# Patient Record
Sex: FEMALE | Race: WHITE | NOT HISPANIC OR LATINO | Employment: UNEMPLOYED | URBAN - METROPOLITAN AREA
[De-identification: names, ages, dates, MRNs, and addresses within clinical notes are randomized per-mention and may not be internally consistent; named-entity substitution may affect disease eponyms.]

---

## 2023-03-09 DIAGNOSIS — I26.99 PE (PULMONARY THROMBOEMBOLISM) (MULTI): ICD-10-CM

## 2023-03-09 DIAGNOSIS — I10 PRIMARY HYPERTENSION: Primary | ICD-10-CM

## 2023-03-09 RX ORDER — AMLODIPINE BESYLATE 5 MG/1
1 TABLET ORAL DAILY
COMMUNITY
Start: 2020-05-14 | End: 2023-03-09 | Stop reason: SDUPTHER

## 2023-03-10 RX ORDER — AMLODIPINE BESYLATE 5 MG/1
5 TABLET ORAL DAILY
Qty: 90 TABLET | Refills: 0 | Status: SHIPPED | OUTPATIENT
Start: 2023-03-10 | End: 2023-03-30 | Stop reason: SDUPTHER

## 2023-03-13 NOTE — TELEPHONE ENCOUNTER
Patient calling for Amlodipine and Eliquis refill. Patient has physical exam appt for the end of this month.

## 2023-03-25 PROBLEM — Z60.9 DECREASED SOCIAL INTERACTION: Status: ACTIVE | Noted: 2023-03-25

## 2023-03-25 PROBLEM — R79.89 LOW VITAMIN D LEVEL: Status: ACTIVE | Noted: 2023-03-25

## 2023-03-25 PROBLEM — R14.0 ABDOMINAL BLOATING: Status: ACTIVE | Noted: 2023-03-25

## 2023-03-25 PROBLEM — M79.18 MUSCULOSKELETAL PAIN: Status: ACTIVE | Noted: 2023-03-25

## 2023-03-25 PROBLEM — D12.6 TUBULAR ADENOMA OF COLON: Status: ACTIVE | Noted: 2023-03-25

## 2023-03-25 PROBLEM — R73.03 PRE-DIABETES: Status: ACTIVE | Noted: 2023-03-25

## 2023-03-25 PROBLEM — R06.02 SOB (SHORTNESS OF BREATH) ON EXERTION: Status: ACTIVE | Noted: 2023-03-25

## 2023-03-25 PROBLEM — I63.9: Status: ACTIVE | Noted: 2023-03-25

## 2023-03-25 PROBLEM — L60.3 DYSTROPHIC NAIL: Status: ACTIVE | Noted: 2023-03-25

## 2023-03-25 PROBLEM — J30.9 ALLERGIC RHINITIS: Status: ACTIVE | Noted: 2023-03-25

## 2023-03-25 PROBLEM — R05.3 CHRONIC COUGH: Status: ACTIVE | Noted: 2023-03-25

## 2023-03-25 PROBLEM — M54.2 CERVICALGIA: Status: ACTIVE | Noted: 2023-03-25

## 2023-03-25 PROBLEM — N60.91 ATYPICAL DUCTAL HYPERPLASIA OF RIGHT BREAST: Status: ACTIVE | Noted: 2023-03-25

## 2023-03-25 PROBLEM — K62.89 ANAL PAIN: Status: ACTIVE | Noted: 2023-03-25

## 2023-03-25 PROBLEM — M20.42 ACQUIRED HAMMERTOES OF BOTH FEET: Status: ACTIVE | Noted: 2023-03-25

## 2023-03-25 PROBLEM — B35.1 ONYCHOMYCOSIS OF TOENAIL: Status: ACTIVE | Noted: 2023-03-25

## 2023-03-25 PROBLEM — R10.9 CHRONIC RIGHT FLANK PAIN: Status: ACTIVE | Noted: 2023-03-25

## 2023-03-25 PROBLEM — B35.3 TINEA PEDIS OF BOTH FEET: Status: ACTIVE | Noted: 2023-03-25

## 2023-03-25 PROBLEM — M79.10 MYALGIA: Status: ACTIVE | Noted: 2023-03-25

## 2023-03-25 PROBLEM — Z86.0100 HISTORY OF COLON POLYPS: Status: ACTIVE | Noted: 2023-03-25

## 2023-03-25 PROBLEM — K58.9 IRRITABLE BOWEL SYNDROME: Status: ACTIVE | Noted: 2023-03-25

## 2023-03-25 PROBLEM — N39.0 URINARY TRACT INFECTION: Status: ACTIVE | Noted: 2023-03-25

## 2023-03-25 PROBLEM — F32.A ANXIETY AND DEPRESSION: Status: ACTIVE | Noted: 2023-03-25

## 2023-03-25 PROBLEM — M20.41 ACQUIRED HAMMERTOES OF BOTH FEET: Status: ACTIVE | Noted: 2023-03-25

## 2023-03-25 PROBLEM — M19.071 LOCALIZED, PRIMARY OSTEOARTHRITIS OF THE ANKLE AND FOOT, RIGHT: Status: ACTIVE | Noted: 2023-03-25

## 2023-03-25 PROBLEM — M54.6 THORACIC BACK PAIN: Status: ACTIVE | Noted: 2023-03-25

## 2023-03-25 PROBLEM — M77.30 HEEL SPUR: Status: ACTIVE | Noted: 2023-03-25

## 2023-03-25 PROBLEM — R93.89 ABNORMAL CT SCAN: Status: ACTIVE | Noted: 2023-03-25

## 2023-03-25 PROBLEM — K13.0 LESION OF LIP: Status: ACTIVE | Noted: 2023-03-25

## 2023-03-25 PROBLEM — R51.9: Status: ACTIVE | Noted: 2023-03-25

## 2023-03-25 PROBLEM — F41.9 ANXIETY AND DEPRESSION: Status: ACTIVE | Noted: 2023-03-25

## 2023-03-25 PROBLEM — K21.9 CHRONIC GERD: Status: ACTIVE | Noted: 2023-03-25

## 2023-03-25 PROBLEM — N63.20 LEFT BREAST MASS: Status: ACTIVE | Noted: 2023-03-25

## 2023-03-25 PROBLEM — B02.9 SHINGLES: Status: ACTIVE | Noted: 2023-03-25

## 2023-03-25 PROBLEM — G89.29 CHRONIC RIGHT FLANK PAIN: Status: ACTIVE | Noted: 2023-03-25

## 2023-03-25 PROBLEM — R53.83 FATIGUE: Status: ACTIVE | Noted: 2023-03-25

## 2023-03-25 PROBLEM — R10.9 ABDOMINAL PAIN: Status: ACTIVE | Noted: 2023-03-25

## 2023-03-25 PROBLEM — M19.072 LOCALIZED, PRIMARY OSTEOARTHRITIS OF THE ANKLE AND FOOT, LEFT: Status: ACTIVE | Noted: 2023-03-25

## 2023-03-25 PROBLEM — I10 UNCONTROLLED HYPERTENSION: Status: ACTIVE | Noted: 2023-03-25

## 2023-03-25 PROBLEM — Q21.12 PFO (PATENT FORAMEN OVALE) (HHS-HCC): Status: ACTIVE | Noted: 2023-03-25

## 2023-03-25 PROBLEM — R01.1 FLOW MURMUR: Status: ACTIVE | Noted: 2023-03-25

## 2023-03-25 PROBLEM — I63.9 STROKE (MULTI): Status: ACTIVE | Noted: 2023-03-25

## 2023-03-25 PROBLEM — Z86.010 HISTORY OF COLON POLYPS: Status: ACTIVE | Noted: 2023-03-25

## 2023-03-25 PROBLEM — E55.9 VITAMIN D DEFICIENCY: Status: ACTIVE | Noted: 2023-03-25

## 2023-03-25 PROBLEM — R31.29 MICROSCOPIC HEMATURIA: Status: ACTIVE | Noted: 2023-03-25

## 2023-03-25 PROBLEM — R00.2 PALPITATIONS: Status: ACTIVE | Noted: 2023-03-25

## 2023-03-25 PROBLEM — H92.01 OTALGIA OF RIGHT EAR: Status: ACTIVE | Noted: 2023-03-25

## 2023-03-25 PROBLEM — R07.89 ATYPICAL CHEST PAIN: Status: ACTIVE | Noted: 2023-03-25

## 2023-03-25 PROBLEM — R42 DIZZINESS: Status: ACTIVE | Noted: 2023-03-25

## 2023-03-25 PROBLEM — H93.A3 SUBJECTIVE PULSATILE TINNITUS OF BOTH EARS: Status: ACTIVE | Noted: 2023-03-25

## 2023-03-25 PROBLEM — J32.9 CHRONIC SINUSITIS: Status: ACTIVE | Noted: 2023-03-25

## 2023-03-25 PROBLEM — R51.9 PRESSURE IN HEAD: Status: ACTIVE | Noted: 2023-03-25

## 2023-03-25 PROBLEM — E78.00 ELEVATED LDL CHOLESTEROL LEVEL: Status: ACTIVE | Noted: 2023-03-25

## 2023-03-25 PROBLEM — R92.8 ABNORMAL MAMMOGRAM: Status: ACTIVE | Noted: 2023-03-25

## 2023-03-25 PROBLEM — I26.99 PULMONARY EMBOLISM (MULTI): Status: ACTIVE | Noted: 2023-03-25

## 2023-03-25 RX ORDER — EZETIMIBE AND SIMVASTATIN 10; 10 MG/1; MG/1
1 TABLET ORAL EVERY EVENING
COMMUNITY
Start: 2022-03-16 | End: 2023-03-30 | Stop reason: ALTCHOICE

## 2023-03-25 RX ORDER — ASPIRIN 81 MG/1
1 TABLET ORAL DAILY
COMMUNITY
Start: 2021-10-27 | End: 2023-03-30 | Stop reason: ALTCHOICE

## 2023-03-25 RX ORDER — MULTIVITAMIN
1 TABLET ORAL DAILY
COMMUNITY
Start: 2022-10-12 | End: 2023-11-13 | Stop reason: ALTCHOICE

## 2023-03-25 RX ORDER — VIT C/E/ZN/COPPR/LUTEIN/ZEAXAN 250MG-90MG
1 CAPSULE ORAL DAILY
COMMUNITY
Start: 2022-10-12 | End: 2023-10-26 | Stop reason: SDUPTHER

## 2023-03-30 ENCOUNTER — OFFICE VISIT (OUTPATIENT)
Dept: PRIMARY CARE | Facility: CLINIC | Age: 63
End: 2023-03-30
Payer: COMMERCIAL

## 2023-03-30 VITALS
HEART RATE: 71 BPM | TEMPERATURE: 97.9 F | SYSTOLIC BLOOD PRESSURE: 147 MMHG | BODY MASS INDEX: 30.37 KG/M2 | DIASTOLIC BLOOD PRESSURE: 91 MMHG | WEIGHT: 171.4 LBS | HEIGHT: 63 IN

## 2023-03-30 DIAGNOSIS — Z00.00 ENCOUNTER FOR GENERAL ADULT MEDICAL EXAMINATION WITHOUT ABNORMAL FINDINGS: ICD-10-CM

## 2023-03-30 DIAGNOSIS — G89.29 CHRONIC PAIN OF BOTH KNEES: ICD-10-CM

## 2023-03-30 DIAGNOSIS — M25.561 CHRONIC PAIN OF BOTH KNEES: ICD-10-CM

## 2023-03-30 DIAGNOSIS — I63.9 CEREBRAL INFARCTION, UNSPECIFIED (MULTI): ICD-10-CM

## 2023-03-30 DIAGNOSIS — N63.20 MASS OF LEFT BREAST, UNSPECIFIED QUADRANT: Primary | ICD-10-CM

## 2023-03-30 DIAGNOSIS — I10 PRIMARY HYPERTENSION: ICD-10-CM

## 2023-03-30 DIAGNOSIS — Z11.59 NEED FOR HEPATITIS C SCREENING TEST: ICD-10-CM

## 2023-03-30 DIAGNOSIS — I26.99 PE (PULMONARY THROMBOEMBOLISM) (MULTI): ICD-10-CM

## 2023-03-30 DIAGNOSIS — Z11.4 ENCOUNTER FOR SCREENING FOR HIV: ICD-10-CM

## 2023-03-30 DIAGNOSIS — Z12.11 COLON CANCER SCREENING: ICD-10-CM

## 2023-03-30 DIAGNOSIS — Z78.0 ASYMPTOMATIC MENOPAUSE: ICD-10-CM

## 2023-03-30 DIAGNOSIS — M25.562 CHRONIC PAIN OF BOTH KNEES: ICD-10-CM

## 2023-03-30 PROBLEM — R42 DIZZINESS: Status: RESOLVED | Noted: 2023-03-25 | Resolved: 2023-03-30

## 2023-03-30 PROBLEM — H92.01 OTALGIA OF RIGHT EAR: Status: RESOLVED | Noted: 2023-03-25 | Resolved: 2023-03-30

## 2023-03-30 PROBLEM — R07.89 ATYPICAL CHEST PAIN: Status: RESOLVED | Noted: 2023-03-25 | Resolved: 2023-03-30

## 2023-03-30 PROBLEM — K13.0 LESION OF LIP: Status: RESOLVED | Noted: 2023-03-25 | Resolved: 2023-03-30

## 2023-03-30 PROBLEM — R53.83 FATIGUE: Status: RESOLVED | Noted: 2023-03-25 | Resolved: 2023-03-30

## 2023-03-30 PROCEDURE — 99396 PREV VISIT EST AGE 40-64: CPT | Performed by: FAMILY MEDICINE

## 2023-03-30 PROCEDURE — 3077F SYST BP >= 140 MM HG: CPT | Performed by: FAMILY MEDICINE

## 2023-03-30 PROCEDURE — 3080F DIAST BP >= 90 MM HG: CPT | Performed by: FAMILY MEDICINE

## 2023-03-30 PROCEDURE — 1036F TOBACCO NON-USER: CPT | Performed by: FAMILY MEDICINE

## 2023-03-30 RX ORDER — EZETIMIBE AND SIMVASTATIN 10; 10 MG/1; MG/1
1 TABLET ORAL EVERY EVENING
Qty: 90 TABLET | Refills: 1 | Status: SHIPPED | OUTPATIENT
Start: 2023-03-30 | End: 2023-07-24 | Stop reason: SDUPTHER

## 2023-03-30 RX ORDER — AMLODIPINE BESYLATE 5 MG/1
5 TABLET ORAL DAILY
Qty: 90 TABLET | Refills: 1 | Status: SHIPPED | OUTPATIENT
Start: 2023-03-30 | End: 2023-10-26 | Stop reason: SDUPTHER

## 2023-03-30 RX ORDER — ASPIRIN 81 MG/1
81 TABLET ORAL DAILY
Qty: 90 TABLET | Refills: 1 | Status: SHIPPED | OUTPATIENT
Start: 2023-03-30 | End: 2023-07-24 | Stop reason: SDUPTHER

## 2023-03-30 ASSESSMENT — ENCOUNTER SYMPTOMS
DEPRESSION: 0
OCCASIONAL FEELINGS OF UNSTEADINESS: 0
LOSS OF SENSATION IN FEET: 0

## 2023-03-30 ASSESSMENT — PAIN SCALES - GENERAL: PAINLEVEL: 6

## 2023-03-30 NOTE — PROGRESS NOTES
Subjective   Patient ID: Cassandra Lang is a 62 y.o. female who presents for Annual Exam.  HPI  The patient is here for her CPE.  No major concerns otherwise.    Mammogram 1/2023  Colonoscopy 2021: 1 tubular adenoma- repeat in 5y.  Dexa scan ordered today.  Pap smear 5/2019- wnl- no HPV.  Blood and FIT ordered.     She also has bilateral knee pain and gained about 10 lbs over the last year.    A review of system was completed.  All systems were reviewed and were normal, except for the ones that are listed in the HPI.    Objective   Physical Exam    Assessment/Plan   Problem List Items Addressed This Visit          Other    Asymptomatic menopause    Relevant Orders    XR DEXA bone density    Colon cancer screening    Relevant Orders    Fecal Occult Blood Immunoassy    Encounter for screening for HIV    Relevant Orders    HIV 1/2 Antigen/Antibody Screen with Reflex to Confirmation    Need for hepatitis C screening test    Relevant Orders    Hepatitis C Antibody    RESOLVED: Left breast mass - Primary     Other Visit Diagnoses       Primary hypertension        Relevant Medications    amLODIPine (Norvasc) 5 mg tablet    Other Relevant Orders    CBC    Comprehensive Metabolic Panel    Hemoglobin A1C    Lipid Panel    TSH with reflex to Free T4 if abnormal    PE (pulmonary thromboembolism) (CMS/HCC)        Relevant Medications    apixaban (Eliquis) 5 mg tablet    Encounter for general adult medical examination without abnormal findings        Relevant Medications    aspirin 81 mg EC tablet    Cerebral infarction, unspecified (CMS/HCC)        Relevant Medications    ezetimibe-simvastatin (Vytorin) 10-10 mg tablet        1- CPE:  Mammogram 1/2023  Colonoscopy 2021: 1 tubular adenoma- repeat in 5y.  Dexa scan ordered today.  Pap smear 5/2019- wnl- no HPV.  Blood and FIT ordered.      2-  Bilateral knee pain from recent weight gain  Weight loss recommended.    3- Uncontrolled HTN.  - Patient declined an increased of the  Amlodipine 5 mg every day current dose.  Weight loss and life style changes discussed.

## 2023-03-31 ENCOUNTER — LAB (OUTPATIENT)
Dept: LAB | Facility: LAB | Age: 63
End: 2023-03-31
Payer: COMMERCIAL

## 2023-03-31 DIAGNOSIS — Z11.4 ENCOUNTER FOR SCREENING FOR HIV: ICD-10-CM

## 2023-03-31 DIAGNOSIS — Z11.59 NEED FOR HEPATITIS C SCREENING TEST: ICD-10-CM

## 2023-03-31 DIAGNOSIS — I10 PRIMARY HYPERTENSION: ICD-10-CM

## 2023-03-31 LAB
ALANINE AMINOTRANSFERASE (SGPT) (U/L) IN SER/PLAS: 18 U/L (ref 7–45)
ALBUMIN (G/DL) IN SER/PLAS: 4.4 G/DL (ref 3.4–5)
ALKALINE PHOSPHATASE (U/L) IN SER/PLAS: 70 U/L (ref 33–136)
ANION GAP IN SER/PLAS: 13 MMOL/L (ref 10–20)
ASPARTATE AMINOTRANSFERASE (SGOT) (U/L) IN SER/PLAS: 16 U/L (ref 9–39)
BILIRUBIN TOTAL (MG/DL) IN SER/PLAS: 0.7 MG/DL (ref 0–1.2)
CALCIUM (MG/DL) IN SER/PLAS: 9.3 MG/DL (ref 8.6–10.3)
CARBON DIOXIDE, TOTAL (MMOL/L) IN SER/PLAS: 26 MMOL/L (ref 21–32)
CHLORIDE (MMOL/L) IN SER/PLAS: 106 MMOL/L (ref 98–107)
CHOLESTEROL (MG/DL) IN SER/PLAS: 188 MG/DL (ref 0–199)
CHOLESTEROL IN HDL (MG/DL) IN SER/PLAS: 63.5 MG/DL
CHOLESTEROL/HDL RATIO: 3
CREATININE (MG/DL) IN SER/PLAS: 0.72 MG/DL (ref 0.5–1.05)
ERYTHROCYTE DISTRIBUTION WIDTH (RATIO) BY AUTOMATED COUNT: 13 % (ref 11.5–14.5)
ERYTHROCYTE MEAN CORPUSCULAR HEMOGLOBIN CONCENTRATION (G/DL) BY AUTOMATED: 32.2 G/DL (ref 32–36)
ERYTHROCYTE MEAN CORPUSCULAR VOLUME (FL) BY AUTOMATED COUNT: 83 FL (ref 80–100)
ERYTHROCYTES (10*6/UL) IN BLOOD BY AUTOMATED COUNT: 5.29 X10E12/L (ref 4–5.2)
ESTIMATED AVERAGE GLUCOSE FOR HBA1C: 128 MG/DL
GFR FEMALE: >90 ML/MIN/1.73M2
GLUCOSE (MG/DL) IN SER/PLAS: 121 MG/DL (ref 74–99)
HEMATOCRIT (%) IN BLOOD BY AUTOMATED COUNT: 43.8 % (ref 36–46)
HEMOGLOBIN (G/DL) IN BLOOD: 14.1 G/DL (ref 12–16)
HEMOGLOBIN A1C/HEMOGLOBIN TOTAL IN BLOOD: 6.1 %
HEPATITIS C VIRUS AB PRESENCE IN SERUM: NONREACTIVE
HIV 1/ 2 AG/AB SCREEN: NONREACTIVE
LDL: 99 MG/DL (ref 0–99)
LEUKOCYTES (10*3/UL) IN BLOOD BY AUTOMATED COUNT: 4.3 X10E9/L (ref 4.4–11.3)
NRBC (PER 100 WBCS) BY AUTOMATED COUNT: 0 /100 WBC (ref 0–0)
PLATELETS (10*3/UL) IN BLOOD AUTOMATED COUNT: 271 X10E9/L (ref 150–450)
POTASSIUM (MMOL/L) IN SER/PLAS: 4 MMOL/L (ref 3.5–5.3)
PROTEIN TOTAL: 6.6 G/DL (ref 6.4–8.2)
SODIUM (MMOL/L) IN SER/PLAS: 141 MMOL/L (ref 136–145)
THYROTROPIN (MIU/L) IN SER/PLAS BY DETECTION LIMIT <= 0.05 MIU/L: 1.41 MIU/L (ref 0.44–3.98)
TRIGLYCERIDE (MG/DL) IN SER/PLAS: 129 MG/DL (ref 0–149)
UREA NITROGEN (MG/DL) IN SER/PLAS: 14 MG/DL (ref 6–23)
VLDL: 26 MG/DL (ref 0–40)

## 2023-03-31 PROCEDURE — 87389 HIV-1 AG W/HIV-1&-2 AB AG IA: CPT

## 2023-03-31 PROCEDURE — 85027 COMPLETE CBC AUTOMATED: CPT

## 2023-03-31 PROCEDURE — 80061 LIPID PANEL: CPT

## 2023-03-31 PROCEDURE — 36415 COLL VENOUS BLD VENIPUNCTURE: CPT

## 2023-03-31 PROCEDURE — 80053 COMPREHEN METABOLIC PANEL: CPT

## 2023-03-31 PROCEDURE — 83036 HEMOGLOBIN GLYCOSYLATED A1C: CPT

## 2023-03-31 PROCEDURE — 86803 HEPATITIS C AB TEST: CPT

## 2023-03-31 PROCEDURE — 84443 ASSAY THYROID STIM HORMONE: CPT

## 2023-04-03 ENCOUNTER — LAB (OUTPATIENT)
Dept: LAB | Facility: LAB | Age: 63
End: 2023-04-03
Payer: COMMERCIAL

## 2023-04-03 DIAGNOSIS — Z12.11 COLON CANCER SCREENING: ICD-10-CM

## 2023-06-28 ENCOUNTER — OFFICE VISIT (OUTPATIENT)
Dept: PRIMARY CARE | Facility: CLINIC | Age: 63
End: 2023-06-28
Payer: COMMERCIAL

## 2023-06-28 VITALS
HEART RATE: 89 BPM | DIASTOLIC BLOOD PRESSURE: 80 MMHG | SYSTOLIC BLOOD PRESSURE: 137 MMHG | HEIGHT: 63 IN | BODY MASS INDEX: 30.51 KG/M2 | WEIGHT: 172.2 LBS

## 2023-06-28 DIAGNOSIS — L23.9 ALLERGIC DERMATITIS: Primary | ICD-10-CM

## 2023-06-28 PROCEDURE — 3075F SYST BP GE 130 - 139MM HG: CPT | Performed by: FAMILY MEDICINE

## 2023-06-28 PROCEDURE — 99214 OFFICE O/P EST MOD 30 MIN: CPT | Performed by: FAMILY MEDICINE

## 2023-06-28 PROCEDURE — 1036F TOBACCO NON-USER: CPT | Performed by: FAMILY MEDICINE

## 2023-06-28 PROCEDURE — 3079F DIAST BP 80-89 MM HG: CPT | Performed by: FAMILY MEDICINE

## 2023-06-28 RX ORDER — PREDNISONE 20 MG/1
20 TABLET ORAL DAILY
Qty: 5 TABLET | Refills: 0 | Status: SHIPPED | OUTPATIENT
Start: 2023-06-28 | End: 2023-07-03

## 2023-06-28 RX ORDER — LORATADINE 10 MG/1
10 TABLET ORAL DAILY
Qty: 30 TABLET | Refills: 2 | Status: SHIPPED | OUTPATIENT
Start: 2023-06-28 | End: 2023-07-30

## 2023-06-28 NOTE — ASSESSMENT & PLAN NOTE
-stop vitamin E today.  -Prednisone 20 mg every day for 5 days.  -Loratadine 10 mg every day started today.  -stool for ova and parasites.

## 2023-06-28 NOTE — PROGRESS NOTES
Subjective   Patient ID: Cassandra Lang is a 63 y.o. female who presents for Rash.  Rash        The patient is here for the management of a new generalized pruritus that started at least two weeks ago.  She thinks that it started shortly after starting a vitamin E supplement.     A review of system was completed.  All systems were reviewed and were normal, except for the ones that are listed in the HPI.    Objective   Physical Exam  Constitutional:       Appearance: Normal appearance.   HENT:      Head: Normocephalic and atraumatic.      Right Ear: Tympanic membrane, ear canal and external ear normal.      Left Ear: Tympanic membrane, ear canal and external ear normal.      Nose: Nose normal.      Mouth/Throat:      Mouth: Mucous membranes are moist.      Pharynx: Oropharynx is clear.   Eyes:      Extraocular Movements: Extraocular movements intact.      Conjunctiva/sclera: Conjunctivae normal.      Pupils: Pupils are equal, round, and reactive to light.   Cardiovascular:      Rate and Rhythm: Normal rate and regular rhythm.      Pulses: Normal pulses.   Pulmonary:      Effort: Pulmonary effort is normal.      Breath sounds: Normal breath sounds.   Abdominal:      General: Abdomen is flat. Bowel sounds are normal.      Palpations: Abdomen is soft.   Musculoskeletal:         General: Normal range of motion.      Cervical back: Normal range of motion and neck supple.   Skin:     General: Skin is warm.   Neurological:      General: No focal deficit present.      Mental Status: She is alert and oriented to person, place, and time. Mental status is at baseline.   Psychiatric:         Mood and Affect: Mood normal.         Behavior: Behavior normal.         Thought Content: Thought content normal.         Judgment: Judgment normal.         Assessment/Plan   Problem List Items Addressed This Visit          Skin    Allergic dermatitis - Primary     -stop vitamin E today.  -Prednisone 20 mg every day for 5  days.  -Loratadine 10 mg every day started today.  -stool for ova and parasites.          Relevant Medications    predniSONE (Deltasone) 20 mg tablet    loratadine (Claritin) 10 mg tablet    Other Relevant Orders    Referral to Allergy    Ova/Para + Giardia/Cryptosporidium Antigen

## 2023-07-18 ENCOUNTER — LAB (OUTPATIENT)
Dept: LAB | Facility: LAB | Age: 63
End: 2023-07-18
Payer: COMMERCIAL

## 2023-07-18 DIAGNOSIS — L23.9 ALLERGIC DERMATITIS: ICD-10-CM

## 2023-07-18 PROCEDURE — 87328 CRYPTOSPORIDIUM AG IA: CPT

## 2023-07-18 PROCEDURE — 87177 OVA AND PARASITES SMEARS: CPT

## 2023-07-18 PROCEDURE — 87329 GIARDIA AG IA: CPT

## 2023-07-22 DIAGNOSIS — L23.9 ALLERGIC DERMATITIS: ICD-10-CM

## 2023-07-24 ENCOUNTER — OFFICE VISIT (OUTPATIENT)
Dept: PRIMARY CARE | Facility: CLINIC | Age: 63
End: 2023-07-24
Payer: COMMERCIAL

## 2023-07-24 VITALS
SYSTOLIC BLOOD PRESSURE: 134 MMHG | DIASTOLIC BLOOD PRESSURE: 93 MMHG | HEART RATE: 94 BPM | WEIGHT: 172.8 LBS | TEMPERATURE: 98 F | BODY MASS INDEX: 30.61 KG/M2

## 2023-07-24 DIAGNOSIS — E78.5 HYPERLIPIDEMIA, UNSPECIFIED HYPERLIPIDEMIA TYPE: ICD-10-CM

## 2023-07-24 DIAGNOSIS — I63.9 CEREBRAL INFARCTION, UNSPECIFIED (MULTI): ICD-10-CM

## 2023-07-24 DIAGNOSIS — R73.03 PRE-DIABETES: ICD-10-CM

## 2023-07-24 DIAGNOSIS — Z00.00 ENCOUNTER FOR GENERAL ADULT MEDICAL EXAMINATION WITHOUT ABNORMAL FINDINGS: ICD-10-CM

## 2023-07-24 DIAGNOSIS — M54.6 ACUTE RIGHT-SIDED THORACIC BACK PAIN: Primary | ICD-10-CM

## 2023-07-24 DIAGNOSIS — I26.99 PE (PULMONARY THROMBOEMBOLISM) (MULTI): ICD-10-CM

## 2023-07-24 DIAGNOSIS — I10 PRIMARY HYPERTENSION: ICD-10-CM

## 2023-07-24 LAB
CRYPTOSPORIDIUM ANTIGEN-DATA CONVERSION: NEGATIVE
GIARDIA LAMBLIA AG-DATA CONVERSION: NEGATIVE
OVA + PARASITE EXAM: NEGATIVE

## 2023-07-24 PROCEDURE — 99214 OFFICE O/P EST MOD 30 MIN: CPT | Performed by: FAMILY MEDICINE

## 2023-07-24 PROCEDURE — 3080F DIAST BP >= 90 MM HG: CPT | Performed by: FAMILY MEDICINE

## 2023-07-24 PROCEDURE — 3075F SYST BP GE 130 - 139MM HG: CPT | Performed by: FAMILY MEDICINE

## 2023-07-24 PROCEDURE — 1036F TOBACCO NON-USER: CPT | Performed by: FAMILY MEDICINE

## 2023-07-24 RX ORDER — EZETIMIBE AND SIMVASTATIN 10; 10 MG/1; MG/1
1 TABLET ORAL EVERY EVENING
Qty: 90 TABLET | Refills: 1 | Status: SHIPPED | OUTPATIENT
Start: 2023-07-24 | End: 2023-10-26 | Stop reason: SDUPTHER

## 2023-07-24 RX ORDER — ASPIRIN 81 MG/1
81 TABLET ORAL DAILY
Qty: 90 TABLET | Refills: 1 | Status: SHIPPED | OUTPATIENT
Start: 2023-07-24 | End: 2023-10-26 | Stop reason: SDUPTHER

## 2023-07-24 RX ORDER — METHOCARBAMOL 500 MG/1
TABLET, FILM COATED ORAL
COMMUNITY
End: 2023-07-24 | Stop reason: SDUPTHER

## 2023-07-24 RX ORDER — AMLODIPINE BESYLATE 5 MG/1
5 TABLET ORAL DAILY
Qty: 90 TABLET | Refills: 1 | Status: ON HOLD | OUTPATIENT
Start: 2023-07-24 | End: 2023-10-02 | Stop reason: SDUPTHER

## 2023-07-24 RX ORDER — METHOCARBAMOL 500 MG/1
TABLET, FILM COATED ORAL
Qty: 20 TABLET | Refills: 0 | Status: SHIPPED | OUTPATIENT
Start: 2023-07-24 | End: 2023-11-13 | Stop reason: ALTCHOICE

## 2023-07-24 NOTE — PROGRESS NOTES
Subjective   Patient ID: Cassandra Lang is a 63 y.o. female who presents for Hospital Follow-up.  HPI  The patient is here for:  1-  her post hospitalization follow-up visit.  She went in for a right upper and para thoracic pain/ spasms.  She was treated with methocarbamol and lidocaine patch.  The pain improved but is occasionally still present.  2-  Crawling sensation under the skin.  Stool for Ova and parasites were normal.  3- A1C and lipid panel.     A review of system was completed.  All systems were reviewed and were normal, except for the ones that are listed in the HPI.    Objective   Physical Exam  Constitutional:       Appearance: Normal appearance.   HENT:      Head: Normocephalic and atraumatic.      Right Ear: Tympanic membrane, ear canal and external ear normal.      Left Ear: Tympanic membrane, ear canal and external ear normal.      Nose: Nose normal.      Mouth/Throat:      Mouth: Mucous membranes are moist.      Pharynx: Oropharynx is clear.   Eyes:      Extraocular Movements: Extraocular movements intact.      Conjunctiva/sclera: Conjunctivae normal.      Pupils: Pupils are equal, round, and reactive to light.   Cardiovascular:      Rate and Rhythm: Normal rate and regular rhythm.      Pulses: Normal pulses.   Pulmonary:      Effort: Pulmonary effort is normal.      Breath sounds: Normal breath sounds.   Abdominal:      General: Abdomen is flat. Bowel sounds are normal.      Palpations: Abdomen is soft.   Musculoskeletal:         General: Normal range of motion.      Cervical back: Normal range of motion and neck supple.      Comments: Right para scapula tenderness to palpation.  No paresthesia.    Skin:     General: Skin is warm.   Neurological:      General: No focal deficit present.      Mental Status: She is alert and oriented to person, place, and time. Mental status is at baseline.   Psychiatric:         Mood and Affect: Mood normal.         Behavior: Behavior normal.         Thought  Content: Thought content normal.         Judgment: Judgment normal.         Assessment/Plan   Problem List Items Addressed This Visit       Pre-diabetes     -Diet and life style changes discussed.  Continue statin.  -Blood test ordered.          Relevant Orders    Hemoglobin A1C    Thoracic back pain - Primary     -possible muscle strain.  Tylenol and Methocarbamol PRN started.  -Lidocaine patches PRN.          Relevant Medications    methocarbamol (Robaxin) 500 mg tablet    Hyperlipidemia     -diet and life style changes continue.  -statin started.          Relevant Orders    Lipid Panel     Other Visit Diagnoses       Cerebral infarction, unspecified (CMS/HCC)        Relevant Medications    ezetimibe-simvastatin (Vytorin) 10-10 mg tablet    PE (pulmonary thromboembolism) (CMS/HCC)        Relevant Medications    apixaban (Eliquis) 5 mg tablet    Primary hypertension        Relevant Medications    amLODIPine (Norvasc) 5 mg tablet    Other Relevant Orders    Comprehensive Metabolic Panel    Encounter for general adult medical examination without abnormal findings        Relevant Medications    aspirin 81 mg EC tablet

## 2023-07-30 RX ORDER — LORATADINE 10 MG/1
10 TABLET ORAL DAILY
Qty: 30 TABLET | Refills: 2 | Status: SHIPPED | OUTPATIENT
Start: 2023-07-30 | End: 2023-11-13 | Stop reason: ALTCHOICE

## 2023-09-06 ENCOUNTER — HOSPITAL ENCOUNTER (OUTPATIENT)
Dept: DATA CONVERSION | Facility: HOSPITAL | Age: 63
End: 2023-09-06
Attending: SURGERY | Admitting: SURGERY
Payer: COMMERCIAL

## 2023-09-06 DIAGNOSIS — N60.91 UNSPECIFIED BENIGN MAMMARY DYSPLASIA OF RIGHT BREAST: ICD-10-CM

## 2023-09-13 LAB
COMPLETE PATHOLOGY REPORT: NORMAL
CONVERTED ADDENDUM DIAGNOSIS 2: NORMAL
CONVERTED CLINICAL DIAGNOSIS-HISTORY: NORMAL
CONVERTED FINAL DIAGNOSIS: NORMAL
CONVERTED FINAL REPORT PDF LINK TO COPY AND PASTE: NORMAL
CONVERTED GROSS DESCRIPTION: NORMAL
CONVERTED SYNOPTIC DIAGNOSIS: NORMAL

## 2023-09-29 VITALS — WEIGHT: 171.96 LBS | HEIGHT: 64 IN | BODY MASS INDEX: 29.36 KG/M2

## 2023-09-30 ENCOUNTER — ANESTHESIA EVENT (OUTPATIENT)
Dept: OPERATING ROOM | Facility: HOSPITAL | Age: 63
End: 2023-09-30
Payer: COMMERCIAL

## 2023-09-30 NOTE — H&P
History & Physical Reviewed:   I have reviewed the History and Physical dated:  08-Aug-2023   History and Physical reviewed and relevant findings noted. Patient examined to review pertinent physical  findings.: No significant changes   Home Medications Reviewed: no changes noted   Allergies Reviewed: no changes noted       ERAS (Enhanced Recovery After Surgery):  ·  ERAS Patient: no     Consent:   COVID-19 Consent:  ·  COVID-19 Risk Consent Surgeon has reviewed key risks related to the risk of ebony COVID-19 and if they contract COVID-19 what the risks are.       Electronic Signatures:  Gricelda Soria)  (Signed 06-Sep-2023 06:58)   Authored: History & Physical Reviewed, ERAS, Consent,  Note Completion      Last Updated: 06-Sep-2023 06:58 by Gricelda Soria)

## 2023-10-01 ENCOUNTER — PREP FOR PROCEDURE (OUTPATIENT)
Dept: SURGICAL ONCOLOGY | Facility: CLINIC | Age: 63
End: 2023-10-01
Payer: COMMERCIAL

## 2023-10-01 DIAGNOSIS — Z17.1 MALIGNANT NEOPLASM OF UPPER-OUTER QUADRANT OF RIGHT BREAST IN FEMALE, ESTROGEN RECEPTOR NEGATIVE (MULTI): Primary | ICD-10-CM

## 2023-10-01 DIAGNOSIS — C50.411 MALIGNANT NEOPLASM OF UPPER-OUTER QUADRANT OF RIGHT BREAST IN FEMALE, ESTROGEN RECEPTOR NEGATIVE (MULTI): Primary | ICD-10-CM

## 2023-10-01 RX ORDER — ONDANSETRON HYDROCHLORIDE 2 MG/ML
4 INJECTION, SOLUTION INTRAVENOUS ONCE AS NEEDED
Status: CANCELLED | OUTPATIENT
Start: 2023-10-01

## 2023-10-01 RX ORDER — ACETAMINOPHEN 325 MG/1
975 TABLET ORAL ONCE
Status: CANCELLED | OUTPATIENT
Start: 2023-10-01 | End: 2023-10-01

## 2023-10-01 RX ORDER — SODIUM CHLORIDE, SODIUM LACTATE, POTASSIUM CHLORIDE, CALCIUM CHLORIDE 600; 310; 30; 20 MG/100ML; MG/100ML; MG/100ML; MG/100ML
100 INJECTION, SOLUTION INTRAVENOUS CONTINUOUS
Status: CANCELLED | OUTPATIENT
Start: 2023-10-01

## 2023-10-01 RX ORDER — LIDOCAINE HYDROCHLORIDE 10 MG/ML
0.1 INJECTION, SOLUTION EPIDURAL; INFILTRATION; INTRACAUDAL; PERINEURAL ONCE
Status: CANCELLED | OUTPATIENT
Start: 2023-10-01 | End: 2023-10-01

## 2023-10-01 RX ORDER — DROPERIDOL 2.5 MG/ML
0.62 INJECTION, SOLUTION INTRAMUSCULAR; INTRAVENOUS ONCE AS NEEDED
Status: CANCELLED | OUTPATIENT
Start: 2023-10-01

## 2023-10-01 RX ORDER — APREPITANT 40 MG/1
40 CAPSULE ORAL ONCE
Status: CANCELLED | OUTPATIENT
Start: 2023-10-01 | End: 2023-10-01

## 2023-10-01 RX ORDER — SODIUM CHLORIDE, SODIUM LACTATE, POTASSIUM CHLORIDE, CALCIUM CHLORIDE 600; 310; 30; 20 MG/100ML; MG/100ML; MG/100ML; MG/100ML
INJECTION, SOLUTION INTRAVENOUS CONTINUOUS
Status: CANCELLED | OUTPATIENT
Start: 2023-10-01

## 2023-10-01 RX ORDER — ALBUTEROL SULFATE 0.83 MG/ML
2.5 SOLUTION RESPIRATORY (INHALATION) ONCE AS NEEDED
Status: CANCELLED | OUTPATIENT
Start: 2023-10-01

## 2023-10-01 RX ORDER — MIDAZOLAM HYDROCHLORIDE 1 MG/ML
1 INJECTION INTRAMUSCULAR; INTRAVENOUS ONCE AS NEEDED
Status: CANCELLED | OUTPATIENT
Start: 2023-10-01

## 2023-10-01 RX ORDER — SODIUM CHLORIDE, SODIUM LACTATE, POTASSIUM CHLORIDE, CALCIUM CHLORIDE 600; 310; 30; 20 MG/100ML; MG/100ML; MG/100ML; MG/100ML
50 INJECTION, SOLUTION INTRAVENOUS CONTINUOUS
Status: CANCELLED | OUTPATIENT
Start: 2023-10-02

## 2023-10-01 RX ORDER — MEPERIDINE HYDROCHLORIDE 25 MG/ML
12.5 INJECTION INTRAMUSCULAR; INTRAVENOUS; SUBCUTANEOUS EVERY 10 MIN PRN
Status: CANCELLED | OUTPATIENT
Start: 2023-10-01

## 2023-10-01 RX ORDER — OXYCODONE HYDROCHLORIDE 5 MG/1
5 TABLET ORAL EVERY 4 HOURS PRN
Status: CANCELLED | OUTPATIENT
Start: 2023-10-01

## 2023-10-01 RX ORDER — LIDOCAINE HYDROCHLORIDE 10 MG/ML
INJECTION, SOLUTION EPIDURAL; INFILTRATION; INTRACAUDAL; PERINEURAL ONCE
Status: CANCELLED | OUTPATIENT
Start: 2023-10-01 | End: 2023-10-01

## 2023-10-01 RX ORDER — DIPHENHYDRAMINE HYDROCHLORIDE 50 MG/ML
12.5 INJECTION INTRAMUSCULAR; INTRAVENOUS ONCE AS NEEDED
Status: CANCELLED | OUTPATIENT
Start: 2023-10-01

## 2023-10-01 RX ORDER — HYDRALAZINE HYDROCHLORIDE 20 MG/ML
5 INJECTION INTRAMUSCULAR; INTRAVENOUS EVERY 30 MIN PRN
Status: CANCELLED | OUTPATIENT
Start: 2023-10-01

## 2023-10-01 RX ORDER — LABETALOL HYDROCHLORIDE 5 MG/ML
5 INJECTION, SOLUTION INTRAVENOUS ONCE AS NEEDED
Status: CANCELLED | OUTPATIENT
Start: 2023-10-01

## 2023-10-01 NOTE — OP NOTE
"      PROCEDURE DETAILS    Preoperative Diagnosis:  right ADH    Postoperative Diagnosis:  right ADH    Surgeon: Gricelda Soria  Resident/Fellow/Other Assistant: Maik Warner    Procedure:  1. Right Breast Magseed Excisional Biopsy  2. 80850 interpretation of specimen radiograph    Anesthesia: Mike Dickson  Estimated Blood Loss: 5 mL  Findings: specimen radiograph confirmed removal of distortion, biopsy clip, and Magseed  Specimens(s) Collected: yes,  right Magseed excisional biopsy at 10:00 5cm FN, short stitch superior, long stitch lateral    Complications: none  Drains and/or Catheters: none  Implants: none  Patient Returned To/Condition: to PACU in excellent condition        Operative Report:   After informed consent was obtained and  the appropriate breast was marked the patient was transferred to the operating room.  She was positioned on the operating room table in a supine position with her pressure points padded appropriately. A surgical time out was performed with the OR team.   General anesthesia was induced and she received perioperative antibiotics in the form of Ancef.    I reviewed the preoperative imaging and confirmed that she had 1 MagSeed in her right breast at the 10 o'clock position localizing the biopsy clip and distortion.  Her right breast and axilla were prepped and draped in a sterile fashion.  Using the probe,  I was able to confirm the location of the MagSeed.  I made a radial incision. Dissection was taken down through her soft tissues using electrocautery.  A skin flap was raised superiorly and I excised a 3x2x2 cm specimen of breast tissue that contained  the MagSeed.  I confirmed the seed was within the excision specimen using the probe. The specimen was inked on all 6 sides and sent to Pathology fresh labeled \"right Magseed excisional biopsy at 10:00 5cm FN, short stitch superior, long stitch lateral.\"    I reviewed the specimen radiograph myself.  I confirmed the excision specimen " contained the MagSeed and the biopsy clip.  The cavity was irrigated.  Hemostasis was achieved with electrocautery and clips. There was one very large vessel posterior to the excision that was secured with clips. One small clip was left marking the base of the cavity.  The cavity was closed in  layers using multiple interrupted 3-0 Vicryls. The skin was closed using interrupted 3-0 Vicryl deep dermals and a running subcuticular 4-0 Monocryl.      10 mL of 0.5 percent Marcaine plain were injected into the surrounding breast tissue for postoperative analgesia. The incision was dressed with skin glue, fluffs, and a surgical bra.  The patient awoke from general anesthesia without incident.  The instrument  and sponge counts were correct at the end of the case.                            Attestation:   Note Completion:  Attending Attestation I performed the procedure without a resident         Electronic Signatures:  Gricelda Soria)  (Signed 06-Sep-2023 08:29)   Authored: Post-Operative Note, Chart Review, Note Completion      Last Updated: 06-Sep-2023 08:29 by Gricelda Soria)

## 2023-10-02 ENCOUNTER — HOSPITAL ENCOUNTER (OUTPATIENT)
Facility: HOSPITAL | Age: 63
Setting detail: OUTPATIENT SURGERY
Discharge: HOME | End: 2023-10-02
Attending: SURGERY | Admitting: SURGERY
Payer: COMMERCIAL

## 2023-10-02 ENCOUNTER — ANESTHESIA (OUTPATIENT)
Dept: OPERATING ROOM | Facility: HOSPITAL | Age: 63
End: 2023-10-02

## 2023-10-02 ENCOUNTER — APPOINTMENT (OUTPATIENT)
Dept: PRIMARY CARE | Facility: CLINIC | Age: 63
End: 2023-10-02
Payer: COMMERCIAL

## 2023-10-02 ENCOUNTER — HOSPITAL ENCOUNTER (OUTPATIENT)
Dept: RADIOLOGY | Facility: HOSPITAL | Age: 63
Setting detail: OUTPATIENT SURGERY
Discharge: HOME | End: 2023-10-02
Payer: COMMERCIAL

## 2023-10-02 ENCOUNTER — ANESTHESIA EVENT (OUTPATIENT)
Dept: OPERATING ROOM | Facility: HOSPITAL | Age: 63
End: 2023-10-02

## 2023-10-02 ENCOUNTER — ANESTHESIA (OUTPATIENT)
Dept: OPERATING ROOM | Facility: HOSPITAL | Age: 63
End: 2023-10-02
Payer: COMMERCIAL

## 2023-10-02 VITALS
BODY MASS INDEX: 27.81 KG/M2 | DIASTOLIC BLOOD PRESSURE: 77 MMHG | SYSTOLIC BLOOD PRESSURE: 145 MMHG | RESPIRATION RATE: 16 BRPM | WEIGHT: 162.92 LBS | HEART RATE: 64 BPM | TEMPERATURE: 97 F | OXYGEN SATURATION: 95 % | HEIGHT: 64 IN

## 2023-10-02 DIAGNOSIS — C50.911 MALIGNANT NEOPLASM OF UNSPECIFIED SITE OF RIGHT FEMALE BREAST (MULTI): ICD-10-CM

## 2023-10-02 DIAGNOSIS — C50.411 MALIGNANT NEOPLASM OF UPPER-OUTER QUADRANT OF RIGHT FEMALE BREAST (MULTI): ICD-10-CM

## 2023-10-02 PROCEDURE — 2500000004 HC RX 250 GENERAL PHARMACY W/ HCPCS (ALT 636 FOR OP/ED): Performed by: NURSE ANESTHETIST, CERTIFIED REGISTERED

## 2023-10-02 PROCEDURE — 38792 RA TRACER ID OF SENTINL NODE: CPT | Performed by: SURGERY

## 2023-10-02 PROCEDURE — 3600000008 HC OR TIME - EACH INCREMENTAL 1 MINUTE - PROCEDURE LEVEL THREE: Performed by: SURGERY

## 2023-10-02 PROCEDURE — 2500000001 HC RX 250 WO HCPCS SELF ADMINISTERED DRUGS (ALT 637 FOR MEDICARE OP): Performed by: SURGERY

## 2023-10-02 PROCEDURE — A38525 PR BX/REMV,LYMPH NODE,DEEP AXILL: Performed by: NURSE ANESTHETIST, CERTIFIED REGISTERED

## 2023-10-02 PROCEDURE — 88307 TISSUE EXAM BY PATHOLOGIST: CPT | Mod: TC,SUR,AHULAB,CMCLAB | Performed by: SURGERY

## 2023-10-02 PROCEDURE — 2580000001 HC RX 258 IV SOLUTIONS: Performed by: ANESTHESIOLOGY

## 2023-10-02 PROCEDURE — 2720000007 HC OR 272 NO HCPCS: Performed by: SURGERY

## 2023-10-02 PROCEDURE — 7100000001 HC RECOVERY ROOM TIME - INITIAL BASE CHARGE: Performed by: SURGERY

## 2023-10-02 PROCEDURE — 19301 PARTIAL MASTECTOMY: CPT | Performed by: SURGERY

## 2023-10-02 PROCEDURE — A9520 TC99 TILMANOCEPT DIAG 0.5MCI: HCPCS | Performed by: SURGERY

## 2023-10-02 PROCEDURE — 3600000003 HC OR TIME - INITIAL BASE CHARGE - PROCEDURE LEVEL THREE: Performed by: SURGERY

## 2023-10-02 PROCEDURE — 7100000009 HC PHASE TWO TIME - INITIAL BASE CHARGE: Performed by: SURGERY

## 2023-10-02 PROCEDURE — 2500000004 HC RX 250 GENERAL PHARMACY W/ HCPCS (ALT 636 FOR OP/ED): Performed by: ANESTHESIOLOGY

## 2023-10-02 PROCEDURE — 38525 BIOPSY/REMOVAL LYMPH NODES: CPT | Performed by: SURGERY

## 2023-10-02 PROCEDURE — 7100000002 HC RECOVERY ROOM TIME - EACH INCREMENTAL 1 MINUTE: Performed by: SURGERY

## 2023-10-02 PROCEDURE — 3700000002 HC GENERAL ANESTHESIA TIME - EACH INCREMENTAL 1 MINUTE: Performed by: SURGERY

## 2023-10-02 PROCEDURE — 2580000001 HC RX 258 IV SOLUTIONS: Performed by: NURSE ANESTHETIST, CERTIFIED REGISTERED

## 2023-10-02 PROCEDURE — 3430000001 HC RX 343 DIAGNOSTIC RADIOPHARMACEUTICALS: Performed by: SURGERY

## 2023-10-02 PROCEDURE — 3700000001 HC GENERAL ANESTHESIA TIME - INITIAL BASE CHARGE: Performed by: SURGERY

## 2023-10-02 PROCEDURE — 88307 TISSUE EXAM BY PATHOLOGIST: CPT | Performed by: STUDENT IN AN ORGANIZED HEALTH CARE EDUCATION/TRAINING PROGRAM

## 2023-10-02 PROCEDURE — 2500000005 HC RX 250 GENERAL PHARMACY W/O HCPCS: Performed by: NURSE ANESTHETIST, CERTIFIED REGISTERED

## 2023-10-02 PROCEDURE — 2580000001 HC RX 258 IV SOLUTIONS: Performed by: SURGERY

## 2023-10-02 PROCEDURE — 2500000001 HC RX 250 WO HCPCS SELF ADMINISTERED DRUGS (ALT 637 FOR MEDICARE OP): Performed by: ANESTHESIOLOGY

## 2023-10-02 PROCEDURE — 2500000004 HC RX 250 GENERAL PHARMACY W/ HCPCS (ALT 636 FOR OP/ED): Performed by: SURGERY

## 2023-10-02 PROCEDURE — A38525 PR BX/REMV,LYMPH NODE,DEEP AXILL: Performed by: ANESTHESIOLOGY

## 2023-10-02 PROCEDURE — 88307 TISSUE EXAM BY PATHOLOGIST: CPT | Mod: TC,AHULAB,CMCLAB | Performed by: SURGERY

## 2023-10-02 PROCEDURE — 7100000010 HC PHASE TWO TIME - EACH INCREMENTAL 1 MINUTE: Performed by: SURGERY

## 2023-10-02 PROCEDURE — 38900 IO MAP OF SENT LYMPH NODE: CPT | Performed by: SURGERY

## 2023-10-02 DEVICE — HORIZON TI SMALL RED 6 CLIPS/CART
Type: IMPLANTABLE DEVICE | Site: BREAST | Status: NON-FUNCTIONAL
Brand: WECK

## 2023-10-02 RX ORDER — SODIUM CHLORIDE, SODIUM LACTATE, POTASSIUM CHLORIDE, CALCIUM CHLORIDE 600; 310; 30; 20 MG/100ML; MG/100ML; MG/100ML; MG/100ML
100 INJECTION, SOLUTION INTRAVENOUS CONTINUOUS
Status: DISCONTINUED | OUTPATIENT
Start: 2023-10-02 | End: 2023-10-02 | Stop reason: HOSPADM

## 2023-10-02 RX ORDER — SODIUM CHLORIDE, SODIUM LACTATE, POTASSIUM CHLORIDE, CALCIUM CHLORIDE 600; 310; 30; 20 MG/100ML; MG/100ML; MG/100ML; MG/100ML
50 INJECTION, SOLUTION INTRAVENOUS CONTINUOUS
Status: DISCONTINUED | OUTPATIENT
Start: 2023-10-02 | End: 2023-10-02 | Stop reason: HOSPADM

## 2023-10-02 RX ORDER — HYDROMORPHONE HYDROCHLORIDE 1 MG/ML
1 INJECTION, SOLUTION INTRAMUSCULAR; INTRAVENOUS; SUBCUTANEOUS EVERY 5 MIN PRN
Status: DISCONTINUED | OUTPATIENT
Start: 2023-10-02 | End: 2023-10-02 | Stop reason: HOSPADM

## 2023-10-02 RX ORDER — ACETAMINOPHEN 325 MG/1
975 TABLET ORAL ONCE
Status: COMPLETED | OUTPATIENT
Start: 2023-10-02 | End: 2023-10-02

## 2023-10-02 RX ORDER — ALBUTEROL SULFATE 0.83 MG/ML
2.5 SOLUTION RESPIRATORY (INHALATION) ONCE AS NEEDED
Status: DISCONTINUED | OUTPATIENT
Start: 2023-10-02 | End: 2023-10-02 | Stop reason: HOSPADM

## 2023-10-02 RX ORDER — DEXAMETHASONE SODIUM PHOSPHATE 4 MG/ML
INJECTION, SOLUTION INTRA-ARTICULAR; INTRALESIONAL; INTRAMUSCULAR; INTRAVENOUS; SOFT TISSUE AS NEEDED
Status: DISCONTINUED | OUTPATIENT
Start: 2023-10-02 | End: 2023-10-02

## 2023-10-02 RX ORDER — HYDRALAZINE HYDROCHLORIDE 20 MG/ML
5 INJECTION INTRAMUSCULAR; INTRAVENOUS EVERY 30 MIN PRN
Status: DISCONTINUED | OUTPATIENT
Start: 2023-10-02 | End: 2023-10-02 | Stop reason: HOSPADM

## 2023-10-02 RX ORDER — OXYCODONE HYDROCHLORIDE 5 MG/1
5 TABLET ORAL EVERY 4 HOURS PRN
Status: DISCONTINUED | OUTPATIENT
Start: 2023-10-02 | End: 2023-10-02 | Stop reason: HOSPADM

## 2023-10-02 RX ORDER — PROPOFOL 10 MG/ML
INJECTION, EMULSION INTRAVENOUS AS NEEDED
Status: DISCONTINUED | OUTPATIENT
Start: 2023-10-02 | End: 2023-10-02

## 2023-10-02 RX ORDER — LIDOCAINE HYDROCHLORIDE 20 MG/ML
INJECTION, SOLUTION INFILTRATION; PERINEURAL AS NEEDED
Status: DISCONTINUED | OUTPATIENT
Start: 2023-10-02 | End: 2023-10-02

## 2023-10-02 RX ORDER — CEFAZOLIN 1 G/1
INJECTION, POWDER, FOR SOLUTION INTRAVENOUS AS NEEDED
Status: DISCONTINUED | OUTPATIENT
Start: 2023-10-02 | End: 2023-10-02

## 2023-10-02 RX ORDER — FENTANYL CITRATE 50 UG/ML
INJECTION, SOLUTION INTRAMUSCULAR; INTRAVENOUS AS NEEDED
Status: DISCONTINUED | OUTPATIENT
Start: 2023-10-02 | End: 2023-10-02

## 2023-10-02 RX ORDER — LABETALOL HYDROCHLORIDE 5 MG/ML
5 INJECTION, SOLUTION INTRAVENOUS ONCE AS NEEDED
Status: DISCONTINUED | OUTPATIENT
Start: 2023-10-02 | End: 2023-10-02 | Stop reason: HOSPADM

## 2023-10-02 RX ORDER — ONDANSETRON HYDROCHLORIDE 2 MG/ML
INJECTION, SOLUTION INTRAVENOUS AS NEEDED
Status: DISCONTINUED | OUTPATIENT
Start: 2023-10-02 | End: 2023-10-02

## 2023-10-02 RX ORDER — DIPHENHYDRAMINE HYDROCHLORIDE 50 MG/ML
12.5 INJECTION INTRAMUSCULAR; INTRAVENOUS ONCE AS NEEDED
Status: DISCONTINUED | OUTPATIENT
Start: 2023-10-02 | End: 2023-10-02 | Stop reason: HOSPADM

## 2023-10-02 RX ORDER — BUPIVACAINE HYDROCHLORIDE 5 MG/ML
INJECTION, SOLUTION EPIDURAL; INTRACAUDAL AS NEEDED
Status: DISCONTINUED | OUTPATIENT
Start: 2023-10-02 | End: 2023-10-02 | Stop reason: HOSPADM

## 2023-10-02 RX ORDER — ONDANSETRON HYDROCHLORIDE 2 MG/ML
4 INJECTION, SOLUTION INTRAVENOUS ONCE AS NEEDED
Status: DISCONTINUED | OUTPATIENT
Start: 2023-10-02 | End: 2023-10-02 | Stop reason: HOSPADM

## 2023-10-02 RX ORDER — ISOSULFAN BLUE 50 MG/5ML
INJECTION, SOLUTION SUBCUTANEOUS AS NEEDED
Status: DISCONTINUED | OUTPATIENT
Start: 2023-10-02 | End: 2023-10-02 | Stop reason: HOSPADM

## 2023-10-02 RX ORDER — LIDOCAINE HYDROCHLORIDE 10 MG/ML
0.1 INJECTION, SOLUTION EPIDURAL; INFILTRATION; INTRACAUDAL; PERINEURAL ONCE
Status: DISCONTINUED | OUTPATIENT
Start: 2023-10-02 | End: 2023-10-02 | Stop reason: HOSPADM

## 2023-10-02 RX ADMIN — FENTANYL CITRATE 50 MCG: 50 INJECTION, SOLUTION INTRAMUSCULAR; INTRAVENOUS at 12:42

## 2023-10-02 RX ADMIN — HYDROMORPHONE HYDROCHLORIDE 0.5 MG: 1 INJECTION, SOLUTION INTRAMUSCULAR; INTRAVENOUS; SUBCUTANEOUS at 13:29

## 2023-10-02 RX ADMIN — PROPOFOL 200 MG: 10 INJECTION, EMULSION INTRAVENOUS at 11:53

## 2023-10-02 RX ADMIN — FENTANYL CITRATE 50 MCG: 50 INJECTION, SOLUTION INTRAMUSCULAR; INTRAVENOUS at 11:53

## 2023-10-02 RX ADMIN — OXYCODONE HYDROCHLORIDE 5 MG: 5 TABLET ORAL at 13:39

## 2023-10-02 RX ADMIN — SODIUM CHLORIDE, POTASSIUM CHLORIDE, SODIUM LACTATE AND CALCIUM CHLORIDE 50 ML/HR: 600; 310; 30; 20 INJECTION, SOLUTION INTRAVENOUS at 11:18

## 2023-10-02 RX ADMIN — SODIUM CHLORIDE, POTASSIUM CHLORIDE, SODIUM LACTATE AND CALCIUM CHLORIDE 100 ML/HR: 600; 310; 30; 20 INJECTION, SOLUTION INTRAVENOUS at 12:55

## 2023-10-02 RX ADMIN — DEXAMETHASONE SODIUM PHOSPHATE 8 MG: 4 INJECTION, SOLUTION INTRA-ARTICULAR; INTRALESIONAL; INTRAMUSCULAR; INTRAVENOUS; SOFT TISSUE at 11:53

## 2023-10-02 RX ADMIN — TILMANOCEPT 1 MILLICURIE: KIT at 12:25

## 2023-10-02 RX ADMIN — ONDANSETRON 4 MG: 2 INJECTION INTRAMUSCULAR; INTRAVENOUS at 11:53

## 2023-10-02 RX ADMIN — SODIUM CHLORIDE, SODIUM LACTATE, POTASSIUM CHLORIDE, AND CALCIUM CHLORIDE: 600; 310; 30; 20 INJECTION, SOLUTION INTRAVENOUS at 11:41

## 2023-10-02 RX ADMIN — LIDOCAINE HYDROCHLORIDE 50 MG: 20 INJECTION, SOLUTION INFILTRATION; PERINEURAL at 11:53

## 2023-10-02 RX ADMIN — ACETAMINOPHEN 975 MG: 325 TABLET, FILM COATED ORAL at 11:17

## 2023-10-02 RX ADMIN — CEFAZOLIN SODIUM 2 G: 1 POWDER, FOR SOLUTION INTRAMUSCULAR; INTRAVENOUS at 11:50

## 2023-10-02 ASSESSMENT — PAIN - FUNCTIONAL ASSESSMENT
PAIN_FUNCTIONAL_ASSESSMENT: 0-10
PAIN_FUNCTIONAL_ASSESSMENT: VAS (VISUAL ANALOG SCALE)
PAIN_FUNCTIONAL_ASSESSMENT: 0-10

## 2023-10-02 ASSESSMENT — PAIN SCALES - GENERAL
PAINLEVEL_OUTOF10: 2
PAINLEVEL_OUTOF10: 3
PAINLEVEL_OUTOF10: 1
PAINLEVEL_OUTOF10: 3
PAINLEVEL_OUTOF10: 3
PAINLEVEL_OUTOF10: 5 - MODERATE PAIN

## 2023-10-02 ASSESSMENT — COLUMBIA-SUICIDE SEVERITY RATING SCALE - C-SSRS
6. HAVE YOU EVER DONE ANYTHING, STARTED TO DO ANYTHING, OR PREPARED TO DO ANYTHING TO END YOUR LIFE?: NO
1. IN THE PAST MONTH, HAVE YOU WISHED YOU WERE DEAD OR WISHED YOU COULD GO TO SLEEP AND NOT WAKE UP?: NO
2. HAVE YOU ACTUALLY HAD ANY THOUGHTS OF KILLING YOURSELF?: NO

## 2023-10-02 NOTE — OP NOTE
Right re-excision lumpectomy w/sentinel lymph node biopsy and intraoperative lymphatic mapping (R) Operative Note     Date: 10/2/2023  OR Location: [facility] A OR    Name: Cassandra Lang, : 1960, Age: 63 y.o., MRN: [id number], Sex: female    Diagnosis  Pre-op Diagnosis     * Malignant neoplasm of unspecified site of right female breast (CMS/HCC) [C50.911] Post-op Diagnosis     * Malignant neoplasm of unspecified site of right female breast (CMS/HCC) [C50.911]     Procedures    * Right re-excision lumpectomy w/sentinel lymph node biopsy and intraoperative lymphatic mapping    Surgeons      * Gricelda Soria - Primary    Resident/Fellow/Other Assistant:  Surgical Assistant: Maik Warner    Procedure Summary  Anesthesia: General  ASA: III  Anesthesia Staff: Anesthesiologist: Gagandeep Arellano MD  CRNA: COURTNEY Swift-YNES, MART  Estimated Blood Loss: 3mL  Intra-op Medications:   Medication Name Total Dose   lactated Ringer's infusion 85 mL   acetaminophen (Tylenol) tablet 975 mg 975 mg              Anesthesia Record               Intraprocedure I/O Totals       None             Staff:   Circulator: Jane Salcedo RN; Claire Lund RN  Scrub Person: Deja Alejandre            Findings: 1 axillary sentinel lymph node      Indications: Cassandra Lang is an 63 y.o. female who is having surgery for Malignant neoplasm of unspecified site of right female breast (CMS/HCC) [C50.911].     The patient was seen in the preoperative area. The risks, benefits, complications, treatment options, non-operative alternatives, expected recovery and outcomes were discussed with the patient. The possibilities of reaction to medication, pulmonary aspiration, injury to surrounding structures, bleeding, recurrent infection, the need for additional procedures, failure to diagnose a condition, and creating a complication requiring transfusion or operation were discussed with the patient. The patient concurred with the proposed plan,  "giving informed consent.  The site of surgery was properly noted/marked if necessary per policy. The patient has been actively warmed in preoperative area. Preoperative antibiotics have been ordered and given within 1 hours of incision. Venous thrombosis prophylaxis have been ordered including bilateral sequential compression devices    Procedure Details: After informed consent was obtained and the appropriate breast was marked the patient was transferred to the operating room.  She was positioned on the operating room table in a supine position with her pressure points padded appropriately. A surgical time out was performed with the OR team.  General anesthesia was induced and she received perioperative antibiotics in the form of Ancef.    I proceeded with intraoperative lymphatic mapping by injecting her right breast around the border of the areola transdermally from 9-12 o'clock with 1.0 millicurie of technetium-99 tilmanocept (Lymphoseek). I then injected her right breast subareolar with 2.5 mL of Lymphazurin blue dye.  Her right breast was gently massaged for 5 minutes.    I reviewed the preoperative imaging and confirmed on her surgical pathology report that she had positive anterior margin for DCIS after excisional biopsy in her right breast at the 10 o'clock position.  Her right breast and axilla were prepped and draped in a sterile fashion. I re-opened her old lumpectomy incision. Dissection was taken down through her soft tissues using electrocautery. The vicryl sutures reapproximating her deep layers were opened. The seroma fluid was aspirated. The anterior aspect of the cavity was grasped with a forceps and the margin was exicsed for a depth of 1cm. The specimen was inked on the new anterior surface with green paint and sent to Pathology fresh labeled \"right re-excision of anterior margin at 10:00 5cm FN, clip marks true margin.\" Three small clips were left marking the boundaries of the anterior and " "medial cavity. The cavity was closed in layers using multiple interrupted 3-0 Vicryls. The skin was closed using interrupted 3-0 Vicryl deep dermals and a running subcuticular 4-0 Monocryl.      I then turned my attention to the intraoperative lymphatic mapping and sentinel node biopsy.  Using the Neoprobe, I was able to confirm there was uptake of technetium in the right axilla. An axillary incision was created with a scalpel and dissection was taken down through her soft tissues using electrocautery.  The clavipectoral fascia was identified and divided and the axilla was entered.  I identified a hot spot up against the chest wall in level I.  This tissue was grasped, elevated and excised.  This was associated with a lymph node that had uptake of blue dye and technetium.  Ex Vivo count of 127, this was sent as \"right axillary sentinel lymph node #1, hottest.\"  I did not identify any other lymph nodes that had uptake of technetium or blue dye. All specimens were sent for permanent.    The cavity was irrigated. Hemostasis was achieved with cautery and clips. The clavipectoral fascia was re-approximated using interrupted 3-0 Vicryl. The skin was closed with interrupted deep dermal 3-0 Vicryl followed by a running subcuticular 4-0 Monocryl.    10 mL of 0.5 percent Marcaine plain were injected into the surrounding breast tissue for postoperative analgesia and 10 mL into the axilla.  The incisions were dressed with skin glue, fluffs, and a surgical bra.  The patient awoke from general anesthesia without incident.  The instrument and sponge counts were correct at the end of the case.     Complications:  None; patient tolerated the procedure well.    Disposition: PACU - hemodynamically stable.  Condition: stable     Jewell Node Biopsy for Breast Cancer  Operation performed with curative intent Yes   Tracer(s) used to identify sentinel nodes in the upfront surgery (non-neoadjuvant) setting Yes, Tc99 and lymphazurin "   Tracer(s) used to identify sentinel nodes in the neoadjuvant setting N/A   All nodes (colored or non-colored) present at the end of a dye-filled lymphatic channel were removed Yes   All significantly radioactive nodes were removed Yes   All palpably suspicious nodes were removed N/A   Biopsy-proven positive nodes marked with clips prior to chemotherapy were identified and removed N/A             Attending Attestation: I was present and scrubbed for the entire procedure.    Gricelda Soria  Phone Number: [phone]

## 2023-10-02 NOTE — ANESTHESIA POSTPROCEDURE EVALUATION
Patient: Cassandra Lang    Procedure Summary       Date: 10/02/23 Room / Location: [facility] 03 / [facility]    Anesthesia Start: 1141 Anesthesia Stop: 1302    Procedure: Right re-excision lumpectomy w/sentinel lymph node biopsy and intraoperative lymphatic mapping (Right) Diagnosis:       Malignant neoplasm of unspecified site of right female breast (CMS/HCC)      (Malignant neoplasm of unspecified site of right female breast (CMS/HCC) [C50.911])    Surgeons: Gricelda Soria MD Responsible Provider: Gagandeep Arellano MD    Anesthesia Type: general ASA Status: 3            Anesthesia Type: general    Vitals Value Taken Time   /77 10/02/23 1400   Temp 36.1 °C (97 °F) 10/02/23 1345   Pulse 64 10/02/23 1400   Resp 16 10/02/23 1400   SpO2 95 % 10/02/23 1400       Anesthesia Post Evaluation    Patient location during evaluation: PACU  Patient participation: complete - patient participated  Level of consciousness: awake and alert  Pain management: satisfactory to patient  Airway patency: patent  Cardiovascular status: acceptable and blood pressure returned to baseline  Respiratory status: acceptable  Hydration status: acceptable        No notable events documented.

## 2023-10-02 NOTE — ANESTHESIA PREPROCEDURE EVALUATION
Patient: Cassandra Lang    Procedure Information       Date/Time: 10/02/23 1100    Procedure: Right re-excision lumpectomy w/sentinel lymph node biopsy and intraoperative lymphatic mapping (Right) - Nuclear Medicine Injection    Location: [facility] A OR 03 / Virtual [facility] A OR    Surgeons: Gricelda Soria MD            Relevant Problems   Cardiovascular   (+) Flow murmur   (+) Hyperlipidemia   (+) PFO (patent foramen ovale)   (+) Pulmonary embolism (CMS/HCC)   (+) Thoracic back pain   (+) Uncontrolled hypertension      GI   (+) Chronic GERD   (+) Irritable bowel syndrome      /Renal   (+) Urinary tract infection      Neuro/Psych   (+) Anxiety and depression   (+) Stroke (CMS/HCC)      Pulmonary   (+) Pulmonary embolism (CMS/HCC)   (+) SOB (shortness of breath) on exertion      Musculoskeletal   (+) Localized, primary osteoarthritis of the ankle and foot, left   (+) Localized, primary osteoarthritis of the ankle and foot, right      Infectious Disease   (+) Onychomycosis of toenail   (+) Shingles   (+) Tinea pedis of both feet   (+) Urinary tract infection       Clinical information reviewed:   Tobacco  Allergies  Meds   Med Hx  Surg Hx   Fam Hx  Soc Hx        NPO/Void Status  Date of Last Liquid: 10/02/23  Time of Last Liquid: 0700  Date of Last Solid: 10/01/23  Time of Last Solid: 2100         Anesthesia Evaluation  Past Medical History:   Diagnosis Date    Abnormal findings on diagnostic imaging of other specified body structures     Abnormal CT of the chest    Personal history of other medical treatment     History of echocardiogram    Personal history of other medical treatment     History of transesophageal echocardiography (MAYKEL)    Personal history of other medical treatment     History of cardiac monitoring    Personal history of transient ischemic attack (TIA), and cerebral infarction without residual deficits     History of cerebrovascular accident    Tinea unguium 10/12/2021    Onychomycosis of toenail     "  Past Surgical History:   Procedure Laterality Date    APPENDECTOMY  2014    Appendectomy     SECTION, CLASSIC  2014     Section    MR HEAD ANGIO WO IV CONTRAST  2020    MR HEAD ANGIO WO IV CONTRAST 2020 [facility] CLINICAL LEGACY    MR NECK ANGIO WO IV CONTRAST  2020    MR NECK ANGIO WO IV CONTRAST 2020 [facility] CLINICAL LEGACY    OTHER SURGICAL HISTORY  2020    Esophagogastroduodenoscopy    OTHER SURGICAL HISTORY  2020    Colonoscopy    OTHER SURGICAL HISTORY  2021    Patent foramen ovale repair     Social History     Tobacco Use    Smoking status: Never     Passive exposure: Never    Smokeless tobacco: Never   Vaping Use    Vaping Use: Never used   Substance Use Topics    Alcohol use: Never    Drug use: Never      Current Outpatient Medications   Medication Instructions    amLODIPine (NORVASC) 5 mg, oral, Daily    apixaban (ELIQUIS) 5 mg, oral, 2 times daily    aspirin 81 mg, oral, Daily    cholecalciferol (Vitamin D-3) 25 MCG (1000 UT) capsule 1 capsule, oral, Daily    ezetimibe-simvastatin (Vytorin) 10-10 mg tablet 1 tablet, oral, Every evening    loratadine (CLARITIN) 10 mg, oral, Daily    methocarbamol (Robaxin) 500 mg tablet TAKE 2 TABLETS BY MOUTH 3 TIMES DAILY AS NEEDED FOR PAIN    multivitamin tablet 1 tablet, oral, Daily      Allergies   Allergen Reactions    Sulfa (Sulfonamide Antibiotics) Rash    Sulfamethoxazole-Trimethoprim Rash       Physical Exam    Airway  Mallampati: II  TM distance: >3 FB     Cardiovascular    Dental    Pulmonary    Abdominal           Visit Vitals  BP (!) 141/92   Pulse 80   Temp 36.4 °C (97.5 °F)   Resp 15   Ht 1.626 m (5' 4\")   Wt 73.9 kg (162 lb 14.7 oz)   LMP  (LMP Unknown)   SpO2 97%   BMI 27.97 kg/m²   OB Status Postmenopausal   Smoking Status Never   BSA 1.83 m²      Anesthesia Plan    ASA 3     general   (lma)  intravenous induction   Postoperative administration of opioids is intended.  Anesthetic plan and risks " discussed with patient.    Plan discussed with CRNA.

## 2023-10-02 NOTE — DISCHARGE INSTRUCTIONS
There are no restrictions after surgery about what you may eat or drink. Please note anesthesia and pain medication can make you feel sick (nauseated).  Please use tylenol 1,000 mg q6h and ibuprofen 400 mg q8h and ice for pain after surgery. If your pain is still >5 after using these OTC medications, please us the Rx medication prescribed for you.  Pain medication and anesthesia can cause constipation. It is important to be physically active (walk) and drink lots of water after surgery. You may need to start an OTC stool softener or laxative if you have not had a bowel movement after 2 days.  You may shower 24h after surgery, but should avoid soaking your incisions in a bathtub/hot tub/swimming pool for 4 weeks.  You have glue over your incisions which functions as a water-tight bandage. This will slowly peel off in approximately 2 weeks.  Please follow the instructions in your Breast Cancer book about exercises to perform and activities to avoid after your surgery.  You may drive when you are not in pain and when you are not taking narcotic pain medication, as these both delay reaction time.  If you have drains, strip and record output for each drain separately as you were instructed in clinic and by OT in PACU.  I will call you with your pathology report when it is available, usually 1.5 weeks after surgery.  You will have a post op visit to see me in clinic in about 2 weeks. If this has not already been scheduled for you, please call Kristin to schedule [phone].  If you have questions or concerns when you are home, please call the office: [place] [phone]

## 2023-10-02 NOTE — ANESTHESIA PROCEDURE NOTES
Airway  Date/Time: 10/2/2023 11:54 AM  Urgency: elective    Airway not difficult    Staffing  Performed: CRNA   Authorized by: Gagandeep Arellano MD    Performed by: COURTNEY Swift-YNES, DNP    Indications and Patient Condition  Indications for airway management: anesthesia and airway protection  Spontaneous ventilation: present  Sedation level: deep  Preoxygenated: yes  Patient position: sniffing  MILS maintained throughout      Final Airway Details  Final airway type: supraglottic airway      Successful airway: Supreme  Size 4     Number of attempts at approach: 1

## 2023-10-06 ENCOUNTER — TELEPHONE (OUTPATIENT)
Dept: SURGICAL ONCOLOGY | Facility: CLINIC | Age: 63
End: 2023-10-06
Payer: COMMERCIAL

## 2023-10-06 NOTE — TELEPHONE ENCOUNTER
Telephone patient has routine follow-up from right reexcision lumpectomy with sentinel lymph node biopsy on 10/23/2023.  Denies complaints of fever or chills.  She endorses minor discomfort however has not needed any narcotic pain medication and is using ice for relief.  She has been up to the shower without difficulty.  Incision is clean, dry and intact with Dermabond in place.  She denies redness, swelling or warmth to the area.  Appetite normal.  She is voiding without difficulty.  + BM.  She is performing exercises as illustrated in the surgical block without difficulty.  She offers no complaints or concerns at this time.  Her postoperative appointment is scheduled with Dr. Soria on 10/17/2023 she is aware to contact the office for any concerns or questions prior to this visit.

## 2023-10-09 DIAGNOSIS — C50.411 MALIGNANT NEOPLASM OF UPPER-OUTER QUADRANT OF RIGHT BREAST IN FEMALE, ESTROGEN RECEPTOR NEGATIVE (MULTI): ICD-10-CM

## 2023-10-09 DIAGNOSIS — Z17.1 MALIGNANT NEOPLASM OF UPPER-OUTER QUADRANT OF RIGHT BREAST IN FEMALE, ESTROGEN RECEPTOR NEGATIVE (MULTI): ICD-10-CM

## 2023-10-12 ENCOUNTER — APPOINTMENT (OUTPATIENT)
Dept: HEMATOLOGY/ONCOLOGY | Facility: HOSPITAL | Age: 63
End: 2023-10-12
Payer: COMMERCIAL

## 2023-10-17 ENCOUNTER — OFFICE VISIT (OUTPATIENT)
Dept: SURGICAL ONCOLOGY | Facility: CLINIC | Age: 63
End: 2023-10-17
Payer: COMMERCIAL

## 2023-10-17 DIAGNOSIS — C50.411 MALIGNANT NEOPLASM OF UPPER-OUTER QUADRANT OF RIGHT BREAST IN FEMALE, ESTROGEN RECEPTOR NEGATIVE (MULTI): Primary | ICD-10-CM

## 2023-10-17 DIAGNOSIS — Z17.1 MALIGNANT NEOPLASM OF UPPER-OUTER QUADRANT OF RIGHT BREAST IN FEMALE, ESTROGEN RECEPTOR NEGATIVE (MULTI): Primary | ICD-10-CM

## 2023-10-17 PROCEDURE — 1036F TOBACCO NON-USER: CPT | Performed by: SURGERY

## 2023-10-17 PROCEDURE — 99024 POSTOP FOLLOW-UP VISIT: CPT | Performed by: SURGERY

## 2023-10-17 NOTE — PROGRESS NOTES
Subjective   Cassandra Lang is a 63 y.o. Postmenopausal female      Cancer Staging   Malignant neoplasm of upper-outer quadrant of right breast in female, estrogen receptor negative (CMS/HCC)  Staging form: Breast, AJCC 8th Edition  - Pathologic stage from 10/17/2023: Stage IIA (pT2, pN0(sn), cM0, G3, ER-, HI-, HER2+) - Signed by Gricelda Soria MD on 10/17/2023  Histopathologic type: Infiltrating ductular carcinoma  Stage prefix: Initial diagnosis  Method of lymph node assessment: [place] lymph node biopsy  Histologic grading system: 3 grade system      Diagnosis date: 9/6/23 right grade 3 IDC ER/HI- HER2+, pT2cN0, stage IA    Cassandra Lang is a pleasant 62 yo female returning to the [facility] for a post op visit after her second surgery. She has been in surveillance for a known right ADH that was diagnosed in January 2022 on screening mammogram. Even though excision was recommended at that time, she declined, and this area has remained stable on subsequent imaging studies. She had abnormal screening mammogram in January 2022 and proceeded to bilateral diagnostic mammogram and targeted ultrasound demonstrating an ill-defined mass with associated distortion in the right breast. The area underwent ultrasound guided core biopsy revealing a diagnosis of severely atypical ductal proliferation with apocrine features. Scanning of right axilla demonstrated 2 morphologically normal lymph nodes. In the left breast at 3:00 position 3cm from nipple, a 1cm probable benign soft mass is identified and short-term 6 month sonographic follow up is recommended. She was seen in 3/2022 and the decision was made to delay surgery secondary to leaving the country for 2 months; she was prescribed anastrozole at that time, but she did not fill the prescription. She denies palpable mass, skin changes or nipple discharge. [place] she has had additional imaging surveillance in July 2022 and January 2023  to follow a probably benign left breast finding and documenting stability at the right ADH site. She decided to undergo Magseed localized excisional biopsy for ADH on 23 to avoid continued surveillance. Final pathology demontrated a 2.1cm grade 3 IDC with a positive anterior margin for DCIS. She returned to the OR on 10/2/23 for axillary staging with SLN and re-excision of the anterior margin. Final pathology from that procedure is still pending at the time of this visit. She is doing well since surgery and has no breast specific complaints today.    She is accompanied by her daughter, who helped to translate during this conversation, as Polish is Ms. Lang's primary language, but she was able to participate and communicate well on her own.    BREAST IMAGIN2022 Screening mammogram demonstrated scattered fibroglandular tissue, BI-RADS Category 0, spiculated right breast mass and left breast mass. 2022 Bilateral diagnostic mammogram and targeted ultrasound, BI-RADS Category 4, Right breast at 10:00,5 cm from nipple, ill-defined mass with associated architectural distortion measuring 1.7 x 0.7 x 3.2cm warranting ultrasound guided biopsy. Scanning of right axilla demonstrates 2 morphologically normal lymph nodes. Left breast at 3:00, 3cm from nipple, probable benign soft mass measuring 1 x 1.1 x 0.5cm, recommend 6 month ultrasound follow up  2022 bilateral ultrasound and mammogram BI-RADS 3 scattered fibrglandular density, in the right breast, the biopsied  architectural distortion in the superior lateral right breast at anterior to mid depth is unchanged. No suspicious masses or calcifications are identified. In the left breast there is a small oval circumscribed hypoechoic mass is again seen in the 3 o'clock position, 3 cm FN. It measures 9 x 5 x 9 mm. It is avascular and soft on elastography.  2023 bilateral mammgraom and ultrasound BI-RADS 3 there is stable architectural distortion with  "associated tissue marker in the anterior upper outer right breast. Sonographic scanning targeted to the 3 o'clock position left breast 3 cm from the nipple demonstrates an oval circumscribed parallel partially anechoic and partially hypoechoic mass measuring  0.8 x 0.5 x 0.6 cm, with the greatest overall radial dimension of 1.0 cm. This is stable compared with 2022. There is no internal Doppler blood flow and the mass remains soft on elastography.    BREAST PROCEDURE: 3/1/2022 Right breast ultrasound guided core biopsy at 10:00 5cm from nipple.  23 right magseed excisional biopsy  10/2/23 right re-excision lumpectomy, SLN    REPRODUCTIVE HEALTH: menarche at 16, postmenopausal from natural causes t age 54,  with first birth at 31,  x 1 month, denies exposure to birth control pills or post menopausal estrogen.    MEDICAL HISTORY: Hypertension, hypercholesterolemia, CVA in 2020, her daughter states she has a constant \"pounding sound\" in her head since, Pulmonary embolism 2020, patent foramen ovale s/p percutaneous closure 2021, anticoagulated on Eliquis and ASA.    FAMILY CANCER HISTORY: Mother diagnosed with kidney cancer.     MEDICAL ONCOLOGY: Dr. Rosenbaum    RADIATION ONCOLOGY: Dr. Domínguez    GENETICS: does not meet NCCN criteria    Findings at that time were the following:   Tumor size: 2.1 cm   Tumor thgthrthathdtheth:th th4th Estrogen Receptor: -   Progesterone Receptor: -   Her-2 ronnell: +   Lymph node status: pending        Objective     Cancer-related family history includes Cancer in her mother.    Physical Exam     RIGHT breast: healing periareolar incision, resolving ecchymosis, good symmetry, no underlying volume deficit    RIGHT axilla: healing SLN incision without underlying seroma      Assessment/Plan   Stage II breast cancer.  Surgery is complete.  Return to see Dr. Rosenbaum in medical oncology to discuss adjuvant chemotherapy and Dr. Domínguez in radiation oncology to discuss adjuvant radiation " after completion of chemotherapy.  Return to clinic 6 months after completion of local therapy, I estimate approximately 1 year. Bilateral diagnostic mammogram will be ordered and obtained before the visit.   Gricelda Soria MD

## 2023-10-17 NOTE — PATIENT INSTRUCTIONS
Reminder of your appointment with Dr. Rosenbaum in Medical Oncology on 10/25/2023 at 10:15am.  Plan on seeing Dr. Soria in approximately 1 year with bilateral diagnostic mammogram.

## 2023-10-18 LAB
LAB AP ASR DISCLAIMER: NORMAL
LABORATORY COMMENT REPORT: NORMAL
PATH REPORT.FINAL DX SPEC: NORMAL
PATH REPORT.GROSS SPEC: NORMAL
PATH REPORT.RELEVANT HX SPEC: NORMAL
PATH REPORT.TOTAL CANCER: NORMAL

## 2023-10-18 PROCEDURE — 88342 IMHCHEM/IMCYTCHM 1ST ANTB: CPT | Performed by: STUDENT IN AN ORGANIZED HEALTH CARE EDUCATION/TRAINING PROGRAM

## 2023-10-18 PROCEDURE — 88341 IMHCHEM/IMCYTCHM EA ADD ANTB: CPT | Performed by: STUDENT IN AN ORGANIZED HEALTH CARE EDUCATION/TRAINING PROGRAM

## 2023-10-18 PROCEDURE — 88341 IMHCHEM/IMCYTCHM EA ADD ANTB: CPT | Mod: TC,SUR,AHULAB,CMCLAB | Performed by: SURGERY

## 2023-10-19 ENCOUNTER — TUMOR BOARD CONFERENCE (OUTPATIENT)
Dept: HEMATOLOGY/ONCOLOGY | Facility: HOSPITAL | Age: 63
End: 2023-10-19
Payer: COMMERCIAL

## 2023-10-20 ENCOUNTER — APPOINTMENT (OUTPATIENT)
Dept: PRIMARY CARE | Facility: CLINIC | Age: 63
End: 2023-10-20
Payer: COMMERCIAL

## 2023-10-23 NOTE — TUMOR BOARD NOTE
Tumor Board Documentation - Breast    Cassandra Lang was presented by *** at our Tumor Board on 10/23/2023, which included representatives from Medical oncology, Radiation oncology, Surgical oncology, Pathology, Radiology.    Cassandra currently presents as New patient initial presentation (Treatment planning) with history of the following treatments:  .    History of biopsy right breast for atypia Mar 2022.  Patient declined excision at that time.  S/p right breast mag seed excisional biopsy Sept 2023. Right re-excision lumpectomy w/sentinel lymph node biopsy and intraoperative lymphatic mapping.  Final margins negative.     Additionally, we reviewed previous medical and familial history, history of present illness, and recent lab results along with all available histopathologic and imaging studies. The tumor board considered available treatment options and made the following recommendations:    Radiation. TCH. Consider adding pertuzumab.  Genetics referral not recommended.        The following procedures/referrals were also placed: No orders of the defined types were placed in this encounter.      Clinical Trial Status:       National site-specific guidelines were discussed with respect to the case.    Path stage:  pT2 pN1mi(sn) cM0 stage IIB.  ER/IA-, HER2+.

## 2023-10-25 ENCOUNTER — OFFICE VISIT (OUTPATIENT)
Dept: HEMATOLOGY/ONCOLOGY | Facility: CLINIC | Age: 63
End: 2023-10-25
Payer: COMMERCIAL

## 2023-10-25 ENCOUNTER — ONCOLOGY MEDICATION OUTREACH (OUTPATIENT)
Dept: HEMATOLOGY/ONCOLOGY | Facility: CLINIC | Age: 63
End: 2023-10-25
Payer: COMMERCIAL

## 2023-10-25 VITALS
DIASTOLIC BLOOD PRESSURE: 89 MMHG | BODY MASS INDEX: 28.34 KG/M2 | HEART RATE: 88 BPM | TEMPERATURE: 98.6 F | WEIGHT: 165.12 LBS | SYSTOLIC BLOOD PRESSURE: 125 MMHG

## 2023-10-25 DIAGNOSIS — I63.019 CEREBROVASCULAR ACCIDENT (CVA) DUE TO THROMBOSIS OF VERTEBRAL ARTERY, UNSPECIFIED BLOOD VESSEL LATERALITY (MULTI): ICD-10-CM

## 2023-10-25 DIAGNOSIS — Z17.1 MALIGNANT NEOPLASM OF UPPER-OUTER QUADRANT OF RIGHT BREAST IN FEMALE, ESTROGEN RECEPTOR NEGATIVE (MULTI): Primary | ICD-10-CM

## 2023-10-25 DIAGNOSIS — C50.411 MALIGNANT NEOPLASM OF UPPER-OUTER QUADRANT OF RIGHT BREAST IN FEMALE, ESTROGEN RECEPTOR NEGATIVE (MULTI): ICD-10-CM

## 2023-10-25 DIAGNOSIS — C50.411 MALIGNANT NEOPLASM OF UPPER-OUTER QUADRANT OF RIGHT BREAST IN FEMALE, ESTROGEN RECEPTOR NEGATIVE (MULTI): Primary | ICD-10-CM

## 2023-10-25 DIAGNOSIS — Z17.1 MALIGNANT NEOPLASM OF UPPER-OUTER QUADRANT OF RIGHT BREAST IN FEMALE, ESTROGEN RECEPTOR NEGATIVE (MULTI): ICD-10-CM

## 2023-10-25 DIAGNOSIS — Z41.9 ELECTIVE SURGICAL PROCEDURE: ICD-10-CM

## 2023-10-25 DIAGNOSIS — Z92.21 STATUS POST ADMINISTRATION OF CARDIOTOXIC CHEMOTHERAPY: ICD-10-CM

## 2023-10-25 DIAGNOSIS — I82.4Y9 DEEP VEIN THROMBOSIS (DVT) OF PROXIMAL LOWER EXTREMITY, UNSPECIFIED CHRONICITY, UNSPECIFIED LATERALITY (MULTI): ICD-10-CM

## 2023-10-25 PROCEDURE — 3074F SYST BP LT 130 MM HG: CPT | Performed by: INTERNAL MEDICINE

## 2023-10-25 PROCEDURE — 99214 OFFICE O/P EST MOD 30 MIN: CPT | Performed by: INTERNAL MEDICINE

## 2023-10-25 PROCEDURE — 3079F DIAST BP 80-89 MM HG: CPT | Performed by: INTERNAL MEDICINE

## 2023-10-25 PROCEDURE — 1036F TOBACCO NON-USER: CPT | Performed by: INTERNAL MEDICINE

## 2023-10-25 RX ORDER — DEXAMETHASONE SODIUM PHOSPHATE 100 MG/10ML
10 INJECTION INTRAMUSCULAR; INTRAVENOUS ONCE
Status: CANCELLED | OUTPATIENT
Start: 2023-11-15

## 2023-10-25 RX ORDER — EPINEPHRINE 0.3 MG/.3ML
0.3 INJECTION SUBCUTANEOUS EVERY 5 MIN PRN
Status: CANCELLED | OUTPATIENT
Start: 2023-11-15

## 2023-10-25 RX ORDER — DIPHENHYDRAMINE HYDROCHLORIDE 50 MG/ML
50 INJECTION INTRAMUSCULAR; INTRAVENOUS AS NEEDED
Status: CANCELLED | OUTPATIENT
Start: 2023-11-22

## 2023-10-25 RX ORDER — PROCHLORPERAZINE MALEATE 5 MG
10 TABLET ORAL EVERY 6 HOURS PRN
Status: CANCELLED | OUTPATIENT
Start: 2023-11-15

## 2023-10-25 RX ORDER — FAMOTIDINE 10 MG/ML
20 INJECTION INTRAVENOUS ONCE AS NEEDED
Status: CANCELLED | OUTPATIENT
Start: 2023-11-15

## 2023-10-25 RX ORDER — HEPARIN 100 UNIT/ML
500 SYRINGE INTRAVENOUS AS NEEDED
Status: CANCELLED | OUTPATIENT
Start: 2023-10-25

## 2023-10-25 RX ORDER — DEXAMETHASONE SODIUM PHOSPHATE 100 MG/10ML
10 INJECTION INTRAMUSCULAR; INTRAVENOUS ONCE
Status: CANCELLED | OUTPATIENT
Start: 2023-11-22

## 2023-10-25 RX ORDER — DIPHENHYDRAMINE HCL 50 MG
50 CAPSULE ORAL ONCE
Status: CANCELLED | OUTPATIENT
Start: 2023-11-15

## 2023-10-25 RX ORDER — FAMOTIDINE 10 MG/ML
20 INJECTION INTRAVENOUS ONCE
Status: CANCELLED | OUTPATIENT
Start: 2023-11-22

## 2023-10-25 RX ORDER — FAMOTIDINE 10 MG/ML
20 INJECTION INTRAVENOUS ONCE AS NEEDED
Status: CANCELLED | OUTPATIENT
Start: 2023-11-22

## 2023-10-25 RX ORDER — ALBUTEROL SULFATE 0.83 MG/ML
3 SOLUTION RESPIRATORY (INHALATION) AS NEEDED
Status: CANCELLED | OUTPATIENT
Start: 2023-11-15

## 2023-10-25 RX ORDER — PROCHLORPERAZINE MALEATE 5 MG
10 TABLET ORAL EVERY 6 HOURS PRN
Status: CANCELLED | OUTPATIENT
Start: 2023-11-22

## 2023-10-25 RX ORDER — HEPARIN SODIUM,PORCINE/PF 10 UNIT/ML
50 SYRINGE (ML) INTRAVENOUS AS NEEDED
Status: CANCELLED | OUTPATIENT
Start: 2023-10-25

## 2023-10-25 RX ORDER — EPINEPHRINE 0.3 MG/.3ML
0.3 INJECTION SUBCUTANEOUS EVERY 5 MIN PRN
Status: CANCELLED | OUTPATIENT
Start: 2023-11-22

## 2023-10-25 RX ORDER — PROCHLORPERAZINE EDISYLATE 5 MG/ML
10 INJECTION INTRAMUSCULAR; INTRAVENOUS EVERY 6 HOURS PRN
Status: CANCELLED | OUTPATIENT
Start: 2023-11-22

## 2023-10-25 RX ORDER — ALBUTEROL SULFATE 0.83 MG/ML
3 SOLUTION RESPIRATORY (INHALATION) AS NEEDED
Status: CANCELLED | OUTPATIENT
Start: 2023-11-22

## 2023-10-25 RX ORDER — DIPHENHYDRAMINE HYDROCHLORIDE 50 MG/ML
50 INJECTION INTRAMUSCULAR; INTRAVENOUS AS NEEDED
Status: CANCELLED | OUTPATIENT
Start: 2023-11-15

## 2023-10-25 RX ORDER — PROCHLORPERAZINE EDISYLATE 5 MG/ML
10 INJECTION INTRAMUSCULAR; INTRAVENOUS EVERY 6 HOURS PRN
Status: CANCELLED | OUTPATIENT
Start: 2023-11-15

## 2023-10-25 RX ORDER — DIPHENHYDRAMINE HCL 50 MG
50 CAPSULE ORAL ONCE
Status: CANCELLED | OUTPATIENT
Start: 2023-11-22

## 2023-10-25 RX ORDER — FAMOTIDINE 10 MG/ML
20 INJECTION INTRAVENOUS ONCE
Status: CANCELLED | OUTPATIENT
Start: 2023-11-15

## 2023-10-25 ASSESSMENT — PAIN SCALES - GENERAL: PAINLEVEL: 0-NO PAIN

## 2023-10-25 NOTE — PATIENT INSTRUCTIONS
Port   Echocardiogram  Start chemotherapy in about 3 wks  Follow up with ana lilia about 3 wks after starting chemotherapy  Will give you information on the cold cap

## 2023-10-25 NOTE — PROGRESS NOTES
Reviewed drug, dose, frequency, administration, treatment cycle, duration of therapy, and missed doses. Counseled on potential side effects including but not limited to chemotherapy side effects: neutropenia, infection risk, anemia, fatigue, weakness, low energy, thrombocytopenia, bleeding/bruising, n/v, diarrhea, hair loss, fatigue, muscle or joint pain, and skin rash. Discussed techniques to mitigate severity of side effects such as blood count checks, prophylactic anti-infective medicines, temperature checks, blood product transfusions, electrolyte monitoring, antiemetic use, loperamide use, staying hydrated if having diarrhea, and oral care (soft tooth brush, nonalcohol containing mouthwash).  Provided medication/regimen handout. Patient had questions about taxol and herceptin. All questions answered and contact information was given to patient.

## 2023-10-25 NOTE — PROGRESS NOTES
Breast Medical Oncology Clinic  Location: [facility]      BREAST CANCER DIAGNOSIS  Malignant neoplasm of upper-outer quadrant of right breast in female, estrogen receptor negative (CMS/HCC), Pathologic: Stage IIB (pT2, pN1mi(sn), cM0, G3, ER-, [place]-, HER2+)       ONCOLOGIC HISTORY  S/p recent lumpectomy and SLN.     CURRENT THERAPY      HISTORY OF PRESENT ILLNESS    Cassandra Lang is a 63 y.o. woman. Here for post op visit. S/p SLN which revealed  micromet. Previously we had discussed trastuzumab/pacltaxel. Today we have a Polish  using "[employer] system. No new issues.            Review of Systems - Oncology      Past Medical History:  has a past medical history of Abnormal findings on diagnostic imaging of other specified body structures, Personal history of other medical treatment, Personal history of other medical treatment, Personal history of other medical treatment, Personal history of transient ischemic attack (TIA), and cerebral infarction without residual deficits, and Tinea unguium (10/12/2021).  Surgical History:   has a past surgical history that includes  section, classic (2014); Appendectomy (2014); Other surgical history (2020); Other surgical history (2020); Other surgical history (2021); MR angio head wo IV contrast (2020); MR angio neck wo IV contrast (2020); and Breast lumpectomy.  Social History:   reports that she has never smoked. She has never been exposed to tobacco smoke. She has never used smokeless tobacco. She reports that she does not drink alcohol and does not use drugs.  Family History:    Family History   Problem Relation Name Age of Onset    Diabetes Mother      Hypertension Mother      Cancer Mother      Coronary artery disease Father      Heart attack Maternal Grandfather      Stroke Other       Family Oncology History:  Cancer-related family history includes Cancer in her mother.      OBJECTIVE    VS / Pain:  /89 (BP  Location: Left arm, Patient Position: Sitting, BP Cuff Size: Adult)   Pulse 88   Temp 37 °C (98.6 °F) (Skin)   Wt 74.9 kg (165 lb 2 oz)   LMP  (LMP Unknown)   BMI 28.34 kg/m²   BSA: 1.84 meters squared   Pain Scale: 0    Performance Status:  The ECOG performance scale today is ECO- Restricted in physically strenuous activity.  Carries out light duty.    Physical Exam        Diagnostic Results   === 22 ===    CT HEAD WO IV CONTRAST    - Impression -  No acute intracranial hemorrhage or mass-effect. Remote infarct in  the left basal ganglia/corona radiata.   No results found for this or any previous visit from the past 365 days.     No images are attached to the encounter.    LABORATORY/PATHOLOGY DATA    Admission on 10/02/2023, Discharged on 10/02/2023   Component Date Value Ref Range Status    Case Report 10/02/2023    Final                    Value:Surgical Pathology                                Case: [id number]                                  Authorizing Provider:  Gricelda Soria MD          Collected:           10/02/2023 1151              Ordering Location:     [facility] OR Received:            10/03/2023 1017              Pathologist:           Anastacia Bailey MD                                                            Specimens:   A) - BREAST MARGIN RIGHT, RIGHT RE-EXCISION OF ANTERIOR MARGIN MARKED GREEN AT 10:00                5CM FN,  OOB 1211                                                                                   B) - SENTINEL LYMPH NODE BREAST RIGHT, RIGHT AXILLARY SENTINEL LYMPH NODE #1, HOTTEST      FINAL DIAGNOSIS 10/02/2023    Final                    Value:This result contains rich text formatting which cannot be displayed here.      10/02/2023    Final                    Value:This result contains rich text formatting which cannot be displayed here.    Clinical History 10/02/2023    Final                    Value:This result contains rich text formatting  which cannot be displayed here.    Gross Description 10/02/2023    Final                    Value:This result contains rich text formatting which cannot be displayed here.    Disclaimer 10/02/2023    Final                    Value:This result contains rich text formatting which cannot be displayed here.          IMPRESSION/PLAN    1. Right sided [id number] HER2+ breast cancer, unexpectedly found after excisional biopsy for sclerosing adenosis. I have spent 60 minutes of direct  consultation  with the patient today reviewing the cancer care plan, educating and answering questions regarding ongoing follow up, greater than 50% in counseling and coordination of care. Recommendations are as follows: 1) recommend adjuvant chemo-HER2  therapy. She is in agreement. Will order PORT and echo. Not interested in cold cap. Will start chemo in about 3-4 wks. Patient has been consented        2. Hypercoagulable state. extensive medical history of CVA, breast cancer,  HTN, Hyperlipidemia, hypercholesterolemia. She remains on eliquis.      We discussed the clinical significance of diagnosis, goals of care and treatment plan in detail.     I personally spent over half of a total 55 minutes face to face with the patient in counseling and discussion and/or coordination of care as described above.         -------------------------------------------------------------------------------------------------------------------------------  Brendon Rosenbaum MD  Director of Breast Cancer Medical Oncology Research Program   [facility]  Professor of Medicine  [address], [address]  [place] [address]  Phone: [phone]  [email or web address]

## 2023-10-26 ENCOUNTER — OFFICE VISIT (OUTPATIENT)
Dept: PRIMARY CARE | Facility: CLINIC | Age: 63
End: 2023-10-26
Payer: COMMERCIAL

## 2023-10-26 VITALS
HEART RATE: 92 BPM | DIASTOLIC BLOOD PRESSURE: 84 MMHG | BODY MASS INDEX: 29.18 KG/M2 | TEMPERATURE: 97.2 F | WEIGHT: 170 LBS | SYSTOLIC BLOOD PRESSURE: 133 MMHG

## 2023-10-26 DIAGNOSIS — E78.00 PURE HYPERCHOLESTEROLEMIA: Primary | ICD-10-CM

## 2023-10-26 DIAGNOSIS — E55.9 VITAMIN D DEFICIENCY: ICD-10-CM

## 2023-10-26 DIAGNOSIS — I10 PRIMARY HYPERTENSION: ICD-10-CM

## 2023-10-26 DIAGNOSIS — Z00.00 ENCOUNTER FOR GENERAL ADULT MEDICAL EXAMINATION WITHOUT ABNORMAL FINDINGS: ICD-10-CM

## 2023-10-26 DIAGNOSIS — I26.99 PE (PULMONARY THROMBOEMBOLISM) (MULTI): ICD-10-CM

## 2023-10-26 DIAGNOSIS — I63.9 CEREBRAL INFARCTION, UNSPECIFIED (MULTI): ICD-10-CM

## 2023-10-26 PROCEDURE — 3079F DIAST BP 80-89 MM HG: CPT | Performed by: FAMILY MEDICINE

## 2023-10-26 PROCEDURE — 90471 IMMUNIZATION ADMIN: CPT | Performed by: FAMILY MEDICINE

## 2023-10-26 PROCEDURE — 90686 IIV4 VACC NO PRSV 0.5 ML IM: CPT | Performed by: FAMILY MEDICINE

## 2023-10-26 PROCEDURE — 3075F SYST BP GE 130 - 139MM HG: CPT | Performed by: FAMILY MEDICINE

## 2023-10-26 PROCEDURE — 99214 OFFICE O/P EST MOD 30 MIN: CPT | Performed by: FAMILY MEDICINE

## 2023-10-26 PROCEDURE — 1036F TOBACCO NON-USER: CPT | Performed by: FAMILY MEDICINE

## 2023-10-26 RX ORDER — AMLODIPINE BESYLATE 5 MG/1
5 TABLET ORAL DAILY
Qty: 90 TABLET | Refills: 1 | Status: SHIPPED | OUTPATIENT
Start: 2023-10-26 | End: 2024-02-16 | Stop reason: HOSPADM

## 2023-10-26 RX ORDER — EZETIMIBE AND SIMVASTATIN 10; 10 MG/1; MG/1
1 TABLET ORAL EVERY EVENING
Qty: 90 TABLET | Refills: 1 | Status: SHIPPED | OUTPATIENT
Start: 2023-10-26 | End: 2024-04-05 | Stop reason: SDUPTHER

## 2023-10-26 RX ORDER — VIT C/E/ZN/COPPR/LUTEIN/ZEAXAN 250MG-90MG
25 CAPSULE ORAL DAILY
Qty: 90 CAPSULE | Refills: 1 | Status: SHIPPED | OUTPATIENT
Start: 2023-10-26 | End: 2024-04-05 | Stop reason: SDUPTHER

## 2023-10-26 RX ORDER — ASPIRIN 81 MG/1
81 TABLET ORAL DAILY
Qty: 90 TABLET | Refills: 1 | Status: SHIPPED | OUTPATIENT
Start: 2023-10-26 | End: 2024-02-16 | Stop reason: HOSPADM

## 2023-10-26 NOTE — PROGRESS NOTES
"Subjective   Patient ID: Cassandra Lang is a 63 y.o. female who presents for 6 month follow up.  HPI    The patient is a 62 yo, , female, with a history of hypertension, hypercholesterolemia, low vitamin D, CVA 5/20, elevated A1c, right breast cancer \"23,  who is here for blood test order and medication refills.     A review of system was completed.  All systems were reviewed and were normal, except for the ones that are listed in the HPI.    Objective   Physical Exam  Constitutional:       Appearance: Normal appearance.   HENT:      Head: Normocephalic and atraumatic.      Right Ear: Tympanic membrane, ear canal and external ear normal.      Left Ear: Tympanic membrane, ear canal and external ear normal.      Nose: Nose normal.      Mouth/Throat:      Mouth: Mucous membranes are moist.      Pharynx: Oropharynx is clear.   Eyes:      Extraocular Movements: Extraocular movements intact.      Conjunctiva/sclera: Conjunctivae normal.      Pupils: Pupils are equal, round, and reactive to light.   Cardiovascular:      Rate and Rhythm: Normal rate and regular rhythm.      Pulses: Normal pulses.   Pulmonary:      Effort: Pulmonary effort is normal.      Breath sounds: Normal breath sounds.   Abdominal:      General: Abdomen is flat. Bowel sounds are normal.      Palpations: Abdomen is soft.   Musculoskeletal:         General: Normal range of motion.      Cervical back: Normal range of motion and neck supple.   Skin:     General: Skin is warm.   Neurological:      General: No focal deficit present.      Mental Status: She is alert and oriented to person, place, and time. Mental status is at baseline.   Psychiatric:         Mood and Affect: Mood normal.         Behavior: Behavior normal.         Thought Content: Thought content normal.         Judgment: Judgment normal.         Assessment/Plan   Problem List Items Addressed This Visit       Vitamin D deficiency    Relevant Medications    cholecalciferol (Vitamin " D-3) 25 MCG (1000 UT) capsule    Other Relevant Orders    Vitamin D 25-Hydroxy,Total (for eval of Vitamin D levels)    Hyperlipidemia - Primary    Primary hypertension    Relevant Medications    amLODIPine (Norvasc) 5 mg tablet    PE (pulmonary thromboembolism) (CMS/HCC)    Relevant Medications    apixaban (Eliquis) 5 mg tablet    Encounter for general adult medical examination without abnormal findings    Relevant Medications    aspirin 81 mg EC tablet    Cerebral infarction, unspecified (CMS/HCC)    Relevant Medications    ezetimibe-simvastatin (Vytorin) 10-10 mg tablet   -blood test ordered.  -Medications all refilled.      Patient to return to office in 6 months.

## 2023-10-30 ENCOUNTER — LAB (OUTPATIENT)
Dept: LAB | Facility: LAB | Age: 63
End: 2023-10-30
Payer: COMMERCIAL

## 2023-10-30 DIAGNOSIS — Z17.1 MALIGNANT NEOPLASM OF UPPER-OUTER QUADRANT OF RIGHT BREAST IN FEMALE, ESTROGEN RECEPTOR NEGATIVE (MULTI): ICD-10-CM

## 2023-10-30 DIAGNOSIS — Z92.21 STATUS POST ADMINISTRATION OF CARDIOTOXIC CHEMOTHERAPY: ICD-10-CM

## 2023-10-30 DIAGNOSIS — Z41.9 ELECTIVE SURGICAL PROCEDURE: ICD-10-CM

## 2023-10-30 DIAGNOSIS — C50.411 MALIGNANT NEOPLASM OF UPPER-OUTER QUADRANT OF RIGHT BREAST IN FEMALE, ESTROGEN RECEPTOR NEGATIVE (MULTI): ICD-10-CM

## 2023-10-30 LAB
ALBUMIN SERPL BCP-MCNC: 4.7 G/DL (ref 3.4–5)
ALP SERPL-CCNC: 69 U/L (ref 33–136)
ALT SERPL W P-5'-P-CCNC: 20 U/L (ref 7–45)
ANION GAP SERPL CALC-SCNC: 17 MMOL/L (ref 10–20)
APTT PPP: 38 SECONDS (ref 27–38)
AST SERPL W P-5'-P-CCNC: 16 U/L (ref 9–39)
BASOPHILS # BLD AUTO: 0.06 X10*3/UL (ref 0–0.1)
BASOPHILS NFR BLD AUTO: 1.4 %
BILIRUB SERPL-MCNC: 0.5 MG/DL (ref 0–1.2)
BUN SERPL-MCNC: 11 MG/DL (ref 6–23)
CALCIUM SERPL-MCNC: 9.8 MG/DL (ref 8.6–10.6)
CHLORIDE SERPL-SCNC: 106 MMOL/L (ref 98–107)
CO2 SERPL-SCNC: 25 MMOL/L (ref 21–32)
CREAT SERPL-MCNC: 0.63 MG/DL (ref 0.5–1.05)
EOSINOPHIL # BLD AUTO: 0.19 X10*3/UL (ref 0–0.7)
EOSINOPHIL NFR BLD AUTO: 4.5 %
ERYTHROCYTE [DISTWIDTH] IN BLOOD BY AUTOMATED COUNT: 13 % (ref 11.5–14.5)
GFR SERPL CREATININE-BSD FRML MDRD: >90 ML/MIN/1.73M*2
GLUCOSE SERPL-MCNC: 103 MG/DL (ref 74–99)
HCT VFR BLD AUTO: 45 % (ref 36–46)
HGB BLD-MCNC: 14.5 G/DL (ref 12–16)
IMM GRANULOCYTES # BLD AUTO: 0.01 X10*3/UL (ref 0–0.7)
IMM GRANULOCYTES NFR BLD AUTO: 0.2 % (ref 0–0.9)
INR PPP: 1.1 (ref 0.9–1.1)
LYMPHOCYTES # BLD AUTO: 1.41 X10*3/UL (ref 1.2–4.8)
LYMPHOCYTES NFR BLD AUTO: 33.7 %
MCH RBC QN AUTO: 27.1 PG (ref 26–34)
MCHC RBC AUTO-ENTMCNC: 32.2 G/DL (ref 32–36)
MCV RBC AUTO: 84 FL (ref 80–100)
MONOCYTES # BLD AUTO: 0.42 X10*3/UL (ref 0.1–1)
MONOCYTES NFR BLD AUTO: 10 %
NEUTROPHILS # BLD AUTO: 2.1 X10*3/UL (ref 1.2–7.7)
NEUTROPHILS NFR BLD AUTO: 50.2 %
NRBC BLD-RTO: 0 /100 WBCS (ref 0–0)
PLATELET # BLD AUTO: 259 X10*3/UL (ref 150–450)
PMV BLD AUTO: 9.8 FL (ref 7.5–11.5)
POTASSIUM SERPL-SCNC: 3.8 MMOL/L (ref 3.5–5.3)
PROT SERPL-MCNC: 6.9 G/DL (ref 6.4–8.2)
PROTHROMBIN TIME: 12.8 SECONDS (ref 9.8–12.8)
RBC # BLD AUTO: 5.35 X10*6/UL (ref 4–5.2)
SODIUM SERPL-SCNC: 144 MMOL/L (ref 136–145)
WBC # BLD AUTO: 4.2 X10*3/UL (ref 4.4–11.3)

## 2023-10-30 PROCEDURE — 36415 COLL VENOUS BLD VENIPUNCTURE: CPT

## 2023-10-30 PROCEDURE — 80053 COMPREHEN METABOLIC PANEL: CPT

## 2023-10-30 PROCEDURE — 85730 THROMBOPLASTIN TIME PARTIAL: CPT

## 2023-10-30 PROCEDURE — 85610 PROTHROMBIN TIME: CPT

## 2023-10-30 PROCEDURE — 85025 COMPLETE CBC W/AUTO DIFF WBC: CPT

## 2023-11-08 ENCOUNTER — HOSPITAL ENCOUNTER (OUTPATIENT)
Dept: CARDIOLOGY | Facility: HOSPITAL | Age: 63
Discharge: HOME | End: 2023-11-08
Payer: COMMERCIAL

## 2023-11-08 DIAGNOSIS — C50.411 MALIGNANT NEOPLASM OF UPPER-OUTER QUADRANT OF RIGHT BREAST IN FEMALE, ESTROGEN RECEPTOR NEGATIVE (MULTI): Primary | ICD-10-CM

## 2023-11-08 DIAGNOSIS — Z17.1 MALIGNANT NEOPLASM OF UPPER-OUTER QUADRANT OF RIGHT BREAST IN FEMALE, ESTROGEN RECEPTOR NEGATIVE (MULTI): Primary | ICD-10-CM

## 2023-11-08 DIAGNOSIS — Z92.21 STATUS POST ADMINISTRATION OF CARDIOTOXIC CHEMOTHERAPY: ICD-10-CM

## 2023-11-08 DIAGNOSIS — Z51.81 ENCOUNTER FOR THERAPEUTIC DRUG LEVEL MONITORING: ICD-10-CM

## 2023-11-08 LAB
AORTIC VALVE PEAK VELOCITY: 0.99
AV PEAK GRADIENT: 3.9
EJECTION FRACTION APICAL 4 CHAMBER: 64.1
EJECTION FRACTION: 64
LEFT VENTRICLE INTERNAL DIMENSION DIASTOLE: 4.3 (ref 3.5–6)
MITRAL VALVE E/A RATIO: 0.52
MITRAL VALVE E/E' RATIO: 4.6
RIGHT VENTRICLE PEAK SYSTOLIC PRESSURE: 19.3
TRICUSPID ANNULAR PLANE SYSTOLIC EXCURSION: 1.7

## 2023-11-08 PROCEDURE — 93308 TTE F-UP OR LMTD: CPT | Performed by: STUDENT IN AN ORGANIZED HEALTH CARE EDUCATION/TRAINING PROGRAM

## 2023-11-08 PROCEDURE — 93308 TTE F-UP OR LMTD: CPT

## 2023-11-09 RX ORDER — DIPHENHYDRAMINE HYDROCHLORIDE 50 MG/ML
50 INJECTION INTRAMUSCULAR; INTRAVENOUS AS NEEDED
Status: CANCELLED | OUTPATIENT
Start: 2023-12-27

## 2023-11-09 RX ORDER — DEXAMETHASONE SODIUM PHOSPHATE 100 MG/10ML
10 INJECTION INTRAMUSCULAR; INTRAVENOUS ONCE
Status: CANCELLED | OUTPATIENT
Start: 2023-11-29

## 2023-11-09 RX ORDER — ALBUTEROL SULFATE 0.83 MG/ML
3 SOLUTION RESPIRATORY (INHALATION) AS NEEDED
Status: CANCELLED | OUTPATIENT
Start: 2023-12-27

## 2023-11-09 RX ORDER — PROCHLORPERAZINE EDISYLATE 5 MG/ML
10 INJECTION INTRAMUSCULAR; INTRAVENOUS EVERY 6 HOURS PRN
Status: CANCELLED | OUTPATIENT
Start: 2023-11-29

## 2023-11-09 RX ORDER — FAMOTIDINE 10 MG/ML
20 INJECTION INTRAVENOUS ONCE AS NEEDED
Status: CANCELLED | OUTPATIENT
Start: 2023-11-29

## 2023-11-09 RX ORDER — EPINEPHRINE 0.3 MG/.3ML
0.3 INJECTION SUBCUTANEOUS EVERY 5 MIN PRN
Status: CANCELLED | OUTPATIENT
Start: 2023-12-27

## 2023-11-09 RX ORDER — PROCHLORPERAZINE MALEATE 10 MG
10 TABLET ORAL EVERY 6 HOURS PRN
Status: CANCELLED | OUTPATIENT
Start: 2023-11-29

## 2023-11-09 RX ORDER — PROCHLORPERAZINE EDISYLATE 5 MG/ML
10 INJECTION INTRAMUSCULAR; INTRAVENOUS EVERY 6 HOURS PRN
Status: CANCELLED | OUTPATIENT
Start: 2023-12-27

## 2023-11-09 RX ORDER — DIPHENHYDRAMINE HCL 50 MG
50 CAPSULE ORAL ONCE
Status: CANCELLED | OUTPATIENT
Start: 2023-12-27

## 2023-11-09 RX ORDER — FAMOTIDINE 10 MG/ML
20 INJECTION INTRAVENOUS ONCE
Status: CANCELLED | OUTPATIENT
Start: 2023-11-29

## 2023-11-09 RX ORDER — FAMOTIDINE 10 MG/ML
20 INJECTION INTRAVENOUS ONCE AS NEEDED
Status: CANCELLED | OUTPATIENT
Start: 2023-12-27

## 2023-11-09 RX ORDER — EPINEPHRINE 0.3 MG/.3ML
0.3 INJECTION SUBCUTANEOUS EVERY 5 MIN PRN
Status: CANCELLED | OUTPATIENT
Start: 2023-11-29

## 2023-11-09 RX ORDER — FAMOTIDINE 10 MG/ML
20 INJECTION INTRAVENOUS ONCE
Status: CANCELLED | OUTPATIENT
Start: 2023-12-27

## 2023-11-09 RX ORDER — DEXAMETHASONE SODIUM PHOSPHATE 100 MG/10ML
10 INJECTION INTRAMUSCULAR; INTRAVENOUS ONCE
Status: CANCELLED | OUTPATIENT
Start: 2023-12-27

## 2023-11-09 RX ORDER — PROCHLORPERAZINE MALEATE 10 MG
10 TABLET ORAL EVERY 6 HOURS PRN
Status: CANCELLED | OUTPATIENT
Start: 2023-12-27

## 2023-11-09 RX ORDER — DIPHENHYDRAMINE HYDROCHLORIDE 50 MG/ML
50 INJECTION INTRAMUSCULAR; INTRAVENOUS AS NEEDED
Status: CANCELLED | OUTPATIENT
Start: 2023-11-29

## 2023-11-09 RX ORDER — DIPHENHYDRAMINE HCL 50 MG
50 CAPSULE ORAL ONCE
Status: CANCELLED | OUTPATIENT
Start: 2023-11-29

## 2023-11-09 RX ORDER — ALBUTEROL SULFATE 0.83 MG/ML
3 SOLUTION RESPIRATORY (INHALATION) AS NEEDED
Status: CANCELLED | OUTPATIENT
Start: 2023-11-29

## 2023-11-13 ENCOUNTER — HOSPITAL ENCOUNTER (OUTPATIENT)
Dept: CARDIOLOGY | Facility: HOSPITAL | Age: 63
Setting detail: OUTPATIENT SURGERY
Discharge: HOME | End: 2023-11-13
Attending: RADIOLOGY | Admitting: RADIOLOGY
Payer: COMMERCIAL

## 2023-11-13 VITALS
WEIGHT: 169.97 LBS | HEART RATE: 96 BPM | BODY MASS INDEX: 29.02 KG/M2 | OXYGEN SATURATION: 97 % | HEIGHT: 64 IN | TEMPERATURE: 97.7 F | DIASTOLIC BLOOD PRESSURE: 82 MMHG | SYSTOLIC BLOOD PRESSURE: 135 MMHG | RESPIRATION RATE: 16 BRPM

## 2023-11-13 DIAGNOSIS — C50.411 MALIGNANT NEOPLASM OF UPPER-OUTER QUADRANT OF RIGHT BREAST IN FEMALE, ESTROGEN RECEPTOR NEGATIVE (MULTI): ICD-10-CM

## 2023-11-13 DIAGNOSIS — Z17.1 MALIGNANT NEOPLASM OF UPPER-OUTER QUADRANT OF RIGHT BREAST IN FEMALE, ESTROGEN RECEPTOR NEGATIVE (MULTI): ICD-10-CM

## 2023-11-13 DIAGNOSIS — Z92.21 STATUS POST ADMINISTRATION OF CARDIOTOXIC CHEMOTHERAPY: ICD-10-CM

## 2023-11-13 PROCEDURE — 2500000004 HC RX 250 GENERAL PHARMACY W/ HCPCS (ALT 636 FOR OP/ED): Performed by: RADIOLOGY

## 2023-11-13 PROCEDURE — 2500000005 HC RX 250 GENERAL PHARMACY W/O HCPCS: Performed by: RADIOLOGY

## 2023-11-13 PROCEDURE — 76937 US GUIDE VASCULAR ACCESS: CPT | Performed by: RADIOLOGY

## 2023-11-13 PROCEDURE — 96372 THER/PROPH/DIAG INJ SC/IM: CPT | Performed by: RADIOLOGY

## 2023-11-13 PROCEDURE — C1788 PORT, INDWELLING, IMP: HCPCS

## 2023-11-13 PROCEDURE — 99152 MOD SED SAME PHYS/QHP 5/>YRS: CPT | Performed by: RADIOLOGY

## 2023-11-13 PROCEDURE — 36561 INSERT TUNNELED CV CATH: CPT | Performed by: RADIOLOGY

## 2023-11-13 PROCEDURE — 99153 MOD SED SAME PHYS/QHP EA: CPT | Performed by: RADIOLOGY

## 2023-11-13 PROCEDURE — 76937 US GUIDE VASCULAR ACCESS: CPT

## 2023-11-13 PROCEDURE — 77001 FLUOROGUIDE FOR VEIN DEVICE: CPT

## 2023-11-13 PROCEDURE — 99153 MOD SED SAME PHYS/QHP EA: CPT

## 2023-11-13 PROCEDURE — 77001 FLUOROGUIDE FOR VEIN DEVICE: CPT | Performed by: RADIOLOGY

## 2023-11-13 PROCEDURE — 99152 MOD SED SAME PHYS/QHP 5/>YRS: CPT

## 2023-11-13 PROCEDURE — 36561 INSERT TUNNELED CV CATH: CPT

## 2023-11-13 PROCEDURE — 2780000003 HC OR 278 NO HCPCS

## 2023-11-13 RX ORDER — LIDOCAINE HYDROCHLORIDE AND EPINEPHRINE 10; 10 MG/ML; UG/ML
INJECTION, SOLUTION INFILTRATION; PERINEURAL AS NEEDED
Status: DISCONTINUED | OUTPATIENT
Start: 2023-11-13 | End: 2023-11-14 | Stop reason: HOSPADM

## 2023-11-13 RX ORDER — FENTANYL CITRATE 50 UG/ML
INJECTION, SOLUTION INTRAMUSCULAR; INTRAVENOUS AS NEEDED
Status: DISCONTINUED | OUTPATIENT
Start: 2023-11-13 | End: 2023-11-14 | Stop reason: HOSPADM

## 2023-11-13 RX ORDER — MIDAZOLAM HYDROCHLORIDE 1 MG/ML
INJECTION, SOLUTION INTRAMUSCULAR; INTRAVENOUS AS NEEDED
Status: DISCONTINUED | OUTPATIENT
Start: 2023-11-13 | End: 2023-11-14 | Stop reason: HOSPADM

## 2023-11-13 RX ORDER — SODIUM CHLORIDE 9 MG/ML
100 INJECTION, SOLUTION INTRAVENOUS CONTINUOUS
Status: DISCONTINUED | OUTPATIENT
Start: 2023-11-13 | End: 2023-11-13

## 2023-11-13 RX ADMIN — MIDAZOLAM HYDROCHLORIDE 0.5 MG: 1 INJECTION, SOLUTION INTRAMUSCULAR; INTRAVENOUS at 09:17

## 2023-11-13 RX ADMIN — LIDOCAINE HYDROCHLORIDE,EPINEPHRINE BITARTRATE 10 ML: 10; .01 INJECTION, SOLUTION INFILTRATION; PERINEURAL at 09:12

## 2023-11-13 RX ADMIN — FENTANYL CITRATE 50 MCG: 50 INJECTION, SOLUTION INTRAMUSCULAR; INTRAVENOUS at 09:12

## 2023-11-13 RX ADMIN — FENTANYL CITRATE 25 MCG: 50 INJECTION, SOLUTION INTRAMUSCULAR; INTRAVENOUS at 09:17

## 2023-11-13 RX ADMIN — FENTANYL CITRATE 25 MCG: 50 INJECTION, SOLUTION INTRAMUSCULAR; INTRAVENOUS at 09:22

## 2023-11-13 RX ADMIN — MIDAZOLAM HYDROCHLORIDE 0.5 MG: 1 INJECTION, SOLUTION INTRAMUSCULAR; INTRAVENOUS at 09:22

## 2023-11-13 RX ADMIN — Medication 2 L/MIN: at 08:33

## 2023-11-13 RX ADMIN — MIDAZOLAM HYDROCHLORIDE 1 MG: 1 INJECTION, SOLUTION INTRAMUSCULAR; INTRAVENOUS at 09:12

## 2023-11-13 NOTE — Clinical Note
Patient Clipped and Prepped: right chest. Prepped with ChloraPrep, a minimum of 3 minute dry time, longer if needed, no pooling noted, patient draped in sterile fashion.

## 2023-11-13 NOTE — H&P
"History Of Present Illness  Cassandra Lang is a 63 y.o. female presenting with H/O Breast cancer  For Mediport placement with Dr Tan. .     BP (!) 135/97   Pulse 97   Temp 36.5 °C (97.7 °F)   Resp 18   Ht 1.626 m (5' 4.02\")   Wt 77.1 kg (169 lb 15.6 oz)   LMP  (LMP Unknown)   SpO2 96%   BMI 29.16 kg/m²     Past Medical History  Past Medical History:   Diagnosis Date    Abnormal findings on diagnostic imaging of other specified body structures     Abnormal CT of the chest    Personal history of other medical treatment     History of echocardiogram    Personal history of other medical treatment     History of transesophageal echocardiography (MAYKEL)    Personal history of other medical treatment     History of cardiac monitoring    Personal history of transient ischemic attack (TIA), and cerebral infarction without residual deficits     History of cerebrovascular accident    Tinea unguium 10/12/2021    Onychomycosis of toenail       Surgical History  Past Surgical History:   Procedure Laterality Date    APPENDECTOMY  2014    Appendectomy    BREAST LUMPECTOMY       SECTION, CLASSIC  2014     Section    MR HEAD ANGIO WO IV CONTRAST  2020    MR HEAD ANGIO WO IV CONTRAST 2020 [facility] CLINICAL LEGACY    MR NECK ANGIO WO IV CONTRAST  2020    MR NECK ANGIO WO IV CONTRAST 2020 [facility] CLINICAL LEGACY    OTHER SURGICAL HISTORY  2020    Esophagogastroduodenoscopy    OTHER SURGICAL HISTORY  2020    Colonoscopy    OTHER SURGICAL HISTORY  2021    Patent foramen ovale repair        Social History  She reports that she has never smoked. She has never been exposed to tobacco smoke. She has never used smokeless tobacco. She reports that she does not drink alcohol and does not use drugs.    Family History  Family History   Problem Relation Name Age of Onset    Diabetes Mother      Hypertension Mother      Cancer Mother      Coronary artery disease Father      " "Heart attack Maternal Grandfather      Stroke Other          Allergies  Sulfa (sulfonamide antibiotics) and Sulfamethoxazole-trimethoprim    Review of Systems   Hematological:         Breast CA   All other systems reviewed and are negative.       Physical Exam  Vitals reviewed.   HENT:      Head: Normocephalic.      Mouth/Throat:      Mouth: Mucous membranes are moist.      Comments: Mallampati III  Eyes:      Pupils: Pupils are equal, round, and reactive to light.   Cardiovascular:      Rate and Rhythm: Normal rate.   Pulmonary:      Effort: Pulmonary effort is normal.      Breath sounds: Normal breath sounds.   Abdominal:      Palpations: Abdomen is soft.   Musculoskeletal:         General: Normal range of motion.      Cervical back: Normal range of motion and neck supple.   Skin:     General: Skin is warm and dry.   Neurological:      General: No focal deficit present.      Mental Status: She is alert.   Psychiatric:         Mood and Affect: Mood normal.          Last Recorded Vitals  Blood pressure (!) 135/97, pulse 97, temperature 36.5 °C (97.7 °F), resp. rate 18, height 1.626 m (5' 4.02\"), weight 77.1 kg (169 lb 15.6 oz), SpO2 96 %.    Relevant Results  Current Outpatient Medications on File Prior to Encounter   Medication Sig Dispense Refill    amLODIPine (Norvasc) 5 mg tablet Take 1 tablet (5 mg) by mouth once daily. 90 tablet 1    apixaban (Eliquis) 5 mg tablet Take 1 tablet (5 mg) by mouth 2 times a day. 180 tablet 1    aspirin 81 mg EC tablet Take 1 tablet (81 mg) by mouth once daily. 90 tablet 1    cholecalciferol (Vitamin D-3) 25 MCG (1000 UT) capsule Take 1 capsule (25 mcg) by mouth once daily. 90 capsule 1    ezetimibe-simvastatin (Vytorin) 10-10 mg tablet Take 1 tablet by mouth once daily in the evening. 90 tablet 1    [DISCONTINUED] loratadine (Claritin) 10 mg tablet TAKE 1 TABLET BY MOUTH EVERY DAY 30 tablet 2    [DISCONTINUED] methocarbamol (Robaxin) 500 mg tablet TAKE 2 TABLETS BY MOUTH 3 TIMES " DAILY AS NEEDED FOR PAIN 20 tablet 0    [DISCONTINUED] multivitamin tablet Take 1 tablet by mouth once daily.       No current facility-administered medications on file prior to encounter.         No results found for this or any previous visit (from the past 24 hour(s)).       Assessment/Plan   Active Problems:  There are no active Hospital Problems.         I spent 15 minutes in the professional and overall care of this patient.      Sahara Muniz PA-C

## 2023-11-13 NOTE — POST-PROCEDURE NOTE
Interventional Radiology Brief Postprocedure Note    Attending: Dominick    Assistant: None    Diagnosis: breast cancer    Description of procedure: RIJ mediport, ready for use     Anesthesia:  Local  2 mg Versed, 50 mcg Fentanyl    Complications: None    Estimated Blood Loss: minimal        No specimens collected      See detailed result report with images in PACS.    The patient tolerated the procedure well without incident or complication and is in stable condition.

## 2023-11-14 PROBLEM — Z51.11 ENCOUNTER FOR ANTINEOPLASTIC CHEMOTHERAPY: Status: ACTIVE | Noted: 2023-11-14

## 2023-11-14 PROBLEM — Z79.899 ENCOUNTER FOR MONITORING CARDIOTOXIC DRUG THERAPY: Status: ACTIVE | Noted: 2023-11-14

## 2023-11-14 PROBLEM — Z51.81 ENCOUNTER FOR MONITORING CARDIOTOXIC DRUG THERAPY: Status: ACTIVE | Noted: 2023-11-14

## 2023-11-15 ENCOUNTER — OFFICE VISIT (OUTPATIENT)
Dept: HEMATOLOGY/ONCOLOGY | Facility: CLINIC | Age: 63
End: 2023-11-15
Payer: COMMERCIAL

## 2023-11-15 ENCOUNTER — INFUSION (OUTPATIENT)
Dept: HEMATOLOGY/ONCOLOGY | Facility: CLINIC | Age: 63
End: 2023-11-15
Payer: COMMERCIAL

## 2023-11-15 VITALS
TEMPERATURE: 98.4 F | SYSTOLIC BLOOD PRESSURE: 136 MMHG | HEART RATE: 96 BPM | BODY MASS INDEX: 27.45 KG/M2 | WEIGHT: 160 LBS | DIASTOLIC BLOOD PRESSURE: 96 MMHG

## 2023-11-15 VITALS
TEMPERATURE: 98.2 F | BODY MASS INDEX: 28.44 KG/M2 | HEART RATE: 92 BPM | RESPIRATION RATE: 18 BRPM | OXYGEN SATURATION: 97 % | WEIGHT: 160.5 LBS | SYSTOLIC BLOOD PRESSURE: 136 MMHG | DIASTOLIC BLOOD PRESSURE: 77 MMHG | HEIGHT: 63 IN

## 2023-11-15 DIAGNOSIS — Z51.11 ENCOUNTER FOR ANTINEOPLASTIC CHEMOTHERAPY: ICD-10-CM

## 2023-11-15 DIAGNOSIS — Z17.1 MALIGNANT NEOPLASM OF UPPER-OUTER QUADRANT OF RIGHT BREAST IN FEMALE, ESTROGEN RECEPTOR NEGATIVE (MULTI): Primary | ICD-10-CM

## 2023-11-15 DIAGNOSIS — Z51.81 ENCOUNTER FOR MONITORING CARDIOTOXIC DRUG THERAPY: ICD-10-CM

## 2023-11-15 DIAGNOSIS — K21.9 CHRONIC GERD: ICD-10-CM

## 2023-11-15 DIAGNOSIS — Z79.899 ENCOUNTER FOR MONITORING CARDIOTOXIC DRUG THERAPY: ICD-10-CM

## 2023-11-15 DIAGNOSIS — C50.411 MALIGNANT NEOPLASM OF UPPER-OUTER QUADRANT OF RIGHT BREAST IN FEMALE, ESTROGEN RECEPTOR NEGATIVE (MULTI): Primary | ICD-10-CM

## 2023-11-15 DIAGNOSIS — C50.411 MALIGNANT NEOPLASM OF UPPER-OUTER QUADRANT OF RIGHT BREAST IN FEMALE, ESTROGEN RECEPTOR NEGATIVE (MULTI): ICD-10-CM

## 2023-11-15 DIAGNOSIS — Z17.1 MALIGNANT NEOPLASM OF UPPER-OUTER QUADRANT OF RIGHT BREAST IN FEMALE, ESTROGEN RECEPTOR NEGATIVE (MULTI): ICD-10-CM

## 2023-11-15 LAB
ALBUMIN SERPL BCP-MCNC: 4.7 G/DL (ref 3.4–5)
ALP SERPL-CCNC: 69 U/L (ref 33–136)
ALT SERPL W P-5'-P-CCNC: 18 U/L (ref 7–45)
ANION GAP SERPL CALC-SCNC: 12 MMOL/L (ref 10–20)
AST SERPL W P-5'-P-CCNC: 17 U/L (ref 9–39)
BASOPHILS # BLD AUTO: 0.05 X10*3/UL (ref 0–0.1)
BASOPHILS NFR BLD AUTO: 1 %
BILIRUB SERPL-MCNC: 0.6 MG/DL (ref 0–1.2)
BUN SERPL-MCNC: 10 MG/DL (ref 6–23)
CALCIUM SERPL-MCNC: 9.9 MG/DL (ref 8.6–10.6)
CHLORIDE SERPL-SCNC: 107 MMOL/L (ref 98–107)
CO2 SERPL-SCNC: 28 MMOL/L (ref 21–32)
CREAT SERPL-MCNC: 0.56 MG/DL (ref 0.5–1.05)
EOSINOPHIL # BLD AUTO: 0.17 X10*3/UL (ref 0–0.7)
EOSINOPHIL NFR BLD AUTO: 3.3 %
ERYTHROCYTE [DISTWIDTH] IN BLOOD BY AUTOMATED COUNT: 13.3 % (ref 11.5–14.5)
GFR SERPL CREATININE-BSD FRML MDRD: >90 ML/MIN/1.73M*2
GLUCOSE SERPL-MCNC: 103 MG/DL (ref 74–99)
HCT VFR BLD AUTO: 43.8 % (ref 36–46)
HGB BLD-MCNC: 14.4 G/DL (ref 12–16)
IMM GRANULOCYTES # BLD AUTO: 0 X10*3/UL (ref 0–0.7)
IMM GRANULOCYTES NFR BLD AUTO: 0 % (ref 0–0.9)
LYMPHOCYTES # BLD AUTO: 1.79 X10*3/UL (ref 1.2–4.8)
LYMPHOCYTES NFR BLD AUTO: 34.6 %
MCH RBC QN AUTO: 27.2 PG (ref 26–34)
MCHC RBC AUTO-ENTMCNC: 32.9 G/DL (ref 32–36)
MCV RBC AUTO: 83 FL (ref 80–100)
MONOCYTES # BLD AUTO: 0.41 X10*3/UL (ref 0.1–1)
MONOCYTES NFR BLD AUTO: 7.9 %
NEUTROPHILS # BLD AUTO: 2.75 X10*3/UL (ref 1.2–7.7)
NEUTROPHILS NFR BLD AUTO: 53.2 %
NRBC BLD-RTO: ABNORMAL /100{WBCS}
PLATELET # BLD AUTO: 244 X10*3/UL (ref 150–450)
POTASSIUM SERPL-SCNC: 3.7 MMOL/L (ref 3.5–5.3)
PROT SERPL-MCNC: 7 G/DL (ref 6.4–8.2)
RBC # BLD AUTO: 5.29 X10*6/UL (ref 4–5.2)
SODIUM SERPL-SCNC: 143 MMOL/L (ref 136–145)
WBC # BLD AUTO: 5.2 X10*3/UL (ref 4.4–11.3)

## 2023-11-15 PROCEDURE — 2500000004 HC RX 250 GENERAL PHARMACY W/ HCPCS (ALT 636 FOR OP/ED): Performed by: INTERNAL MEDICINE

## 2023-11-15 PROCEDURE — 36591 DRAW BLOOD OFF VENOUS DEVICE: CPT

## 2023-11-15 PROCEDURE — 96417 CHEMO IV INFUS EACH ADDL SEQ: CPT

## 2023-11-15 PROCEDURE — 36415 COLL VENOUS BLD VENIPUNCTURE: CPT

## 2023-11-15 PROCEDURE — 3080F DIAST BP >= 90 MM HG: CPT | Performed by: NURSE PRACTITIONER

## 2023-11-15 PROCEDURE — 1036F TOBACCO NON-USER: CPT | Performed by: NURSE PRACTITIONER

## 2023-11-15 PROCEDURE — 96375 TX/PRO/DX INJ NEW DRUG ADDON: CPT | Mod: INF

## 2023-11-15 PROCEDURE — 99215 OFFICE O/P EST HI 40 MIN: CPT | Performed by: NURSE PRACTITIONER

## 2023-11-15 PROCEDURE — 3075F SYST BP GE 130 - 139MM HG: CPT | Performed by: NURSE PRACTITIONER

## 2023-11-15 PROCEDURE — 96415 CHEMO IV INFUSION ADDL HR: CPT

## 2023-11-15 PROCEDURE — 96413 CHEMO IV INFUSION 1 HR: CPT

## 2023-11-15 PROCEDURE — 80053 COMPREHEN METABOLIC PANEL: CPT

## 2023-11-15 PROCEDURE — 2500000001 HC RX 250 WO HCPCS SELF ADMINISTERED DRUGS (ALT 637 FOR MEDICARE OP): Performed by: INTERNAL MEDICINE

## 2023-11-15 PROCEDURE — 96374 THER/PROPH/DIAG INJ IV PUSH: CPT | Mod: INF

## 2023-11-15 PROCEDURE — 85025 COMPLETE CBC W/AUTO DIFF WBC: CPT

## 2023-11-15 RX ORDER — PROCHLORPERAZINE MALEATE 10 MG
10 TABLET ORAL EVERY 6 HOURS PRN
Status: DISCONTINUED | OUTPATIENT
Start: 2023-11-15 | End: 2023-11-15 | Stop reason: HOSPADM

## 2023-11-15 RX ORDER — PROCHLORPERAZINE EDISYLATE 5 MG/ML
10 INJECTION INTRAMUSCULAR; INTRAVENOUS EVERY 6 HOURS PRN
Status: DISCONTINUED | OUTPATIENT
Start: 2023-11-15 | End: 2023-11-15 | Stop reason: HOSPADM

## 2023-11-15 RX ORDER — ALBUTEROL SULFATE 0.83 MG/ML
3 SOLUTION RESPIRATORY (INHALATION) AS NEEDED
Status: DISCONTINUED | OUTPATIENT
Start: 2023-11-15 | End: 2023-11-15 | Stop reason: HOSPADM

## 2023-11-15 RX ORDER — LIDOCAINE AND PRILOCAINE 25; 25 MG/G; MG/G
CREAM TOPICAL
Qty: 30 G | Refills: 1 | Status: SHIPPED | OUTPATIENT
Start: 2023-11-15 | End: 2024-02-15 | Stop reason: ALTCHOICE

## 2023-11-15 RX ORDER — DIPHENHYDRAMINE HCL 50 MG
50 CAPSULE ORAL ONCE
Status: COMPLETED | OUTPATIENT
Start: 2023-11-15 | End: 2023-11-15

## 2023-11-15 RX ORDER — FAMOTIDINE 10 MG/ML
20 INJECTION INTRAVENOUS ONCE AS NEEDED
Status: DISCONTINUED | OUTPATIENT
Start: 2023-11-15 | End: 2023-11-15 | Stop reason: HOSPADM

## 2023-11-15 RX ORDER — ONDANSETRON HYDROCHLORIDE 8 MG/1
8 TABLET, FILM COATED ORAL EVERY 6 HOURS PRN
Qty: 30 TABLET | Refills: 1 | Status: SHIPPED | OUTPATIENT
Start: 2023-11-15 | End: 2023-12-06

## 2023-11-15 RX ORDER — HEPARIN SODIUM,PORCINE/PF 10 UNIT/ML
50 SYRINGE (ML) INTRAVENOUS AS NEEDED
Status: CANCELLED | OUTPATIENT
Start: 2023-11-15

## 2023-11-15 RX ORDER — HEPARIN 100 UNIT/ML
500 SYRINGE INTRAVENOUS AS NEEDED
Status: CANCELLED | OUTPATIENT
Start: 2023-11-15

## 2023-11-15 RX ORDER — DEXAMETHASONE SODIUM PHOSPHATE 4 MG/ML
10 INJECTION, SOLUTION INTRA-ARTICULAR; INTRALESIONAL; INTRAMUSCULAR; INTRAVENOUS; SOFT TISSUE ONCE
Status: COMPLETED | OUTPATIENT
Start: 2023-11-15 | End: 2023-11-15

## 2023-11-15 RX ORDER — DIPHENHYDRAMINE HYDROCHLORIDE 50 MG/ML
50 INJECTION INTRAMUSCULAR; INTRAVENOUS AS NEEDED
Status: DISCONTINUED | OUTPATIENT
Start: 2023-11-15 | End: 2023-11-15 | Stop reason: HOSPADM

## 2023-11-15 RX ORDER — FAMOTIDINE 10 MG/ML
20 INJECTION INTRAVENOUS ONCE
Status: COMPLETED | OUTPATIENT
Start: 2023-11-15 | End: 2023-11-15

## 2023-11-15 RX ORDER — OMEPRAZOLE 40 MG/1
40 CAPSULE, DELAYED RELEASE ORAL
Qty: 30 CAPSULE | Refills: 2 | Status: SHIPPED | OUTPATIENT
Start: 2023-11-15 | End: 2024-02-15 | Stop reason: ALTCHOICE

## 2023-11-15 RX ORDER — EPINEPHRINE 0.3 MG/.3ML
0.3 INJECTION SUBCUTANEOUS EVERY 5 MIN PRN
Status: DISCONTINUED | OUTPATIENT
Start: 2023-11-15 | End: 2023-11-15 | Stop reason: HOSPADM

## 2023-11-15 RX ORDER — HEPARIN SODIUM,PORCINE/PF 10 UNIT/ML
50 SYRINGE (ML) INTRAVENOUS AS NEEDED
Status: DISCONTINUED | OUTPATIENT
Start: 2023-11-15 | End: 2023-11-15 | Stop reason: HOSPADM

## 2023-11-15 RX ORDER — HEPARIN 100 UNIT/ML
500 SYRINGE INTRAVENOUS AS NEEDED
Status: DISCONTINUED | OUTPATIENT
Start: 2023-11-15 | End: 2023-11-15 | Stop reason: HOSPADM

## 2023-11-15 RX ADMIN — DEXAMETHASONE SODIUM PHOSPHATE 10 MG: 4 INJECTION INTRA-ARTICULAR; INTRALESIONAL; INTRAMUSCULAR; INTRAVENOUS; SOFT TISSUE at 10:59

## 2023-11-15 RX ADMIN — PACLITAXEL 150 MG: 6 INJECTION, SOLUTION INTRAVENOUS at 14:13

## 2023-11-15 RX ADMIN — FAMOTIDINE 20 MG: 10 INJECTION INTRAVENOUS at 10:59

## 2023-11-15 RX ADMIN — TRASTUZUMAB-ANNS 300.3 MG: 150 INJECTION, POWDER, LYOPHILIZED, FOR SOLUTION INTRAVENOUS at 12:26

## 2023-11-15 RX ADMIN — DIPHENHYDRAMINE HYDROCHLORIDE 50 MG: 50 CAPSULE ORAL at 10:59

## 2023-11-15 RX ADMIN — HEPARIN 500 UNITS: 100 SYRINGE at 15:24

## 2023-11-15 ASSESSMENT — PAIN SCALES - GENERAL
PAINLEVEL: 0-NO PAIN
PAINLEVEL: 0-NO PAIN

## 2023-11-15 NOTE — PROGRESS NOTES
Breast Medical Oncology Clinic  Location: [facility]      BREAST CANCER DIAGNOSIS  Malignant neoplasm of upper-outer quadrant of right breast in female, estrogen receptor negative (CMS/HCC), Pathologic: Stage IIB (pT2, pN1mi(sn), cM0, G3, ER-, MD-, HER2+)   -Jan 2022 Diagnosed with ADH, decline excision  -March 2022 Declined Aromatase inhibitor   -Jan 2023 and July 2023 Continued breast imaging and surveillance of ADH  -9/6/2023 Magseed localized excisional biopsy for ADH to avoid continued surveillance. Final pathology demontrated a 2.1cm grade 3 IDC HER2+ with a positive anterior margin for DCIS.   -10/2/23 axillary staging with SLN and re-excision of the anterior margin, revealed 1/1 micromet    MEDICAL ONCOLOGY: Dr. Rosenbaum  Surgical oncology: Dr Gricelda Soria   RADIATION ONCOLOGY: Dr. Domínguez  GENETICS: does not meet NCCN criteria      CURRENT THERAPY  Planned initiation of adjuvant weekly Paclitaxel and herceptin  x12 followed by completion of maintenance herceptin    HISTORY OF PRESENT ILLNESS    Cassandra Lang is a 63 y.o. woman who present to initiate adjuvant therapy for HER2+ right sided breast cancer. She is accompanied today by her daughter Merry, due to language barriers we are utilizing CANDICE and her daughter to translate for this appointment. She denies any new cancer concerns, she does not have any new masses that she can tell. She reports baseline dry skin since before the cancer diagnosis.     She denies any chest pain or breathing issues. She reports intermittent heart burn. She drinks chamomile tea to help with this.      She denies any vision changes or headache issues, dizziness or loss of balance, no falls    She denies any new or unexplained bone aches or pains, she reports baseline spinal arthritis which is worse with cold weather    She reports a poor appetite baseline since before the diagnosis. She reports normal bowel movements and urination.     She reports issues with sleep, has baseline  "issues with fatigue           Review of Systems - Oncology  ROS 14 points performed, See HPI for exceptions      Past Medical History:  has a past medical history of Hypertension, hypercholesterolemia, CVA in 2020, her daughter states she has a constant \"pounding sound\" in her head since, Pulmonary embolism 2020, patent foramen ovale s/p percutaneous closure 2021, anticoagulated on Eliquis and ASA.Personal history of transient ischemic attack (TIA), and cerebral infarction without residual deficits, and Tinea unguium (10/12/2021).  Surgical History:   has a past surgical history that includes  section, classic (2014); Appendectomy (2014); Other surgical history (2020); Other surgical history (2020); Other surgical history (2021); MR angio head wo IV contrast (2020); MR angio neck wo IV contrast (2020); Breast lumpectomy; and IR CVC port (2023).  Social History:   reports that she has never smoked. She has never been exposed to tobacco smoke. She has never used smokeless tobacco. She reports that she does not drink alcohol and does not use drugs. Lives in a home with  Vin (Hx of a stroke), 3 adult children all live in the home - Merry is a researcher and is going to PA school, daughter Gladys works in anesthesia, son Phani is disabled from severe RA. 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 Pt and  [place]oved [place] in .   REPRODUCTIVE HEALTH: menarche at 16, postmenopausal from natural causes age 54,  with first birth at 31,  x 1 month, denies exposure to birth control pills or post menopausal estrogen.  FAMILY CANCER HISTORY: Mother diagnosed with kidney cancer.     Family History:    Family History   Problem Relation Name Age of Onset    Diabetes Mother      Hypertension Mother      Cancer Mother      Coronary artery disease Father      Heart attack Maternal Grandfather      Stroke Other       Family Oncology History:  " Cancer-related family history includes Cancer in her mother.      OBJECTIVE    VS / Pain:  BP (!) 136/96   Pulse 96   Temp 36.9 °C (98.4 °F)   Wt 72.6 kg (160 lb)   LMP  (LMP Unknown)   BMI 27.45 kg/m²   BSA: 1.81 meters squared   Pain Scale: 0    Performance Status:  The ECOG performance scale today is ECO- Restricted in physically strenuous activity.  Carries out light duty.    Physical Exam  Constitutional: Well developed, awake/alert/oriented x4, no distress, alert and cooperative  EYES: Sclera clear  ENMT: mucous membranes moist, no apparent injury, no lesions seen  Head/Neck: Neck supple, no apparent injury, thyroid without mass or tenderness, No JVD, trachea midline, no bruits  Respiratory / Thoracic: Patent airways, clear to all lobes, normal breath sounds with good chest expansion, thorax symmetric.  Cardiovascular: Regular, rate and rhythm, no murmurs, 2+ equal pulses of the extremities, normal auscultated S 1and S 2  GI: Nondistended, soft, non-tender, no rebound tenderness or guarding, no masses palpable, no organomegaly, +BS, no bruits  Musculoskeletal: ROM intact, no joint swelling, normal strength, no spinal tenderness  Extremities: normal extremities, no cyanosis edema, contusions or wounds, no clubbing  Neurological: alert and oriented x4, intact senses, motor, response and reflexes, normal strength  Breast: S/p right lumpectomy. No palpable masses or lesions in either breast  Lymphatic: No cervical, supraclavicular, infraclavicular or axillary lymphadenopathy  Psychological: Appropriate and talkative mood and behavior  Skin: Warm and dry, no lesions, no rashes, no jaundice        Diagnostic Results   === 22 ===    CT HEAD WO IV CONTRAST    - Impression -  No acute intracranial hemorrhage or mass-effect. Remote infarct in  the left basal ganglia/corona radiata.   No results found for this or any previous visit from the past 365 days.     No images are attached to the  encounter.    LABORATORY/PATHOLOGY DATA    Infusion on 11/15/2023   Component Date Value Ref Range Status    WBC 11/15/2023 5.2  4.4 - 11.3 x10*3/uL Final    nRBC 11/15/2023    Final    RBC 11/15/2023 5.29 (H)  4.00 - 5.20 x10*6/uL Final    Hemoglobin 11/15/2023 14.4  12.0 - 16.0 g/dL Final    Hematocrit 11/15/2023 43.8  36.0 - 46.0 % Final    MCV 11/15/2023 83  80 - 100 fL Final    MCH 11/15/2023 27.2  26.0 - 34.0 pg Final    MCHC 11/15/2023 32.9  32.0 - 36.0 g/dL Final    RDW 11/15/2023 13.3  11.5 - 14.5 % Final    Platelets 11/15/2023 244  150 - 450 x10*3/uL Final    Neutrophils % 11/15/2023 53.2  40.0 - 80.0 % Final    Immature Granulocytes %, Automated 11/15/2023 0.0  0.0 - 0.9 % Final    Lymphocytes % 11/15/2023 34.6  13.0 - 44.0 % Final    Monocytes % 11/15/2023 7.9  2.0 - 10.0 % Final    Eosinophils % 11/15/2023 3.3  0.0 - 6.0 % Final    Basophils % 11/15/2023 1.0  0.0 - 2.0 % Final    Neutrophils Absolute 11/15/2023 2.75  1.20 - 7.70 x10*3/uL Final    Immature Granulocytes Absolute, Au* 11/15/2023 0.00  0.00 - 0.70 x10*3/uL Final    Lymphocytes Absolute 11/15/2023 1.79  1.20 - 4.80 x10*3/uL Final    Monocytes Absolute 11/15/2023 0.41  0.10 - 1.00 x10*3/uL Final    Eosinophils Absolute 11/15/2023 0.17  0.00 - 0.70 x10*3/uL Final    Basophils Absolute 11/15/2023 0.05  0.00 - 0.10 x10*3/uL Final    Glucose 11/15/2023 103 (H)  74 - 99 mg/dL Final    Sodium 11/15/2023 143  136 - 145 mmol/L Final    Potassium 11/15/2023 3.7  3.5 - 5.3 mmol/L Final    Chloride 11/15/2023 107  98 - 107 mmol/L Final    Bicarbonate 11/15/2023 28  21 - 32 mmol/L Final    Anion Gap 11/15/2023 12  10 - 20 mmol/L Final    Urea Nitrogen 11/15/2023 10  6 - 23 mg/dL Final    Creatinine 11/15/2023 0.56  0.50 - 1.05 mg/dL Final    eGFR 11/15/2023 >90  >60 mL/min/1.73m*2 Final    Calcium 11/15/2023 9.9  8.6 - 10.6 mg/dL Final    Albumin 11/15/2023 4.7  3.4 - 5.0 g/dL Final    Alkaline Phosphatase 11/15/2023 69  33 - 136 U/L Final    Total  Protein 11/15/2023 7.0  6.4 - 8.2 g/dL Final    AST 11/15/2023 17  9 - 39 U/L Final    Bilirubin, Total 11/15/2023 0.6  0.0 - 1.2 mg/dL Final    ALT 11/15/2023 18  7 - 45 U/L Final   Hospital Outpatient Visit on 11/08/2023   Component Date Value Ref Range Status    AV pk gogo 11/08/2023 0.99   Final-Edited    LV biplane EF 11/08/2023 64   Final-Edited    MV avg E/e' ratio 11/08/2023 4.60   Final-Edited    MV E/A ratio 11/08/2023 0.52   Final-Edited    Tricuspid annular plane systolic e* 11/08/2023 1.7   Final-Edited    LVIDd 11/08/2023 4.30   Final-Edited    RVSP 11/08/2023 19.3   Final-Edited    AV pk grad 11/08/2023 3.9   Final-Edited    LV A4C EF 11/08/2023 64.1   Final-Edited   Lab on 10/30/2023   Component Date Value Ref Range Status    aPTT 10/30/2023 38  27 - 38 seconds Final    Protime 10/30/2023 12.8  9.8 - 12.8 seconds Final    INR 10/30/2023 1.1  0.9 - 1.1 Final    WBC 10/30/2023 4.2 (L)  4.4 - 11.3 x10*3/uL Final    nRBC 10/30/2023 0.0  0.0 - 0.0 /100 WBCs Final    RBC 10/30/2023 5.35 (H)  4.00 - 5.20 x10*6/uL Final    Hemoglobin 10/30/2023 14.5  12.0 - 16.0 g/dL Final    Hematocrit 10/30/2023 45.0  36.0 - 46.0 % Final    MCV 10/30/2023 84  80 - 100 fL Final    MCH 10/30/2023 27.1  26.0 - 34.0 pg Final    MCHC 10/30/2023 32.2  32.0 - 36.0 g/dL Final    RDW 10/30/2023 13.0  11.5 - 14.5 % Final    Platelets 10/30/2023 259  150 - 450 x10*3/uL Final    MPV 10/30/2023 9.8  7.5 - 11.5 fL Final    Neutrophils % 10/30/2023 50.2  40.0 - 80.0 % Final    Immature Granulocytes %, Automated 10/30/2023 0.2  0.0 - 0.9 % Final    Lymphocytes % 10/30/2023 33.7  13.0 - 44.0 % Final    Monocytes % 10/30/2023 10.0  2.0 - 10.0 % Final    Eosinophils % 10/30/2023 4.5  0.0 - 6.0 % Final    Basophils % 10/30/2023 1.4  0.0 - 2.0 % Final    Neutrophils Absolute 10/30/2023 2.10  1.20 - 7.70 x10*3/uL Final    Immature Granulocytes Absolute, Au* 10/30/2023 0.01  0.00 - 0.70 x10*3/uL Final    Lymphocytes Absolute 10/30/2023 1.41  1.20  - 4.80 x10*3/uL Final    Monocytes Absolute 10/30/2023 0.42  0.10 - 1.00 x10*3/uL Final    Eosinophils Absolute 10/30/2023 0.19  0.00 - 0.70 x10*3/uL Final    Basophils Absolute 10/30/2023 0.06  0.00 - 0.10 x10*3/uL Final    Glucose 10/30/2023 103 (H)  74 - 99 mg/dL Final    Sodium 10/30/2023 144  136 - 145 mmol/L Final    Potassium 10/30/2023 3.8  3.5 - 5.3 mmol/L Final    Chloride 10/30/2023 106  98 - 107 mmol/L Final    Bicarbonate 10/30/2023 25  21 - 32 mmol/L Final    Anion Gap 10/30/2023 17  10 - 20 mmol/L Final    Urea Nitrogen 10/30/2023 11  6 - 23 mg/dL Final    Creatinine 10/30/2023 0.63  0.50 - 1.05 mg/dL Final    eGFR 10/30/2023 >90  >60 mL/min/1.73m*2 Final    Calcium 10/30/2023 9.8  8.6 - 10.6 mg/dL Final    Albumin 10/30/2023 4.7  3.4 - 5.0 g/dL Final    Alkaline Phosphatase 10/30/2023 69  33 - 136 U/L Final    Total Protein 10/30/2023 6.9  6.4 - 8.2 g/dL Final    AST 10/30/2023 16  9 - 39 U/L Final    Bilirubin, Total 10/30/2023 0.5  0.0 - 1.2 mg/dL Final    ALT 10/30/2023 20  7 - 45 U/L Final          IMPRESSION/PLAN    1. Right sided [id number] HER2+ breast cancer, unexpectedly found after excisional biopsy for sclerosing adenosis.   Will start therapy today with adjuvant chemo-HER2 therapy- weekly paclitaxel and herceptin x12 followed by maintenance herceptin to complete 1 year. Prescriptions sent for anti nausea, emila cream and heart burn isses    RTC in 3-4 weeks with Dr Brendon Rosenbaum / Shawna Mccallum APRN, CNP       2. Hypercoagulable state. extensive medical history of CVA, breast cancer,  HTN, Hyperlipidemia, hypercholesterolemia. She remains on eliquis.      We discussed the clinical significance of diagnosis, goals of care and treatment plan in detail.     At least 35 minutes of direct consultation was spent with the patient today reviewing her cancer care plan, educating and answering questions regarding ongoing follow up, greater than 50% in counseling and coordination of  care'        -------------------------------------------------------------------------------------------------------------------------------  Shawna Mccallum MSN, APRN, FNP-C  [facility]  Division of Medical Oncology- Breast   Collaborating Physician Dr. Brendon Rosenbaum   [place] [address]  Phone: [phone]  Fax: [phone]  Available via Secure Chat    Confidential Peer Review Document-  Privilege  Privileged Pursuant to [place] Revised Code Section 2305.24, .25, .251 & .252

## 2023-11-15 NOTE — SIGNIFICANT EVENT
11/15/23 1031   Prechemo Checklist   Has the patient been in the hospital, ED, or urgent care since last date of service No   Chemo/Immuno Consent Signed Yes   Protocol/Indications Verified Yes   Confirmed to previous date/time of medication Yes   Compared to previous dose Yes   All medications are dated accurately Yes   Pregnancy Test Negative Not applicable   Parameters Met Yes   BSA/Weight-Height Verified Yes   Dose Calculations Verified Yes

## 2023-11-15 NOTE — PATIENT INSTRUCTIONS
You will start weekly Paclitaxel today with herceptin for a planned 12 weeks. Please call us for any intolerances or side effects to the regimen- Nausea, changes in bowels or neuropathy    Return to clinic to see Dr Brendon Rosenbaum or Shawna VALDEZ CNP on 12/13/23 prior to chemotherapy.    I have sent in Prescriptions for nausea, acid reflux, and numbing cream for your port.    Please stay as active as possible, drink 2 liter of water daily, and use oral rinse after each meal and as needed.    Please call [phone] with any questions or concerns prior to your next office visit.

## 2023-11-18 ENCOUNTER — HOSPITAL ENCOUNTER (EMERGENCY)
Facility: HOSPITAL | Age: 63
Discharge: HOME | End: 2023-11-18
Payer: COMMERCIAL

## 2023-11-18 ENCOUNTER — APPOINTMENT (OUTPATIENT)
Dept: RADIOLOGY | Facility: HOSPITAL | Age: 63
End: 2023-11-18
Payer: COMMERCIAL

## 2023-11-18 VITALS
DIASTOLIC BLOOD PRESSURE: 88 MMHG | SYSTOLIC BLOOD PRESSURE: 132 MMHG | OXYGEN SATURATION: 99 % | HEART RATE: 90 BPM | BODY MASS INDEX: 28 KG/M2 | WEIGHT: 158 LBS | HEIGHT: 63 IN | RESPIRATION RATE: 18 BRPM | TEMPERATURE: 98.6 F

## 2023-11-18 DIAGNOSIS — N30.90 CYSTITIS: ICD-10-CM

## 2023-11-18 DIAGNOSIS — C50.411 MALIGNANT NEOPLASM OF UPPER-OUTER QUADRANT OF RIGHT BREAST IN FEMALE, ESTROGEN RECEPTOR NEGATIVE (MULTI): ICD-10-CM

## 2023-11-18 DIAGNOSIS — R31.9 HEMATURIA, UNSPECIFIED TYPE: Primary | ICD-10-CM

## 2023-11-18 DIAGNOSIS — Z17.1 MALIGNANT NEOPLASM OF UPPER-OUTER QUADRANT OF RIGHT BREAST IN FEMALE, ESTROGEN RECEPTOR NEGATIVE (MULTI): ICD-10-CM

## 2023-11-18 LAB
ALBUMIN SERPL BCP-MCNC: 4.6 G/DL (ref 3.4–5)
ALP SERPL-CCNC: 67 U/L (ref 33–136)
ALT SERPL W P-5'-P-CCNC: 23 U/L (ref 7–45)
ANION GAP SERPL CALC-SCNC: 15 MMOL/L (ref 10–20)
APPEARANCE UR: ABNORMAL
AST SERPL W P-5'-P-CCNC: 16 U/L (ref 9–39)
BACTERIA #/AREA URNS AUTO: ABNORMAL /HPF
BASOPHILS # BLD AUTO: 0.05 X10*3/UL (ref 0–0.1)
BASOPHILS NFR BLD AUTO: 0.6 %
BILIRUB SERPL-MCNC: 0.6 MG/DL (ref 0–1.2)
BILIRUB UR STRIP.AUTO-MCNC: NEGATIVE MG/DL
BUN SERPL-MCNC: 19 MG/DL (ref 6–23)
CALCIUM SERPL-MCNC: 9.4 MG/DL (ref 8.6–10.3)
CHLORIDE SERPL-SCNC: 103 MMOL/L (ref 98–107)
CO2 SERPL-SCNC: 24 MMOL/L (ref 21–32)
COLOR UR: YELLOW
CREAT SERPL-MCNC: 0.68 MG/DL (ref 0.5–1.05)
EOSINOPHIL # BLD AUTO: 0.16 X10*3/UL (ref 0–0.7)
EOSINOPHIL NFR BLD AUTO: 2 %
ERYTHROCYTE [DISTWIDTH] IN BLOOD BY AUTOMATED COUNT: 12.9 % (ref 11.5–14.5)
GFR SERPL CREATININE-BSD FRML MDRD: >90 ML/MIN/1.73M*2
GLUCOSE SERPL-MCNC: 131 MG/DL (ref 74–99)
GLUCOSE UR STRIP.AUTO-MCNC: NEGATIVE MG/DL
HCT VFR BLD AUTO: 43 % (ref 36–46)
HGB BLD-MCNC: 14.4 G/DL (ref 12–16)
HOLD SPECIMEN: NORMAL
IMM GRANULOCYTES # BLD AUTO: 0.03 X10*3/UL (ref 0–0.7)
IMM GRANULOCYTES NFR BLD AUTO: 0.4 % (ref 0–0.9)
KETONES UR STRIP.AUTO-MCNC: NEGATIVE MG/DL
LACTATE SERPL-SCNC: 0.7 MMOL/L (ref 0.4–2)
LEUKOCYTE ESTERASE UR QL STRIP.AUTO: ABNORMAL
LYMPHOCYTES # BLD AUTO: 1.86 X10*3/UL (ref 1.2–4.8)
LYMPHOCYTES NFR BLD AUTO: 23.3 %
MCH RBC QN AUTO: 27.6 PG (ref 26–34)
MCHC RBC AUTO-ENTMCNC: 33.5 G/DL (ref 32–36)
MCV RBC AUTO: 83 FL (ref 80–100)
MONOCYTES # BLD AUTO: 0.28 X10*3/UL (ref 0.1–1)
MONOCYTES NFR BLD AUTO: 3.5 %
MUCOUS THREADS #/AREA URNS AUTO: ABNORMAL /LPF
NEUTROPHILS # BLD AUTO: 5.61 X10*3/UL (ref 1.2–7.7)
NEUTROPHILS NFR BLD AUTO: 70.2 %
NITRITE UR QL STRIP.AUTO: POSITIVE
NRBC BLD-RTO: 0 /100 WBCS (ref 0–0)
PH UR STRIP.AUTO: 5 [PH]
PLATELET # BLD AUTO: 211 X10*3/UL (ref 150–450)
POTASSIUM SERPL-SCNC: 3.6 MMOL/L (ref 3.5–5.3)
PROT SERPL-MCNC: 6.8 G/DL (ref 6.4–8.2)
PROT UR STRIP.AUTO-MCNC: ABNORMAL MG/DL
RBC # BLD AUTO: 5.21 X10*6/UL (ref 4–5.2)
RBC # UR STRIP.AUTO: ABNORMAL /UL
RBC #/AREA URNS AUTO: >20 /HPF
SODIUM SERPL-SCNC: 138 MMOL/L (ref 136–145)
SP GR UR STRIP.AUTO: 1.01
SQUAMOUS #/AREA URNS AUTO: ABNORMAL /HPF
UROBILINOGEN UR STRIP.AUTO-MCNC: <2 MG/DL
WBC # BLD AUTO: 8 X10*3/UL (ref 4.4–11.3)
WBC #/AREA URNS AUTO: >50 /HPF
WBC CLUMPS #/AREA URNS AUTO: ABNORMAL /HPF

## 2023-11-18 PROCEDURE — 96360 HYDRATION IV INFUSION INIT: CPT | Mod: 59

## 2023-11-18 PROCEDURE — 99284 EMERGENCY DEPT VISIT MOD MDM: CPT | Mod: 25 | Performed by: PHYSICIAN ASSISTANT

## 2023-11-18 PROCEDURE — 74177 CT ABD & PELVIS W/CONTRAST: CPT | Performed by: STUDENT IN AN ORGANIZED HEALTH CARE EDUCATION/TRAINING PROGRAM

## 2023-11-18 PROCEDURE — 84075 ASSAY ALKALINE PHOSPHATASE: CPT | Performed by: PHYSICIAN ASSISTANT

## 2023-11-18 PROCEDURE — 85025 COMPLETE CBC W/AUTO DIFF WBC: CPT | Performed by: PHYSICIAN ASSISTANT

## 2023-11-18 PROCEDURE — 99285 EMERGENCY DEPT VISIT HI MDM: CPT | Mod: 25

## 2023-11-18 PROCEDURE — 2550000001 HC RX 255 CONTRASTS: Performed by: PHYSICIAN ASSISTANT

## 2023-11-18 PROCEDURE — 83605 ASSAY OF LACTIC ACID: CPT | Performed by: PHYSICIAN ASSISTANT

## 2023-11-18 PROCEDURE — 87186 SC STD MICRODIL/AGAR DIL: CPT | Mod: PARLAB | Performed by: PHYSICIAN ASSISTANT

## 2023-11-18 PROCEDURE — 36415 COLL VENOUS BLD VENIPUNCTURE: CPT | Performed by: PHYSICIAN ASSISTANT

## 2023-11-18 PROCEDURE — 74177 CT ABD & PELVIS W/CONTRAST: CPT

## 2023-11-18 PROCEDURE — 87075 CULTR BACTERIA EXCEPT BLOOD: CPT | Mod: 59,PARLAB | Performed by: PHYSICIAN ASSISTANT

## 2023-11-18 PROCEDURE — 2500000001 HC RX 250 WO HCPCS SELF ADMINISTERED DRUGS (ALT 637 FOR MEDICARE OP): Performed by: PHYSICIAN ASSISTANT

## 2023-11-18 PROCEDURE — 81001 URINALYSIS AUTO W/SCOPE: CPT | Performed by: PHYSICIAN ASSISTANT

## 2023-11-18 PROCEDURE — 87075 CULTR BACTERIA EXCEPT BLOOD: CPT | Mod: PARLAB | Performed by: PHYSICIAN ASSISTANT

## 2023-11-18 PROCEDURE — 2500000004 HC RX 250 GENERAL PHARMACY W/ HCPCS (ALT 636 FOR OP/ED): Performed by: PHYSICIAN ASSISTANT

## 2023-11-18 RX ORDER — CEPHALEXIN 500 MG/1
500 CAPSULE ORAL 2 TIMES DAILY
Qty: 14 CAPSULE | Refills: 0 | Status: SHIPPED | OUTPATIENT
Start: 2023-11-18 | End: 2023-11-25

## 2023-11-18 RX ORDER — CEPHALEXIN 500 MG/1
500 CAPSULE ORAL ONCE
Status: COMPLETED | OUTPATIENT
Start: 2023-11-18 | End: 2023-11-18

## 2023-11-18 RX ADMIN — IOHEXOL 75 ML: 350 INJECTION, SOLUTION INTRAVENOUS at 20:40

## 2023-11-18 RX ADMIN — SODIUM CHLORIDE 1000 ML: 9 INJECTION, SOLUTION INTRAVENOUS at 20:03

## 2023-11-18 RX ADMIN — CEPHALEXIN 500 MG: 500 CAPSULE ORAL at 22:25

## 2023-11-18 ASSESSMENT — COLUMBIA-SUICIDE SEVERITY RATING SCALE - C-SSRS
2. HAVE YOU ACTUALLY HAD ANY THOUGHTS OF KILLING YOURSELF?: NO
1. IN THE PAST MONTH, HAVE YOU WISHED YOU WERE DEAD OR WISHED YOU COULD GO TO SLEEP AND NOT WAKE UP?: NO
6. HAVE YOU EVER DONE ANYTHING, STARTED TO DO ANYTHING, OR PREPARED TO DO ANYTHING TO END YOUR LIFE?: NO

## 2023-11-18 ASSESSMENT — PAIN - FUNCTIONAL ASSESSMENT: PAIN_FUNCTIONAL_ASSESSMENT: 0-10

## 2023-11-18 ASSESSMENT — PAIN SCALES - GENERAL
PAINLEVEL_OUTOF10: 0 - NO PAIN
PAINLEVEL_OUTOF10: 0 - NO PAIN

## 2023-11-18 NOTE — ED TRIAGE NOTES
Blood in urine, low abd pain. Frequent urination.   started yesterday .  Started chemo last week.

## 2023-11-19 NOTE — ED PROVIDER NOTES
Patient was handed off to me by the previous provider.  At the time of handoff CT imaging and urinalysis was pending.  On my examination the patient was found to be well-appearing.  There is no CVA tenderness concerning for pyelonephritis.  Abdomen soft without rigidity or guarding.  Work-up revealed a nitrite positive UTI with no CT evidence of obstructive uropathy.  I reviewed the patient's past medical records along with her most recent cultures.  Patient will be discharged home on Keflex.  She was given her first dose prior to discharge.  Return precautions were discussed at length.  Recommended close follow-up with her primary care physician in the next 2-3 days      Gagandeep Richard PA-C  11/18/23 9090

## 2023-11-19 NOTE — ED PROVIDER NOTES
Limitations to History: None  External Records Reviewed  Independent Historians: None  Social determinants affecting care: None    HPI  Cassandra Lang is a 63 y.o. female With significant past medical history of breast cancer recently starting chemotherapy, hypertension, arthritis, CVA on Eliquis, who presents emergency department for assessment of hematuria for the last 2 days.  She reports that her urine is pink in color.  She also reports some dysuria and urinary pressure.  She has had some chills.  She is unsure about fever.  She denies nausea or vomiting.  She does report some mild back pain but does not left or right-sided and is more midline.  She discussed this with physician on-call who recommended she go to the ER for further assessment of her symptoms.  She had her first round of chemotherapy 3 days ago.  She has no further complaints.  Her partner is at bedside.    PMH  Past Medical History:   Diagnosis Date    Abnormal findings on diagnostic imaging of other specified body structures     Abnormal CT of the chest    Personal history of other medical treatment     History of echocardiogram    Personal history of other medical treatment     History of transesophageal echocardiography (MAYKEL)    Personal history of other medical treatment     History of cardiac monitoring    Personal history of transient ischemic attack (TIA), and cerebral infarction without residual deficits     History of cerebrovascular accident    Tinea unguium 10/12/2021    Onychomycosis of toenail    reviewed by myself.    Meds  Current Outpatient Medications   Medication Instructions    amLODIPine (NORVASC) 5 mg, oral, Daily    apixaban (ELIQUIS) 5 mg, oral, 2 times daily    aspirin 81 mg, oral, Daily    cholecalciferol (VITAMIN D-3) 25 mcg, oral, Daily    ezetimibe-simvastatin (Vytorin) 10-10 mg tablet 1 tablet, oral, Every evening    lidocaine-prilocaine (Emla) 2.5-2.5 % cream Apply topically to affected area once a day, As Needed   "30-60 minutes prior to port access    omeprazole (PRILOSEC) 40 mg, oral, Daily before breakfast, Do not crush or chew.    ondansetron (ZOFRAN) 8 mg, oral, Every 6 hours PRN       Allergies  Allergies   Allergen Reactions    Sulfa (Sulfonamide Antibiotics) Rash    Sulfamethoxazole-Trimethoprim Rash    reviewed by myself.    SHx  Social History     Tobacco Use    Smoking status: Never     Passive exposure: Never    Smokeless tobacco: Never   Vaping Use    Vaping Use: Never used   Substance Use Topics    Alcohol use: Never    Drug use: Never    reviewed by myself.      ------------------------------------------------------------------------------------------------------------------------------------------    BP (!) 151/104   Pulse (!) 112   Temp 37 °C (98.6 °F)   Resp 20   Ht 1.6 m (5' 3\")   Wt 71.7 kg (158 lb)   LMP  (LMP Unknown)   SpO2 96%   BMI 27.99 kg/m²     Physical Exam  Vitals and nursing note reviewed.   Constitutional:       General: She is not in acute distress.     Appearance: Normal appearance. She is normal weight. She is not ill-appearing or toxic-appearing.   HENT:      Head: Normocephalic.      Nose: Nose normal.      Mouth/Throat:      Mouth: Mucous membranes are moist.   Eyes:      Extraocular Movements: Extraocular movements intact.      Conjunctiva/sclera: Conjunctivae normal.   Cardiovascular:      Rate and Rhythm: Regular rhythm. Tachycardia present.   Pulmonary:      Effort: Pulmonary effort is normal.      Breath sounds: Normal breath sounds.   Abdominal:      General: Abdomen is flat. Bowel sounds are normal. There is no distension.      Palpations: Abdomen is soft.      Tenderness: There is no abdominal tenderness. There is no right CVA tenderness or left CVA tenderness.   Musculoskeletal:         General: Normal range of motion.      Cervical back: Neck supple.   Skin:     General: Skin is warm and dry.   Neurological:      Mental Status: She is alert and oriented to person, place, " and time.   Psychiatric:         Attention and Perception: Attention normal.         Mood and Affect: Mood normal.          ------------------------------------------------------------------------------------------------------------------------------------------  Imaging  CT abdomen pelvis w IV contrast    (Results Pending)        Labs  Labs Reviewed   CBC WITH AUTO DIFFERENTIAL - Abnormal       Result Value    WBC 8.0      nRBC 0.0      RBC 5.21 (*)     Hemoglobin 14.4      Hematocrit 43.0      MCV 83      MCH 27.6      MCHC 33.5      RDW 12.9      Platelets 211      Neutrophils % 70.2      Immature Granulocytes %, Automated 0.4      Lymphocytes % 23.3      Monocytes % 3.5      Eosinophils % 2.0      Basophils % 0.6      Neutrophils Absolute 5.61      Immature Granulocytes Absolute, Automated 0.03      Lymphocytes Absolute 1.86      Monocytes Absolute 0.28      Eosinophils Absolute 0.16      Basophils Absolute 0.05     BLOOD CULTURE   BLOOD CULTURE   COMPREHENSIVE METABOLIC PANEL   URINALYSIS WITH REFLEX MICROSCOPIC AND CULTURE    Narrative:     The following orders were created for panel order Urinalysis with Reflex Microscopic and Culture.  Procedure                               Abnormality         Status                     ---------                               -----------         ------                     Urinalysis with Reflex M...[[id number]]                      In process                 Extra Urine Gray Tube[[id number]]                            In process                   Please view results for these tests on the individual orders.   LACTATE   URINALYSIS WITH REFLEX MICROSCOPIC AND CULTURE   EXTRA URINE GRAY TUBE   MICROSCOPIC ONLY, URINE        ED Course  Diagnoses as of 11/18/23 1954   Hematuria, unspecified type        Medical Decision Making: He did not appear ill or toxic.  Vital signs reviewed from triage.  She is hypertensive and tachycardic.  Otherwise hemodynamically stable.  I did assess  the patient at 1900 due to high patient acuity and volume in the emergency department.  Nursing staff asked to place patient on cardiac and pulse ox monitor and to repeat her vital signs.    Differential diagnoses considered: UTI, pyelonephritis, bladder mass, others    Medications given: IVF    Lab work was placed.  Case was discussed with my colleague, Geovany Richard at the end of my shift.  He will be following the work-up pending labs and imaging.  He will be making final disposition           Maicol Gardiner PA-C  11/18/23 1955

## 2023-11-21 ENCOUNTER — TELEPHONE (OUTPATIENT)
Dept: ADMISSION | Facility: HOSPITAL | Age: 63
End: 2023-11-21
Payer: COMMERCIAL

## 2023-11-21 LAB — BACTERIA UR CULT: ABNORMAL

## 2023-11-21 NOTE — TELEPHONE ENCOUNTER
The patient had left a VM regarding being on a new antibiotic and wanting to discuss prior to next treatment on 11/22    This RN placed a call to the patient to discuss:   Left a VM on her mobile line and her home line for her to call us back to discuss further, next treatment is scheduled tomorrow morning.

## 2023-11-22 ENCOUNTER — TELEPHONE (OUTPATIENT)
Dept: PHARMACY | Facility: HOSPITAL | Age: 63
End: 2023-11-22

## 2023-11-22 ENCOUNTER — INFUSION (OUTPATIENT)
Dept: HEMATOLOGY/ONCOLOGY | Facility: CLINIC | Age: 63
End: 2023-11-22
Payer: COMMERCIAL

## 2023-11-22 VITALS
OXYGEN SATURATION: 98 % | DIASTOLIC BLOOD PRESSURE: 90 MMHG | HEART RATE: 80 BPM | BODY MASS INDEX: 27.73 KG/M2 | WEIGHT: 156.53 LBS | SYSTOLIC BLOOD PRESSURE: 137 MMHG | TEMPERATURE: 98.1 F | RESPIRATION RATE: 17 BRPM

## 2023-11-22 DIAGNOSIS — C50.411 MALIGNANT NEOPLASM OF UPPER-OUTER QUADRANT OF RIGHT BREAST IN FEMALE, ESTROGEN RECEPTOR NEGATIVE (MULTI): ICD-10-CM

## 2023-11-22 DIAGNOSIS — Z17.1 MALIGNANT NEOPLASM OF UPPER-OUTER QUADRANT OF RIGHT BREAST IN FEMALE, ESTROGEN RECEPTOR NEGATIVE (MULTI): ICD-10-CM

## 2023-11-22 LAB
ALBUMIN SERPL BCP-MCNC: 4.3 G/DL (ref 3.4–5)
ALP SERPL-CCNC: 63 U/L (ref 33–136)
ALT SERPL W P-5'-P-CCNC: 23 U/L (ref 7–45)
ANION GAP SERPL CALC-SCNC: 15 MMOL/L (ref 10–20)
AST SERPL W P-5'-P-CCNC: 19 U/L (ref 9–39)
BASOPHILS # BLD AUTO: 0.03 X10*3/UL (ref 0–0.1)
BASOPHILS NFR BLD AUTO: 0.9 %
BILIRUB SERPL-MCNC: 0.5 MG/DL (ref 0–1.2)
BUN SERPL-MCNC: 10 MG/DL (ref 6–23)
CALCIUM SERPL-MCNC: 9.5 MG/DL (ref 8.6–10.6)
CHLORIDE SERPL-SCNC: 105 MMOL/L (ref 98–107)
CO2 SERPL-SCNC: 25 MMOL/L (ref 21–32)
CREAT SERPL-MCNC: 0.62 MG/DL (ref 0.5–1.05)
EOSINOPHIL # BLD AUTO: 0.18 X10*3/UL (ref 0–0.7)
EOSINOPHIL NFR BLD AUTO: 5.5 %
ERYTHROCYTE [DISTWIDTH] IN BLOOD BY AUTOMATED COUNT: 12.8 % (ref 11.5–14.5)
GFR SERPL CREATININE-BSD FRML MDRD: >90 ML/MIN/1.73M*2
GLUCOSE SERPL-MCNC: 100 MG/DL (ref 74–99)
HCT VFR BLD AUTO: 38.9 % (ref 36–46)
HGB BLD-MCNC: 13 G/DL (ref 12–16)
IMM GRANULOCYTES # BLD AUTO: 0.02 X10*3/UL (ref 0–0.7)
IMM GRANULOCYTES NFR BLD AUTO: 0.6 % (ref 0–0.9)
LYMPHOCYTES # BLD AUTO: 1.13 X10*3/UL (ref 1.2–4.8)
LYMPHOCYTES NFR BLD AUTO: 34.5 %
MCH RBC QN AUTO: 27.6 PG (ref 26–34)
MCHC RBC AUTO-ENTMCNC: 33.4 G/DL (ref 32–36)
MCV RBC AUTO: 83 FL (ref 80–100)
MONOCYTES # BLD AUTO: 0.2 X10*3/UL (ref 0.1–1)
MONOCYTES NFR BLD AUTO: 6.1 %
NEUTROPHILS # BLD AUTO: 1.72 X10*3/UL (ref 1.2–7.7)
NEUTROPHILS NFR BLD AUTO: 52.4 %
NRBC BLD-RTO: ABNORMAL /100{WBCS}
PLATELET # BLD AUTO: 228 X10*3/UL (ref 150–450)
POTASSIUM SERPL-SCNC: 3.8 MMOL/L (ref 3.5–5.3)
PROT SERPL-MCNC: 6.6 G/DL (ref 6.4–8.2)
RBC # BLD AUTO: 4.71 X10*6/UL (ref 4–5.2)
SODIUM SERPL-SCNC: 141 MMOL/L (ref 136–145)
WBC # BLD AUTO: 3.3 X10*3/UL (ref 4.4–11.3)

## 2023-11-22 PROCEDURE — 2500000001 HC RX 250 WO HCPCS SELF ADMINISTERED DRUGS (ALT 637 FOR MEDICARE OP): Performed by: INTERNAL MEDICINE

## 2023-11-22 PROCEDURE — 96523 IRRIG DRUG DELIVERY DEVICE: CPT

## 2023-11-22 PROCEDURE — 2500000004 HC RX 250 GENERAL PHARMACY W/ HCPCS (ALT 636 FOR OP/ED): Performed by: INTERNAL MEDICINE

## 2023-11-22 PROCEDURE — 80053 COMPREHEN METABOLIC PANEL: CPT

## 2023-11-22 PROCEDURE — 96375 TX/PRO/DX INJ NEW DRUG ADDON: CPT | Mod: INF

## 2023-11-22 PROCEDURE — 85025 COMPLETE CBC W/AUTO DIFF WBC: CPT

## 2023-11-22 PROCEDURE — 96413 CHEMO IV INFUSION 1 HR: CPT

## 2023-11-22 PROCEDURE — 96417 CHEMO IV INFUS EACH ADDL SEQ: CPT

## 2023-11-22 PROCEDURE — 2500000004 HC RX 250 GENERAL PHARMACY W/ HCPCS (ALT 636 FOR OP/ED): Mod: JZ | Performed by: INTERNAL MEDICINE

## 2023-11-22 RX ORDER — DIPHENHYDRAMINE HYDROCHLORIDE 50 MG/ML
50 INJECTION INTRAMUSCULAR; INTRAVENOUS AS NEEDED
Status: DISCONTINUED | OUTPATIENT
Start: 2023-11-22 | End: 2023-11-22 | Stop reason: HOSPADM

## 2023-11-22 RX ORDER — DIPHENHYDRAMINE HCL 50 MG
50 CAPSULE ORAL ONCE
Status: COMPLETED | OUTPATIENT
Start: 2023-11-22 | End: 2023-11-22

## 2023-11-22 RX ORDER — DEXAMETHASONE SODIUM PHOSPHATE 4 MG/ML
10 INJECTION, SOLUTION INTRA-ARTICULAR; INTRALESIONAL; INTRAMUSCULAR; INTRAVENOUS; SOFT TISSUE ONCE
Status: COMPLETED | OUTPATIENT
Start: 2023-11-22 | End: 2023-11-22

## 2023-11-22 RX ORDER — PROCHLORPERAZINE MALEATE 10 MG
10 TABLET ORAL EVERY 6 HOURS PRN
Status: DISCONTINUED | OUTPATIENT
Start: 2023-11-22 | End: 2023-11-22 | Stop reason: HOSPADM

## 2023-11-22 RX ORDER — EPINEPHRINE 0.3 MG/.3ML
0.3 INJECTION SUBCUTANEOUS EVERY 5 MIN PRN
Status: DISCONTINUED | OUTPATIENT
Start: 2023-11-22 | End: 2023-11-22 | Stop reason: HOSPADM

## 2023-11-22 RX ORDER — PROCHLORPERAZINE EDISYLATE 5 MG/ML
10 INJECTION INTRAMUSCULAR; INTRAVENOUS EVERY 6 HOURS PRN
Status: DISCONTINUED | OUTPATIENT
Start: 2023-11-22 | End: 2023-11-22 | Stop reason: HOSPADM

## 2023-11-22 RX ORDER — ALBUTEROL SULFATE 0.83 MG/ML
3 SOLUTION RESPIRATORY (INHALATION) AS NEEDED
Status: DISCONTINUED | OUTPATIENT
Start: 2023-11-22 | End: 2023-11-22 | Stop reason: HOSPADM

## 2023-11-22 RX ORDER — FAMOTIDINE 10 MG/ML
20 INJECTION INTRAVENOUS ONCE
Status: COMPLETED | OUTPATIENT
Start: 2023-11-22 | End: 2023-11-22

## 2023-11-22 RX ORDER — HEPARIN 100 UNIT/ML
500 SYRINGE INTRAVENOUS AS NEEDED
Status: CANCELLED | OUTPATIENT
Start: 2023-11-22

## 2023-11-22 RX ORDER — FAMOTIDINE 10 MG/ML
20 INJECTION INTRAVENOUS ONCE AS NEEDED
Status: DISCONTINUED | OUTPATIENT
Start: 2023-11-22 | End: 2023-11-22 | Stop reason: HOSPADM

## 2023-11-22 RX ORDER — HEPARIN 100 UNIT/ML
500 SYRINGE INTRAVENOUS AS NEEDED
Status: DISCONTINUED | OUTPATIENT
Start: 2023-11-22 | End: 2023-11-22 | Stop reason: HOSPADM

## 2023-11-22 RX ORDER — HEPARIN SODIUM,PORCINE/PF 10 UNIT/ML
50 SYRINGE (ML) INTRAVENOUS AS NEEDED
Status: CANCELLED | OUTPATIENT
Start: 2023-11-22

## 2023-11-22 RX ORDER — HEPARIN SODIUM,PORCINE/PF 10 UNIT/ML
50 SYRINGE (ML) INTRAVENOUS AS NEEDED
Status: DISCONTINUED | OUTPATIENT
Start: 2023-11-22 | End: 2023-11-22 | Stop reason: HOSPADM

## 2023-11-22 RX ADMIN — FAMOTIDINE 20 MG: 10 INJECTION INTRAVENOUS at 08:51

## 2023-11-22 RX ADMIN — HEPARIN 500 UNITS: 100 SYRINGE at 10:58

## 2023-11-22 RX ADMIN — TRASTUZUMAB-ANNS 150 MG: 150 INJECTION, POWDER, LYOPHILIZED, FOR SOLUTION INTRAVENOUS at 09:19

## 2023-11-22 RX ADMIN — PACLITAXEL 150 MG: 6 INJECTION, SOLUTION INTRAVENOUS at 09:56

## 2023-11-22 RX ADMIN — DEXAMETHASONE SODIUM PHOSPHATE 10 MG: 4 INJECTION INTRA-ARTICULAR; INTRALESIONAL; INTRAMUSCULAR; INTRAVENOUS; SOFT TISSUE at 08:46

## 2023-11-22 RX ADMIN — DIPHENHYDRAMINE HYDROCHLORIDE 50 MG: 50 CAPSULE ORAL at 08:45

## 2023-11-22 ASSESSMENT — PAIN SCALES - GENERAL: PAINLEVEL: 0-NO PAIN

## 2023-11-22 NOTE — PROGRESS NOTES
EDPD Note: Antibiotics Reviewed and Warranted    Contacted Mrs. Cassandra Lang regarding a positive Escherichia coli urine culture/result that was taken during their recent emergency room visit. I completed education with patient via the [facility] Language Line ([phone]). Patient spoke some english, but majority of conversation was discussed via the interpretor. Patient indicated that she felt symptoms had greatly improved after starting cephalexin, including reported symptoms in the ED of dysuria and urinary pressure. The patient is being treated appropriately with cephalexin 500 m capsule BID x 7 days based on the culture susceptibilities and reported patient symptom improvement. Patient denied education regarding antibiotic side effects, but stated that if symptoms worsened, she would follow up with her PCP for further care. Patient also reported that they were already aware of urine culture results. Encouraged patient to return to the ED, if unable to reach their PCP. Patient acknowledged understanding.     Susceptibility data from last 90 days.  Collected Specimen Info Organism Amoxicillin/Clavulanate Ampicillin Ampicillin/Sulbactam Cefazolin Cefazolin (uncomplicated UTIs only) Ciprofloxacin Gentamicin Nitrofurantoin Piperacillin/Tazobactam Trimethoprim/Sulfamethoxazole   23 Urine from Clean Catch/Voided Escherichia coli S R I S S S S S S S        No further follow up needed from EDPD Team.     Karo Posey, GeorgesD

## 2023-11-23 LAB
BACTERIA BLD CULT: NORMAL
BACTERIA BLD CULT: NORMAL

## 2023-11-29 RX ORDER — PROCHLORPERAZINE EDISYLATE 5 MG/ML
10 INJECTION INTRAMUSCULAR; INTRAVENOUS EVERY 6 HOURS PRN
Status: CANCELLED | OUTPATIENT
Start: 2023-12-13

## 2023-11-29 RX ORDER — DIPHENHYDRAMINE HCL 25 MG
50 CAPSULE ORAL ONCE
Status: CANCELLED | OUTPATIENT
Start: 2023-12-06

## 2023-11-29 RX ORDER — DIPHENHYDRAMINE HCL 25 MG
50 CAPSULE ORAL ONCE
Status: CANCELLED | OUTPATIENT
Start: 2023-12-13

## 2023-11-29 RX ORDER — ALBUTEROL SULFATE 0.83 MG/ML
3 SOLUTION RESPIRATORY (INHALATION) AS NEEDED
Status: CANCELLED | OUTPATIENT
Start: 2023-12-13

## 2023-11-29 RX ORDER — DIPHENHYDRAMINE HYDROCHLORIDE 50 MG/ML
50 INJECTION INTRAMUSCULAR; INTRAVENOUS AS NEEDED
Status: CANCELLED | OUTPATIENT
Start: 2023-12-06

## 2023-11-29 RX ORDER — EPINEPHRINE 0.3 MG/.3ML
0.3 INJECTION SUBCUTANEOUS EVERY 5 MIN PRN
Status: CANCELLED | OUTPATIENT
Start: 2023-12-13

## 2023-11-29 RX ORDER — DEXAMETHASONE SODIUM PHOSPHATE 10 MG/ML
10 INJECTION INTRAMUSCULAR; INTRAVENOUS ONCE
Status: CANCELLED | OUTPATIENT
Start: 2023-12-13

## 2023-11-29 RX ORDER — PROCHLORPERAZINE EDISYLATE 5 MG/ML
10 INJECTION INTRAMUSCULAR; INTRAVENOUS EVERY 6 HOURS PRN
Status: CANCELLED | OUTPATIENT
Start: 2023-12-06

## 2023-11-29 RX ORDER — PROCHLORPERAZINE MALEATE 10 MG
10 TABLET ORAL EVERY 6 HOURS PRN
Status: CANCELLED | OUTPATIENT
Start: 2023-12-13

## 2023-11-29 RX ORDER — FAMOTIDINE 10 MG/ML
20 INJECTION INTRAVENOUS ONCE
Status: CANCELLED | OUTPATIENT
Start: 2023-12-06

## 2023-11-29 RX ORDER — FAMOTIDINE 10 MG/ML
20 INJECTION INTRAVENOUS ONCE
Status: CANCELLED | OUTPATIENT
Start: 2023-12-13

## 2023-11-29 RX ORDER — ALBUTEROL SULFATE 0.83 MG/ML
3 SOLUTION RESPIRATORY (INHALATION) AS NEEDED
Status: CANCELLED | OUTPATIENT
Start: 2023-12-06

## 2023-11-29 RX ORDER — EPINEPHRINE 0.3 MG/.3ML
0.3 INJECTION SUBCUTANEOUS EVERY 5 MIN PRN
Status: CANCELLED | OUTPATIENT
Start: 2023-12-06

## 2023-11-29 RX ORDER — DEXAMETHASONE SODIUM PHOSPHATE 10 MG/ML
10 INJECTION INTRAMUSCULAR; INTRAVENOUS ONCE
Status: CANCELLED | OUTPATIENT
Start: 2023-12-06

## 2023-11-29 RX ORDER — DIPHENHYDRAMINE HYDROCHLORIDE 50 MG/ML
50 INJECTION INTRAMUSCULAR; INTRAVENOUS AS NEEDED
Status: CANCELLED | OUTPATIENT
Start: 2023-12-13

## 2023-11-29 RX ORDER — PROCHLORPERAZINE MALEATE 10 MG
10 TABLET ORAL EVERY 6 HOURS PRN
Status: CANCELLED | OUTPATIENT
Start: 2023-12-06

## 2023-11-29 RX ORDER — FAMOTIDINE 10 MG/ML
20 INJECTION INTRAVENOUS ONCE AS NEEDED
Status: CANCELLED | OUTPATIENT
Start: 2023-12-13

## 2023-11-29 RX ORDER — FAMOTIDINE 10 MG/ML
20 INJECTION INTRAVENOUS ONCE AS NEEDED
Status: CANCELLED | OUTPATIENT
Start: 2023-12-06

## 2023-11-30 ENCOUNTER — INFUSION (OUTPATIENT)
Dept: HEMATOLOGY/ONCOLOGY | Facility: CLINIC | Age: 63
End: 2023-11-30
Payer: COMMERCIAL

## 2023-11-30 VITALS
WEIGHT: 163.8 LBS | HEART RATE: 77 BPM | SYSTOLIC BLOOD PRESSURE: 141 MMHG | TEMPERATURE: 98.8 F | RESPIRATION RATE: 18 BRPM | BODY MASS INDEX: 29.02 KG/M2 | DIASTOLIC BLOOD PRESSURE: 90 MMHG

## 2023-11-30 DIAGNOSIS — Z17.1 MALIGNANT NEOPLASM OF UPPER-OUTER QUADRANT OF RIGHT BREAST IN FEMALE, ESTROGEN RECEPTOR NEGATIVE (MULTI): ICD-10-CM

## 2023-11-30 DIAGNOSIS — C50.411 MALIGNANT NEOPLASM OF UPPER-OUTER QUADRANT OF RIGHT BREAST IN FEMALE, ESTROGEN RECEPTOR NEGATIVE (MULTI): ICD-10-CM

## 2023-11-30 LAB
BASOPHILS # BLD AUTO: 0.03 X10*3/UL (ref 0–0.1)
BASOPHILS NFR BLD AUTO: 0.9 %
EOSINOPHIL # BLD AUTO: 0.15 X10*3/UL (ref 0–0.7)
EOSINOPHIL NFR BLD AUTO: 4.5 %
ERYTHROCYTE [DISTWIDTH] IN BLOOD BY AUTOMATED COUNT: 13.1 % (ref 11.5–14.5)
HCT VFR BLD AUTO: 35.5 % (ref 36–46)
HGB BLD-MCNC: 11.8 G/DL (ref 12–16)
IMM GRANULOCYTES # BLD AUTO: 0.01 X10*3/UL (ref 0–0.7)
IMM GRANULOCYTES NFR BLD AUTO: 0.3 % (ref 0–0.9)
LYMPHOCYTES # BLD AUTO: 1.1 X10*3/UL (ref 1.2–4.8)
LYMPHOCYTES NFR BLD AUTO: 32.8 %
MCH RBC QN AUTO: 27.6 PG (ref 26–34)
MCHC RBC AUTO-ENTMCNC: 33.2 G/DL (ref 32–36)
MCV RBC AUTO: 83 FL (ref 80–100)
MONOCYTES # BLD AUTO: 0.23 X10*3/UL (ref 0.1–1)
MONOCYTES NFR BLD AUTO: 6.9 %
NEUTROPHILS # BLD AUTO: 1.83 X10*3/UL (ref 1.2–7.7)
NEUTROPHILS NFR BLD AUTO: 54.6 %
NRBC BLD-RTO: ABNORMAL /100{WBCS}
PLATELET # BLD AUTO: 261 X10*3/UL (ref 150–450)
RBC # BLD AUTO: 4.27 X10*6/UL (ref 4–5.2)
WBC # BLD AUTO: 3.4 X10*3/UL (ref 4.4–11.3)

## 2023-11-30 PROCEDURE — 96413 CHEMO IV INFUSION 1 HR: CPT

## 2023-11-30 PROCEDURE — 2500000001 HC RX 250 WO HCPCS SELF ADMINISTERED DRUGS (ALT 637 FOR MEDICARE OP): Performed by: INTERNAL MEDICINE

## 2023-11-30 PROCEDURE — 96417 CHEMO IV INFUS EACH ADDL SEQ: CPT

## 2023-11-30 PROCEDURE — 80053 COMPREHEN METABOLIC PANEL: CPT

## 2023-11-30 PROCEDURE — 96374 THER/PROPH/DIAG INJ IV PUSH: CPT | Mod: INF

## 2023-11-30 PROCEDURE — 2500000004 HC RX 250 GENERAL PHARMACY W/ HCPCS (ALT 636 FOR OP/ED): Performed by: INTERNAL MEDICINE

## 2023-11-30 PROCEDURE — 36591 DRAW BLOOD OFF VENOUS DEVICE: CPT

## 2023-11-30 PROCEDURE — 2500000004 HC RX 250 GENERAL PHARMACY W/ HCPCS (ALT 636 FOR OP/ED): Mod: JZ | Performed by: INTERNAL MEDICINE

## 2023-11-30 PROCEDURE — 96375 TX/PRO/DX INJ NEW DRUG ADDON: CPT | Mod: INF

## 2023-11-30 PROCEDURE — 85025 COMPLETE CBC W/AUTO DIFF WBC: CPT

## 2023-11-30 RX ORDER — HEPARIN SODIUM,PORCINE/PF 10 UNIT/ML
50 SYRINGE (ML) INTRAVENOUS AS NEEDED
Status: CANCELLED | OUTPATIENT
Start: 2023-11-30

## 2023-11-30 RX ORDER — DIPHENHYDRAMINE HCL 50 MG
50 CAPSULE ORAL ONCE
Status: COMPLETED | OUTPATIENT
Start: 2023-11-30 | End: 2023-11-30

## 2023-11-30 RX ORDER — HEPARIN SODIUM,PORCINE/PF 10 UNIT/ML
50 SYRINGE (ML) INTRAVENOUS AS NEEDED
Status: DISCONTINUED | OUTPATIENT
Start: 2023-11-30 | End: 2023-11-30 | Stop reason: HOSPADM

## 2023-11-30 RX ORDER — PROCHLORPERAZINE MALEATE 10 MG
10 TABLET ORAL EVERY 6 HOURS PRN
Status: DISCONTINUED | OUTPATIENT
Start: 2023-11-30 | End: 2023-11-30 | Stop reason: HOSPADM

## 2023-11-30 RX ORDER — FAMOTIDINE 10 MG/ML
20 INJECTION INTRAVENOUS ONCE AS NEEDED
Status: DISCONTINUED | OUTPATIENT
Start: 2023-11-30 | End: 2023-11-30 | Stop reason: HOSPADM

## 2023-11-30 RX ORDER — ALBUTEROL SULFATE 0.83 MG/ML
3 SOLUTION RESPIRATORY (INHALATION) AS NEEDED
Status: DISCONTINUED | OUTPATIENT
Start: 2023-11-30 | End: 2023-11-30 | Stop reason: HOSPADM

## 2023-11-30 RX ORDER — EPINEPHRINE 0.3 MG/.3ML
0.3 INJECTION SUBCUTANEOUS EVERY 5 MIN PRN
Status: DISCONTINUED | OUTPATIENT
Start: 2023-11-30 | End: 2023-11-30 | Stop reason: HOSPADM

## 2023-11-30 RX ORDER — DIPHENHYDRAMINE HYDROCHLORIDE 50 MG/ML
50 INJECTION INTRAMUSCULAR; INTRAVENOUS AS NEEDED
Status: DISCONTINUED | OUTPATIENT
Start: 2023-11-30 | End: 2023-11-30 | Stop reason: HOSPADM

## 2023-11-30 RX ORDER — DEXAMETHASONE SODIUM PHOSPHATE 4 MG/ML
10 INJECTION, SOLUTION INTRA-ARTICULAR; INTRALESIONAL; INTRAMUSCULAR; INTRAVENOUS; SOFT TISSUE ONCE
Status: COMPLETED | OUTPATIENT
Start: 2023-11-30 | End: 2023-11-30

## 2023-11-30 RX ORDER — HEPARIN 100 UNIT/ML
500 SYRINGE INTRAVENOUS AS NEEDED
Status: DISCONTINUED | OUTPATIENT
Start: 2023-11-30 | End: 2023-11-30 | Stop reason: HOSPADM

## 2023-11-30 RX ORDER — FAMOTIDINE 10 MG/ML
20 INJECTION INTRAVENOUS ONCE
Status: COMPLETED | OUTPATIENT
Start: 2023-11-30 | End: 2023-11-30

## 2023-11-30 RX ORDER — PROCHLORPERAZINE EDISYLATE 5 MG/ML
10 INJECTION INTRAMUSCULAR; INTRAVENOUS EVERY 6 HOURS PRN
Status: DISCONTINUED | OUTPATIENT
Start: 2023-11-30 | End: 2023-11-30 | Stop reason: HOSPADM

## 2023-11-30 RX ORDER — HEPARIN 100 UNIT/ML
500 SYRINGE INTRAVENOUS AS NEEDED
Status: CANCELLED | OUTPATIENT
Start: 2023-11-30

## 2023-11-30 RX ADMIN — TRASTUZUMAB-ANNS 150 MG: 150 INJECTION, POWDER, LYOPHILIZED, FOR SOLUTION INTRAVENOUS at 12:04

## 2023-11-30 RX ADMIN — HEPARIN 500 UNITS: 100 SYRINGE at 13:42

## 2023-11-30 RX ADMIN — FAMOTIDINE 20 MG: 10 INJECTION INTRAVENOUS at 11:18

## 2023-11-30 RX ADMIN — DIPHENHYDRAMINE HYDROCHLORIDE 50 MG: 50 CAPSULE ORAL at 11:18

## 2023-11-30 RX ADMIN — DEXAMETHASONE SODIUM PHOSPHATE 10 MG: 4 INJECTION INTRA-ARTICULAR; INTRALESIONAL; INTRAMUSCULAR; INTRAVENOUS; SOFT TISSUE at 11:18

## 2023-11-30 RX ADMIN — PACLITAXEL 150 MG: 6 INJECTION, SOLUTION INTRAVENOUS at 12:44

## 2023-11-30 ASSESSMENT — PAIN SCALES - GENERAL: PAINLEVEL: 0-NO PAIN

## 2023-11-30 NOTE — SIGNIFICANT EVENT
11/30/23 1042   Prechemo Checklist   Has the patient been in the hospital, ED, or urgent care since last date of service No   Chemo/Immuno Consent Signed Yes   Protocol/Indications Verified Yes   Confirmed to previous date/time of medication Yes   Compared to previous dose Yes   All medications are dated accurately Yes   Pregnancy Test Negative Not applicable   Parameters Met Yes   BSA/Weight-Height Verified Yes   Dose Calculations Verified Yes

## 2023-12-04 LAB
ALBUMIN SERPL BCP-MCNC: 4.3 G/DL (ref 3.4–5)
ALP SERPL-CCNC: 59 U/L (ref 33–136)
ALT SERPL W P-5'-P-CCNC: 21 U/L (ref 7–45)
ANION GAP SERPL CALC-SCNC: 21 MMOL/L (ref 10–20)
AST SERPL W P-5'-P-CCNC: 22 U/L (ref 9–39)
BILIRUB SERPL-MCNC: 0.4 MG/DL (ref 0–1.2)
BUN SERPL-MCNC: 13 MG/DL (ref 6–23)
CALCIUM SERPL-MCNC: 9.4 MG/DL (ref 8.6–10.6)
CHLORIDE SERPL-SCNC: 106 MMOL/L (ref 98–107)
CO2 SERPL-SCNC: 20 MMOL/L (ref 21–32)
CREAT SERPL-MCNC: 0.72 MG/DL (ref 0.5–1.05)
GFR SERPL CREATININE-BSD FRML MDRD: >90 ML/MIN/1.73M*2
GLUCOSE SERPL-MCNC: 115 MG/DL (ref 74–99)
POTASSIUM SERPL-SCNC: 3.9 MMOL/L (ref 3.5–5.3)
PROT SERPL-MCNC: 6.3 G/DL (ref 6.4–8.2)
SODIUM SERPL-SCNC: 143 MMOL/L (ref 136–145)

## 2023-12-06 ENCOUNTER — INFUSION (OUTPATIENT)
Dept: HEMATOLOGY/ONCOLOGY | Facility: CLINIC | Age: 63
End: 2023-12-06
Payer: COMMERCIAL

## 2023-12-06 VITALS
WEIGHT: 164 LBS | BODY MASS INDEX: 29.05 KG/M2 | TEMPERATURE: 98.4 F | OXYGEN SATURATION: 96 % | HEART RATE: 89 BPM | RESPIRATION RATE: 18 BRPM | DIASTOLIC BLOOD PRESSURE: 84 MMHG | SYSTOLIC BLOOD PRESSURE: 123 MMHG

## 2023-12-06 DIAGNOSIS — Z17.1 MALIGNANT NEOPLASM OF UPPER-OUTER QUADRANT OF RIGHT BREAST IN FEMALE, ESTROGEN RECEPTOR NEGATIVE (MULTI): ICD-10-CM

## 2023-12-06 DIAGNOSIS — C50.411 MALIGNANT NEOPLASM OF UPPER-OUTER QUADRANT OF RIGHT BREAST IN FEMALE, ESTROGEN RECEPTOR NEGATIVE (MULTI): ICD-10-CM

## 2023-12-06 LAB
BASOPHILS # BLD AUTO: 0.05 X10*3/UL (ref 0–0.1)
BASOPHILS NFR BLD AUTO: 1.4 %
EOSINOPHIL # BLD AUTO: 0.13 X10*3/UL (ref 0–0.7)
EOSINOPHIL NFR BLD AUTO: 3.5 %
ERYTHROCYTE [DISTWIDTH] IN BLOOD BY AUTOMATED COUNT: 13.2 % (ref 11.5–14.5)
HCT VFR BLD AUTO: 37.6 % (ref 36–46)
HGB BLD-MCNC: 12.4 G/DL (ref 12–16)
IMM GRANULOCYTES # BLD AUTO: 0.02 X10*3/UL (ref 0–0.7)
IMM GRANULOCYTES NFR BLD AUTO: 0.5 % (ref 0–0.9)
LYMPHOCYTES # BLD AUTO: 1.15 X10*3/UL (ref 1.2–4.8)
LYMPHOCYTES NFR BLD AUTO: 31.3 %
MCH RBC QN AUTO: 27.5 PG (ref 26–34)
MCHC RBC AUTO-ENTMCNC: 33 G/DL (ref 32–36)
MCV RBC AUTO: 83 FL (ref 80–100)
MONOCYTES # BLD AUTO: 0.23 X10*3/UL (ref 0.1–1)
MONOCYTES NFR BLD AUTO: 6.3 %
NEUTROPHILS # BLD AUTO: 2.1 X10*3/UL (ref 1.2–7.7)
NEUTROPHILS NFR BLD AUTO: 57 %
NRBC BLD-RTO: ABNORMAL /100{WBCS}
PLATELET # BLD AUTO: 321 X10*3/UL (ref 150–450)
RBC # BLD AUTO: 4.51 X10*6/UL (ref 4–5.2)
WBC # BLD AUTO: 3.7 X10*3/UL (ref 4.4–11.3)

## 2023-12-06 PROCEDURE — 36591 DRAW BLOOD OFF VENOUS DEVICE: CPT

## 2023-12-06 PROCEDURE — 96413 CHEMO IV INFUSION 1 HR: CPT

## 2023-12-06 PROCEDURE — 2500000004 HC RX 250 GENERAL PHARMACY W/ HCPCS (ALT 636 FOR OP/ED): Performed by: INTERNAL MEDICINE

## 2023-12-06 PROCEDURE — 96417 CHEMO IV INFUS EACH ADDL SEQ: CPT

## 2023-12-06 PROCEDURE — 80053 COMPREHEN METABOLIC PANEL: CPT

## 2023-12-06 PROCEDURE — 85025 COMPLETE CBC W/AUTO DIFF WBC: CPT

## 2023-12-06 PROCEDURE — 96374 THER/PROPH/DIAG INJ IV PUSH: CPT | Mod: INF

## 2023-12-06 PROCEDURE — 96375 TX/PRO/DX INJ NEW DRUG ADDON: CPT | Mod: INF

## 2023-12-06 PROCEDURE — 2500000001 HC RX 250 WO HCPCS SELF ADMINISTERED DRUGS (ALT 637 FOR MEDICARE OP): Performed by: INTERNAL MEDICINE

## 2023-12-06 RX ORDER — PROCHLORPERAZINE EDISYLATE 5 MG/ML
10 INJECTION INTRAMUSCULAR; INTRAVENOUS EVERY 6 HOURS PRN
Status: DISCONTINUED | OUTPATIENT
Start: 2023-12-06 | End: 2023-12-06 | Stop reason: HOSPADM

## 2023-12-06 RX ORDER — DIPHENHYDRAMINE HYDROCHLORIDE 50 MG/ML
50 INJECTION INTRAMUSCULAR; INTRAVENOUS AS NEEDED
Status: DISCONTINUED | OUTPATIENT
Start: 2023-12-06 | End: 2023-12-06 | Stop reason: HOSPADM

## 2023-12-06 RX ORDER — HEPARIN SODIUM,PORCINE/PF 10 UNIT/ML
50 SYRINGE (ML) INTRAVENOUS AS NEEDED
Status: DISCONTINUED | OUTPATIENT
Start: 2023-12-06 | End: 2023-12-06 | Stop reason: HOSPADM

## 2023-12-06 RX ORDER — FAMOTIDINE 10 MG/ML
20 INJECTION INTRAVENOUS ONCE AS NEEDED
Status: DISCONTINUED | OUTPATIENT
Start: 2023-12-06 | End: 2023-12-06 | Stop reason: HOSPADM

## 2023-12-06 RX ORDER — HEPARIN SODIUM,PORCINE/PF 10 UNIT/ML
50 SYRINGE (ML) INTRAVENOUS AS NEEDED
Status: CANCELLED | OUTPATIENT
Start: 2023-12-06

## 2023-12-06 RX ORDER — PROCHLORPERAZINE MALEATE 10 MG
10 TABLET ORAL EVERY 6 HOURS PRN
Status: DISCONTINUED | OUTPATIENT
Start: 2023-12-06 | End: 2023-12-06 | Stop reason: HOSPADM

## 2023-12-06 RX ORDER — EPINEPHRINE 0.3 MG/.3ML
0.3 INJECTION SUBCUTANEOUS EVERY 5 MIN PRN
Status: DISCONTINUED | OUTPATIENT
Start: 2023-12-06 | End: 2023-12-06 | Stop reason: HOSPADM

## 2023-12-06 RX ORDER — HEPARIN 100 UNIT/ML
500 SYRINGE INTRAVENOUS AS NEEDED
Status: CANCELLED | OUTPATIENT
Start: 2023-12-06

## 2023-12-06 RX ORDER — DIPHENHYDRAMINE HCL 50 MG
50 CAPSULE ORAL ONCE
Status: COMPLETED | OUTPATIENT
Start: 2023-12-06 | End: 2023-12-06

## 2023-12-06 RX ORDER — HEPARIN 100 UNIT/ML
500 SYRINGE INTRAVENOUS AS NEEDED
Status: DISCONTINUED | OUTPATIENT
Start: 2023-12-06 | End: 2023-12-06 | Stop reason: HOSPADM

## 2023-12-06 RX ORDER — FAMOTIDINE 10 MG/ML
20 INJECTION INTRAVENOUS ONCE
Status: COMPLETED | OUTPATIENT
Start: 2023-12-06 | End: 2023-12-06

## 2023-12-06 RX ORDER — DEXAMETHASONE SODIUM PHOSPHATE 4 MG/ML
10 INJECTION, SOLUTION INTRA-ARTICULAR; INTRALESIONAL; INTRAMUSCULAR; INTRAVENOUS; SOFT TISSUE ONCE
Status: COMPLETED | OUTPATIENT
Start: 2023-12-06 | End: 2023-12-06

## 2023-12-06 RX ORDER — ALBUTEROL SULFATE 0.83 MG/ML
3 SOLUTION RESPIRATORY (INHALATION) AS NEEDED
Status: DISCONTINUED | OUTPATIENT
Start: 2023-12-06 | End: 2023-12-06 | Stop reason: HOSPADM

## 2023-12-06 RX ADMIN — PACLITAXEL 150 MG: 6 INJECTION, SOLUTION INTRAVENOUS at 13:19

## 2023-12-06 RX ADMIN — FAMOTIDINE 20 MG: 10 INJECTION INTRAVENOUS at 12:11

## 2023-12-06 RX ADMIN — Medication 500 UNITS: at 14:25

## 2023-12-06 RX ADMIN — DIPHENHYDRAMINE HYDROCHLORIDE 50 MG: 50 CAPSULE ORAL at 12:11

## 2023-12-06 RX ADMIN — TRASTUZUMAB-ANNS 150 MG: 150 INJECTION, POWDER, LYOPHILIZED, FOR SOLUTION INTRAVENOUS at 12:39

## 2023-12-06 RX ADMIN — DEXAMETHASONE SODIUM PHOSPHATE 10 MG: 4 INJECTION INTRA-ARTICULAR; INTRALESIONAL; INTRAMUSCULAR; INTRAVENOUS; SOFT TISSUE at 12:11

## 2023-12-06 ASSESSMENT — PAIN SCALES - GENERAL: PAINLEVEL: 0-NO PAIN

## 2023-12-06 NOTE — SIGNIFICANT EVENT
12/06/23 1154   Prechemo Checklist   Has the patient been in the hospital, ED, or urgent care since last date of service No   Protocol/Indications Verified Yes   Confirmed to previous date/time of medication Yes   Compared to previous dose Yes   All medications are dated accurately Yes   Pregnancy Test Negative Not applicable   Parameters Met Yes   BSA/Weight-Height Verified Yes   Dose Calculations Verified Yes

## 2023-12-07 LAB
ALBUMIN SERPL BCP-MCNC: 4.5 G/DL (ref 3.4–5)
ALP SERPL-CCNC: 61 U/L (ref 33–136)
ALT SERPL W P-5'-P-CCNC: 25 U/L (ref 7–45)
ANION GAP SERPL CALC-SCNC: 15 MMOL/L (ref 10–20)
AST SERPL W P-5'-P-CCNC: 19 U/L (ref 9–39)
BILIRUB SERPL-MCNC: 0.4 MG/DL (ref 0–1.2)
BUN SERPL-MCNC: 15 MG/DL (ref 6–23)
CALCIUM SERPL-MCNC: 9.4 MG/DL (ref 8.6–10.6)
CHLORIDE SERPL-SCNC: 104 MMOL/L (ref 98–107)
CO2 SERPL-SCNC: 26 MMOL/L (ref 21–32)
CREAT SERPL-MCNC: 0.63 MG/DL (ref 0.5–1.05)
GFR SERPL CREATININE-BSD FRML MDRD: >90 ML/MIN/1.73M*2
GLUCOSE SERPL-MCNC: 97 MG/DL (ref 74–99)
POTASSIUM SERPL-SCNC: 3.9 MMOL/L (ref 3.5–5.3)
PROT SERPL-MCNC: 6.6 G/DL (ref 6.4–8.2)
SODIUM SERPL-SCNC: 141 MMOL/L (ref 136–145)

## 2023-12-13 ENCOUNTER — OFFICE VISIT (OUTPATIENT)
Dept: HEMATOLOGY/ONCOLOGY | Facility: CLINIC | Age: 63
End: 2023-12-13
Payer: COMMERCIAL

## 2023-12-13 ENCOUNTER — INFUSION (OUTPATIENT)
Dept: HEMATOLOGY/ONCOLOGY | Facility: CLINIC | Age: 63
End: 2023-12-13
Payer: COMMERCIAL

## 2023-12-13 ENCOUNTER — SOCIAL WORK (OUTPATIENT)
Dept: CASE MANAGEMENT | Facility: HOSPITAL | Age: 63
End: 2023-12-13

## 2023-12-13 VITALS
HEART RATE: 86 BPM | TEMPERATURE: 98.2 F | WEIGHT: 164.8 LBS | DIASTOLIC BLOOD PRESSURE: 90 MMHG | SYSTOLIC BLOOD PRESSURE: 138 MMHG | BODY MASS INDEX: 29.19 KG/M2

## 2023-12-13 DIAGNOSIS — Z79.899 ENCOUNTER FOR MONITORING CARDIOTOXIC DRUG THERAPY: ICD-10-CM

## 2023-12-13 DIAGNOSIS — Z51.81 ENCOUNTER FOR MONITORING CARDIOTOXIC DRUG THERAPY: ICD-10-CM

## 2023-12-13 DIAGNOSIS — Z51.11 ENCOUNTER FOR ANTINEOPLASTIC CHEMOTHERAPY: ICD-10-CM

## 2023-12-13 DIAGNOSIS — Z17.1 MALIGNANT NEOPLASM OF UPPER-OUTER QUADRANT OF RIGHT BREAST IN FEMALE, ESTROGEN RECEPTOR NEGATIVE (MULTI): ICD-10-CM

## 2023-12-13 DIAGNOSIS — Z17.1 MALIGNANT NEOPLASM OF UPPER-OUTER QUADRANT OF RIGHT BREAST IN FEMALE, ESTROGEN RECEPTOR NEGATIVE (MULTI): Primary | ICD-10-CM

## 2023-12-13 DIAGNOSIS — C50.411 MALIGNANT NEOPLASM OF UPPER-OUTER QUADRANT OF RIGHT BREAST IN FEMALE, ESTROGEN RECEPTOR NEGATIVE (MULTI): ICD-10-CM

## 2023-12-13 DIAGNOSIS — C50.411 MALIGNANT NEOPLASM OF UPPER-OUTER QUADRANT OF RIGHT BREAST IN FEMALE, ESTROGEN RECEPTOR NEGATIVE (MULTI): Primary | ICD-10-CM

## 2023-12-13 LAB
ALBUMIN SERPL BCP-MCNC: 4.6 G/DL (ref 3.4–5)
ALP SERPL-CCNC: 56 U/L (ref 33–136)
ALT SERPL W P-5'-P-CCNC: 21 U/L (ref 7–45)
ANION GAP SERPL CALC-SCNC: 14 MMOL/L (ref 10–20)
AST SERPL W P-5'-P-CCNC: 17 U/L (ref 9–39)
BASOPHILS # BLD AUTO: 0.04 X10*3/UL (ref 0–0.1)
BASOPHILS NFR BLD AUTO: 1 %
BILIRUB SERPL-MCNC: 0.4 MG/DL (ref 0–1.2)
BUN SERPL-MCNC: 16 MG/DL (ref 6–23)
CALCIUM SERPL-MCNC: 9.6 MG/DL (ref 8.6–10.6)
CHLORIDE SERPL-SCNC: 104 MMOL/L (ref 98–107)
CO2 SERPL-SCNC: 27 MMOL/L (ref 21–32)
CREAT SERPL-MCNC: 0.69 MG/DL (ref 0.5–1.05)
EOSINOPHIL # BLD AUTO: 0.13 X10*3/UL (ref 0–0.7)
EOSINOPHIL NFR BLD AUTO: 3.2 %
ERYTHROCYTE [DISTWIDTH] IN BLOOD BY AUTOMATED COUNT: 13.6 % (ref 11.5–14.5)
GFR SERPL CREATININE-BSD FRML MDRD: >90 ML/MIN/1.73M*2
GLUCOSE SERPL-MCNC: 135 MG/DL (ref 74–99)
HCT VFR BLD AUTO: 37.8 % (ref 36–46)
HGB BLD-MCNC: 12.5 G/DL (ref 12–16)
IMM GRANULOCYTES # BLD AUTO: 0.04 X10*3/UL (ref 0–0.7)
IMM GRANULOCYTES NFR BLD AUTO: 1 % (ref 0–0.9)
LYMPHOCYTES # BLD AUTO: 1.26 X10*3/UL (ref 1.2–4.8)
LYMPHOCYTES NFR BLD AUTO: 31.3 %
MCH RBC QN AUTO: 27.6 PG (ref 26–34)
MCHC RBC AUTO-ENTMCNC: 33.1 G/DL (ref 32–36)
MCV RBC AUTO: 83 FL (ref 80–100)
MONOCYTES # BLD AUTO: 0.29 X10*3/UL (ref 0.1–1)
MONOCYTES NFR BLD AUTO: 7.2 %
NEUTROPHILS # BLD AUTO: 2.26 X10*3/UL (ref 1.2–7.7)
NEUTROPHILS NFR BLD AUTO: 56.3 %
NRBC BLD-RTO: ABNORMAL /100{WBCS}
PLATELET # BLD AUTO: 281 X10*3/UL (ref 150–450)
POTASSIUM SERPL-SCNC: 3.9 MMOL/L (ref 3.5–5.3)
PROT SERPL-MCNC: 6.6 G/DL (ref 6.4–8.2)
RBC # BLD AUTO: 4.53 X10*6/UL (ref 4–5.2)
SODIUM SERPL-SCNC: 141 MMOL/L (ref 136–145)
WBC # BLD AUTO: 4 X10*3/UL (ref 4.4–11.3)

## 2023-12-13 PROCEDURE — 96413 CHEMO IV INFUSION 1 HR: CPT

## 2023-12-13 PROCEDURE — 36591 DRAW BLOOD OFF VENOUS DEVICE: CPT

## 2023-12-13 PROCEDURE — 96417 CHEMO IV INFUS EACH ADDL SEQ: CPT

## 2023-12-13 PROCEDURE — 2500000001 HC RX 250 WO HCPCS SELF ADMINISTERED DRUGS (ALT 637 FOR MEDICARE OP): Performed by: INTERNAL MEDICINE

## 2023-12-13 PROCEDURE — 80053 COMPREHEN METABOLIC PANEL: CPT

## 2023-12-13 PROCEDURE — 85025 COMPLETE CBC W/AUTO DIFF WBC: CPT

## 2023-12-13 PROCEDURE — 96375 TX/PRO/DX INJ NEW DRUG ADDON: CPT | Mod: INF

## 2023-12-13 PROCEDURE — 99214 OFFICE O/P EST MOD 30 MIN: CPT | Performed by: INTERNAL MEDICINE

## 2023-12-13 PROCEDURE — 96374 THER/PROPH/DIAG INJ IV PUSH: CPT | Mod: INF

## 2023-12-13 PROCEDURE — 2500000004 HC RX 250 GENERAL PHARMACY W/ HCPCS (ALT 636 FOR OP/ED): Performed by: INTERNAL MEDICINE

## 2023-12-13 PROCEDURE — 3080F DIAST BP >= 90 MM HG: CPT | Performed by: INTERNAL MEDICINE

## 2023-12-13 PROCEDURE — 3075F SYST BP GE 130 - 139MM HG: CPT | Performed by: INTERNAL MEDICINE

## 2023-12-13 PROCEDURE — 2500000004 HC RX 250 GENERAL PHARMACY W/ HCPCS (ALT 636 FOR OP/ED): Mod: JZ | Performed by: INTERNAL MEDICINE

## 2023-12-13 PROCEDURE — 1036F TOBACCO NON-USER: CPT | Performed by: INTERNAL MEDICINE

## 2023-12-13 RX ORDER — PROCHLORPERAZINE EDISYLATE 5 MG/ML
10 INJECTION INTRAMUSCULAR; INTRAVENOUS EVERY 6 HOURS PRN
Status: CANCELLED | OUTPATIENT
Start: 2023-12-20

## 2023-12-13 RX ORDER — EPINEPHRINE 0.3 MG/.3ML
0.3 INJECTION SUBCUTANEOUS EVERY 5 MIN PRN
Status: DISCONTINUED | OUTPATIENT
Start: 2023-12-13 | End: 2023-12-13 | Stop reason: HOSPADM

## 2023-12-13 RX ORDER — PROCHLORPERAZINE EDISYLATE 5 MG/ML
10 INJECTION INTRAMUSCULAR; INTRAVENOUS EVERY 6 HOURS PRN
Status: DISCONTINUED | OUTPATIENT
Start: 2023-12-13 | End: 2023-12-13 | Stop reason: HOSPADM

## 2023-12-13 RX ORDER — DIPHENHYDRAMINE HYDROCHLORIDE 50 MG/ML
50 INJECTION INTRAMUSCULAR; INTRAVENOUS AS NEEDED
Status: DISCONTINUED | OUTPATIENT
Start: 2023-12-13 | End: 2023-12-13 | Stop reason: HOSPADM

## 2023-12-13 RX ORDER — PROCHLORPERAZINE MALEATE 10 MG
10 TABLET ORAL EVERY 6 HOURS PRN
Status: CANCELLED | OUTPATIENT
Start: 2023-12-20

## 2023-12-13 RX ORDER — DIPHENHYDRAMINE HCL 50 MG
50 CAPSULE ORAL ONCE
Status: COMPLETED | OUTPATIENT
Start: 2023-12-13 | End: 2023-12-13

## 2023-12-13 RX ORDER — EPINEPHRINE 0.3 MG/.3ML
0.3 INJECTION SUBCUTANEOUS EVERY 5 MIN PRN
Status: CANCELLED | OUTPATIENT
Start: 2023-12-20

## 2023-12-13 RX ORDER — HEPARIN 100 UNIT/ML
500 SYRINGE INTRAVENOUS AS NEEDED
Status: DISCONTINUED | OUTPATIENT
Start: 2023-12-13 | End: 2023-12-13 | Stop reason: HOSPADM

## 2023-12-13 RX ORDER — ALBUTEROL SULFATE 0.83 MG/ML
3 SOLUTION RESPIRATORY (INHALATION) AS NEEDED
Status: DISCONTINUED | OUTPATIENT
Start: 2023-12-13 | End: 2023-12-13 | Stop reason: HOSPADM

## 2023-12-13 RX ORDER — FAMOTIDINE 10 MG/ML
20 INJECTION INTRAVENOUS ONCE AS NEEDED
Status: DISCONTINUED | OUTPATIENT
Start: 2023-12-13 | End: 2023-12-13 | Stop reason: HOSPADM

## 2023-12-13 RX ORDER — HEPARIN SODIUM,PORCINE/PF 10 UNIT/ML
50 SYRINGE (ML) INTRAVENOUS AS NEEDED
Status: DISCONTINUED | OUTPATIENT
Start: 2023-12-13 | End: 2023-12-13 | Stop reason: HOSPADM

## 2023-12-13 RX ORDER — DEXAMETHASONE SODIUM PHOSPHATE 4 MG/ML
10 INJECTION, SOLUTION INTRA-ARTICULAR; INTRALESIONAL; INTRAMUSCULAR; INTRAVENOUS; SOFT TISSUE ONCE
Status: COMPLETED | OUTPATIENT
Start: 2023-12-13 | End: 2023-12-13

## 2023-12-13 RX ORDER — FAMOTIDINE 10 MG/ML
20 INJECTION INTRAVENOUS ONCE AS NEEDED
Status: CANCELLED | OUTPATIENT
Start: 2023-12-20

## 2023-12-13 RX ORDER — ALBUTEROL SULFATE 0.83 MG/ML
3 SOLUTION RESPIRATORY (INHALATION) AS NEEDED
Status: CANCELLED | OUTPATIENT
Start: 2023-12-20

## 2023-12-13 RX ORDER — DIPHENHYDRAMINE HYDROCHLORIDE 50 MG/ML
50 INJECTION INTRAMUSCULAR; INTRAVENOUS AS NEEDED
Status: CANCELLED | OUTPATIENT
Start: 2023-12-20

## 2023-12-13 RX ORDER — FAMOTIDINE 10 MG/ML
20 INJECTION INTRAVENOUS ONCE
Status: COMPLETED | OUTPATIENT
Start: 2023-12-13 | End: 2023-12-13

## 2023-12-13 RX ORDER — PROCHLORPERAZINE MALEATE 10 MG
10 TABLET ORAL EVERY 6 HOURS PRN
Status: DISCONTINUED | OUTPATIENT
Start: 2023-12-13 | End: 2023-12-13 | Stop reason: HOSPADM

## 2023-12-13 RX ORDER — HEPARIN 100 UNIT/ML
500 SYRINGE INTRAVENOUS AS NEEDED
Status: CANCELLED | OUTPATIENT
Start: 2023-12-13

## 2023-12-13 RX ORDER — DIPHENHYDRAMINE HCL 50 MG
50 CAPSULE ORAL ONCE
Status: CANCELLED | OUTPATIENT
Start: 2023-12-20

## 2023-12-13 RX ORDER — HEPARIN SODIUM,PORCINE/PF 10 UNIT/ML
50 SYRINGE (ML) INTRAVENOUS AS NEEDED
Status: CANCELLED | OUTPATIENT
Start: 2023-12-13

## 2023-12-13 RX ORDER — DEXAMETHASONE SODIUM PHOSPHATE 100 MG/10ML
10 INJECTION INTRAMUSCULAR; INTRAVENOUS ONCE
Status: CANCELLED | OUTPATIENT
Start: 2023-12-20

## 2023-12-13 RX ORDER — FAMOTIDINE 10 MG/ML
20 INJECTION INTRAVENOUS ONCE
Status: CANCELLED | OUTPATIENT
Start: 2023-12-20

## 2023-12-13 RX ADMIN — DEXAMETHASONE SODIUM PHOSPHATE 10 MG: 4 INJECTION INTRA-ARTICULAR; INTRALESIONAL; INTRAMUSCULAR; INTRAVENOUS; SOFT TISSUE at 11:50

## 2023-12-13 RX ADMIN — PACLITAXEL 150 MG: 6 INJECTION, SOLUTION INTRAVENOUS at 13:09

## 2023-12-13 RX ADMIN — DIPHENHYDRAMINE HYDROCHLORIDE 50 MG: 50 CAPSULE ORAL at 11:50

## 2023-12-13 RX ADMIN — TRASTUZUMAB-ANNS 150 MG: 150 INJECTION, POWDER, LYOPHILIZED, FOR SOLUTION INTRAVENOUS at 12:30

## 2023-12-13 RX ADMIN — Medication 500 UNITS: at 14:12

## 2023-12-13 RX ADMIN — FAMOTIDINE 20 MG: 10 INJECTION INTRAVENOUS at 11:50

## 2023-12-13 ASSESSMENT — ENCOUNTER SYMPTOMS
ARTHRALGIAS: 1
DEPRESSION: 0
HEMOPTYSIS: 0
CARDIOVASCULAR NEGATIVE: 1
WHEEZING: 0
ABDOMINAL PAIN: 0
CONFUSION: 0
EYE PROBLEMS: 0
LEG SWELLING: 0
BRUISES/BLEEDS EASILY: 0
SCLERAL ICTERUS: 0
FATIGUE: 0
CHEST TIGHTNESS: 0
BACK PAIN: 0
ABDOMINAL DISTENTION: 0
FREQUENCY: 0
EYES NEGATIVE: 1
SHORTNESS OF BREATH: 0
NAUSEA: 1
FEVER: 0
CHILLS: 0
HEMATURIA: 0
DYSURIA: 0
MYALGIAS: 0
ADENOPATHY: 0
DIARRHEA: 1
SLEEP DISTURBANCE: 0
CONSTIPATION: 0
RESPIRATORY NEGATIVE: 1
NERVOUS/ANXIOUS: 0
APPETITE CHANGE: 0
DIFFICULTY URINATING: 0
CONSTITUTIONAL NEGATIVE: 1
NUMBNESS: 0
BLOOD IN STOOL: 0
DIAPHORESIS: 0
EXTREMITY WEAKNESS: 0
SEIZURES: 0
HEADACHES: 0
HOT FLASHES: 0
COUGH: 0
DIZZINESS: 0
PALPITATIONS: 0

## 2023-12-13 ASSESSMENT — PAIN SCALES - GENERAL: PAINLEVEL: 0-NO PAIN

## 2023-12-13 NOTE — SIGNIFICANT EVENT
12/13/23 1135   Prechemo Checklist   Has the patient been in the hospital, ED, or urgent care since last date of service No   Chemo/Immuno Consent Signed Yes   Protocol/Indications Verified Yes   Confirmed to previous date/time of medication Yes   Compared to previous dose Yes   All medications are dated accurately Yes   Pregnancy Test Negative Not applicable   Parameters Met Yes   BSA/Weight-Height Verified Yes   Dose Calculations Verified Yes

## 2023-12-14 NOTE — PROGRESS NOTES
Breast [facility] Oncology Clinic  Location: [facility]      BREAST CANCER DIAGNOSIS  Malignant neoplasm of upper-outer quadrant of right breast in female, estrogen receptor negative (CMS/HCC), Pathologic: Stage IIB (pT2, pN1mi(sn), cM0, G3, ER-, MN-, HER2+)       ONCOLOGIC HISTORY  S/p recent lumpectomy and SLN.     CURRENT THERAPY  Weekly paclitaxel/trastuzumab week #5/12    HISTORY OF PRESENT ILLNESS    Cassandra Lang is a 63 y.o. woman. Here for post op visit. S/p SLN which revealed 1/1 micromet. Previously we had discussed trastuzumab/pacltaxel. Today we have a [place]  using [employer] system. Tolerating chemotherapy fairly well. No neuropathy. No diarrhea. Does have some skin dryness and itchiness and mild epistaxis which may be due to dry nasal mucosa. Previously she had similar issues pre-paclitaxel.             Review of Systems   Constitutional: Negative.  Negative for appetite change, chills, diaphoresis, fatigue and fever.   HENT:   Positive for nosebleeds. Negative for hearing loss and lump/mass.    Eyes: Negative.  Negative for eye problems and icterus.   Respiratory: Negative.  Negative for chest tightness, cough, hemoptysis, shortness of breath and wheezing.    Cardiovascular: Negative.  Negative for chest pain, leg swelling and palpitations.   Gastrointestinal:  Positive for diarrhea and nausea. Negative for abdominal distention, abdominal pain, blood in stool and constipation.   Endocrine: Negative for hot flashes.   Genitourinary:  Negative for bladder incontinence, difficulty urinating, dyspareunia, dysuria, frequency and hematuria.    Musculoskeletal:  Positive for arthralgias. Negative for back pain, gait problem and myalgias.   Neurological:  Negative for dizziness, extremity weakness, gait problem, headaches, numbness and seizures.   Hematological:  Negative for adenopathy. Does not bruise/bleed easily.   Psychiatric/Behavioral:  Negative for confusion, depression and sleep disturbance. The  patient is not nervous/anxious.    All other systems reviewed and are negative.        Past Medical History:  has a past medical history of Abnormal findings on diagnostic imaging of other specified body structures, Personal history of other medical treatment, Personal history of other medical treatment, Personal history of other medical treatment, Personal history of transient ischemic attack (TIA), and cerebral infarction without residual deficits, and Tinea unguium (10/12/2021).  Surgical History:   has a past surgical history that includes  section, classic (2014); Appendectomy (2014); Other surgical history (2020); Other surgical history (2020); Other surgical history (2021); MR angio head wo IV contrast (2020); MR angio neck wo IV contrast (2020); Breast lumpectomy; and IR CVC port (2023).  Social History:   reports that she has never smoked. She has never been exposed to tobacco smoke. She has never used smokeless tobacco. She reports that she does not drink alcohol and does not use drugs.  Family History:    Family History   Problem Relation Name Age of Onset    Diabetes Mother      Hypertension Mother      Cancer Mother      Coronary artery disease Father      Heart attack Maternal Grandfather      Stroke Other       Family Oncology History:  Cancer-related family history includes Cancer in her mother.      OBJECTIVE    VS / Pain:  /90 (BP Location: Left arm, Patient Position: Sitting, BP Cuff Size: Adult)   Pulse 86   Temp 36.8 °C (98.2 °F) (Skin)   Wt 74.8 kg (164 lb 12.8 oz)   LMP  (LMP Unknown)   BMI 29.19 kg/m²   BSA: 1.82 meters squared   Pain Scale: 0    Performance Status:  The ECOG performance scale today is ECO- Restricted in physically strenuous activity.  Carries out light duty.    Physical Exam  Constitutional:       General: She is not in acute distress.     Appearance: She is not ill-appearing, toxic-appearing or  diaphoretic.   HENT:      Head: Normocephalic and atraumatic.      Nose: Nose normal. No congestion or rhinorrhea.      Mouth/Throat:      Pharynx: No posterior oropharyngeal erythema.   Eyes:      Pupils: Pupils are equal, round, and reactive to light.   Cardiovascular:      Rate and Rhythm: Normal rate and regular rhythm.      Pulses: Normal pulses.      Heart sounds: Normal heart sounds. No murmur heard.     No gallop.   Pulmonary:      Effort: No respiratory distress.      Breath sounds: Normal breath sounds.   Abdominal:      General: There is no distension.      Palpations: There is no mass.      Tenderness: There is no abdominal tenderness.   Musculoskeletal:         General: No swelling.      Cervical back: No rigidity.      Right lower leg: No edema.      Left lower leg: No edema.   Lymphadenopathy:      Cervical: No cervical adenopathy.   Skin:     General: Skin is warm.      Coloration: Skin is not cyanotic.      Findings: No bruising, ecchymosis or erythema.   Neurological:      General: No focal deficit present.      Mental Status: She is oriented to person, place, and time.      Cranial Nerves: Cranial nerves 2-12 are intact.      Motor: Motor function is intact.   Psychiatric:         Attention and Perception: Attention and perception normal.         Mood and Affect: Mood and affect normal.         Behavior: Behavior normal.         Thought Content: Thought content normal.         Judgment: Judgment normal.             Diagnostic Results   === 08/25/22 ===    CT HEAD WO IV CONTRAST    - Impression -  No acute intracranial hemorrhage or mass-effect. Remote infarct in  the left basal ganglia/corona radiata.   No results found for this or any previous visit from the past 365 days.     No images are attached to the encounter.    LABORATORY/PATHOLOGY DATA    Infusion on 12/13/2023   Component Date Value Ref Range Status    WBC 12/13/2023 4.0 (L)  4.4 - 11.3 x10*3/uL Final    nRBC 12/13/2023    Final    RBC  12/13/2023 4.53  4.00 - 5.20 x10*6/uL Final    Hemoglobin 12/13/2023 12.5  12.0 - 16.0 g/dL Final    Hematocrit 12/13/2023 37.8  36.0 - 46.0 % Final    MCV 12/13/2023 83  80 - 100 fL Final    MCH 12/13/2023 27.6  26.0 - 34.0 pg Final    MCHC 12/13/2023 33.1  32.0 - 36.0 g/dL Final    RDW 12/13/2023 13.6  11.5 - 14.5 % Final    Platelets 12/13/2023 281  150 - 450 x10*3/uL Final    Neutrophils % 12/13/2023 56.3  40.0 - 80.0 % Final    Immature Granulocytes %, Automated 12/13/2023 1.0 (H)  0.0 - 0.9 % Final    Lymphocytes % 12/13/2023 31.3  13.0 - 44.0 % Final    Monocytes % 12/13/2023 7.2  2.0 - 10.0 % Final    Eosinophils % 12/13/2023 3.2  0.0 - 6.0 % Final    Basophils % 12/13/2023 1.0  0.0 - 2.0 % Final    Neutrophils Absolute 12/13/2023 2.26  1.20 - 7.70 x10*3/uL Final    Immature Granulocytes Absolute, Au* 12/13/2023 0.04  0.00 - 0.70 x10*3/uL Final    Lymphocytes Absolute 12/13/2023 1.26  1.20 - 4.80 x10*3/uL Final    Monocytes Absolute 12/13/2023 0.29  0.10 - 1.00 x10*3/uL Final    Eosinophils Absolute 12/13/2023 0.13  0.00 - 0.70 x10*3/uL Final    Basophils Absolute 12/13/2023 0.04  0.00 - 0.10 x10*3/uL Final    Glucose 12/13/2023 135 (H)  74 - 99 mg/dL Final    Sodium 12/13/2023 141  136 - 145 mmol/L Final    Potassium 12/13/2023 3.9  3.5 - 5.3 mmol/L Final    Chloride 12/13/2023 104  98 - 107 mmol/L Final    Bicarbonate 12/13/2023 27  21 - 32 mmol/L Final    Anion Gap 12/13/2023 14  10 - 20 mmol/L Final    Urea Nitrogen 12/13/2023 16  6 - 23 mg/dL Final    Creatinine 12/13/2023 0.69  0.50 - 1.05 mg/dL Final    eGFR 12/13/2023 >90  >60 mL/min/1.73m*2 Final    Calcium 12/13/2023 9.6  8.6 - 10.6 mg/dL Final    Albumin 12/13/2023 4.6  3.4 - 5.0 g/dL Final    Alkaline Phosphatase 12/13/2023 56  33 - 136 U/L Final    Total Protein 12/13/2023 6.6  6.4 - 8.2 g/dL Final    AST 12/13/2023 17  9 - 39 U/L Final    Bilirubin, Total 12/13/2023 0.4  0.0 - 1.2 mg/dL Final    ALT 12/13/2023 21  7 - 45 U/L Final   Infusion on  12/06/2023   Component Date Value Ref Range Status    WBC 12/06/2023 3.7 (L)  4.4 - 11.3 x10*3/uL Final    nRBC 12/06/2023    Final    RBC 12/06/2023 4.51  4.00 - 5.20 x10*6/uL Final    Hemoglobin 12/06/2023 12.4  12.0 - 16.0 g/dL Final    Hematocrit 12/06/2023 37.6  36.0 - 46.0 % Final    MCV 12/06/2023 83  80 - 100 fL Final    MCH 12/06/2023 27.5  26.0 - 34.0 pg Final    MCHC 12/06/2023 33.0  32.0 - 36.0 g/dL Final    RDW 12/06/2023 13.2  11.5 - 14.5 % Final    Platelets 12/06/2023 321  150 - 450 x10*3/uL Final    Neutrophils % 12/06/2023 57.0  40.0 - 80.0 % Final    Immature Granulocytes %, Automated 12/06/2023 0.5  0.0 - 0.9 % Final    Lymphocytes % 12/06/2023 31.3  13.0 - 44.0 % Final    Monocytes % 12/06/2023 6.3  2.0 - 10.0 % Final    Eosinophils % 12/06/2023 3.5  0.0 - 6.0 % Final    Basophils % 12/06/2023 1.4  0.0 - 2.0 % Final    Neutrophils Absolute 12/06/2023 2.10  1.20 - 7.70 x10*3/uL Final    Immature Granulocytes Absolute, Au* 12/06/2023 0.02  0.00 - 0.70 x10*3/uL Final    Lymphocytes Absolute 12/06/2023 1.15 (L)  1.20 - 4.80 x10*3/uL Final    Monocytes Absolute 12/06/2023 0.23  0.10 - 1.00 x10*3/uL Final    Eosinophils Absolute 12/06/2023 0.13  0.00 - 0.70 x10*3/uL Final    Basophils Absolute 12/06/2023 0.05  0.00 - 0.10 x10*3/uL Final    Glucose 12/06/2023 97  74 - 99 mg/dL Final    Sodium 12/06/2023 141  136 - 145 mmol/L Final    Potassium 12/06/2023 3.9  3.5 - 5.3 mmol/L Final    Chloride 12/06/2023 104  98 - 107 mmol/L Final    Bicarbonate 12/06/2023 26  21 - 32 mmol/L Final    Anion Gap 12/06/2023 15  10 - 20 mmol/L Final    Urea Nitrogen 12/06/2023 15  6 - 23 mg/dL Final    Creatinine 12/06/2023 0.63  0.50 - 1.05 mg/dL Final    eGFR 12/06/2023 >90  >60 mL/min/1.73m*2 Final    Calcium 12/06/2023 9.4  8.6 - 10.6 mg/dL Final    Albumin 12/06/2023 4.5  3.4 - 5.0 g/dL Final    Alkaline Phosphatase 12/06/2023 61  33 - 136 U/L Final    Total Protein 12/06/2023 6.6  6.4 - 8.2 g/dL Final    AST  12/06/2023 19  9 - 39 U/L Final    Bilirubin, Total 12/06/2023 0.4  0.0 - 1.2 mg/dL Final    ALT 12/06/2023 25  7 - 45 U/L Final   Infusion on 11/30/2023   Component Date Value Ref Range Status    WBC 11/30/2023 3.4 (L)  4.4 - 11.3 x10*3/uL Final    nRBC 11/30/2023    Final    RBC 11/30/2023 4.27  4.00 - 5.20 x10*6/uL Final    Hemoglobin 11/30/2023 11.8 (L)  12.0 - 16.0 g/dL Final    Hematocrit 11/30/2023 35.5 (L)  36.0 - 46.0 % Final    MCV 11/30/2023 83  80 - 100 fL Final    MCH 11/30/2023 27.6  26.0 - 34.0 pg Final    MCHC 11/30/2023 33.2  32.0 - 36.0 g/dL Final    RDW 11/30/2023 13.1  11.5 - 14.5 % Final    Platelets 11/30/2023 261  150 - 450 x10*3/uL Final    Neutrophils % 11/30/2023 54.6  40.0 - 80.0 % Final    Immature Granulocytes %, Automated 11/30/2023 0.3  0.0 - 0.9 % Final    Lymphocytes % 11/30/2023 32.8  13.0 - 44.0 % Final    Monocytes % 11/30/2023 6.9  2.0 - 10.0 % Final    Eosinophils % 11/30/2023 4.5  0.0 - 6.0 % Final    Basophils % 11/30/2023 0.9  0.0 - 2.0 % Final    Neutrophils Absolute 11/30/2023 1.83  1.20 - 7.70 x10*3/uL Final    Immature Granulocytes Absolute, Au* 11/30/2023 0.01  0.00 - 0.70 x10*3/uL Final    Lymphocytes Absolute 11/30/2023 1.10 (L)  1.20 - 4.80 x10*3/uL Final    Monocytes Absolute 11/30/2023 0.23  0.10 - 1.00 x10*3/uL Final    Eosinophils Absolute 11/30/2023 0.15  0.00 - 0.70 x10*3/uL Final    Basophils Absolute 11/30/2023 0.03  0.00 - 0.10 x10*3/uL Final    Glucose 11/30/2023 115 (H)  74 - 99 mg/dL Final    Sodium 11/30/2023 143  136 - 145 mmol/L Final    Potassium 11/30/2023 3.9  3.5 - 5.3 mmol/L Final    Chloride 11/30/2023 106  98 - 107 mmol/L Final    Bicarbonate 11/30/2023 20 (L)  21 - 32 mmol/L Final    Anion Gap 11/30/2023 21 (H)  10 - 20 mmol/L Final    Urea Nitrogen 11/30/2023 13  6 - 23 mg/dL Final    Creatinine 11/30/2023 0.72  0.50 - 1.05 mg/dL Final    eGFR 11/30/2023 >90  >60 mL/min/1.73m*2 Final    Calcium 11/30/2023 9.4  8.6 - 10.6 mg/dL Final    Albumin  11/30/2023 4.3  3.4 - 5.0 g/dL Final    Alkaline Phosphatase 11/30/2023 59  33 - 136 U/L Final    Total Protein 11/30/2023 6.3 (L)  6.4 - 8.2 g/dL Final    AST 11/30/2023 22  9 - 39 U/L Final    Bilirubin, Total 11/30/2023 0.4  0.0 - 1.2 mg/dL Final    ALT 11/30/2023 21  7 - 45 U/L Final   Infusion on 11/22/2023   Component Date Value Ref Range Status    WBC 11/22/2023 3.3 (L)  4.4 - 11.3 x10*3/uL Final    nRBC 11/22/2023    Final    RBC 11/22/2023 4.71  4.00 - 5.20 x10*6/uL Final    Hemoglobin 11/22/2023 13.0  12.0 - 16.0 g/dL Final    Hematocrit 11/22/2023 38.9  36.0 - 46.0 % Final    MCV 11/22/2023 83  80 - 100 fL Final    MCH 11/22/2023 27.6  26.0 - 34.0 pg Final    MCHC 11/22/2023 33.4  32.0 - 36.0 g/dL Final    RDW 11/22/2023 12.8  11.5 - 14.5 % Final    Platelets 11/22/2023 228  150 - 450 x10*3/uL Final    Neutrophils % 11/22/2023 52.4  40.0 - 80.0 % Final    Immature Granulocytes %, Automated 11/22/2023 0.6  0.0 - 0.9 % Final    Lymphocytes % 11/22/2023 34.5  13.0 - 44.0 % Final    Monocytes % 11/22/2023 6.1  2.0 - 10.0 % Final    Eosinophils % 11/22/2023 5.5  0.0 - 6.0 % Final    Basophils % 11/22/2023 0.9  0.0 - 2.0 % Final    Neutrophils Absolute 11/22/2023 1.72  1.20 - 7.70 x10*3/uL Final    Immature Granulocytes Absolute, Au* 11/22/2023 0.02  0.00 - 0.70 x10*3/uL Final    Lymphocytes Absolute 11/22/2023 1.13 (L)  1.20 - 4.80 x10*3/uL Final    Monocytes Absolute 11/22/2023 0.20  0.10 - 1.00 x10*3/uL Final    Eosinophils Absolute 11/22/2023 0.18  0.00 - 0.70 x10*3/uL Final    Basophils Absolute 11/22/2023 0.03  0.00 - 0.10 x10*3/uL Final    Glucose 11/22/2023 100 (H)  74 - 99 mg/dL Final    Sodium 11/22/2023 141  136 - 145 mmol/L Final    Potassium 11/22/2023 3.8  3.5 - 5.3 mmol/L Final    Chloride 11/22/2023 105  98 - 107 mmol/L Final    Bicarbonate 11/22/2023 25  21 - 32 mmol/L Final    Anion Gap 11/22/2023 15  10 - 20 mmol/L Final    Urea Nitrogen 11/22/2023 10  6 - 23 mg/dL Final    Creatinine  11/22/2023 0.62  0.50 - 1.05 mg/dL Final    eGFR 11/22/2023 >90  >60 mL/min/1.73m*2 Final    Calcium 11/22/2023 9.5  8.6 - 10.6 mg/dL Final    Albumin 11/22/2023 4.3  3.4 - 5.0 g/dL Final    Alkaline Phosphatase 11/22/2023 63  33 - 136 U/L Final    Total Protein 11/22/2023 6.6  6.4 - 8.2 g/dL Final    AST 11/22/2023 19  9 - 39 U/L Final    Bilirubin, Total 11/22/2023 0.5  0.0 - 1.2 mg/dL Final    ALT 11/22/2023 23  7 - 45 U/L Final   Admission on 11/18/2023, Discharged on 11/18/2023   Component Date Value Ref Range Status    WBC 11/18/2023 8.0  4.4 - 11.3 x10*3/uL Final    nRBC 11/18/2023 0.0  0.0 - 0.0 /100 WBCs Final    RBC 11/18/2023 5.21 (H)  4.00 - 5.20 x10*6/uL Final    Hemoglobin 11/18/2023 14.4  12.0 - 16.0 g/dL Final    Hematocrit 11/18/2023 43.0  36.0 - 46.0 % Final    MCV 11/18/2023 83  80 - 100 fL Final    MCH 11/18/2023 27.6  26.0 - 34.0 pg Final    MCHC 11/18/2023 33.5  32.0 - 36.0 g/dL Final    RDW 11/18/2023 12.9  11.5 - 14.5 % Final    Platelets 11/18/2023 211  150 - 450 x10*3/uL Final    Neutrophils % 11/18/2023 70.2  40.0 - 80.0 % Final    Immature Granulocytes %, Automated 11/18/2023 0.4  0.0 - 0.9 % Final    Lymphocytes % 11/18/2023 23.3  13.0 - 44.0 % Final    Monocytes % 11/18/2023 3.5  2.0 - 10.0 % Final    Eosinophils % 11/18/2023 2.0  0.0 - 6.0 % Final    Basophils % 11/18/2023 0.6  0.0 - 2.0 % Final    Neutrophils Absolute 11/18/2023 5.61  1.20 - 7.70 x10*3/uL Final    Immature Granulocytes Absolute, Au* 11/18/2023 0.03  0.00 - 0.70 x10*3/uL Final    Lymphocytes Absolute 11/18/2023 1.86  1.20 - 4.80 x10*3/uL Final    Monocytes Absolute 11/18/2023 0.28  0.10 - 1.00 x10*3/uL Final    Eosinophils Absolute 11/18/2023 0.16  0.00 - 0.70 x10*3/uL Final    Basophils Absolute 11/18/2023 0.05  0.00 - 0.10 x10*3/uL Final    Glucose 11/18/2023 131 (H)  74 - 99 mg/dL Final    Sodium 11/18/2023 138  136 - 145 mmol/L Final    Potassium 11/18/2023 3.6  3.5 - 5.3 mmol/L Final    Chloride 11/18/2023 103  98  - 107 mmol/L Final    Bicarbonate 11/18/2023 24  21 - 32 mmol/L Final    Anion Gap 11/18/2023 15  10 - 20 mmol/L Final    Urea Nitrogen 11/18/2023 19  6 - 23 mg/dL Final    Creatinine 11/18/2023 0.68  0.50 - 1.05 mg/dL Final    eGFR 11/18/2023 >90  >60 mL/min/1.73m*2 Final    Calcium 11/18/2023 9.4  8.6 - 10.3 mg/dL Final    Albumin 11/18/2023 4.6  3.4 - 5.0 g/dL Final    Alkaline Phosphatase 11/18/2023 67  33 - 136 U/L Final    Total Protein 11/18/2023 6.8  6.4 - 8.2 g/dL Final    AST 11/18/2023 16  9 - 39 U/L Final    Bilirubin, Total 11/18/2023 0.6  0.0 - 1.2 mg/dL Final    ALT 11/18/2023 23  7 - 45 U/L Final    Lactate 11/18/2023 0.7  0.4 - 2.0 mmol/L Final    Blood Culture 11/18/2023 No growth at 4 days -  FINAL REPORT   Final    Blood Culture 11/18/2023 No growth at 4 days -  FINAL REPORT   Final    Color, Urine 11/18/2023 Yellow  Straw, Yellow Final    Appearance, Urine 11/18/2023 Hazy (N)  Clear Final    Specific Gravity, Urine 11/18/2023 1.008  1.005 - 1.035 Final    pH, Urine 11/18/2023 5.0  5.0, 5.5, 6.0, 6.5, 7.0, 7.5, 8.0 Final    Protein, Urine 11/18/2023 30 (1+) (N)  NEGATIVE mg/dL Final    Glucose, Urine 11/18/2023 NEGATIVE  NEGATIVE mg/dL Final    Blood, Urine 11/18/2023 LARGE (3+) (A)  NEGATIVE Final    Ketones, Urine 11/18/2023 NEGATIVE  NEGATIVE mg/dL Final    Bilirubin, Urine 11/18/2023 NEGATIVE  NEGATIVE Final    Urobilinogen, Urine 11/18/2023 <2.0  <2.0 mg/dL Final    Nitrite, Urine 11/18/2023 POSITIVE (A)  NEGATIVE Final    Leukocyte Esterase, Urine 11/18/2023 MODERATE (2+) (A)  NEGATIVE Final    Extra Tube 11/18/2023 Hold for add-ons.   Final    WBC, Urine 11/18/2023 >50 (A)  1-5, NONE /HPF Final    WBC Clumps, Urine 11/18/2023 MANY  Reference range not established. /HPF Final    RBC, Urine 11/18/2023 >20 (A)  NONE, 1-2, 3-5 /HPF Final    Squamous Epithelial Cells, Urine 11/18/2023 1-9 (SPARSE)  Reference range not established. /HPF Final    Bacteria, Urine 11/18/2023 4+ (A)  NONE SEEN /HPF  Final    Mucus, Urine 11/18/2023 1+  Reference range not established. /LPF Final    Urine Culture 11/18/2023 >100,000 Escherichia coli (A)   Final   Infusion on 11/15/2023   Component Date Value Ref Range Status    WBC 11/15/2023 5.2  4.4 - 11.3 x10*3/uL Final    nRBC 11/15/2023    Final    RBC 11/15/2023 5.29 (H)  4.00 - 5.20 x10*6/uL Final    Hemoglobin 11/15/2023 14.4  12.0 - 16.0 g/dL Final    Hematocrit 11/15/2023 43.8  36.0 - 46.0 % Final    MCV 11/15/2023 83  80 - 100 fL Final    MCH 11/15/2023 27.2  26.0 - 34.0 pg Final    MCHC 11/15/2023 32.9  32.0 - 36.0 g/dL Final    RDW 11/15/2023 13.3  11.5 - 14.5 % Final    Platelets 11/15/2023 244  150 - 450 x10*3/uL Final    Neutrophils % 11/15/2023 53.2  40.0 - 80.0 % Final    Immature Granulocytes %, Automated 11/15/2023 0.0  0.0 - 0.9 % Final    Lymphocytes % 11/15/2023 34.6  13.0 - 44.0 % Final    Monocytes % 11/15/2023 7.9  2.0 - 10.0 % Final    Eosinophils % 11/15/2023 3.3  0.0 - 6.0 % Final    Basophils % 11/15/2023 1.0  0.0 - 2.0 % Final    Neutrophils Absolute 11/15/2023 2.75  1.20 - 7.70 x10*3/uL Final    Immature Granulocytes Absolute, Au* 11/15/2023 0.00  0.00 - 0.70 x10*3/uL Final    Lymphocytes Absolute 11/15/2023 1.79  1.20 - 4.80 x10*3/uL Final    Monocytes Absolute 11/15/2023 0.41  0.10 - 1.00 x10*3/uL Final    Eosinophils Absolute 11/15/2023 0.17  0.00 - 0.70 x10*3/uL Final    Basophils Absolute 11/15/2023 0.05  0.00 - 0.10 x10*3/uL Final    Glucose 11/15/2023 103 (H)  74 - 99 mg/dL Final    Sodium 11/15/2023 143  136 - 145 mmol/L Final    Potassium 11/15/2023 3.7  3.5 - 5.3 mmol/L Final    Chloride 11/15/2023 107  98 - 107 mmol/L Final    Bicarbonate 11/15/2023 28  21 - 32 mmol/L Final    Anion Gap 11/15/2023 12  10 - 20 mmol/L Final    Urea Nitrogen 11/15/2023 10  6 - 23 mg/dL Final    Creatinine 11/15/2023 0.56  0.50 - 1.05 mg/dL Final    eGFR 11/15/2023 >90  >60 mL/min/1.73m*2 Final    Calcium 11/15/2023 9.9  8.6 - 10.6 mg/dL Final    Albumin  11/15/2023 4.7  3.4 - 5.0 g/dL Final    Alkaline Phosphatase 11/15/2023 69  33 - 136 U/L Final    Total Protein 11/15/2023 7.0  6.4 - 8.2 g/dL Final    AST 11/15/2023 17  9 - 39 U/L Final    Bilirubin, Total 11/15/2023 0.6  0.0 - 1.2 mg/dL Final    ALT 11/15/2023 18  7 - 45 U/L Final   Hospital Outpatient Visit on 11/08/2023   Component Date Value Ref Range Status    AV pk gogo 11/08/2023 0.99   Final-Edited    LV biplane EF 11/08/2023 64   Final-Edited    MV avg E/e' ratio 11/08/2023 4.60   Final-Edited    MV E/A ratio 11/08/2023 0.52   Final-Edited    Tricuspid annular plane systolic e* 11/08/2023 1.7   Final-Edited    LVIDd 11/08/2023 4.30   Final-Edited    RVSP 11/08/2023 19.3   Final-Edited    AV pk grad 11/08/2023 3.9   Final-Edited    LV A4C EF 11/08/2023 64.1   Final-Edited          IMPRESSION/PLAN    1. Right sided [id number] HER2+ breast cancer, unexpectedly found after excisional biopsy for sclerosing adenosis. I have spent 45 minutes of direct  consultation  with the patient today reviewing the cancer care plan, educating and answering questions regarding ongoing follow up, greater than 50% in counseling and coordination of care. Continue adjuvant chemo-HER2  therapy, week #5/12. Return in 3-4 wks to follow-up with Shawna Mccallum        2. Hypercoagulable state. extensive medical history of CVA, breast cancer,  HTN, Hyperlipidemia, hypercholesterolemia. She remains on eliquis.      We discussed the clinical significance of diagnosis, goals of care and treatment plan in detail.     I personally spent over half of a total 55 minutes face to face with the patient in counseling and discussion and/or coordination of care as described above.         -------------------------------------------------------------------------------------------------------------------------------  Brendon Rosenbaum MD  Director of Breast Cancer Medical Oncology Research Program   [facility]  Professor of Medicine  [facility]  [id number] [place] [address] [address]  [place] [id number]  Phone: [phone]  [email or web address]

## 2023-12-14 NOTE — PROGRESS NOTES
RADHA met with patient and daughter in the infusion center of [facility]: [facility]. Today is patient's fifth of twelve planned chemotherapy treatments for breast cancer. SW introduced self and explained role. Ms. Lang is a 63 year old female, appears alert, oriented X3 and independent in self care. Patient with this  accent; able to understand and converse in English. Patient resides in a single family home with spouse and 2 adult daughters. Daughters, both in their 20's are full time students. Patient is a ; spouse works full time and carriers the medical benefits for both patient and spouse. Patient shared that her spouse is considering retiring sometime in 2024; she is concerned about medical insurance. SW explained how patient would qualify to enroll in a Market[employer] Plan when spouse retires. She can be screened for [place] Medicaid and then a Market place plan. SW reviewed community resources. SW provided a brochure for [facility]. Additional resources discussed and materials provided for resources for emotional and tangible supports. Support offered. Sw provided my contact information and will follow. Patient agreeable.

## 2023-12-20 ENCOUNTER — INFUSION (OUTPATIENT)
Dept: HEMATOLOGY/ONCOLOGY | Facility: CLINIC | Age: 63
End: 2023-12-20
Payer: COMMERCIAL

## 2023-12-20 VITALS
SYSTOLIC BLOOD PRESSURE: 149 MMHG | BODY MASS INDEX: 28.9 KG/M2 | WEIGHT: 163.14 LBS | HEART RATE: 84 BPM | TEMPERATURE: 95.2 F | RESPIRATION RATE: 18 BRPM | DIASTOLIC BLOOD PRESSURE: 96 MMHG

## 2023-12-20 DIAGNOSIS — C50.411 MALIGNANT NEOPLASM OF UPPER-OUTER QUADRANT OF RIGHT BREAST IN FEMALE, ESTROGEN RECEPTOR NEGATIVE (MULTI): ICD-10-CM

## 2023-12-20 DIAGNOSIS — Z17.1 MALIGNANT NEOPLASM OF UPPER-OUTER QUADRANT OF RIGHT BREAST IN FEMALE, ESTROGEN RECEPTOR NEGATIVE (MULTI): ICD-10-CM

## 2023-12-20 LAB
ALBUMIN SERPL BCP-MCNC: 4.3 G/DL (ref 3.4–5)
ALP SERPL-CCNC: 57 U/L (ref 33–136)
ALT SERPL W P-5'-P-CCNC: 23 U/L (ref 7–45)
ANION GAP SERPL CALC-SCNC: 11 MMOL/L (ref 10–20)
AST SERPL W P-5'-P-CCNC: 19 U/L (ref 9–39)
BASOPHILS # BLD AUTO: 0.06 X10*3/UL (ref 0–0.1)
BASOPHILS NFR BLD AUTO: 1.7 %
BILIRUB SERPL-MCNC: 0.3 MG/DL (ref 0–1.2)
BUN SERPL-MCNC: 13 MG/DL (ref 6–23)
CALCIUM SERPL-MCNC: 9.5 MG/DL (ref 8.6–10.6)
CHLORIDE SERPL-SCNC: 105 MMOL/L (ref 98–107)
CO2 SERPL-SCNC: 29 MMOL/L (ref 21–32)
CREAT SERPL-MCNC: 0.66 MG/DL (ref 0.5–1.05)
EOSINOPHIL # BLD AUTO: 0.15 X10*3/UL (ref 0–0.7)
EOSINOPHIL NFR BLD AUTO: 4.3 %
ERYTHROCYTE [DISTWIDTH] IN BLOOD BY AUTOMATED COUNT: 13.9 % (ref 11.5–14.5)
GFR SERPL CREATININE-BSD FRML MDRD: >90 ML/MIN/1.73M*2
GLUCOSE SERPL-MCNC: 118 MG/DL (ref 74–99)
HCT VFR BLD AUTO: 35.8 % (ref 36–46)
HGB BLD-MCNC: 11.9 G/DL (ref 12–16)
IMM GRANULOCYTES # BLD AUTO: 0.02 X10*3/UL (ref 0–0.7)
IMM GRANULOCYTES NFR BLD AUTO: 0.6 % (ref 0–0.9)
LYMPHOCYTES # BLD AUTO: 1.27 X10*3/UL (ref 1.2–4.8)
LYMPHOCYTES NFR BLD AUTO: 36.3 %
MCH RBC QN AUTO: 27.8 PG (ref 26–34)
MCHC RBC AUTO-ENTMCNC: 33.2 G/DL (ref 32–36)
MCV RBC AUTO: 84 FL (ref 80–100)
MONOCYTES # BLD AUTO: 0.27 X10*3/UL (ref 0.1–1)
MONOCYTES NFR BLD AUTO: 7.7 %
NEUTROPHILS # BLD AUTO: 1.73 X10*3/UL (ref 1.2–7.7)
NEUTROPHILS NFR BLD AUTO: 49.4 %
NRBC BLD-RTO: ABNORMAL /100{WBCS}
PLATELET # BLD AUTO: 272 X10*3/UL (ref 150–450)
POTASSIUM SERPL-SCNC: 3.7 MMOL/L (ref 3.5–5.3)
PROT SERPL-MCNC: 6.3 G/DL (ref 6.4–8.2)
RBC # BLD AUTO: 4.28 X10*6/UL (ref 4–5.2)
SODIUM SERPL-SCNC: 141 MMOL/L (ref 136–145)
WBC # BLD AUTO: 3.5 X10*3/UL (ref 4.4–11.3)

## 2023-12-20 PROCEDURE — 96413 CHEMO IV INFUSION 1 HR: CPT

## 2023-12-20 PROCEDURE — 85025 COMPLETE CBC W/AUTO DIFF WBC: CPT

## 2023-12-20 PROCEDURE — 36591 DRAW BLOOD OFF VENOUS DEVICE: CPT

## 2023-12-20 PROCEDURE — 2500000001 HC RX 250 WO HCPCS SELF ADMINISTERED DRUGS (ALT 637 FOR MEDICARE OP): Performed by: INTERNAL MEDICINE

## 2023-12-20 PROCEDURE — 96375 TX/PRO/DX INJ NEW DRUG ADDON: CPT | Mod: INF

## 2023-12-20 PROCEDURE — 96374 THER/PROPH/DIAG INJ IV PUSH: CPT | Mod: INF

## 2023-12-20 PROCEDURE — 2500000004 HC RX 250 GENERAL PHARMACY W/ HCPCS (ALT 636 FOR OP/ED): Performed by: INTERNAL MEDICINE

## 2023-12-20 PROCEDURE — 96417 CHEMO IV INFUS EACH ADDL SEQ: CPT

## 2023-12-20 PROCEDURE — 80053 COMPREHEN METABOLIC PANEL: CPT

## 2023-12-20 RX ORDER — HEPARIN SODIUM,PORCINE/PF 10 UNIT/ML
50 SYRINGE (ML) INTRAVENOUS AS NEEDED
Status: CANCELLED | OUTPATIENT
Start: 2023-12-20

## 2023-12-20 RX ORDER — ALBUTEROL SULFATE 0.83 MG/ML
3 SOLUTION RESPIRATORY (INHALATION) AS NEEDED
Status: DISCONTINUED | OUTPATIENT
Start: 2023-12-20 | End: 2023-12-20 | Stop reason: HOSPADM

## 2023-12-20 RX ORDER — DIPHENHYDRAMINE HYDROCHLORIDE 50 MG/ML
50 INJECTION INTRAMUSCULAR; INTRAVENOUS AS NEEDED
Status: DISCONTINUED | OUTPATIENT
Start: 2023-12-20 | End: 2023-12-20 | Stop reason: HOSPADM

## 2023-12-20 RX ORDER — FAMOTIDINE 10 MG/ML
20 INJECTION INTRAVENOUS ONCE
Status: COMPLETED | OUTPATIENT
Start: 2023-12-20 | End: 2023-12-20

## 2023-12-20 RX ORDER — EPINEPHRINE 0.3 MG/.3ML
0.3 INJECTION SUBCUTANEOUS EVERY 5 MIN PRN
Status: DISCONTINUED | OUTPATIENT
Start: 2023-12-20 | End: 2023-12-20 | Stop reason: HOSPADM

## 2023-12-20 RX ORDER — PROCHLORPERAZINE MALEATE 10 MG
10 TABLET ORAL EVERY 6 HOURS PRN
Status: DISCONTINUED | OUTPATIENT
Start: 2023-12-20 | End: 2023-12-20 | Stop reason: HOSPADM

## 2023-12-20 RX ORDER — HEPARIN SODIUM,PORCINE/PF 10 UNIT/ML
50 SYRINGE (ML) INTRAVENOUS AS NEEDED
Status: DISCONTINUED | OUTPATIENT
Start: 2023-12-20 | End: 2023-12-20 | Stop reason: HOSPADM

## 2023-12-20 RX ORDER — DEXAMETHASONE SODIUM PHOSPHATE 4 MG/ML
10 INJECTION, SOLUTION INTRA-ARTICULAR; INTRALESIONAL; INTRAMUSCULAR; INTRAVENOUS; SOFT TISSUE ONCE
Status: COMPLETED | OUTPATIENT
Start: 2023-12-20 | End: 2023-12-20

## 2023-12-20 RX ORDER — DIPHENHYDRAMINE HCL 50 MG
50 CAPSULE ORAL ONCE
Status: COMPLETED | OUTPATIENT
Start: 2023-12-20 | End: 2023-12-20

## 2023-12-20 RX ORDER — PROCHLORPERAZINE EDISYLATE 5 MG/ML
10 INJECTION INTRAMUSCULAR; INTRAVENOUS EVERY 6 HOURS PRN
Status: DISCONTINUED | OUTPATIENT
Start: 2023-12-20 | End: 2023-12-20 | Stop reason: HOSPADM

## 2023-12-20 RX ORDER — FAMOTIDINE 10 MG/ML
20 INJECTION INTRAVENOUS ONCE AS NEEDED
Status: DISCONTINUED | OUTPATIENT
Start: 2023-12-20 | End: 2023-12-20 | Stop reason: HOSPADM

## 2023-12-20 RX ORDER — HEPARIN 100 UNIT/ML
500 SYRINGE INTRAVENOUS AS NEEDED
Status: DISCONTINUED | OUTPATIENT
Start: 2023-12-20 | End: 2023-12-20 | Stop reason: HOSPADM

## 2023-12-20 RX ORDER — HEPARIN 100 UNIT/ML
500 SYRINGE INTRAVENOUS AS NEEDED
Status: CANCELLED | OUTPATIENT
Start: 2023-12-20

## 2023-12-20 RX ADMIN — Medication 500 UNITS: at 13:43

## 2023-12-20 RX ADMIN — DEXAMETHASONE SODIUM PHOSPHATE 10 MG: 4 INJECTION INTRA-ARTICULAR; INTRALESIONAL; INTRAMUSCULAR; INTRAVENOUS; SOFT TISSUE at 10:49

## 2023-12-20 RX ADMIN — TRASTUZUMAB-ANNS 150 MG: 150 INJECTION, POWDER, LYOPHILIZED, FOR SOLUTION INTRAVENOUS at 11:49

## 2023-12-20 RX ADMIN — FAMOTIDINE 20 MG: 10 INJECTION INTRAVENOUS at 10:49

## 2023-12-20 RX ADMIN — DIPHENHYDRAMINE HYDROCHLORIDE 50 MG: 50 CAPSULE ORAL at 10:49

## 2023-12-20 RX ADMIN — PACLITAXEL 150 MG: 6 INJECTION, SOLUTION INTRAVENOUS at 12:38

## 2023-12-20 ASSESSMENT — PAIN SCALES - GENERAL: PAINLEVEL: 0-NO PAIN

## 2023-12-20 NOTE — SIGNIFICANT EVENT
12/20/23 [facility]   Prechemo Checklist   Has the patient been in the hospital, ED, or urgent care since last date of service No   Chemo/Immuno Consent Signed Yes   Protocol/Indications Verified Yes   Confirmed to previous date/time of medication Yes   Compared to previous dose Yes   All medications are dated accurately Yes   Pregnancy Test Negative Not applicable   Parameters Met Yes   BSA/Weight-Height Verified Yes

## 2023-12-27 ENCOUNTER — INFUSION (OUTPATIENT)
Dept: HEMATOLOGY/ONCOLOGY | Facility: CLINIC | Age: 63
End: 2023-12-27
Payer: COMMERCIAL

## 2023-12-27 VITALS
SYSTOLIC BLOOD PRESSURE: 131 MMHG | HEART RATE: 96 BPM | BODY MASS INDEX: 28.76 KG/M2 | OXYGEN SATURATION: 96 % | TEMPERATURE: 98.6 F | WEIGHT: 162.37 LBS | RESPIRATION RATE: 18 BRPM | DIASTOLIC BLOOD PRESSURE: 86 MMHG

## 2023-12-27 DIAGNOSIS — Z17.1 MALIGNANT NEOPLASM OF UPPER-OUTER QUADRANT OF RIGHT BREAST IN FEMALE, ESTROGEN RECEPTOR NEGATIVE (MULTI): ICD-10-CM

## 2023-12-27 DIAGNOSIS — C50.411 MALIGNANT NEOPLASM OF UPPER-OUTER QUADRANT OF RIGHT BREAST IN FEMALE, ESTROGEN RECEPTOR NEGATIVE (MULTI): ICD-10-CM

## 2023-12-27 LAB
ALBUMIN SERPL BCP-MCNC: 4.1 G/DL (ref 3.4–5)
ALP SERPL-CCNC: 56 U/L (ref 33–136)
ALT SERPL W P-5'-P-CCNC: 24 U/L (ref 7–45)
ANION GAP SERPL CALC-SCNC: 12 MMOL/L (ref 10–20)
AST SERPL W P-5'-P-CCNC: 20 U/L (ref 9–39)
BASOPHILS # BLD AUTO: 0.05 X10*3/UL (ref 0–0.1)
BASOPHILS NFR BLD AUTO: 1.3 %
BILIRUB SERPL-MCNC: 0.3 MG/DL (ref 0–1.2)
BUN SERPL-MCNC: 9 MG/DL (ref 6–23)
CALCIUM SERPL-MCNC: 9.7 MG/DL (ref 8.6–10.6)
CHLORIDE SERPL-SCNC: 106 MMOL/L (ref 98–107)
CO2 SERPL-SCNC: 29 MMOL/L (ref 21–32)
CREAT SERPL-MCNC: 0.64 MG/DL (ref 0.5–1.05)
EOSINOPHIL # BLD AUTO: 0.17 X10*3/UL (ref 0–0.7)
EOSINOPHIL NFR BLD AUTO: 4.4 %
ERYTHROCYTE [DISTWIDTH] IN BLOOD BY AUTOMATED COUNT: 14.7 % (ref 11.5–14.5)
GFR SERPL CREATININE-BSD FRML MDRD: >90 ML/MIN/1.73M*2
GLUCOSE SERPL-MCNC: 85 MG/DL (ref 74–99)
HCT VFR BLD AUTO: 35.2 % (ref 36–46)
HGB BLD-MCNC: 11.7 G/DL (ref 12–16)
IMM GRANULOCYTES # BLD AUTO: 0.04 X10*3/UL (ref 0–0.7)
IMM GRANULOCYTES NFR BLD AUTO: 1 % (ref 0–0.9)
LYMPHOCYTES # BLD AUTO: 1.13 X10*3/UL (ref 1.2–4.8)
LYMPHOCYTES NFR BLD AUTO: 29 %
MCH RBC QN AUTO: 27.9 PG (ref 26–34)
MCHC RBC AUTO-ENTMCNC: 33.2 G/DL (ref 32–36)
MCV RBC AUTO: 84 FL (ref 80–100)
MONOCYTES # BLD AUTO: 0.32 X10*3/UL (ref 0.1–1)
MONOCYTES NFR BLD AUTO: 8.2 %
NEUTROPHILS # BLD AUTO: 2.18 X10*3/UL (ref 1.2–7.7)
NEUTROPHILS NFR BLD AUTO: 56.1 %
NRBC BLD-RTO: ABNORMAL /100{WBCS}
PLATELET # BLD AUTO: 299 X10*3/UL (ref 150–450)
POTASSIUM SERPL-SCNC: 3.6 MMOL/L (ref 3.5–5.3)
PROT SERPL-MCNC: 6.1 G/DL (ref 6.4–8.2)
RBC # BLD AUTO: 4.19 X10*6/UL (ref 4–5.2)
SODIUM SERPL-SCNC: 143 MMOL/L (ref 136–145)
WBC # BLD AUTO: 3.9 X10*3/UL (ref 4.4–11.3)

## 2023-12-27 PROCEDURE — 2500000004 HC RX 250 GENERAL PHARMACY W/ HCPCS (ALT 636 FOR OP/ED): Performed by: INTERNAL MEDICINE

## 2023-12-27 PROCEDURE — 96374 THER/PROPH/DIAG INJ IV PUSH: CPT | Mod: INF

## 2023-12-27 PROCEDURE — 80053 COMPREHEN METABOLIC PANEL: CPT

## 2023-12-27 PROCEDURE — 2500000001 HC RX 250 WO HCPCS SELF ADMINISTERED DRUGS (ALT 637 FOR MEDICARE OP): Performed by: INTERNAL MEDICINE

## 2023-12-27 PROCEDURE — 96417 CHEMO IV INFUS EACH ADDL SEQ: CPT

## 2023-12-27 PROCEDURE — 96413 CHEMO IV INFUSION 1 HR: CPT

## 2023-12-27 PROCEDURE — 96375 TX/PRO/DX INJ NEW DRUG ADDON: CPT | Mod: INF

## 2023-12-27 PROCEDURE — 85025 COMPLETE CBC W/AUTO DIFF WBC: CPT

## 2023-12-27 PROCEDURE — 36591 DRAW BLOOD OFF VENOUS DEVICE: CPT

## 2023-12-27 RX ORDER — HEPARIN SODIUM,PORCINE/PF 10 UNIT/ML
50 SYRINGE (ML) INTRAVENOUS AS NEEDED
Status: CANCELLED | OUTPATIENT
Start: 2023-12-27

## 2023-12-27 RX ORDER — DIPHENHYDRAMINE HCL 50 MG
50 CAPSULE ORAL ONCE
Status: COMPLETED | OUTPATIENT
Start: 2023-12-27 | End: 2023-12-27

## 2023-12-27 RX ORDER — HEPARIN SODIUM,PORCINE/PF 10 UNIT/ML
50 SYRINGE (ML) INTRAVENOUS AS NEEDED
Status: DISCONTINUED | OUTPATIENT
Start: 2023-12-27 | End: 2023-12-27 | Stop reason: HOSPADM

## 2023-12-27 RX ORDER — EPINEPHRINE 0.3 MG/.3ML
0.3 INJECTION SUBCUTANEOUS EVERY 5 MIN PRN
Status: DISCONTINUED | OUTPATIENT
Start: 2023-12-27 | End: 2023-12-27 | Stop reason: HOSPADM

## 2023-12-27 RX ORDER — HEPARIN 100 UNIT/ML
500 SYRINGE INTRAVENOUS AS NEEDED
Status: DISCONTINUED | OUTPATIENT
Start: 2023-12-27 | End: 2023-12-27 | Stop reason: HOSPADM

## 2023-12-27 RX ORDER — ALBUTEROL SULFATE 0.83 MG/ML
3 SOLUTION RESPIRATORY (INHALATION) AS NEEDED
Status: DISCONTINUED | OUTPATIENT
Start: 2023-12-27 | End: 2023-12-27 | Stop reason: HOSPADM

## 2023-12-27 RX ORDER — FAMOTIDINE 10 MG/ML
20 INJECTION INTRAVENOUS ONCE AS NEEDED
Status: DISCONTINUED | OUTPATIENT
Start: 2023-12-27 | End: 2023-12-27 | Stop reason: HOSPADM

## 2023-12-27 RX ORDER — DIPHENHYDRAMINE HYDROCHLORIDE 50 MG/ML
50 INJECTION INTRAMUSCULAR; INTRAVENOUS AS NEEDED
Status: DISCONTINUED | OUTPATIENT
Start: 2023-12-27 | End: 2023-12-27 | Stop reason: HOSPADM

## 2023-12-27 RX ORDER — DEXAMETHASONE SODIUM PHOSPHATE 4 MG/ML
10 INJECTION, SOLUTION INTRA-ARTICULAR; INTRALESIONAL; INTRAMUSCULAR; INTRAVENOUS; SOFT TISSUE ONCE
Status: COMPLETED | OUTPATIENT
Start: 2023-12-27 | End: 2023-12-27

## 2023-12-27 RX ORDER — PROCHLORPERAZINE EDISYLATE 5 MG/ML
10 INJECTION INTRAMUSCULAR; INTRAVENOUS EVERY 6 HOURS PRN
Status: DISCONTINUED | OUTPATIENT
Start: 2023-12-27 | End: 2023-12-27 | Stop reason: HOSPADM

## 2023-12-27 RX ORDER — PROCHLORPERAZINE MALEATE 10 MG
10 TABLET ORAL EVERY 6 HOURS PRN
Status: DISCONTINUED | OUTPATIENT
Start: 2023-12-27 | End: 2023-12-27 | Stop reason: HOSPADM

## 2023-12-27 RX ORDER — FAMOTIDINE 10 MG/ML
20 INJECTION INTRAVENOUS ONCE
Status: COMPLETED | OUTPATIENT
Start: 2023-12-27 | End: 2023-12-27

## 2023-12-27 RX ORDER — HEPARIN 100 UNIT/ML
500 SYRINGE INTRAVENOUS AS NEEDED
Status: CANCELLED | OUTPATIENT
Start: 2023-12-27

## 2023-12-27 RX ADMIN — DEXAMETHASONE SODIUM PHOSPHATE 10 MG: 4 INJECTION INTRA-ARTICULAR; INTRALESIONAL; INTRAMUSCULAR; INTRAVENOUS; SOFT TISSUE at 12:20

## 2023-12-27 RX ADMIN — FAMOTIDINE 20 MG: 10 INJECTION INTRAVENOUS at 12:20

## 2023-12-27 RX ADMIN — DIPHENHYDRAMINE HYDROCHLORIDE 50 MG: 50 CAPSULE ORAL at 12:19

## 2023-12-27 RX ADMIN — TRASTUZUMAB-ANNS 150 MG: 150 INJECTION, POWDER, LYOPHILIZED, FOR SOLUTION INTRAVENOUS at 12:55

## 2023-12-27 RX ADMIN — PACLITAXEL 150 MG: 6 INJECTION, SOLUTION INTRAVENOUS at 13:33

## 2023-12-27 RX ADMIN — HEPARIN 500 UNITS: 100 SYRINGE at 14:39

## 2023-12-27 ASSESSMENT — PAIN SCALES - GENERAL: PAINLEVEL: 0-NO PAIN

## 2023-12-27 NOTE — SIGNIFICANT EVENT
12/27/23 1211   Prechemo Checklist   Has the patient been in the hospital, ED, or urgent care since last date of service No   Chemo/Immuno Consent Signed Yes   Protocol/Indications Verified Yes   Confirmed to previous date/time of medication Yes   Compared to previous dose Yes   All medications are dated accurately Yes   Pregnancy Test Negative Not applicable   Parameters Met Yes   BSA/Weight-Height Verified Yes   Dose Calculations Verified Yes

## 2023-12-28 DIAGNOSIS — C50.411 MALIGNANT NEOPLASM OF UPPER-OUTER QUADRANT OF RIGHT BREAST IN FEMALE, ESTROGEN RECEPTOR NEGATIVE (MULTI): Primary | ICD-10-CM

## 2023-12-28 DIAGNOSIS — Z17.1 MALIGNANT NEOPLASM OF UPPER-OUTER QUADRANT OF RIGHT BREAST IN FEMALE, ESTROGEN RECEPTOR NEGATIVE (MULTI): Primary | ICD-10-CM

## 2023-12-28 RX ORDER — FAMOTIDINE 10 MG/ML
20 INJECTION INTRAVENOUS ONCE AS NEEDED
Status: CANCELLED | OUTPATIENT
Start: 2024-01-03

## 2023-12-28 RX ORDER — ALBUTEROL SULFATE 0.83 MG/ML
3 SOLUTION RESPIRATORY (INHALATION) AS NEEDED
Status: CANCELLED | OUTPATIENT
Start: 2024-01-10

## 2023-12-28 RX ORDER — DIPHENHYDRAMINE HCL 50 MG
50 CAPSULE ORAL ONCE
Status: CANCELLED | OUTPATIENT
Start: 2024-01-03

## 2023-12-28 RX ORDER — FAMOTIDINE 10 MG/ML
20 INJECTION INTRAVENOUS ONCE
Status: CANCELLED | OUTPATIENT
Start: 2024-01-10

## 2023-12-28 RX ORDER — DIPHENHYDRAMINE HYDROCHLORIDE 50 MG/ML
50 INJECTION INTRAMUSCULAR; INTRAVENOUS AS NEEDED
Status: CANCELLED | OUTPATIENT
Start: 2024-01-10

## 2023-12-28 RX ORDER — ALBUTEROL SULFATE 0.83 MG/ML
3 SOLUTION RESPIRATORY (INHALATION) AS NEEDED
Status: CANCELLED | OUTPATIENT
Start: 2024-01-03

## 2023-12-28 RX ORDER — DIPHENHYDRAMINE HYDROCHLORIDE 50 MG/ML
50 INJECTION INTRAMUSCULAR; INTRAVENOUS AS NEEDED
Status: CANCELLED | OUTPATIENT
Start: 2024-01-03

## 2023-12-28 RX ORDER — PROCHLORPERAZINE EDISYLATE 5 MG/ML
10 INJECTION INTRAMUSCULAR; INTRAVENOUS EVERY 6 HOURS PRN
Status: CANCELLED | OUTPATIENT
Start: 2024-01-10

## 2023-12-28 RX ORDER — DIPHENHYDRAMINE HCL 50 MG
50 CAPSULE ORAL ONCE
Status: CANCELLED | OUTPATIENT
Start: 2024-01-10

## 2023-12-28 RX ORDER — DEXAMETHASONE SODIUM PHOSPHATE 100 MG/10ML
10 INJECTION INTRAMUSCULAR; INTRAVENOUS ONCE
Status: CANCELLED | OUTPATIENT
Start: 2024-01-10

## 2023-12-28 RX ORDER — EPINEPHRINE 0.3 MG/.3ML
0.3 INJECTION SUBCUTANEOUS EVERY 5 MIN PRN
Status: CANCELLED | OUTPATIENT
Start: 2024-01-03

## 2023-12-28 RX ORDER — FAMOTIDINE 10 MG/ML
20 INJECTION INTRAVENOUS ONCE AS NEEDED
Status: CANCELLED | OUTPATIENT
Start: 2024-01-10

## 2023-12-28 RX ORDER — EPINEPHRINE 0.3 MG/.3ML
0.3 INJECTION SUBCUTANEOUS EVERY 5 MIN PRN
Status: CANCELLED | OUTPATIENT
Start: 2024-01-10

## 2023-12-28 RX ORDER — PROCHLORPERAZINE MALEATE 10 MG
10 TABLET ORAL EVERY 6 HOURS PRN
Status: CANCELLED | OUTPATIENT
Start: 2024-01-03

## 2023-12-28 RX ORDER — PROCHLORPERAZINE MALEATE 10 MG
10 TABLET ORAL EVERY 6 HOURS PRN
Status: CANCELLED | OUTPATIENT
Start: 2024-01-10

## 2023-12-28 RX ORDER — PROCHLORPERAZINE EDISYLATE 5 MG/ML
10 INJECTION INTRAMUSCULAR; INTRAVENOUS EVERY 6 HOURS PRN
Status: CANCELLED | OUTPATIENT
Start: 2024-01-03

## 2023-12-28 RX ORDER — DEXAMETHASONE SODIUM PHOSPHATE 100 MG/10ML
10 INJECTION INTRAMUSCULAR; INTRAVENOUS ONCE
Status: CANCELLED | OUTPATIENT
Start: 2024-01-03

## 2023-12-28 RX ORDER — FAMOTIDINE 10 MG/ML
20 INJECTION INTRAVENOUS ONCE
Status: CANCELLED | OUTPATIENT
Start: 2024-01-03

## 2024-01-03 ENCOUNTER — INFUSION (OUTPATIENT)
Dept: HEMATOLOGY/ONCOLOGY | Facility: CLINIC | Age: 64
End: 2024-01-03
Payer: COMMERCIAL

## 2024-01-03 VITALS
SYSTOLIC BLOOD PRESSURE: 141 MMHG | TEMPERATURE: 96.8 F | RESPIRATION RATE: 18 BRPM | DIASTOLIC BLOOD PRESSURE: 90 MMHG | OXYGEN SATURATION: 97 % | WEIGHT: 165 LBS | HEART RATE: 80 BPM | BODY MASS INDEX: 29.23 KG/M2

## 2024-01-03 DIAGNOSIS — C50.411 MALIGNANT NEOPLASM OF UPPER-OUTER QUADRANT OF RIGHT BREAST IN FEMALE, ESTROGEN RECEPTOR NEGATIVE (MULTI): ICD-10-CM

## 2024-01-03 DIAGNOSIS — Z17.1 MALIGNANT NEOPLASM OF UPPER-OUTER QUADRANT OF RIGHT BREAST IN FEMALE, ESTROGEN RECEPTOR NEGATIVE (MULTI): ICD-10-CM

## 2024-01-03 LAB
ALBUMIN SERPL BCP-MCNC: 4.3 G/DL (ref 3.4–5)
ALP SERPL-CCNC: 55 U/L (ref 33–136)
ALT SERPL W P-5'-P-CCNC: 29 U/L (ref 7–45)
ANION GAP SERPL CALC-SCNC: 14 MMOL/L (ref 10–20)
AST SERPL W P-5'-P-CCNC: 22 U/L (ref 9–39)
BASOPHILS # BLD AUTO: 0.07 X10*3/UL (ref 0–0.1)
BASOPHILS NFR BLD AUTO: 1.7 %
BILIRUB SERPL-MCNC: 0.3 MG/DL (ref 0–1.2)
BUN SERPL-MCNC: 10 MG/DL (ref 6–23)
CALCIUM SERPL-MCNC: 9.4 MG/DL (ref 8.6–10.6)
CHLORIDE SERPL-SCNC: 102 MMOL/L (ref 98–107)
CO2 SERPL-SCNC: 28 MMOL/L (ref 21–32)
CREAT SERPL-MCNC: 0.58 MG/DL (ref 0.5–1.05)
EOSINOPHIL # BLD AUTO: 0.21 X10*3/UL (ref 0–0.7)
EOSINOPHIL NFR BLD AUTO: 5.1 %
ERYTHROCYTE [DISTWIDTH] IN BLOOD BY AUTOMATED COUNT: 14.7 % (ref 11.5–14.5)
GFR SERPL CREATININE-BSD FRML MDRD: >90 ML/MIN/1.73M*2
GLUCOSE SERPL-MCNC: 145 MG/DL (ref 74–99)
HCT VFR BLD AUTO: 35.3 % (ref 36–46)
HGB BLD-MCNC: 11.7 G/DL (ref 12–16)
IMM GRANULOCYTES # BLD AUTO: 0.05 X10*3/UL (ref 0–0.7)
IMM GRANULOCYTES NFR BLD AUTO: 1.2 % (ref 0–0.9)
LYMPHOCYTES # BLD AUTO: 1.2 X10*3/UL (ref 1.2–4.8)
LYMPHOCYTES NFR BLD AUTO: 29.3 %
MCH RBC QN AUTO: 28.1 PG (ref 26–34)
MCHC RBC AUTO-ENTMCNC: 33.1 G/DL (ref 32–36)
MCV RBC AUTO: 85 FL (ref 80–100)
MONOCYTES # BLD AUTO: 0.34 X10*3/UL (ref 0.1–1)
MONOCYTES NFR BLD AUTO: 8.3 %
NEUTROPHILS # BLD AUTO: 2.23 X10*3/UL (ref 1.2–7.7)
NEUTROPHILS NFR BLD AUTO: 54.4 %
NRBC BLD-RTO: ABNORMAL /100{WBCS}
PLATELET # BLD AUTO: 310 X10*3/UL (ref 150–450)
POTASSIUM SERPL-SCNC: 3.7 MMOL/L (ref 3.5–5.3)
PROT SERPL-MCNC: 6.1 G/DL (ref 6.4–8.2)
RBC # BLD AUTO: 4.16 X10*6/UL (ref 4–5.2)
SODIUM SERPL-SCNC: 140 MMOL/L (ref 136–145)
WBC # BLD AUTO: 4.1 X10*3/UL (ref 4.4–11.3)

## 2024-01-03 PROCEDURE — 2500000004 HC RX 250 GENERAL PHARMACY W/ HCPCS (ALT 636 FOR OP/ED): Performed by: INTERNAL MEDICINE

## 2024-01-03 PROCEDURE — 85025 COMPLETE CBC W/AUTO DIFF WBC: CPT

## 2024-01-03 PROCEDURE — 36591 DRAW BLOOD OFF VENOUS DEVICE: CPT

## 2024-01-03 PROCEDURE — 2500000001 HC RX 250 WO HCPCS SELF ADMINISTERED DRUGS (ALT 637 FOR MEDICARE OP): Performed by: INTERNAL MEDICINE

## 2024-01-03 PROCEDURE — 96375 TX/PRO/DX INJ NEW DRUG ADDON: CPT | Mod: INF

## 2024-01-03 PROCEDURE — 96417 CHEMO IV INFUS EACH ADDL SEQ: CPT

## 2024-01-03 PROCEDURE — 96413 CHEMO IV INFUSION 1 HR: CPT

## 2024-01-03 PROCEDURE — 96374 THER/PROPH/DIAG INJ IV PUSH: CPT | Mod: INF

## 2024-01-03 PROCEDURE — 80053 COMPREHEN METABOLIC PANEL: CPT

## 2024-01-03 RX ORDER — DIPHENHYDRAMINE HCL 50 MG
50 CAPSULE ORAL ONCE
Status: COMPLETED | OUTPATIENT
Start: 2024-01-03 | End: 2024-01-03

## 2024-01-03 RX ORDER — EPINEPHRINE 0.3 MG/.3ML
0.3 INJECTION SUBCUTANEOUS EVERY 5 MIN PRN
Status: DISCONTINUED | OUTPATIENT
Start: 2024-01-03 | End: 2024-01-03 | Stop reason: HOSPADM

## 2024-01-03 RX ORDER — DIPHENHYDRAMINE HYDROCHLORIDE 50 MG/ML
50 INJECTION INTRAMUSCULAR; INTRAVENOUS AS NEEDED
Status: DISCONTINUED | OUTPATIENT
Start: 2024-01-03 | End: 2024-01-03 | Stop reason: HOSPADM

## 2024-01-03 RX ORDER — DEXAMETHASONE SODIUM PHOSPHATE 4 MG/ML
10 INJECTION, SOLUTION INTRA-ARTICULAR; INTRALESIONAL; INTRAMUSCULAR; INTRAVENOUS; SOFT TISSUE ONCE
Status: COMPLETED | OUTPATIENT
Start: 2024-01-03 | End: 2024-01-03

## 2024-01-03 RX ORDER — HEPARIN 100 UNIT/ML
500 SYRINGE INTRAVENOUS AS NEEDED
Status: CANCELLED | OUTPATIENT
Start: 2024-01-03

## 2024-01-03 RX ORDER — PROCHLORPERAZINE MALEATE 10 MG
10 TABLET ORAL EVERY 6 HOURS PRN
Status: DISCONTINUED | OUTPATIENT
Start: 2024-01-03 | End: 2024-01-03 | Stop reason: HOSPADM

## 2024-01-03 RX ORDER — HEPARIN SODIUM,PORCINE/PF 10 UNIT/ML
50 SYRINGE (ML) INTRAVENOUS AS NEEDED
Status: CANCELLED | OUTPATIENT
Start: 2024-01-03

## 2024-01-03 RX ORDER — HEPARIN SODIUM,PORCINE/PF 10 UNIT/ML
50 SYRINGE (ML) INTRAVENOUS AS NEEDED
Status: DISCONTINUED | OUTPATIENT
Start: 2024-01-03 | End: 2024-01-03 | Stop reason: HOSPADM

## 2024-01-03 RX ORDER — FAMOTIDINE 10 MG/ML
20 INJECTION INTRAVENOUS ONCE
Status: COMPLETED | OUTPATIENT
Start: 2024-01-03 | End: 2024-01-03

## 2024-01-03 RX ORDER — HEPARIN 100 UNIT/ML
500 SYRINGE INTRAVENOUS AS NEEDED
Status: DISCONTINUED | OUTPATIENT
Start: 2024-01-03 | End: 2024-01-03 | Stop reason: HOSPADM

## 2024-01-03 RX ORDER — FAMOTIDINE 10 MG/ML
20 INJECTION INTRAVENOUS ONCE AS NEEDED
Status: DISCONTINUED | OUTPATIENT
Start: 2024-01-03 | End: 2024-01-03 | Stop reason: HOSPADM

## 2024-01-03 RX ORDER — ALBUTEROL SULFATE 0.83 MG/ML
3 SOLUTION RESPIRATORY (INHALATION) AS NEEDED
Status: DISCONTINUED | OUTPATIENT
Start: 2024-01-03 | End: 2024-01-03 | Stop reason: HOSPADM

## 2024-01-03 RX ORDER — PROCHLORPERAZINE EDISYLATE 5 MG/ML
10 INJECTION INTRAMUSCULAR; INTRAVENOUS EVERY 6 HOURS PRN
Status: DISCONTINUED | OUTPATIENT
Start: 2024-01-03 | End: 2024-01-03 | Stop reason: HOSPADM

## 2024-01-03 RX ADMIN — DIPHENHYDRAMINE HYDROCHLORIDE 50 MG: 50 CAPSULE ORAL at 12:36

## 2024-01-03 RX ADMIN — DEXAMETHASONE SODIUM PHOSPHATE 10 MG: 4 INJECTION INTRA-ARTICULAR; INTRALESIONAL; INTRAMUSCULAR; INTRAVENOUS; SOFT TISSUE at 12:37

## 2024-01-03 RX ADMIN — HEPARIN 500 UNITS: 100 SYRINGE at 14:41

## 2024-01-03 RX ADMIN — TRASTUZUMAB-ANNS 150 MG: 150 INJECTION, POWDER, LYOPHILIZED, FOR SOLUTION INTRAVENOUS at 12:55

## 2024-01-03 RX ADMIN — FAMOTIDINE 20 MG: 10 INJECTION INTRAVENOUS at 12:37

## 2024-01-03 RX ADMIN — PACLITAXEL 150 MG: 6 INJECTION, SOLUTION INTRAVENOUS at 13:41

## 2024-01-03 ASSESSMENT — PAIN SCALES - GENERAL: PAINLEVEL: 0-NO PAIN

## 2024-01-03 NOTE — SIGNIFICANT EVENT
01/03/24 1148   Prechemo Checklist   Has the patient been in the hospital, ED, or urgent care since last date of service No   Chemo/Immuno Consent Signed Yes   Protocol/Indications Verified Yes   Confirmed to previous date/time of medication Yes   Compared to previous dose Yes   All medications are dated accurately Yes   Pregnancy Test Negative Not applicable   Parameters Met Yes   BSA/Weight-Height Verified Yes   Dose Calculations Verified Yes

## 2024-01-10 ENCOUNTER — INFUSION (OUTPATIENT)
Dept: HEMATOLOGY/ONCOLOGY | Facility: CLINIC | Age: 64
End: 2024-01-10
Payer: COMMERCIAL

## 2024-01-10 VITALS
WEIGHT: 164.7 LBS | TEMPERATURE: 97.9 F | HEART RATE: 98 BPM | DIASTOLIC BLOOD PRESSURE: 92 MMHG | SYSTOLIC BLOOD PRESSURE: 145 MMHG | OXYGEN SATURATION: 97 % | BODY MASS INDEX: 29.18 KG/M2

## 2024-01-10 DIAGNOSIS — C50.411 MALIGNANT NEOPLASM OF UPPER-OUTER QUADRANT OF RIGHT BREAST IN FEMALE, ESTROGEN RECEPTOR NEGATIVE (MULTI): ICD-10-CM

## 2024-01-10 DIAGNOSIS — Z17.1 MALIGNANT NEOPLASM OF UPPER-OUTER QUADRANT OF RIGHT BREAST IN FEMALE, ESTROGEN RECEPTOR NEGATIVE (MULTI): ICD-10-CM

## 2024-01-10 LAB
ALBUMIN SERPL BCP-MCNC: 4.4 G/DL (ref 3.4–5)
ALP SERPL-CCNC: 56 U/L (ref 33–136)
ALT SERPL W P-5'-P-CCNC: 28 U/L (ref 7–45)
ANION GAP SERPL CALC-SCNC: 13 MMOL/L (ref 10–20)
AST SERPL W P-5'-P-CCNC: 20 U/L (ref 9–39)
BASOPHILS # BLD AUTO: 0.04 X10*3/UL (ref 0–0.1)
BASOPHILS NFR BLD AUTO: 0.9 %
BILIRUB SERPL-MCNC: 0.4 MG/DL (ref 0–1.2)
BUN SERPL-MCNC: 18 MG/DL (ref 6–23)
CALCIUM SERPL-MCNC: 9.5 MG/DL (ref 8.6–10.6)
CHLORIDE SERPL-SCNC: 106 MMOL/L (ref 98–107)
CO2 SERPL-SCNC: 27 MMOL/L (ref 21–32)
CREAT SERPL-MCNC: 0.98 MG/DL (ref 0.5–1.05)
EGFRCR SERPLBLD CKD-EPI 2021: 66 ML/MIN/1.73M*2
EOSINOPHIL # BLD AUTO: 0.24 X10*3/UL (ref 0–0.7)
EOSINOPHIL NFR BLD AUTO: 5.4 %
ERYTHROCYTE [DISTWIDTH] IN BLOOD BY AUTOMATED COUNT: 14.8 % (ref 11.5–14.5)
GLUCOSE SERPL-MCNC: 103 MG/DL (ref 74–99)
HCT VFR BLD AUTO: 35.2 % (ref 36–46)
HGB BLD-MCNC: 11.6 G/DL (ref 12–16)
IMM GRANULOCYTES # BLD AUTO: 0.05 X10*3/UL (ref 0–0.7)
IMM GRANULOCYTES NFR BLD AUTO: 1.1 % (ref 0–0.9)
LYMPHOCYTES # BLD AUTO: 1.01 X10*3/UL (ref 1.2–4.8)
LYMPHOCYTES NFR BLD AUTO: 22.7 %
MCH RBC QN AUTO: 27.6 PG (ref 26–34)
MCHC RBC AUTO-ENTMCNC: 33 G/DL (ref 32–36)
MCV RBC AUTO: 84 FL (ref 80–100)
MONOCYTES # BLD AUTO: 0.44 X10*3/UL (ref 0.1–1)
MONOCYTES NFR BLD AUTO: 9.9 %
NEUTROPHILS # BLD AUTO: 2.66 X10*3/UL (ref 1.2–7.7)
NEUTROPHILS NFR BLD AUTO: 60 %
NRBC BLD-RTO: ABNORMAL /100{WBCS}
PLATELET # BLD AUTO: 283 X10*3/UL (ref 150–450)
POTASSIUM SERPL-SCNC: 3.7 MMOL/L (ref 3.5–5.3)
PROT SERPL-MCNC: 6.3 G/DL (ref 6.4–8.2)
RBC # BLD AUTO: 4.21 X10*6/UL (ref 4–5.2)
SODIUM SERPL-SCNC: 142 MMOL/L (ref 136–145)
WBC # BLD AUTO: 4.4 X10*3/UL (ref 4.4–11.3)

## 2024-01-10 PROCEDURE — 36591 DRAW BLOOD OFF VENOUS DEVICE: CPT

## 2024-01-10 PROCEDURE — 2500000001 HC RX 250 WO HCPCS SELF ADMINISTERED DRUGS (ALT 637 FOR MEDICARE OP): Performed by: INTERNAL MEDICINE

## 2024-01-10 PROCEDURE — 96375 TX/PRO/DX INJ NEW DRUG ADDON: CPT | Mod: INF

## 2024-01-10 PROCEDURE — 85025 COMPLETE CBC W/AUTO DIFF WBC: CPT

## 2024-01-10 PROCEDURE — 96374 THER/PROPH/DIAG INJ IV PUSH: CPT | Mod: INF

## 2024-01-10 PROCEDURE — 2500000004 HC RX 250 GENERAL PHARMACY W/ HCPCS (ALT 636 FOR OP/ED): Performed by: INTERNAL MEDICINE

## 2024-01-10 PROCEDURE — 96413 CHEMO IV INFUSION 1 HR: CPT

## 2024-01-10 PROCEDURE — 80053 COMPREHEN METABOLIC PANEL: CPT

## 2024-01-10 PROCEDURE — 96417 CHEMO IV INFUS EACH ADDL SEQ: CPT

## 2024-01-10 RX ORDER — HEPARIN SODIUM,PORCINE/PF 10 UNIT/ML
50 SYRINGE (ML) INTRAVENOUS AS NEEDED
Status: DISCONTINUED | OUTPATIENT
Start: 2024-01-10 | End: 2024-01-10 | Stop reason: HOSPADM

## 2024-01-10 RX ORDER — DIPHENHYDRAMINE HYDROCHLORIDE 50 MG/ML
50 INJECTION INTRAMUSCULAR; INTRAVENOUS AS NEEDED
Status: DISCONTINUED | OUTPATIENT
Start: 2024-01-10 | End: 2024-01-10 | Stop reason: HOSPADM

## 2024-01-10 RX ORDER — FAMOTIDINE 10 MG/ML
20 INJECTION INTRAVENOUS ONCE
Status: COMPLETED | OUTPATIENT
Start: 2024-01-10 | End: 2024-01-10

## 2024-01-10 RX ORDER — PROCHLORPERAZINE EDISYLATE 5 MG/ML
10 INJECTION INTRAMUSCULAR; INTRAVENOUS EVERY 6 HOURS PRN
Status: DISCONTINUED | OUTPATIENT
Start: 2024-01-10 | End: 2024-01-10 | Stop reason: HOSPADM

## 2024-01-10 RX ORDER — HEPARIN 100 UNIT/ML
500 SYRINGE INTRAVENOUS AS NEEDED
Status: DISCONTINUED | OUTPATIENT
Start: 2024-01-10 | End: 2024-01-10 | Stop reason: HOSPADM

## 2024-01-10 RX ORDER — HEPARIN SODIUM,PORCINE/PF 10 UNIT/ML
50 SYRINGE (ML) INTRAVENOUS AS NEEDED
Status: CANCELLED | OUTPATIENT
Start: 2024-01-10

## 2024-01-10 RX ORDER — PROCHLORPERAZINE MALEATE 10 MG
10 TABLET ORAL EVERY 6 HOURS PRN
Status: DISCONTINUED | OUTPATIENT
Start: 2024-01-10 | End: 2024-01-10 | Stop reason: HOSPADM

## 2024-01-10 RX ORDER — HEPARIN 100 UNIT/ML
500 SYRINGE INTRAVENOUS AS NEEDED
Status: CANCELLED | OUTPATIENT
Start: 2024-01-10

## 2024-01-10 RX ORDER — DEXAMETHASONE SODIUM PHOSPHATE 4 MG/ML
10 INJECTION, SOLUTION INTRA-ARTICULAR; INTRALESIONAL; INTRAMUSCULAR; INTRAVENOUS; SOFT TISSUE ONCE
Status: COMPLETED | OUTPATIENT
Start: 2024-01-10 | End: 2024-01-10

## 2024-01-10 RX ORDER — FAMOTIDINE 10 MG/ML
20 INJECTION INTRAVENOUS ONCE AS NEEDED
Status: DISCONTINUED | OUTPATIENT
Start: 2024-01-10 | End: 2024-01-10 | Stop reason: HOSPADM

## 2024-01-10 RX ORDER — EPINEPHRINE 0.3 MG/.3ML
0.3 INJECTION SUBCUTANEOUS EVERY 5 MIN PRN
Status: DISCONTINUED | OUTPATIENT
Start: 2024-01-10 | End: 2024-01-10 | Stop reason: HOSPADM

## 2024-01-10 RX ORDER — ALBUTEROL SULFATE 0.83 MG/ML
3 SOLUTION RESPIRATORY (INHALATION) AS NEEDED
Status: DISCONTINUED | OUTPATIENT
Start: 2024-01-10 | End: 2024-01-10 | Stop reason: HOSPADM

## 2024-01-10 RX ORDER — DIPHENHYDRAMINE HCL 50 MG
50 CAPSULE ORAL ONCE
Status: COMPLETED | OUTPATIENT
Start: 2024-01-10 | End: 2024-01-10

## 2024-01-10 RX ADMIN — FAMOTIDINE 20 MG: 10 INJECTION INTRAVENOUS at 12:03

## 2024-01-10 RX ADMIN — DEXAMETHASONE SODIUM PHOSPHATE 10 MG: 4 INJECTION INTRA-ARTICULAR; INTRALESIONAL; INTRAMUSCULAR; INTRAVENOUS; SOFT TISSUE at 12:03

## 2024-01-10 RX ADMIN — DIPHENHYDRAMINE HYDROCHLORIDE 50 MG: 50 CAPSULE ORAL at 12:04

## 2024-01-10 RX ADMIN — TRASTUZUMAB-ANNS 150 MG: 150 INJECTION, POWDER, LYOPHILIZED, FOR SOLUTION INTRAVENOUS at 12:42

## 2024-01-10 RX ADMIN — HEPARIN 500 UNITS: 100 SYRINGE at 14:32

## 2024-01-10 RX ADMIN — PACLITAXEL 150 MG: 6 INJECTION, SOLUTION INTRAVENOUS at 13:28

## 2024-01-10 ASSESSMENT — PAIN SCALES - GENERAL: PAINLEVEL: 0-NO PAIN

## 2024-01-10 NOTE — SIGNIFICANT EVENT
01/10/24 1148   Prechemo Checklist   Has the patient been in the hospital, ED, or urgent care since last date of service No   Chemo/Immuno Consent Signed Yes   Protocol/Indications Verified Yes   Confirmed to previous date/time of medication Yes   Compared to previous dose Yes   All medications are dated accurately Yes   Pregnancy Test Negative Not applicable   Parameters Met Yes   BSA/Weight-Height Verified Yes   Dose Calculations Verified Yes

## 2024-01-12 DIAGNOSIS — Z17.1 MALIGNANT NEOPLASM OF UPPER-OUTER QUADRANT OF RIGHT BREAST IN FEMALE, ESTROGEN RECEPTOR NEGATIVE (MULTI): Primary | ICD-10-CM

## 2024-01-12 DIAGNOSIS — C50.411 MALIGNANT NEOPLASM OF UPPER-OUTER QUADRANT OF RIGHT BREAST IN FEMALE, ESTROGEN RECEPTOR NEGATIVE (MULTI): Primary | ICD-10-CM

## 2024-01-12 RX ORDER — EPINEPHRINE 0.3 MG/.3ML
0.3 INJECTION SUBCUTANEOUS EVERY 5 MIN PRN
Status: CANCELLED | OUTPATIENT
Start: 2024-01-17

## 2024-01-12 RX ORDER — PROCHLORPERAZINE MALEATE 10 MG
10 TABLET ORAL EVERY 6 HOURS PRN
Status: CANCELLED | OUTPATIENT
Start: 2024-01-17

## 2024-01-12 RX ORDER — DIPHENHYDRAMINE HYDROCHLORIDE 50 MG/ML
50 INJECTION INTRAMUSCULAR; INTRAVENOUS AS NEEDED
Status: CANCELLED | OUTPATIENT
Start: 2024-01-17

## 2024-01-12 RX ORDER — ALBUTEROL SULFATE 0.83 MG/ML
3 SOLUTION RESPIRATORY (INHALATION) AS NEEDED
Status: CANCELLED | OUTPATIENT
Start: 2024-01-17

## 2024-01-12 RX ORDER — DIPHENHYDRAMINE HCL 50 MG
50 CAPSULE ORAL ONCE
Status: CANCELLED | OUTPATIENT
Start: 2024-01-17

## 2024-01-12 RX ORDER — PROCHLORPERAZINE EDISYLATE 5 MG/ML
10 INJECTION INTRAMUSCULAR; INTRAVENOUS EVERY 6 HOURS PRN
Status: CANCELLED | OUTPATIENT
Start: 2024-01-17

## 2024-01-12 RX ORDER — FAMOTIDINE 10 MG/ML
20 INJECTION INTRAVENOUS ONCE AS NEEDED
Status: CANCELLED | OUTPATIENT
Start: 2024-01-17

## 2024-01-12 RX ORDER — FAMOTIDINE 10 MG/ML
20 INJECTION INTRAVENOUS ONCE
Status: CANCELLED | OUTPATIENT
Start: 2024-01-17

## 2024-01-12 RX ORDER — DEXAMETHASONE SODIUM PHOSPHATE 100 MG/10ML
10 INJECTION INTRAMUSCULAR; INTRAVENOUS ONCE
Status: CANCELLED | OUTPATIENT
Start: 2024-01-17

## 2024-01-17 ENCOUNTER — OFFICE VISIT (OUTPATIENT)
Dept: HEMATOLOGY/ONCOLOGY | Facility: CLINIC | Age: 64
End: 2024-01-17
Payer: COMMERCIAL

## 2024-01-17 ENCOUNTER — ANCILLARY PROCEDURE (OUTPATIENT)
Dept: RADIOLOGY | Facility: CLINIC | Age: 64
End: 2024-01-17
Payer: COMMERCIAL

## 2024-01-17 ENCOUNTER — INFUSION (OUTPATIENT)
Dept: HEMATOLOGY/ONCOLOGY | Facility: CLINIC | Age: 64
End: 2024-01-17
Payer: COMMERCIAL

## 2024-01-17 VITALS
DIASTOLIC BLOOD PRESSURE: 83 MMHG | WEIGHT: 166.45 LBS | HEART RATE: 80 BPM | BODY MASS INDEX: 29.48 KG/M2 | SYSTOLIC BLOOD PRESSURE: 125 MMHG

## 2024-01-17 DIAGNOSIS — Z17.1 MALIGNANT NEOPLASM OF UPPER-OUTER QUADRANT OF RIGHT BREAST IN FEMALE, ESTROGEN RECEPTOR NEGATIVE (MULTI): Primary | ICD-10-CM

## 2024-01-17 DIAGNOSIS — R06.02 SHORTNESS OF BREATH: ICD-10-CM

## 2024-01-17 DIAGNOSIS — Z17.1 MALIGNANT NEOPLASM OF UPPER-OUTER QUADRANT OF RIGHT BREAST IN FEMALE, ESTROGEN RECEPTOR NEGATIVE (MULTI): ICD-10-CM

## 2024-01-17 DIAGNOSIS — Z51.81 ENCOUNTER FOR MONITORING CARDIOTOXIC DRUG THERAPY: ICD-10-CM

## 2024-01-17 DIAGNOSIS — Z79.899 ENCOUNTER FOR MONITORING CARDIOTOXIC DRUG THERAPY: ICD-10-CM

## 2024-01-17 DIAGNOSIS — C50.411 MALIGNANT NEOPLASM OF UPPER-OUTER QUADRANT OF RIGHT BREAST IN FEMALE, ESTROGEN RECEPTOR NEGATIVE (MULTI): Primary | ICD-10-CM

## 2024-01-17 DIAGNOSIS — C50.411 MALIGNANT NEOPLASM OF UPPER-OUTER QUADRANT OF RIGHT BREAST IN FEMALE, ESTROGEN RECEPTOR NEGATIVE (MULTI): ICD-10-CM

## 2024-01-17 DIAGNOSIS — R30.0 DYSURIA: ICD-10-CM

## 2024-01-17 DIAGNOSIS — R39.15 URINARY URGENCY: ICD-10-CM

## 2024-01-17 DIAGNOSIS — Z51.11 ENCOUNTER FOR ANTINEOPLASTIC CHEMOTHERAPY: ICD-10-CM

## 2024-01-17 LAB
ALBUMIN SERPL BCP-MCNC: 4.2 G/DL (ref 3.4–5)
ALP SERPL-CCNC: 53 U/L (ref 33–136)
ALT SERPL W P-5'-P-CCNC: 23 U/L (ref 7–45)
ANION GAP SERPL CALC-SCNC: 16 MMOL/L (ref 10–20)
APPEARANCE UR: CLEAR
AST SERPL W P-5'-P-CCNC: 19 U/L (ref 9–39)
BASOPHILS # BLD AUTO: 0.06 X10*3/UL (ref 0–0.1)
BASOPHILS NFR BLD AUTO: 1.4 %
BILIRUB SERPL-MCNC: 0.3 MG/DL (ref 0–1.2)
BILIRUB UR STRIP.AUTO-MCNC: NEGATIVE MG/DL
BUN SERPL-MCNC: 13 MG/DL (ref 6–23)
CALCIUM SERPL-MCNC: 9.4 MG/DL (ref 8.6–10.6)
CHLORIDE SERPL-SCNC: 103 MMOL/L (ref 98–107)
CO2 SERPL-SCNC: 26 MMOL/L (ref 21–32)
COLOR UR: YELLOW
CREAT SERPL-MCNC: 0.59 MG/DL (ref 0.5–1.05)
EGFRCR SERPLBLD CKD-EPI 2021: >90 ML/MIN/1.73M*2
EOSINOPHIL # BLD AUTO: 0.24 X10*3/UL (ref 0–0.7)
EOSINOPHIL NFR BLD AUTO: 5.7 %
ERYTHROCYTE [DISTWIDTH] IN BLOOD BY AUTOMATED COUNT: 14.8 % (ref 11.5–14.5)
GLUCOSE SERPL-MCNC: 145 MG/DL (ref 74–99)
GLUCOSE UR STRIP.AUTO-MCNC: NEGATIVE MG/DL
HCT VFR BLD AUTO: 34.5 % (ref 36–46)
HGB BLD-MCNC: 11.6 G/DL (ref 12–16)
IMM GRANULOCYTES # BLD AUTO: 0.05 X10*3/UL (ref 0–0.7)
IMM GRANULOCYTES NFR BLD AUTO: 1.2 % (ref 0–0.9)
KETONES UR STRIP.AUTO-MCNC: NEGATIVE MG/DL
LEUKOCYTE ESTERASE UR QL STRIP.AUTO: NEGATIVE
LYMPHOCYTES # BLD AUTO: 1.14 X10*3/UL (ref 1.2–4.8)
LYMPHOCYTES NFR BLD AUTO: 27.1 %
MCH RBC QN AUTO: 28.2 PG (ref 26–34)
MCHC RBC AUTO-ENTMCNC: 33.6 G/DL (ref 32–36)
MCV RBC AUTO: 84 FL (ref 80–100)
MONOCYTES # BLD AUTO: 0.33 X10*3/UL (ref 0.1–1)
MONOCYTES NFR BLD AUTO: 7.9 %
NEUTROPHILS # BLD AUTO: 2.38 X10*3/UL (ref 1.2–7.7)
NEUTROPHILS NFR BLD AUTO: 56.7 %
NITRITE UR QL STRIP.AUTO: NEGATIVE
NRBC BLD-RTO: ABNORMAL /100{WBCS}
PH UR STRIP.AUTO: 6 [PH]
PLATELET # BLD AUTO: 285 X10*3/UL (ref 150–450)
POTASSIUM SERPL-SCNC: 3.6 MMOL/L (ref 3.5–5.3)
PROT SERPL-MCNC: 6 G/DL (ref 6.4–8.2)
PROT UR STRIP.AUTO-MCNC: NEGATIVE MG/DL
RBC # BLD AUTO: 4.12 X10*6/UL (ref 4–5.2)
RBC # UR STRIP.AUTO: NEGATIVE /UL
SODIUM SERPL-SCNC: 141 MMOL/L (ref 136–145)
SP GR UR STRIP.AUTO: 1.01
UROBILINOGEN UR STRIP.AUTO-MCNC: 0.2 MG/DL
WBC # BLD AUTO: 4.2 X10*3/UL (ref 4.4–11.3)

## 2024-01-17 PROCEDURE — 96374 THER/PROPH/DIAG INJ IV PUSH: CPT | Mod: INF

## 2024-01-17 PROCEDURE — 96413 CHEMO IV INFUSION 1 HR: CPT

## 2024-01-17 PROCEDURE — 81003 URINALYSIS AUTO W/O SCOPE: CPT

## 2024-01-17 PROCEDURE — 2500000004 HC RX 250 GENERAL PHARMACY W/ HCPCS (ALT 636 FOR OP/ED): Performed by: INTERNAL MEDICINE

## 2024-01-17 PROCEDURE — 80053 COMPREHEN METABOLIC PANEL: CPT

## 2024-01-17 PROCEDURE — 85025 COMPLETE CBC W/AUTO DIFF WBC: CPT

## 2024-01-17 PROCEDURE — 96375 TX/PRO/DX INJ NEW DRUG ADDON: CPT | Mod: INF

## 2024-01-17 PROCEDURE — 3074F SYST BP LT 130 MM HG: CPT | Performed by: NURSE PRACTITIONER

## 2024-01-17 PROCEDURE — 71046 X-RAY EXAM CHEST 2 VIEWS: CPT

## 2024-01-17 PROCEDURE — 96417 CHEMO IV INFUS EACH ADDL SEQ: CPT

## 2024-01-17 PROCEDURE — 2500000001 HC RX 250 WO HCPCS SELF ADMINISTERED DRUGS (ALT 637 FOR MEDICARE OP): Performed by: INTERNAL MEDICINE

## 2024-01-17 PROCEDURE — 36591 DRAW BLOOD OFF VENOUS DEVICE: CPT

## 2024-01-17 PROCEDURE — 71046 X-RAY EXAM CHEST 2 VIEWS: CPT | Performed by: RADIOLOGY

## 2024-01-17 PROCEDURE — 99215 OFFICE O/P EST HI 40 MIN: CPT | Performed by: NURSE PRACTITIONER

## 2024-01-17 PROCEDURE — 1036F TOBACCO NON-USER: CPT | Performed by: NURSE PRACTITIONER

## 2024-01-17 PROCEDURE — 3079F DIAST BP 80-89 MM HG: CPT | Performed by: NURSE PRACTITIONER

## 2024-01-17 RX ORDER — PROCHLORPERAZINE EDISYLATE 5 MG/ML
10 INJECTION INTRAMUSCULAR; INTRAVENOUS EVERY 6 HOURS PRN
Status: DISCONTINUED | OUTPATIENT
Start: 2024-01-17 | End: 2024-01-17 | Stop reason: HOSPADM

## 2024-01-17 RX ORDER — EPINEPHRINE 0.3 MG/.3ML
0.3 INJECTION SUBCUTANEOUS EVERY 5 MIN PRN
Status: DISCONTINUED | OUTPATIENT
Start: 2024-01-17 | End: 2024-01-17 | Stop reason: HOSPADM

## 2024-01-17 RX ORDER — ALBUTEROL SULFATE 0.83 MG/ML
3 SOLUTION RESPIRATORY (INHALATION) AS NEEDED
Status: DISCONTINUED | OUTPATIENT
Start: 2024-01-17 | End: 2024-01-17 | Stop reason: HOSPADM

## 2024-01-17 RX ORDER — HEPARIN 100 UNIT/ML
500 SYRINGE INTRAVENOUS AS NEEDED
Status: DISCONTINUED | OUTPATIENT
Start: 2024-01-17 | End: 2024-01-17 | Stop reason: HOSPADM

## 2024-01-17 RX ORDER — DIPHENHYDRAMINE HCL 50 MG
50 CAPSULE ORAL ONCE
Status: COMPLETED | OUTPATIENT
Start: 2024-01-17 | End: 2024-01-17

## 2024-01-17 RX ORDER — DIPHENHYDRAMINE HYDROCHLORIDE 50 MG/ML
50 INJECTION INTRAMUSCULAR; INTRAVENOUS AS NEEDED
Status: DISCONTINUED | OUTPATIENT
Start: 2024-01-17 | End: 2024-01-17 | Stop reason: HOSPADM

## 2024-01-17 RX ORDER — HEPARIN SODIUM,PORCINE/PF 10 UNIT/ML
50 SYRINGE (ML) INTRAVENOUS AS NEEDED
Status: CANCELLED | OUTPATIENT
Start: 2024-01-17

## 2024-01-17 RX ORDER — FAMOTIDINE 10 MG/ML
20 INJECTION INTRAVENOUS ONCE
Status: COMPLETED | OUTPATIENT
Start: 2024-01-17 | End: 2024-01-17

## 2024-01-17 RX ORDER — HEPARIN 100 UNIT/ML
500 SYRINGE INTRAVENOUS AS NEEDED
Status: CANCELLED | OUTPATIENT
Start: 2024-01-17

## 2024-01-17 RX ORDER — FAMOTIDINE 10 MG/ML
20 INJECTION INTRAVENOUS ONCE AS NEEDED
Status: DISCONTINUED | OUTPATIENT
Start: 2024-01-17 | End: 2024-01-17 | Stop reason: HOSPADM

## 2024-01-17 RX ORDER — HEPARIN SODIUM,PORCINE/PF 10 UNIT/ML
50 SYRINGE (ML) INTRAVENOUS AS NEEDED
Status: DISCONTINUED | OUTPATIENT
Start: 2024-01-17 | End: 2024-01-17 | Stop reason: HOSPADM

## 2024-01-17 RX ORDER — PROCHLORPERAZINE MALEATE 10 MG
10 TABLET ORAL EVERY 6 HOURS PRN
Status: DISCONTINUED | OUTPATIENT
Start: 2024-01-17 | End: 2024-01-17 | Stop reason: HOSPADM

## 2024-01-17 RX ORDER — DEXAMETHASONE SODIUM PHOSPHATE 4 MG/ML
10 INJECTION, SOLUTION INTRA-ARTICULAR; INTRALESIONAL; INTRAMUSCULAR; INTRAVENOUS; SOFT TISSUE ONCE
Status: COMPLETED | OUTPATIENT
Start: 2024-01-17 | End: 2024-01-17

## 2024-01-17 RX ADMIN — PACLITAXEL 111 MG: 6 INJECTION, SOLUTION INTRAVENOUS at 13:28

## 2024-01-17 RX ADMIN — TRASTUZUMAB-ANNS 150 MG: 150 INJECTION, POWDER, LYOPHILIZED, FOR SOLUTION INTRAVENOUS at 12:54

## 2024-01-17 RX ADMIN — DIPHENHYDRAMINE HYDROCHLORIDE 50 MG: 50 CAPSULE ORAL at 12:06

## 2024-01-17 RX ADMIN — FAMOTIDINE 20 MG: 10 INJECTION INTRAVENOUS at 12:06

## 2024-01-17 RX ADMIN — HEPARIN 500 UNITS: 100 SYRINGE at 14:36

## 2024-01-17 RX ADMIN — DEXAMETHASONE SODIUM PHOSPHATE 10 MG: 4 INJECTION INTRA-ARTICULAR; INTRALESIONAL; INTRAMUSCULAR; INTRAVENOUS; SOFT TISSUE at 12:06

## 2024-01-17 ASSESSMENT — PAIN SCALES - GENERAL: PAINLEVEL: 1

## 2024-01-17 NOTE — SIGNIFICANT EVENT
01/17/24 1147   Prechemo Checklist   Has the patient been in the hospital, ED, or urgent care since last date of service No   Chemo/Immuno Consent Signed Yes   Protocol/Indications Verified Yes   Confirmed to previous date/time of medication Yes   Compared to previous dose No   All medications are dated accurately Yes   Pregnancy Test Negative Not applicable   Parameters Met Yes   BSA/Weight-Height Verified Yes   Dose Calculations Verified Yes

## 2024-01-17 NOTE — PROGRESS NOTES
Breast [facility] Oncology Clinic  Location: [facility]      BREAST CANCER DIAGNOSIS  Malignant neoplasm of upper-outer quadrant of right breast in female, estrogen receptor negative (CMS/HCC), Pathologic: Stage IIB (pT2, pN1mi(sn), cM0, G3, ER-, WI-, HER2+)       ONCOLOGIC HISTORY  S/p recent lumpectomy and SLN.     CURRENT THERAPY  Weekly paclitaxel/trastuzumab week #10/12    HISTORY OF PRESENT ILLNESS    Cassandra Lang is a 63 y.o. woman. Here for post op visit. S/p SLN which revealed 1/1 micromet. Previously we had discussed trastuzumab/pacltaxel. She recently completed abx for a UTI. She has also been struggling with a Sinus pressure. She is concerned she has still has a UTI, denies any blood anymore in her urine but still has some intermittent burning with urination sometimes. She denies any fever or chills.     She continues to tolerate chemo very well. She reports 2-3 treatments ago she began with neuropathy in her heals bilaterally that is now essentially constant. She reports her toes will intermittently feel numb. She denies any vision changes or headache issues, dizziness or loss of balance. She denies some increase in light sensitivity.     She continue to have skin dryness, continued mild epistaxis.     She denies any chest pain or breathing issues. She has noticed increase in feeling short of breath and fatigued. She is worried about an infection in her lungs.     She denies any new or unexplained bone aches or pains, she reports baseline spinal arthritis which is worse with cold weather    She denies any skin lesions or masses, oral sores lesions or infections    She reports a normal appetite. Intermittently will have a loose stool.     She reports some issues with insomnia.  She reports stable fatigue        Review of Systems   ROS 14 points performed, See HPI for exceptions      Past Medical History:  has a past medical history of Abnormal findings on diagnostic imaging of other specified body  structures, Personal history of other medical treatment, Personal history of other medical treatment, Personal history of other medical treatment, Personal history of transient ischemic attack (TIA), and cerebral infarction without residual deficits, and Tinea unguium (10/12/2021).  Surgical History:   has a past surgical history that includes  section, classic (2014); Appendectomy (2014); Other surgical history (2020); Other surgical history (2020); Other surgical history (2021); MR angio head wo IV contrast (2020); MR angio neck wo IV contrast (2020); Breast lumpectomy; and IR CVC port (2023).  Social History:   reports that she has never smoked. She has never been exposed to tobacco smoke. She has never used smokeless tobacco. She reports that she does not drink alcohol and does not use drugs.Lives in a home with  Vin (Hx of a stroke), 3 adult children all live in the home - Merry is a researcher and is going to PA school, daughter Gladys works in anesthesia, son Phani is disabled from severe RA. 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 Pt and  [place]oved [place] in    Family History:    Family History   Problem Relation Name Age of Onset    Diabetes Mother      Hypertension Mother      Cancer Mother      Coronary artery disease Father      Heart attack Maternal Grandfather      Stroke Other       Family Oncology History:  Cancer-related family history includes Cancer in her mother.      OBJECTIVE    VS / Pain:  /83 (BP Location: Left arm, Patient Position: Sitting, BP Cuff Size: Adult)   Pulse 80   Wt 75.5 kg (166 lb 7.2 oz)   LMP  (LMP Unknown)   BMI 29.48 kg/m²   BSA: 1.83 meters squared   Pain Scale: 0    Performance Status:  The ECOG performance scale today is ECO- Restricted in physically strenuous activity.  Carries out light duty.      Physical Exam  Constitutional: Well developed, awake/alert/oriented x4, no distress,  alert and cooperative  EYES: Sclera clear  ENMT: mucous membranes moist, no apparent injury, no lesions seen  Head/Neck: Neck supple, no apparent injury, thyroid without mass or tenderness, No JVD, trachea midline, no bruits  Respiratory / Thoracic: Patent airways, clear to all lobes, normal breath sounds with good chest expansion, thorax symmetric.  Cardiovascular: Regular, rate and rhythm, no murmurs, 2+ equal pulses of the extremities, normal auscultated S 1and S 2  GI: Nondistended, soft, non-tender, no rebound tenderness or guarding, no masses palpable, no organomegaly, +BS, no bruits  Musculoskeletal: ROM intact, no joint swelling, normal strength, no spinal tenderness  Extremities: normal extremities, no cyanosis edema, contusions or wounds, no clubbing  Neurological: alert and oriented x4, intact senses, motor, response and reflexes, normal strength  Breast: S/p right lumpectomy. No palpable masses or lesions in either breast   Lymphatic: No cervical, supraclavicular, infraclavicular or axillary lymphadenopathy  Psychological: Appropriate and talkative mood and behavior  Skin: Warm and dry, no lesions, no rashes, no jaundice    Diagnostic Results   === 08/25/22 ===    CT HEAD WO IV CONTRAST    - Impression -  No acute intracranial hemorrhage or mass-effect. Remote infarct in  the left basal ganglia/corona radiata.   No results found for this or any previous visit from the past 365 days.     No images are attached to the encounter.  TRANSTHORACIC ECHOCARDIOGRAM REPORT        Patient Name:      ASHLY HICKS    Reading           [id number] Lawrence Arnett MD                                         Physician:  Study Date:        11/8/2023           [facility]          [id number]                                         Provider:         EARLINE  MRN/PID:           [id number]            Fellow:  Accession#:        [id number]        Nurse:  Date of Birth/Age: 1960 / 63      Sonographer:      Graciela Brock  RDCS                     years  Gender:            F                   Additional Staff:  Height:            162.00 cm           Admit Date:       11/8/2023  Weight:            77.00 kg            Admission Status: Outpatient  BSA:               1.82 m2             Encounter#:       [id number]  Blood Pressure: 133 /64 mmHg     Study Type:    TRANSTHORACIC ECHO (TTE) LIMITED  Diagnosis/ICD: Encounter for monitoring cardiotoxic drug therapy-Z51.81  Indication:    Malignant neoplasm of upper-outer quadrant of right breast in                 female, estrogen receptor negative     Patient History:  Pertinent History: Hyperlipidemia, HTN, PE and Cancer. PFO.     Study Detail: The following Echo studies were performed: 2D, Doppler and color                flow. Technically challenging study due to body habitus.        PHYSICIAN INTERPRETATION:  Left Ventricle: The left ventricular systolic function is normal, with an estimated ejection fraction of 60-65%. Estimated left ventricular mass is normal. There are no regional wall motion abnormalities. The left ventricular cavity size was not assessed. The left ventricular septal wall thickness is normal. There is normal left ventricular posterior wall thickness. Spectral Doppler shows an impaired relaxation pattern of left ventricular diastolic filling.  Left Atrium: The left atrium was not assessed.  Right Ventricle: The right ventricle was not assessed. Right ventricular systolic function not assessed.  Right Atrium: The right atrium was not assessed.  Aortic Valve: The aortic valve appears structurally normal. The aortic valve appears tricuspid. There is no evidence of aortic valve regurgitation. The peak instantaneous gradient of the aortic valve is 3.9 mmHg.  Mitral Valve: The mitral valve is mildly thickened. There is mild mitral annular calcification. There is no evidence of mitral valve regurgitation.  Tricuspid Valve: The tricuspid valve is structurally normal. There is  trace tricuspid regurgitation.  Pulmonic Valve: The pulmonic valve was not assessed. Pulmonic valve regurgitation was not assessed.  Pericardium: Pericardial effusion was not assessed.  Aorta: The aortic root was not assessed.  Pulmonary Artery: The tricuspid regurgitant velocity is 2.02 m/s, and with an estimated right atrial pressure of 3 mmHg, the estimated pulmonary artery pressure is normal with the RVSP at 19.3 mmHg.  Systemic Veins: The inferior vena cava size appears small. There is IVC inspiratory collapse greater than 50%.  In comparison to the previous echocardiogram(s): Compared with study from 11/18/2021, no significant change.        CONCLUSIONS:   1. Left ventricular systolic function is normal with a 60-65% estimated ejection fraction.   2. Spectral Doppler shows an impaired relaxation pattern of left ventricular diastolic filling.     QUANTITATIVE DATA SUMMARY:  2D MEASUREMENTS:                           Normal Ranges:  IVSd:          1.00 cm   (0.6-1.1cm)  LVPWd:         1.00 cm   (0.6-1.1cm)  LVIDd:         4.30 cm   (3.9-5.9cm)  LVIDs:         2.90 cm  LV Mass Index: 78.3 g/m2  LV % FS        32.6 %     LV SYSTOLIC FUNCTION BY 2D PLANIMETRY (MOD):                      Normal Ranges:  EF-A4C View: 64.1 % (>=55%)  EF-A2C View: 64.3 %  EF-Biplane:  63.9 %     LV DIASTOLIC FUNCTION:                                Normal Ranges:  MV Peak E:        0.38 m/s    (0.7-1.2 m/s)  MV Peak A:        0.74 m/s    (0.42-0.7 m/s)  E/A Ratio:        0.52        (1.0-2.2)  MV lateral e'     0.08 m/s  MV medial e'      0.04 m/s  MV A Dur:         137.00 msec  E/e' Ratio:       4.60        (<8.0)  PulmV Sys Mahesh:    28.20 cm/s  PulmV Coles Mahesh:   16.60 cm/s  PulmV S/D Mahesh:    1.70  PulmV A Revs Mahesh: 19.70 cm/s  PulmV A Revs Dur: 119.00 msec     MITRAL VALVE:                  Normal Ranges:  MV DT: 121 msec (150-240msec)     AORTIC VALVE:                         Normal Ranges:  AoV Vmax:     0.99 m/s (<=1.7m/s)  AoV  Peak PG:  3.9 mmHg (<20mmHg)  LVOT Max Mahesh: 0.80 m/s (<=1.1m/s)        RIGHT VENTRICLE:  TAPSE: 17.1 mm     TRICUSPID VALVE/RVSP:                              Normal Ranges:  Peak TR Velocity: 2.02 m/s  Est. RA Pressure: 3 mmHg  RV Syst Pressure: 19.3 mmHg (< 30mmHg)  IVC Diam:         1.03 cm     Pulmonary Veins:  PulmV A Revs Dur: 119.00 msec  PulmV A Revs Mahesh: 19.70 cm/s  PulmV Coles Mahesh:   16.60 cm/s  PulmV S/D Mahesh:    1.70  PulmV Sys Mahesh:    28.20 cm/s        [id number] Lawrence Arnett MD  Electronically signed on 11/8/2023 at 9:17:22 PM     LABORATORY/PATHOLOGY DATA    Infusion on 01/10/2024   Component Date Value Ref Range Status    WBC 01/10/2024 4.4  4.4 - 11.3 x10*3/uL Final    nRBC 01/10/2024    Final    RBC 01/10/2024 4.21  4.00 - 5.20 x10*6/uL Final    Hemoglobin 01/10/2024 11.6 (L)  12.0 - 16.0 g/dL Final    Hematocrit 01/10/2024 35.2 (L)  36.0 - 46.0 % Final    MCV 01/10/2024 84  80 - 100 fL Final    MCH 01/10/2024 27.6  26.0 - 34.0 pg Final    MCHC 01/10/2024 33.0  32.0 - 36.0 g/dL Final    RDW 01/10/2024 14.8 (H)  11.5 - 14.5 % Final    Platelets 01/10/2024 283  150 - 450 x10*3/uL Final    Neutrophils % 01/10/2024 60.0  40.0 - 80.0 % Final    Immature Granulocytes %, Automated 01/10/2024 1.1 (H)  0.0 - 0.9 % Final    Lymphocytes % 01/10/2024 22.7  13.0 - 44.0 % Final    Monocytes % 01/10/2024 9.9  2.0 - 10.0 % Final    Eosinophils % 01/10/2024 5.4  0.0 - 6.0 % Final    Basophils % 01/10/2024 0.9  0.0 - 2.0 % Final    Neutrophils Absolute 01/10/2024 2.66  1.20 - 7.70 x10*3/uL Final    Immature Granulocytes Absolute, Au* 01/10/2024 0.05  0.00 - 0.70 x10*3/uL Final    Lymphocytes Absolute 01/10/2024 1.01 (L)  1.20 - 4.80 x10*3/uL Final    Monocytes Absolute 01/10/2024 0.44  0.10 - 1.00 x10*3/uL Final    Eosinophils Absolute 01/10/2024 0.24  0.00 - 0.70 x10*3/uL Final    Basophils Absolute 01/10/2024 0.04  0.00 - 0.10 x10*3/uL Final    Glucose 01/10/2024 103 (H)  74 - 99 mg/dL Final    Sodium 01/10/2024 142   136 - 145 mmol/L Final    Potassium 01/10/2024 3.7  3.5 - 5.3 mmol/L Final    Chloride 01/10/2024 106  98 - 107 mmol/L Final    Bicarbonate 01/10/2024 27  21 - 32 mmol/L Final    Anion Gap 01/10/2024 13  10 - 20 mmol/L Final    Urea Nitrogen 01/10/2024 18  6 - 23 mg/dL Final    Creatinine 01/10/2024 0.98  0.50 - 1.05 mg/dL Final    eGFR 01/10/2024 66  >60 mL/min/1.73m*2 Final    Calcium 01/10/2024 9.5  8.6 - 10.6 mg/dL Final    Albumin 01/10/2024 4.4  3.4 - 5.0 g/dL Final    Alkaline Phosphatase 01/10/2024 56  33 - 136 U/L Final    Total Protein 01/10/2024 6.3 (L)  6.4 - 8.2 g/dL Final    AST 01/10/2024 20  9 - 39 U/L Final    Bilirubin, Total 01/10/2024 0.4  0.0 - 1.2 mg/dL Final    ALT 01/10/2024 28  7 - 45 U/L Final   Infusion on 01/03/2024   Component Date Value Ref Range Status    WBC 01/03/2024 4.1 (L)  4.4 - 11.3 x10*3/uL Final    nRBC 01/03/2024    Final    RBC 01/03/2024 4.16  4.00 - 5.20 x10*6/uL Final    Hemoglobin 01/03/2024 11.7 (L)  12.0 - 16.0 g/dL Final    Hematocrit 01/03/2024 35.3 (L)  36.0 - 46.0 % Final    MCV 01/03/2024 85  80 - 100 fL Final    MCH 01/03/2024 28.1  26.0 - 34.0 pg Final    MCHC 01/03/2024 33.1  32.0 - 36.0 g/dL Final    RDW 01/03/2024 14.7 (H)  11.5 - 14.5 % Final    Platelets 01/03/2024 310  150 - 450 x10*3/uL Final    Neutrophils % 01/03/2024 54.4  40.0 - 80.0 % Final    Immature Granulocytes %, Automated 01/03/2024 1.2 (H)  0.0 - 0.9 % Final    Lymphocytes % 01/03/2024 29.3  13.0 - 44.0 % Final    Monocytes % 01/03/2024 8.3  2.0 - 10.0 % Final    Eosinophils % 01/03/2024 5.1  0.0 - 6.0 % Final    Basophils % 01/03/2024 1.7  0.0 - 2.0 % Final    Neutrophils Absolute 01/03/2024 2.23  1.20 - 7.70 x10*3/uL Final    Immature Granulocytes Absolute, Au* 01/03/2024 0.05  0.00 - 0.70 x10*3/uL Final    Lymphocytes Absolute 01/03/2024 1.20  1.20 - 4.80 x10*3/uL Final    Monocytes Absolute 01/03/2024 0.34  0.10 - 1.00 x10*3/uL Final    Eosinophils Absolute 01/03/2024 0.21  0.00 - 0.70  x10*3/uL Final    Basophils Absolute 01/03/2024 0.07  0.00 - 0.10 x10*3/uL Final    Glucose 01/03/2024 145 (H)  74 - 99 mg/dL Final    Sodium 01/03/2024 140  136 - 145 mmol/L Final    Potassium 01/03/2024 3.7  3.5 - 5.3 mmol/L Final    Chloride 01/03/2024 102  98 - 107 mmol/L Final    Bicarbonate 01/03/2024 28  21 - 32 mmol/L Final    Anion Gap 01/03/2024 14  10 - 20 mmol/L Final    Urea Nitrogen 01/03/2024 10  6 - 23 mg/dL Final    Creatinine 01/03/2024 0.58  0.50 - 1.05 mg/dL Final    eGFR 01/03/2024 >90  >60 mL/min/1.73m*2 Final    Calcium 01/03/2024 9.4  8.6 - 10.6 mg/dL Final    Albumin 01/03/2024 4.3  3.4 - 5.0 g/dL Final    Alkaline Phosphatase 01/03/2024 55  33 - 136 U/L Final    Total Protein 01/03/2024 6.1 (L)  6.4 - 8.2 g/dL Final    AST 01/03/2024 22  9 - 39 U/L Final    Bilirubin, Total 01/03/2024 0.3  0.0 - 1.2 mg/dL Final    ALT 01/03/2024 29  7 - 45 U/L Final   Infusion on 12/27/2023   Component Date Value Ref Range Status    WBC 12/27/2023 3.9 (L)  4.4 - 11.3 x10*3/uL Final    nRBC 12/27/2023    Final    RBC 12/27/2023 4.19  4.00 - 5.20 x10*6/uL Final    Hemoglobin 12/27/2023 11.7 (L)  12.0 - 16.0 g/dL Final    Hematocrit 12/27/2023 35.2 (L)  36.0 - 46.0 % Final    MCV 12/27/2023 84  80 - 100 fL Final    MCH 12/27/2023 27.9  26.0 - 34.0 pg Final    MCHC 12/27/2023 33.2  32.0 - 36.0 g/dL Final    RDW 12/27/2023 14.7 (H)  11.5 - 14.5 % Final    Platelets 12/27/2023 299  150 - 450 x10*3/uL Final    Neutrophils % 12/27/2023 56.1  40.0 - 80.0 % Final    Immature Granulocytes %, Automated 12/27/2023 1.0 (H)  0.0 - 0.9 % Final    Lymphocytes % 12/27/2023 29.0  13.0 - 44.0 % Final    Monocytes % 12/27/2023 8.2  2.0 - 10.0 % Final    Eosinophils % 12/27/2023 4.4  0.0 - 6.0 % Final    Basophils % 12/27/2023 1.3  0.0 - 2.0 % Final    Neutrophils Absolute 12/27/2023 2.18  1.20 - 7.70 x10*3/uL Final    Immature Granulocytes Absolute, Au* 12/27/2023 0.04  0.00 - 0.70 x10*3/uL Final    Lymphocytes Absolute  12/27/2023 1.13 (L)  1.20 - 4.80 x10*3/uL Final    Monocytes Absolute 12/27/2023 0.32  0.10 - 1.00 x10*3/uL Final    Eosinophils Absolute 12/27/2023 0.17  0.00 - 0.70 x10*3/uL Final    Basophils Absolute 12/27/2023 0.05  0.00 - 0.10 x10*3/uL Final    Glucose 12/27/2023 85  74 - 99 mg/dL Final    Sodium 12/27/2023 143  136 - 145 mmol/L Final    Potassium 12/27/2023 3.6  3.5 - 5.3 mmol/L Final    Chloride 12/27/2023 106  98 - 107 mmol/L Final    Bicarbonate 12/27/2023 29  21 - 32 mmol/L Final    Anion Gap 12/27/2023 12  10 - 20 mmol/L Final    Urea Nitrogen 12/27/2023 9  6 - 23 mg/dL Final    Creatinine 12/27/2023 0.64  0.50 - 1.05 mg/dL Final    eGFR 12/27/2023 >90  >60 mL/min/1.73m*2 Final    Calcium 12/27/2023 9.7  8.6 - 10.6 mg/dL Final    Albumin 12/27/2023 4.1  3.4 - 5.0 g/dL Final    Alkaline Phosphatase 12/27/2023 56  33 - 136 U/L Final    Total Protein 12/27/2023 6.1 (L)  6.4 - 8.2 g/dL Final    AST 12/27/2023 20  9 - 39 U/L Final    Bilirubin, Total 12/27/2023 0.3  0.0 - 1.2 mg/dL Final    ALT 12/27/2023 24  7 - 45 U/L Final   Infusion on 12/20/2023   Component Date Value Ref Range Status    WBC 12/20/2023 3.5 (L)  4.4 - 11.3 x10*3/uL Final    nRBC 12/20/2023    Final    RBC 12/20/2023 4.28  4.00 - 5.20 x10*6/uL Final    Hemoglobin 12/20/2023 11.9 (L)  12.0 - 16.0 g/dL Final    Hematocrit 12/20/2023 35.8 (L)  36.0 - 46.0 % Final    MCV 12/20/2023 84  80 - 100 fL Final    MCH 12/20/2023 27.8  26.0 - 34.0 pg Final    MCHC 12/20/2023 33.2  32.0 - 36.0 g/dL Final    RDW 12/20/2023 13.9  11.5 - 14.5 % Final    Platelets 12/20/2023 272  150 - 450 x10*3/uL Final    Neutrophils % 12/20/2023 49.4  40.0 - 80.0 % Final    Immature Granulocytes %, Automated 12/20/2023 0.6  0.0 - 0.9 % Final    Lymphocytes % 12/20/2023 36.3  13.0 - 44.0 % Final    Monocytes % 12/20/2023 7.7  2.0 - 10.0 % Final    Eosinophils % 12/20/2023 4.3  0.0 - 6.0 % Final    Basophils % 12/20/2023 1.7  0.0 - 2.0 % Final    Neutrophils Absolute  12/20/2023 1.73  1.20 - 7.70 x10*3/uL Final    Immature Granulocytes Absolute, Au* 12/20/2023 0.02  0.00 - 0.70 x10*3/uL Final    Lymphocytes Absolute 12/20/2023 1.27  1.20 - 4.80 x10*3/uL Final    Monocytes Absolute 12/20/2023 0.27  0.10 - 1.00 x10*3/uL Final    Eosinophils Absolute 12/20/2023 0.15  0.00 - 0.70 x10*3/uL Final    Basophils Absolute 12/20/2023 0.06  0.00 - 0.10 x10*3/uL Final    Glucose 12/20/2023 118 (H)  74 - 99 mg/dL Final    Sodium 12/20/2023 141  136 - 145 mmol/L Final    Potassium 12/20/2023 3.7  3.5 - 5.3 mmol/L Final    Chloride 12/20/2023 105  98 - 107 mmol/L Final    Bicarbonate 12/20/2023 29  21 - 32 mmol/L Final    Anion Gap 12/20/2023 11  10 - 20 mmol/L Final    Urea Nitrogen 12/20/2023 13  6 - 23 mg/dL Final    Creatinine 12/20/2023 0.66  0.50 - 1.05 mg/dL Final    eGFR 12/20/2023 >90  >60 mL/min/1.73m*2 Final    Calcium 12/20/2023 9.5  8.6 - 10.6 mg/dL Final    Albumin 12/20/2023 4.3  3.4 - 5.0 g/dL Final    Alkaline Phosphatase 12/20/2023 57  33 - 136 U/L Final    Total Protein 12/20/2023 6.3 (L)  6.4 - 8.2 g/dL Final    AST 12/20/2023 19  9 - 39 U/L Final    Bilirubin, Total 12/20/2023 0.3  0.0 - 1.2 mg/dL Final    ALT 12/20/2023 23  7 - 45 U/L Final   Infusion on 12/13/2023   Component Date Value Ref Range Status    WBC 12/13/2023 4.0 (L)  4.4 - 11.3 x10*3/uL Final    nRBC 12/13/2023    Final    RBC 12/13/2023 4.53  4.00 - 5.20 x10*6/uL Final    Hemoglobin 12/13/2023 12.5  12.0 - 16.0 g/dL Final    Hematocrit 12/13/2023 37.8  36.0 - 46.0 % Final    MCV 12/13/2023 83  80 - 100 fL Final    MCH 12/13/2023 27.6  26.0 - 34.0 pg Final    MCHC 12/13/2023 33.1  32.0 - 36.0 g/dL Final    RDW 12/13/2023 13.6  11.5 - 14.5 % Final    Platelets 12/13/2023 281  150 - 450 x10*3/uL Final    Neutrophils % 12/13/2023 56.3  40.0 - 80.0 % Final    Immature Granulocytes %, Automated 12/13/2023 1.0 (H)  0.0 - 0.9 % Final    Lymphocytes % 12/13/2023 31.3  13.0 - 44.0 % Final    Monocytes % 12/13/2023 7.2   2.0 - 10.0 % Final    Eosinophils % 12/13/2023 3.2  0.0 - 6.0 % Final    Basophils % 12/13/2023 1.0  0.0 - 2.0 % Final    Neutrophils Absolute 12/13/2023 2.26  1.20 - 7.70 x10*3/uL Final    Immature Granulocytes Absolute, Au* 12/13/2023 0.04  0.00 - 0.70 x10*3/uL Final    Lymphocytes Absolute 12/13/2023 1.26  1.20 - 4.80 x10*3/uL Final    Monocytes Absolute 12/13/2023 0.29  0.10 - 1.00 x10*3/uL Final    Eosinophils Absolute 12/13/2023 0.13  0.00 - 0.70 x10*3/uL Final    Basophils Absolute 12/13/2023 0.04  0.00 - 0.10 x10*3/uL Final    Glucose 12/13/2023 135 (H)  74 - 99 mg/dL Final    Sodium 12/13/2023 141  136 - 145 mmol/L Final    Potassium 12/13/2023 3.9  3.5 - 5.3 mmol/L Final    Chloride 12/13/2023 104  98 - 107 mmol/L Final    Bicarbonate 12/13/2023 27  21 - 32 mmol/L Final    Anion Gap 12/13/2023 14  10 - 20 mmol/L Final    Urea Nitrogen 12/13/2023 16  6 - 23 mg/dL Final    Creatinine 12/13/2023 0.69  0.50 - 1.05 mg/dL Final    eGFR 12/13/2023 >90  >60 mL/min/1.73m*2 Final    Calcium 12/13/2023 9.6  8.6 - 10.6 mg/dL Final    Albumin 12/13/2023 4.6  3.4 - 5.0 g/dL Final    Alkaline Phosphatase 12/13/2023 56  33 - 136 U/L Final    Total Protein 12/13/2023 6.6  6.4 - 8.2 g/dL Final    AST 12/13/2023 17  9 - 39 U/L Final    Bilirubin, Total 12/13/2023 0.4  0.0 - 1.2 mg/dL Final    ALT 12/13/2023 21  7 - 45 U/L Final          IMPRESSION/PLAN    1. Right sided [id number] HER2+ breast cancer, unexpectedly found after excisional biopsy for sclerosing adenosis.   Continue adjuvant chemo-HER2  therapy, week #10/12, however dose reduction to 60mg/m2 for 2 weeks  Grade 2 neuropathy that is constant in heals of feet. I have instructed her to call our team with any progression or worsening of neuropathy.    We have reviewed course of therapy- 3 weekly doses remain, then will transition to Q3 week maintenance Herceptin. Orders placed today for repeat ECHO due by 2/8/2024.    Newly reported exertional fatigue, likely from  chemo deconditioning. Orders placed for CXR.    Concerns for UTI, orders placed for UA with reflex to cx.     Return 4-5 wks to follow-up with Dr Brendon Rosenbaum.         2. Hypercoagulable state. extensive medical history of CVA, breast cancer,  HTN, Hyperlipidemia, hypercholesterolemia. She remains on eliquis.      We discussed the clinical significance of diagnosis, goals of care and treatment plan in detail.     At least 30 minutes of direct consultation was spent with the patient today reviewing her cancer care plan, educating and answering questions regarding ongoing follow up, greater than 50% in counseling and coordination of care           Shawna Mccallum MSN, APRN, FNP-C  [facility]  Division of Medical Oncology- Breast   Collaborating Physician Dr. Brendon Rosenbaum   [facility] Priya Li, Claire Cali [place] [address]  Phone: [phone]  Fax: [phone]  Available via Secure Chat    Confidential Peer Review Document-  Privilege  Privileged Pursuant to [place] Revised Code Section 2305.24, .25, .251 & .252

## 2024-01-17 NOTE — PATIENT INSTRUCTIONS
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     Return to clinic with Dr Brendon Rosenbaum on 2/21/24    Heart Ultrasound MUST be completed prior to 2/8/24.    Please increase oral fluid intake for skin dryness and mild nose bleeds. Please  AYR- nasal saline gel and use in your nose 3-5 times per day gently with Q-tip application.    I have placed orders today for a check of your urine + an X-ray of your lungs. I will call results.    Breast imaging per Dr Soria 2/1/24    Please call [phone] with any questions or concerns prior to your next office visit.

## 2024-01-18 DIAGNOSIS — Z17.1 MALIGNANT NEOPLASM OF UPPER-OUTER QUADRANT OF RIGHT BREAST IN FEMALE, ESTROGEN RECEPTOR NEGATIVE (MULTI): Primary | ICD-10-CM

## 2024-01-18 DIAGNOSIS — C50.411 MALIGNANT NEOPLASM OF UPPER-OUTER QUADRANT OF RIGHT BREAST IN FEMALE, ESTROGEN RECEPTOR NEGATIVE (MULTI): Primary | ICD-10-CM

## 2024-01-18 RX ORDER — DEXAMETHASONE SODIUM PHOSPHATE 100 MG/10ML
10 INJECTION INTRAMUSCULAR; INTRAVENOUS ONCE
Status: CANCELLED | OUTPATIENT
Start: 2024-01-24

## 2024-01-18 RX ORDER — DIPHENHYDRAMINE HYDROCHLORIDE 50 MG/ML
50 INJECTION INTRAMUSCULAR; INTRAVENOUS AS NEEDED
Status: CANCELLED | OUTPATIENT
Start: 2024-01-24

## 2024-01-18 RX ORDER — FAMOTIDINE 10 MG/ML
20 INJECTION INTRAVENOUS ONCE
Status: CANCELLED | OUTPATIENT
Start: 2024-01-24

## 2024-01-18 RX ORDER — EPINEPHRINE 0.3 MG/.3ML
0.3 INJECTION SUBCUTANEOUS EVERY 5 MIN PRN
Status: CANCELLED | OUTPATIENT
Start: 2024-01-24

## 2024-01-18 RX ORDER — DIPHENHYDRAMINE HCL 50 MG
50 CAPSULE ORAL ONCE
Status: CANCELLED | OUTPATIENT
Start: 2024-01-24

## 2024-01-18 RX ORDER — PROCHLORPERAZINE EDISYLATE 5 MG/ML
10 INJECTION INTRAMUSCULAR; INTRAVENOUS EVERY 6 HOURS PRN
Status: CANCELLED | OUTPATIENT
Start: 2024-01-24

## 2024-01-18 RX ORDER — PROCHLORPERAZINE MALEATE 10 MG
10 TABLET ORAL EVERY 6 HOURS PRN
Status: CANCELLED | OUTPATIENT
Start: 2024-01-24

## 2024-01-18 RX ORDER — ALBUTEROL SULFATE 0.83 MG/ML
3 SOLUTION RESPIRATORY (INHALATION) AS NEEDED
Status: CANCELLED | OUTPATIENT
Start: 2024-01-24

## 2024-01-18 RX ORDER — FAMOTIDINE 10 MG/ML
20 INJECTION INTRAVENOUS ONCE AS NEEDED
Status: CANCELLED | OUTPATIENT
Start: 2024-01-24

## 2024-01-19 ENCOUNTER — TELEPHONE (OUTPATIENT)
Dept: HEMATOLOGY/ONCOLOGY | Facility: HOSPITAL | Age: 64
End: 2024-01-19
Payer: COMMERCIAL

## 2024-01-19 NOTE — TELEPHONE ENCOUNTER
Call to pt to review normal CXR she verbalizes understanding this is liley from Chemo deconditioning.

## 2024-01-24 ENCOUNTER — INFUSION (OUTPATIENT)
Dept: HEMATOLOGY/ONCOLOGY | Facility: CLINIC | Age: 64
End: 2024-01-24
Payer: COMMERCIAL

## 2024-01-24 VITALS
WEIGHT: 163.25 LBS | DIASTOLIC BLOOD PRESSURE: 84 MMHG | OXYGEN SATURATION: 96 % | TEMPERATURE: 98.1 F | SYSTOLIC BLOOD PRESSURE: 131 MMHG | RESPIRATION RATE: 18 BRPM | BODY MASS INDEX: 28.92 KG/M2 | HEART RATE: 84 BPM

## 2024-01-24 DIAGNOSIS — C50.411 MALIGNANT NEOPLASM OF UPPER-OUTER QUADRANT OF RIGHT BREAST IN FEMALE, ESTROGEN RECEPTOR NEGATIVE (MULTI): Primary | ICD-10-CM

## 2024-01-24 DIAGNOSIS — Z17.1 MALIGNANT NEOPLASM OF UPPER-OUTER QUADRANT OF RIGHT BREAST IN FEMALE, ESTROGEN RECEPTOR NEGATIVE (MULTI): Primary | ICD-10-CM

## 2024-01-24 DIAGNOSIS — Z17.1 MALIGNANT NEOPLASM OF UPPER-OUTER QUADRANT OF RIGHT BREAST IN FEMALE, ESTROGEN RECEPTOR NEGATIVE (MULTI): ICD-10-CM

## 2024-01-24 DIAGNOSIS — C50.411 MALIGNANT NEOPLASM OF UPPER-OUTER QUADRANT OF RIGHT BREAST IN FEMALE, ESTROGEN RECEPTOR NEGATIVE (MULTI): ICD-10-CM

## 2024-01-24 LAB
ALBUMIN SERPL BCP-MCNC: 4.3 G/DL (ref 3.4–5)
ALP SERPL-CCNC: 50 U/L (ref 33–136)
ALT SERPL W P-5'-P-CCNC: 27 U/L (ref 7–45)
ANION GAP SERPL CALC-SCNC: 12 MMOL/L (ref 10–20)
AST SERPL W P-5'-P-CCNC: 19 U/L (ref 9–39)
BASOPHILS # BLD AUTO: 0.08 X10*3/UL (ref 0–0.1)
BASOPHILS NFR BLD AUTO: 2.2 %
BILIRUB SERPL-MCNC: 0.4 MG/DL (ref 0–1.2)
BUN SERPL-MCNC: 14 MG/DL (ref 6–23)
CALCIUM SERPL-MCNC: 9.6 MG/DL (ref 8.6–10.6)
CHLORIDE SERPL-SCNC: 104 MMOL/L (ref 98–107)
CO2 SERPL-SCNC: 29 MMOL/L (ref 21–32)
CREAT SERPL-MCNC: 0.65 MG/DL (ref 0.5–1.05)
EGFRCR SERPLBLD CKD-EPI 2021: >90 ML/MIN/1.73M*2
EOSINOPHIL # BLD AUTO: 0.23 X10*3/UL (ref 0–0.7)
EOSINOPHIL NFR BLD AUTO: 6.3 %
ERYTHROCYTE [DISTWIDTH] IN BLOOD BY AUTOMATED COUNT: 15.3 % (ref 11.5–14.5)
GLUCOSE SERPL-MCNC: 120 MG/DL (ref 74–99)
HCT VFR BLD AUTO: 34.9 % (ref 36–46)
HGB BLD-MCNC: 11.5 G/DL (ref 12–16)
IMM GRANULOCYTES # BLD AUTO: 0.03 X10*3/UL (ref 0–0.7)
IMM GRANULOCYTES NFR BLD AUTO: 0.8 % (ref 0–0.9)
LYMPHOCYTES # BLD AUTO: 0.97 X10*3/UL (ref 1.2–4.8)
LYMPHOCYTES NFR BLD AUTO: 26.4 %
MCH RBC QN AUTO: 27.9 PG (ref 26–34)
MCHC RBC AUTO-ENTMCNC: 33 G/DL (ref 32–36)
MCV RBC AUTO: 85 FL (ref 80–100)
MONOCYTES # BLD AUTO: 0.33 X10*3/UL (ref 0.1–1)
MONOCYTES NFR BLD AUTO: 9 %
NEUTROPHILS # BLD AUTO: 2.04 X10*3/UL (ref 1.2–7.7)
NEUTROPHILS NFR BLD AUTO: 55.3 %
NRBC BLD-RTO: ABNORMAL /100{WBCS}
PLATELET # BLD AUTO: 275 X10*3/UL (ref 150–450)
POTASSIUM SERPL-SCNC: 3.7 MMOL/L (ref 3.5–5.3)
PROT SERPL-MCNC: 6.2 G/DL (ref 6.4–8.2)
RBC # BLD AUTO: 4.12 X10*6/UL (ref 4–5.2)
SODIUM SERPL-SCNC: 141 MMOL/L (ref 136–145)
WBC # BLD AUTO: 3.7 X10*3/UL (ref 4.4–11.3)

## 2024-01-24 PROCEDURE — 96413 CHEMO IV INFUSION 1 HR: CPT

## 2024-01-24 PROCEDURE — 96417 CHEMO IV INFUS EACH ADDL SEQ: CPT

## 2024-01-24 PROCEDURE — 85025 COMPLETE CBC W/AUTO DIFF WBC: CPT

## 2024-01-24 PROCEDURE — 96375 TX/PRO/DX INJ NEW DRUG ADDON: CPT | Mod: INF

## 2024-01-24 PROCEDURE — 80053 COMPREHEN METABOLIC PANEL: CPT

## 2024-01-24 PROCEDURE — 2500000001 HC RX 250 WO HCPCS SELF ADMINISTERED DRUGS (ALT 637 FOR MEDICARE OP): Performed by: INTERNAL MEDICINE

## 2024-01-24 PROCEDURE — 2500000004 HC RX 250 GENERAL PHARMACY W/ HCPCS (ALT 636 FOR OP/ED): Performed by: INTERNAL MEDICINE

## 2024-01-24 RX ORDER — FAMOTIDINE 10 MG/ML
20 INJECTION INTRAVENOUS ONCE
Status: CANCELLED | OUTPATIENT
Start: 2024-01-31

## 2024-01-24 RX ORDER — PROCHLORPERAZINE EDISYLATE 5 MG/ML
10 INJECTION INTRAMUSCULAR; INTRAVENOUS EVERY 6 HOURS PRN
Status: DISCONTINUED | OUTPATIENT
Start: 2024-01-24 | End: 2024-01-24 | Stop reason: HOSPADM

## 2024-01-24 RX ORDER — PROCHLORPERAZINE MALEATE 10 MG
10 TABLET ORAL EVERY 6 HOURS PRN
Status: DISCONTINUED | OUTPATIENT
Start: 2024-01-24 | End: 2024-01-24 | Stop reason: HOSPADM

## 2024-01-24 RX ORDER — DEXAMETHASONE SODIUM PHOSPHATE 4 MG/ML
10 INJECTION, SOLUTION INTRA-ARTICULAR; INTRALESIONAL; INTRAMUSCULAR; INTRAVENOUS; SOFT TISSUE ONCE
Status: COMPLETED | OUTPATIENT
Start: 2024-01-24 | End: 2024-01-24

## 2024-01-24 RX ORDER — ALBUTEROL SULFATE 0.83 MG/ML
3 SOLUTION RESPIRATORY (INHALATION) AS NEEDED
Status: CANCELLED | OUTPATIENT
Start: 2024-01-31

## 2024-01-24 RX ORDER — PROCHLORPERAZINE EDISYLATE 5 MG/ML
10 INJECTION INTRAMUSCULAR; INTRAVENOUS EVERY 6 HOURS PRN
Status: CANCELLED | OUTPATIENT
Start: 2024-01-31

## 2024-01-24 RX ORDER — DIPHENHYDRAMINE HYDROCHLORIDE 50 MG/ML
50 INJECTION INTRAMUSCULAR; INTRAVENOUS AS NEEDED
Status: DISCONTINUED | OUTPATIENT
Start: 2024-01-24 | End: 2024-01-24 | Stop reason: HOSPADM

## 2024-01-24 RX ORDER — DIPHENHYDRAMINE HYDROCHLORIDE 50 MG/ML
50 INJECTION INTRAMUSCULAR; INTRAVENOUS AS NEEDED
Status: CANCELLED | OUTPATIENT
Start: 2024-01-31

## 2024-01-24 RX ORDER — HEPARIN SODIUM,PORCINE/PF 10 UNIT/ML
50 SYRINGE (ML) INTRAVENOUS AS NEEDED
Status: DISCONTINUED | OUTPATIENT
Start: 2024-01-24 | End: 2024-01-24 | Stop reason: HOSPADM

## 2024-01-24 RX ORDER — HEPARIN SODIUM,PORCINE/PF 10 UNIT/ML
50 SYRINGE (ML) INTRAVENOUS AS NEEDED
Status: CANCELLED | OUTPATIENT
Start: 2024-01-24

## 2024-01-24 RX ORDER — EPINEPHRINE 0.3 MG/.3ML
0.3 INJECTION SUBCUTANEOUS EVERY 5 MIN PRN
Status: CANCELLED | OUTPATIENT
Start: 2024-01-31

## 2024-01-24 RX ORDER — DIPHENHYDRAMINE HCL 50 MG
50 CAPSULE ORAL ONCE
Status: CANCELLED | OUTPATIENT
Start: 2024-01-31

## 2024-01-24 RX ORDER — ALBUTEROL SULFATE 0.83 MG/ML
3 SOLUTION RESPIRATORY (INHALATION) AS NEEDED
Status: DISCONTINUED | OUTPATIENT
Start: 2024-01-24 | End: 2024-01-24 | Stop reason: HOSPADM

## 2024-01-24 RX ORDER — HEPARIN 100 UNIT/ML
500 SYRINGE INTRAVENOUS AS NEEDED
Status: DISCONTINUED | OUTPATIENT
Start: 2024-01-24 | End: 2024-01-24 | Stop reason: HOSPADM

## 2024-01-24 RX ORDER — FAMOTIDINE 10 MG/ML
20 INJECTION INTRAVENOUS ONCE AS NEEDED
Status: CANCELLED | OUTPATIENT
Start: 2024-01-31

## 2024-01-24 RX ORDER — FAMOTIDINE 10 MG/ML
20 INJECTION INTRAVENOUS ONCE
Status: COMPLETED | OUTPATIENT
Start: 2024-01-24 | End: 2024-01-24

## 2024-01-24 RX ORDER — DIPHENHYDRAMINE HCL 50 MG
50 CAPSULE ORAL ONCE
Status: COMPLETED | OUTPATIENT
Start: 2024-01-24 | End: 2024-01-24

## 2024-01-24 RX ORDER — FAMOTIDINE 10 MG/ML
20 INJECTION INTRAVENOUS ONCE AS NEEDED
Status: DISCONTINUED | OUTPATIENT
Start: 2024-01-24 | End: 2024-01-24 | Stop reason: HOSPADM

## 2024-01-24 RX ORDER — PROCHLORPERAZINE MALEATE 10 MG
10 TABLET ORAL EVERY 6 HOURS PRN
Status: CANCELLED | OUTPATIENT
Start: 2024-01-31

## 2024-01-24 RX ORDER — DEXAMETHASONE SODIUM PHOSPHATE 100 MG/10ML
10 INJECTION INTRAMUSCULAR; INTRAVENOUS ONCE
Status: CANCELLED | OUTPATIENT
Start: 2024-01-31

## 2024-01-24 RX ORDER — EPINEPHRINE 0.3 MG/.3ML
0.3 INJECTION SUBCUTANEOUS EVERY 5 MIN PRN
Status: DISCONTINUED | OUTPATIENT
Start: 2024-01-24 | End: 2024-01-24 | Stop reason: HOSPADM

## 2024-01-24 RX ORDER — HEPARIN 100 UNIT/ML
500 SYRINGE INTRAVENOUS AS NEEDED
Status: CANCELLED | OUTPATIENT
Start: 2024-01-24

## 2024-01-24 RX ADMIN — HEPARIN 500 UNITS: 100 SYRINGE at 10:48

## 2024-01-24 RX ADMIN — TRASTUZUMAB-ANNS 150 MG: 150 INJECTION, POWDER, LYOPHILIZED, FOR SOLUTION INTRAVENOUS at 09:01

## 2024-01-24 RX ADMIN — DEXAMETHASONE SODIUM PHOSPHATE 10 MG: 4 INJECTION INTRA-ARTICULAR; INTRALESIONAL; INTRAMUSCULAR; INTRAVENOUS; SOFT TISSUE at 08:42

## 2024-01-24 RX ADMIN — DIPHENHYDRAMINE HYDROCHLORIDE 50 MG: 50 CAPSULE ORAL at 08:41

## 2024-01-24 RX ADMIN — FAMOTIDINE 20 MG: 10 INJECTION INTRAVENOUS at 08:41

## 2024-01-24 RX ADMIN — PACLITAXEL 111 MG: 6 INJECTION, SOLUTION INTRAVENOUS at 09:40

## 2024-01-24 ASSESSMENT — PAIN SCALES - GENERAL: PAINLEVEL: 0-NO PAIN

## 2024-01-24 NOTE — SIGNIFICANT EVENT
01/24/24 0805   Prechemo Checklist   Has the patient been in the hospital, ED, or urgent care since last date of service No   Chemo/Immuno Consent Signed Yes   Protocol/Indications Verified Yes   Confirmed to previous date/time of medication Yes   Compared to previous dose Yes   All medications are dated accurately Yes   Pregnancy Test Negative Not applicable   Parameters Met Yes   BSA/Weight-Height Verified Yes   Dose Calculations Verified Yes

## 2024-01-31 ENCOUNTER — INFUSION (OUTPATIENT)
Dept: HEMATOLOGY/ONCOLOGY | Facility: CLINIC | Age: 64
End: 2024-01-31
Payer: COMMERCIAL

## 2024-01-31 VITALS
RESPIRATION RATE: 18 BRPM | BODY MASS INDEX: 29.37 KG/M2 | OXYGEN SATURATION: 96 % | SYSTOLIC BLOOD PRESSURE: 135 MMHG | DIASTOLIC BLOOD PRESSURE: 95 MMHG | WEIGHT: 165.8 LBS | TEMPERATURE: 98.2 F | HEART RATE: 89 BPM

## 2024-01-31 DIAGNOSIS — Z17.1 MALIGNANT NEOPLASM OF UPPER-OUTER QUADRANT OF RIGHT BREAST IN FEMALE, ESTROGEN RECEPTOR NEGATIVE (MULTI): ICD-10-CM

## 2024-01-31 DIAGNOSIS — C50.411 MALIGNANT NEOPLASM OF UPPER-OUTER QUADRANT OF RIGHT BREAST IN FEMALE, ESTROGEN RECEPTOR NEGATIVE (MULTI): ICD-10-CM

## 2024-01-31 LAB
ALBUMIN SERPL BCP-MCNC: 4.2 G/DL (ref 3.4–5)
ALP SERPL-CCNC: 48 U/L (ref 33–136)
ALT SERPL W P-5'-P-CCNC: 24 U/L (ref 7–45)
ANION GAP SERPL CALC-SCNC: 12 MMOL/L (ref 10–20)
AST SERPL W P-5'-P-CCNC: 19 U/L (ref 9–39)
BASOPHILS # BLD AUTO: 0.07 X10*3/UL (ref 0–0.1)
BASOPHILS NFR BLD AUTO: 1.6 %
BILIRUB SERPL-MCNC: 0.4 MG/DL (ref 0–1.2)
BUN SERPL-MCNC: 11 MG/DL (ref 6–23)
CALCIUM SERPL-MCNC: 9.5 MG/DL (ref 8.6–10.6)
CHLORIDE SERPL-SCNC: 106 MMOL/L (ref 98–107)
CO2 SERPL-SCNC: 27 MMOL/L (ref 21–32)
CREAT SERPL-MCNC: 0.68 MG/DL (ref 0.5–1.05)
EGFRCR SERPLBLD CKD-EPI 2021: >90 ML/MIN/1.73M*2
EOSINOPHIL # BLD AUTO: 0.2 X10*3/UL (ref 0–0.7)
EOSINOPHIL NFR BLD AUTO: 4.6 %
ERYTHROCYTE [DISTWIDTH] IN BLOOD BY AUTOMATED COUNT: 15.3 % (ref 11.5–14.5)
GLUCOSE SERPL-MCNC: 106 MG/DL (ref 74–99)
HCT VFR BLD AUTO: 34.5 % (ref 36–46)
HGB BLD-MCNC: 11.4 G/DL (ref 12–16)
IMM GRANULOCYTES # BLD AUTO: 0.06 X10*3/UL (ref 0–0.7)
IMM GRANULOCYTES NFR BLD AUTO: 1.4 % (ref 0–0.9)
LYMPHOCYTES # BLD AUTO: 1.05 X10*3/UL (ref 1.2–4.8)
LYMPHOCYTES NFR BLD AUTO: 24.4 %
MCH RBC QN AUTO: 28.4 PG (ref 26–34)
MCHC RBC AUTO-ENTMCNC: 33 G/DL (ref 32–36)
MCV RBC AUTO: 86 FL (ref 80–100)
MONOCYTES # BLD AUTO: 0.36 X10*3/UL (ref 0.1–1)
MONOCYTES NFR BLD AUTO: 8.4 %
NEUTROPHILS # BLD AUTO: 2.57 X10*3/UL (ref 1.2–7.7)
NEUTROPHILS NFR BLD AUTO: 59.6 %
NRBC BLD-RTO: ABNORMAL /100{WBCS}
PLATELET # BLD AUTO: 278 X10*3/UL (ref 150–450)
POTASSIUM SERPL-SCNC: 3.9 MMOL/L (ref 3.5–5.3)
PROT SERPL-MCNC: 5.9 G/DL (ref 6.4–8.2)
RBC # BLD AUTO: 4.02 X10*6/UL (ref 4–5.2)
SODIUM SERPL-SCNC: 141 MMOL/L (ref 136–145)
WBC # BLD AUTO: 4.3 X10*3/UL (ref 4.4–11.3)

## 2024-01-31 PROCEDURE — 2500000004 HC RX 250 GENERAL PHARMACY W/ HCPCS (ALT 636 FOR OP/ED): Performed by: INTERNAL MEDICINE

## 2024-01-31 PROCEDURE — 80053 COMPREHEN METABOLIC PANEL: CPT

## 2024-01-31 PROCEDURE — 96417 CHEMO IV INFUS EACH ADDL SEQ: CPT

## 2024-01-31 PROCEDURE — 96375 TX/PRO/DX INJ NEW DRUG ADDON: CPT | Mod: INF

## 2024-01-31 PROCEDURE — 85025 COMPLETE CBC W/AUTO DIFF WBC: CPT

## 2024-01-31 PROCEDURE — 2500000001 HC RX 250 WO HCPCS SELF ADMINISTERED DRUGS (ALT 637 FOR MEDICARE OP): Performed by: INTERNAL MEDICINE

## 2024-01-31 PROCEDURE — 96413 CHEMO IV INFUSION 1 HR: CPT

## 2024-01-31 RX ORDER — DIPHENHYDRAMINE HYDROCHLORIDE 50 MG/ML
50 INJECTION INTRAMUSCULAR; INTRAVENOUS AS NEEDED
Status: DISCONTINUED | OUTPATIENT
Start: 2024-01-31 | End: 2024-01-31 | Stop reason: HOSPADM

## 2024-01-31 RX ORDER — ALBUTEROL SULFATE 0.83 MG/ML
3 SOLUTION RESPIRATORY (INHALATION) AS NEEDED
Status: DISCONTINUED | OUTPATIENT
Start: 2024-01-31 | End: 2024-01-31 | Stop reason: HOSPADM

## 2024-01-31 RX ORDER — EPINEPHRINE 0.3 MG/.3ML
0.3 INJECTION SUBCUTANEOUS EVERY 5 MIN PRN
Status: DISCONTINUED | OUTPATIENT
Start: 2024-01-31 | End: 2024-01-31 | Stop reason: HOSPADM

## 2024-01-31 RX ORDER — PROCHLORPERAZINE EDISYLATE 5 MG/ML
10 INJECTION INTRAMUSCULAR; INTRAVENOUS EVERY 6 HOURS PRN
Status: DISCONTINUED | OUTPATIENT
Start: 2024-01-31 | End: 2024-01-31 | Stop reason: HOSPADM

## 2024-01-31 RX ORDER — HEPARIN SODIUM,PORCINE/PF 10 UNIT/ML
50 SYRINGE (ML) INTRAVENOUS AS NEEDED
Status: DISCONTINUED | OUTPATIENT
Start: 2024-01-31 | End: 2024-01-31 | Stop reason: HOSPADM

## 2024-01-31 RX ORDER — PROCHLORPERAZINE MALEATE 10 MG
10 TABLET ORAL EVERY 6 HOURS PRN
Status: DISCONTINUED | OUTPATIENT
Start: 2024-01-31 | End: 2024-01-31 | Stop reason: HOSPADM

## 2024-01-31 RX ORDER — HEPARIN 100 UNIT/ML
500 SYRINGE INTRAVENOUS AS NEEDED
Status: DISCONTINUED | OUTPATIENT
Start: 2024-01-31 | End: 2024-01-31 | Stop reason: HOSPADM

## 2024-01-31 RX ORDER — DIPHENHYDRAMINE HCL 50 MG
50 CAPSULE ORAL ONCE
Status: COMPLETED | OUTPATIENT
Start: 2024-01-31 | End: 2024-01-31

## 2024-01-31 RX ORDER — FAMOTIDINE 10 MG/ML
20 INJECTION INTRAVENOUS ONCE
Status: COMPLETED | OUTPATIENT
Start: 2024-01-31 | End: 2024-01-31

## 2024-01-31 RX ORDER — HEPARIN 100 UNIT/ML
500 SYRINGE INTRAVENOUS AS NEEDED
Status: CANCELLED | OUTPATIENT
Start: 2024-01-31

## 2024-01-31 RX ORDER — DEXAMETHASONE SODIUM PHOSPHATE 4 MG/ML
10 INJECTION, SOLUTION INTRA-ARTICULAR; INTRALESIONAL; INTRAMUSCULAR; INTRAVENOUS; SOFT TISSUE ONCE
Status: COMPLETED | OUTPATIENT
Start: 2024-01-31 | End: 2024-01-31

## 2024-01-31 RX ORDER — FAMOTIDINE 10 MG/ML
20 INJECTION INTRAVENOUS ONCE AS NEEDED
Status: DISCONTINUED | OUTPATIENT
Start: 2024-01-31 | End: 2024-01-31 | Stop reason: HOSPADM

## 2024-01-31 RX ORDER — HEPARIN SODIUM,PORCINE/PF 10 UNIT/ML
50 SYRINGE (ML) INTRAVENOUS AS NEEDED
Status: CANCELLED | OUTPATIENT
Start: 2024-01-31

## 2024-01-31 RX ADMIN — TRASTUZUMAB-ANNS 450 MG: 150 INJECTION, POWDER, LYOPHILIZED, FOR SOLUTION INTRAVENOUS at 09:23

## 2024-01-31 RX ADMIN — DEXTROSE MONOHYDRATE 111 MG: 5000 INJECTION, SOLUTION INTRAVENOUS at 10:05

## 2024-01-31 RX ADMIN — DIPHENHYDRAMINE HYDROCHLORIDE 50 MG: 50 CAPSULE ORAL at 08:59

## 2024-01-31 RX ADMIN — Medication 500 UNITS: at 11:13

## 2024-01-31 RX ADMIN — DEXAMETHASONE SODIUM PHOSPHATE 10 MG: 4 INJECTION INTRA-ARTICULAR; INTRALESIONAL; INTRAMUSCULAR; INTRAVENOUS; SOFT TISSUE at 09:00

## 2024-01-31 RX ADMIN — FAMOTIDINE 20 MG: 10 INJECTION INTRAVENOUS at 08:59

## 2024-01-31 ASSESSMENT — PAIN SCALES - GENERAL: PAINLEVEL: 0-NO PAIN

## 2024-01-31 NOTE — SIGNIFICANT EVENT
01/31/24 0836   Prechemo Checklist   Has the patient been in the hospital, ED, or urgent care since last date of service No   Chemo/Immuno Consent Signed Yes   Protocol/Indications Verified Yes   Confirmed to previous date/time of medication Yes   Compared to previous dose Yes   All medications are dated accurately Yes   Pregnancy Test Negative Not applicable   Parameters Met Yes   BSA/Weight-Height Verified Yes   Dose Calculations Verified Yes

## 2024-02-01 ENCOUNTER — APPOINTMENT (OUTPATIENT)
Dept: RADIOLOGY | Facility: CLINIC | Age: 64
End: 2024-02-01
Payer: COMMERCIAL

## 2024-02-01 ENCOUNTER — HOSPITAL ENCOUNTER (OUTPATIENT)
Dept: RADIOLOGY | Facility: CLINIC | Age: 64
Discharge: HOME | End: 2024-02-01
Payer: COMMERCIAL

## 2024-02-01 ENCOUNTER — HOSPITAL ENCOUNTER (OUTPATIENT)
Dept: CARDIOLOGY | Facility: HOSPITAL | Age: 64
Discharge: HOME | End: 2024-02-01
Payer: COMMERCIAL

## 2024-02-01 DIAGNOSIS — Z79.899 ENCOUNTER FOR MONITORING CARDIOTOXIC DRUG THERAPY: ICD-10-CM

## 2024-02-01 DIAGNOSIS — Z17.1 MALIGNANT NEOPLASM OF UPPER-OUTER QUADRANT OF RIGHT BREAST IN FEMALE, ESTROGEN RECEPTOR NEGATIVE (MULTI): ICD-10-CM

## 2024-02-01 DIAGNOSIS — C50.411 MALIGNANT NEOPLASM OF UPPER-OUTER QUADRANT OF RIGHT BREAST IN FEMALE, ESTROGEN RECEPTOR NEGATIVE (MULTI): ICD-10-CM

## 2024-02-01 DIAGNOSIS — Z01.89 ENCOUNTER FOR OTHER SPECIFIED SPECIAL EXAMINATIONS: ICD-10-CM

## 2024-02-01 DIAGNOSIS — Z51.81 ENCOUNTER FOR MONITORING CARDIOTOXIC DRUG THERAPY: ICD-10-CM

## 2024-02-01 DIAGNOSIS — Z51.11 ENCOUNTER FOR ANTINEOPLASTIC CHEMOTHERAPY: ICD-10-CM

## 2024-02-01 DIAGNOSIS — N63.10 UNSPECIFIED LUMP IN THE RIGHT BREAST, UNSPECIFIED QUADRANT: ICD-10-CM

## 2024-02-01 LAB
EJECTION FRACTION APICAL 4 CHAMBER: 53.7
EJECTION FRACTION: 52 %
LEFT VENTRICLE INTERNAL DIMENSION DIASTOLE: 4.12 CM (ref 3.5–6)
MITRAL VALVE E/A RATIO: 0.66
RIGHT VENTRICLE PEAK SYSTOLIC PRESSURE: 23.5 MMHG

## 2024-02-01 PROCEDURE — 77066 DX MAMMO INCL CAD BI: CPT

## 2024-02-01 PROCEDURE — 93325 DOPPLER ECHO COLOR FLOW MAPG: CPT | Performed by: INTERNAL MEDICINE

## 2024-02-01 PROCEDURE — 93356 MYOCRD STRAIN IMG SPCKL TRCK: CPT | Performed by: INTERNAL MEDICINE

## 2024-02-01 PROCEDURE — 76642 ULTRASOUND BREAST LIMITED: CPT | Mod: LT

## 2024-02-01 PROCEDURE — 93308 TTE F-UP OR LMTD: CPT | Performed by: INTERNAL MEDICINE

## 2024-02-01 PROCEDURE — 93321 DOPPLER ECHO F-UP/LMTD STD: CPT | Performed by: INTERNAL MEDICINE

## 2024-02-01 PROCEDURE — 76982 USE 1ST TARGET LESION: CPT | Mod: LT

## 2024-02-01 PROCEDURE — 76642 ULTRASOUND BREAST LIMITED: CPT | Performed by: RADIOLOGY

## 2024-02-01 PROCEDURE — 77066 DX MAMMO INCL CAD BI: CPT | Performed by: RADIOLOGY

## 2024-02-01 PROCEDURE — 76376 3D RENDER W/INTRP POSTPROCES: CPT

## 2024-02-01 PROCEDURE — 77062 BREAST TOMOSYNTHESIS BI: CPT | Performed by: RADIOLOGY

## 2024-02-01 PROCEDURE — 76376 3D RENDER W/INTRP POSTPROCES: CPT | Performed by: INTERNAL MEDICINE

## 2024-02-02 ENCOUNTER — NURSE TRIAGE (OUTPATIENT)
Dept: ADMISSION | Facility: HOSPITAL | Age: 64
End: 2024-02-02
Payer: COMMERCIAL

## 2024-02-02 ENCOUNTER — TELEPHONE (OUTPATIENT)
Dept: HEMATOLOGY/ONCOLOGY | Facility: CLINIC | Age: 64
End: 2024-02-02
Payer: COMMERCIAL

## 2024-02-02 DIAGNOSIS — Z51.81 ENCOUNTER FOR MONITORING CARDIOTOXIC DRUG THERAPY: Primary | ICD-10-CM

## 2024-02-02 DIAGNOSIS — R93.1 ABNORMAL ECHOCARDIOGRAM: ICD-10-CM

## 2024-02-02 DIAGNOSIS — Z79.899 ENCOUNTER FOR MONITORING CARDIOTOXIC DRUG THERAPY: Primary | ICD-10-CM

## 2024-02-02 NOTE — TELEPHONE ENCOUNTER
"Cassandra called the office for her ECHO results. She states she developed some intermittent SOB yesterday after having her ECHO done; she was told her heart \"has a bad squeeze\" so now she is having a lot of anxiety. No chest pain or fever. She was able to sleep last night. She is finding it harder to catch her breath even at rest.  Expresses she is very anxious that she was told there was something wrong with her heart. Asking for Shawna Mccallum to review ECHO results.  Secure Chat sent to [facility].     Cassandra declined going to the ED to be evaluated as she prefers to hear Shawna Mccallum's interpretation of her ECHO results.        Additional Information   Do you have new or worsening problems breathing?     States she is having intermittent SOB even at rest    Protocols used: Dyspena (Shortness of Breath)        2/2/24 11:22- Call to patient to review ECHO report which is showing Normal EF with Mild abnormal left ventricle relaxation pattern seen. I have explained to her this is a very mild finding. I have discussed with her just to be on the safe side we will send her to be evaluated with oncocardiology prior to next infusion on 2/21. She is agreeable to this plan. We have reviewed her SOB- she feels this is likely due to her anxiety. We have reviewed reasons to go to ER- Chest pain, severe sob or feeling like she will pass out.   "

## 2024-02-14 DIAGNOSIS — Z17.1 MALIGNANT NEOPLASM OF UPPER-OUTER QUADRANT OF RIGHT BREAST IN FEMALE, ESTROGEN RECEPTOR NEGATIVE (MULTI): Primary | ICD-10-CM

## 2024-02-14 DIAGNOSIS — C50.411 MALIGNANT NEOPLASM OF UPPER-OUTER QUADRANT OF RIGHT BREAST IN FEMALE, ESTROGEN RECEPTOR NEGATIVE (MULTI): Primary | ICD-10-CM

## 2024-02-14 RX ORDER — ALBUTEROL SULFATE 0.83 MG/ML
3 SOLUTION RESPIRATORY (INHALATION) AS NEEDED
Status: CANCELLED | OUTPATIENT
Start: 2024-03-13

## 2024-02-14 RX ORDER — ALBUTEROL SULFATE 0.83 MG/ML
3 SOLUTION RESPIRATORY (INHALATION) AS NEEDED
Status: CANCELLED | OUTPATIENT
Start: 2024-06-26

## 2024-02-14 RX ORDER — FAMOTIDINE 10 MG/ML
20 INJECTION INTRAVENOUS ONCE AS NEEDED
Status: CANCELLED | OUTPATIENT
Start: 2024-02-21

## 2024-02-14 RX ORDER — DIPHENHYDRAMINE HYDROCHLORIDE 50 MG/ML
50 INJECTION INTRAMUSCULAR; INTRAVENOUS AS NEEDED
Status: CANCELLED | OUTPATIENT
Start: 2024-02-21

## 2024-02-14 RX ORDER — PROCHLORPERAZINE EDISYLATE 5 MG/ML
10 INJECTION INTRAMUSCULAR; INTRAVENOUS EVERY 6 HOURS PRN
Status: CANCELLED | OUTPATIENT
Start: 2024-05-15

## 2024-02-14 RX ORDER — PROCHLORPERAZINE EDISYLATE 5 MG/ML
10 INJECTION INTRAMUSCULAR; INTRAVENOUS EVERY 6 HOURS PRN
Status: CANCELLED | OUTPATIENT
Start: 2024-04-24

## 2024-02-14 RX ORDER — PROCHLORPERAZINE MALEATE 10 MG
10 TABLET ORAL EVERY 6 HOURS PRN
Status: CANCELLED | OUTPATIENT
Start: 2024-04-24

## 2024-02-14 RX ORDER — EPINEPHRINE 0.3 MG/.3ML
0.3 INJECTION SUBCUTANEOUS EVERY 5 MIN PRN
Status: CANCELLED | OUTPATIENT
Start: 2024-04-24

## 2024-02-14 RX ORDER — FAMOTIDINE 10 MG/ML
20 INJECTION INTRAVENOUS ONCE AS NEEDED
Status: CANCELLED | OUTPATIENT
Start: 2024-04-24

## 2024-02-14 RX ORDER — EPINEPHRINE 0.3 MG/.3ML
0.3 INJECTION SUBCUTANEOUS EVERY 5 MIN PRN
Status: CANCELLED | OUTPATIENT
Start: 2024-04-03

## 2024-02-14 RX ORDER — DIPHENHYDRAMINE HYDROCHLORIDE 50 MG/ML
50 INJECTION INTRAMUSCULAR; INTRAVENOUS AS NEEDED
Status: CANCELLED | OUTPATIENT
Start: 2024-03-13

## 2024-02-14 RX ORDER — ALBUTEROL SULFATE 0.83 MG/ML
3 SOLUTION RESPIRATORY (INHALATION) AS NEEDED
Status: CANCELLED | OUTPATIENT
Start: 2024-02-21

## 2024-02-14 RX ORDER — PROCHLORPERAZINE EDISYLATE 5 MG/ML
10 INJECTION INTRAMUSCULAR; INTRAVENOUS EVERY 6 HOURS PRN
Status: CANCELLED | OUTPATIENT
Start: 2024-02-21

## 2024-02-14 RX ORDER — PROCHLORPERAZINE MALEATE 10 MG
10 TABLET ORAL EVERY 6 HOURS PRN
Status: CANCELLED | OUTPATIENT
Start: 2024-04-03

## 2024-02-14 RX ORDER — PROCHLORPERAZINE EDISYLATE 5 MG/ML
10 INJECTION INTRAMUSCULAR; INTRAVENOUS EVERY 6 HOURS PRN
Status: CANCELLED | OUTPATIENT
Start: 2024-03-13

## 2024-02-14 RX ORDER — PROCHLORPERAZINE EDISYLATE 5 MG/ML
10 INJECTION INTRAMUSCULAR; INTRAVENOUS EVERY 6 HOURS PRN
Status: CANCELLED | OUTPATIENT
Start: 2024-06-26

## 2024-02-14 RX ORDER — DIPHENHYDRAMINE HYDROCHLORIDE 50 MG/ML
50 INJECTION INTRAMUSCULAR; INTRAVENOUS AS NEEDED
Status: CANCELLED | OUTPATIENT
Start: 2024-05-15

## 2024-02-14 RX ORDER — ALBUTEROL SULFATE 0.83 MG/ML
3 SOLUTION RESPIRATORY (INHALATION) AS NEEDED
Status: CANCELLED | OUTPATIENT
Start: 2024-04-24

## 2024-02-14 RX ORDER — EPINEPHRINE 0.3 MG/.3ML
0.3 INJECTION SUBCUTANEOUS EVERY 5 MIN PRN
Status: CANCELLED | OUTPATIENT
Start: 2024-05-15

## 2024-02-14 RX ORDER — PROCHLORPERAZINE MALEATE 10 MG
10 TABLET ORAL EVERY 6 HOURS PRN
Status: CANCELLED | OUTPATIENT
Start: 2024-05-15

## 2024-02-14 RX ORDER — EPINEPHRINE 0.3 MG/.3ML
0.3 INJECTION SUBCUTANEOUS EVERY 5 MIN PRN
Status: CANCELLED | OUTPATIENT
Start: 2024-06-26

## 2024-02-14 RX ORDER — ALBUTEROL SULFATE 0.83 MG/ML
3 SOLUTION RESPIRATORY (INHALATION) AS NEEDED
Status: CANCELLED | OUTPATIENT
Start: 2024-05-15

## 2024-02-14 RX ORDER — ALBUTEROL SULFATE 0.83 MG/ML
3 SOLUTION RESPIRATORY (INHALATION) AS NEEDED
Status: CANCELLED | OUTPATIENT
Start: 2024-04-03

## 2024-02-14 RX ORDER — FAMOTIDINE 10 MG/ML
20 INJECTION INTRAVENOUS ONCE AS NEEDED
Status: CANCELLED | OUTPATIENT
Start: 2024-04-03

## 2024-02-14 RX ORDER — DIPHENHYDRAMINE HYDROCHLORIDE 50 MG/ML
50 INJECTION INTRAMUSCULAR; INTRAVENOUS AS NEEDED
Status: CANCELLED | OUTPATIENT
Start: 2024-04-03

## 2024-02-14 RX ORDER — PROCHLORPERAZINE MALEATE 10 MG
10 TABLET ORAL EVERY 6 HOURS PRN
Status: CANCELLED | OUTPATIENT
Start: 2024-02-21

## 2024-02-14 RX ORDER — PROCHLORPERAZINE MALEATE 10 MG
10 TABLET ORAL EVERY 6 HOURS PRN
Status: CANCELLED | OUTPATIENT
Start: 2024-06-26

## 2024-02-14 RX ORDER — PROCHLORPERAZINE EDISYLATE 5 MG/ML
10 INJECTION INTRAMUSCULAR; INTRAVENOUS EVERY 6 HOURS PRN
Status: CANCELLED | OUTPATIENT
Start: 2024-04-03

## 2024-02-14 RX ORDER — DIPHENHYDRAMINE HYDROCHLORIDE 50 MG/ML
50 INJECTION INTRAMUSCULAR; INTRAVENOUS AS NEEDED
Status: CANCELLED | OUTPATIENT
Start: 2024-06-26

## 2024-02-14 RX ORDER — PROCHLORPERAZINE MALEATE 10 MG
10 TABLET ORAL EVERY 6 HOURS PRN
Status: CANCELLED | OUTPATIENT
Start: 2024-03-13

## 2024-02-14 RX ORDER — EPINEPHRINE 0.3 MG/.3ML
0.3 INJECTION SUBCUTANEOUS EVERY 5 MIN PRN
Status: CANCELLED | OUTPATIENT
Start: 2024-02-21

## 2024-02-14 RX ORDER — FAMOTIDINE 10 MG/ML
20 INJECTION INTRAVENOUS ONCE AS NEEDED
Status: CANCELLED | OUTPATIENT
Start: 2024-03-13

## 2024-02-14 RX ORDER — EPINEPHRINE 0.3 MG/.3ML
0.3 INJECTION SUBCUTANEOUS EVERY 5 MIN PRN
Status: CANCELLED | OUTPATIENT
Start: 2024-03-13

## 2024-02-14 RX ORDER — FAMOTIDINE 10 MG/ML
20 INJECTION INTRAVENOUS ONCE AS NEEDED
Status: CANCELLED | OUTPATIENT
Start: 2024-05-15

## 2024-02-14 RX ORDER — DIPHENHYDRAMINE HYDROCHLORIDE 50 MG/ML
50 INJECTION INTRAMUSCULAR; INTRAVENOUS AS NEEDED
Status: CANCELLED | OUTPATIENT
Start: 2024-04-24

## 2024-02-14 RX ORDER — FAMOTIDINE 10 MG/ML
20 INJECTION INTRAVENOUS ONCE AS NEEDED
Status: CANCELLED | OUTPATIENT
Start: 2024-06-26

## 2024-02-15 ENCOUNTER — APPOINTMENT (OUTPATIENT)
Dept: CARDIOLOGY | Facility: HOSPITAL | Age: 64
End: 2024-02-15
Payer: COMMERCIAL

## 2024-02-15 ENCOUNTER — HOSPITAL ENCOUNTER (OUTPATIENT)
Facility: HOSPITAL | Age: 64
Setting detail: OBSERVATION
Discharge: HOME | End: 2024-02-16
Attending: STUDENT IN AN ORGANIZED HEALTH CARE EDUCATION/TRAINING PROGRAM | Admitting: INTERNAL MEDICINE
Payer: COMMERCIAL

## 2024-02-15 DIAGNOSIS — K92.0 COFFEE GROUND EMESIS: ICD-10-CM

## 2024-02-15 DIAGNOSIS — D84.9 IMMUNOSUPPRESSED STATUS (MULTI): ICD-10-CM

## 2024-02-15 DIAGNOSIS — Z79.01 ANTICOAGULATED: ICD-10-CM

## 2024-02-15 DIAGNOSIS — R04.0 EPISTAXIS: Primary | ICD-10-CM

## 2024-02-15 LAB
ABO GROUP (TYPE) IN BLOOD: NORMAL
ALBUMIN SERPL BCP-MCNC: 4.1 G/DL (ref 3.4–5)
ALP SERPL-CCNC: 57 U/L (ref 33–136)
ALT SERPL W P-5'-P-CCNC: 21 U/L (ref 7–45)
ANION GAP BLDV CALCULATED.4IONS-SCNC: 13 MMOL/L (ref 10–25)
ANION GAP SERPL CALC-SCNC: 16 MMOL/L (ref 10–20)
ANTIBODY SCREEN: NORMAL
AST SERPL W P-5'-P-CCNC: 15 U/L (ref 9–39)
BASE EXCESS BLDV CALC-SCNC: 2.7 MMOL/L (ref -2–3)
BASOPHILS # BLD AUTO: 0.11 X10*3/UL (ref 0–0.1)
BASOPHILS NFR BLD AUTO: 1.1 %
BILIRUB DIRECT SERPL-MCNC: 0.1 MG/DL (ref 0–0.3)
BILIRUB SERPL-MCNC: 0.4 MG/DL (ref 0–1.2)
BODY TEMPERATURE: 37 DEGREES CELSIUS
BUN SERPL-MCNC: 22 MG/DL (ref 6–23)
CA-I BLDV-SCNC: 1.2 MMOL/L (ref 1.1–1.33)
CALCIUM SERPL-MCNC: 9.1 MG/DL (ref 8.6–10.3)
CARDIAC TROPONIN I PNL SERPL HS: 4 NG/L (ref 0–13)
CHLORIDE BLDV-SCNC: 105 MMOL/L (ref 98–107)
CHLORIDE SERPL-SCNC: 106 MMOL/L (ref 98–107)
CO2 SERPL-SCNC: 23 MMOL/L (ref 21–32)
CREAT SERPL-MCNC: 0.66 MG/DL (ref 0.5–1.05)
EGFRCR SERPLBLD CKD-EPI 2021: >90 ML/MIN/1.73M*2
EOSINOPHIL # BLD AUTO: 0.27 X10*3/UL (ref 0–0.7)
EOSINOPHIL NFR BLD AUTO: 2.7 %
ERYTHROCYTE [DISTWIDTH] IN BLOOD BY AUTOMATED COUNT: 15.1 % (ref 11.5–14.5)
GLUCOSE BLDV-MCNC: 177 MG/DL (ref 74–99)
GLUCOSE SERPL-MCNC: 177 MG/DL (ref 74–99)
HCO3 BLDV-SCNC: 26.1 MMOL/L (ref 22–26)
HCT VFR BLD AUTO: 36.7 % (ref 36–46)
HCT VFR BLD EST: 36 % (ref 36–46)
HGB BLD-MCNC: 11.9 G/DL (ref 12–16)
HGB BLDV-MCNC: 12 G/DL (ref 12–16)
HOLD SPECIMEN: NORMAL
IMM GRANULOCYTES # BLD AUTO: 0.04 X10*3/UL (ref 0–0.7)
IMM GRANULOCYTES NFR BLD AUTO: 0.4 % (ref 0–0.9)
INHALED O2 CONCENTRATION: 21 %
INR PPP: 1.1 (ref 0.9–1.1)
LACTATE BLDV-SCNC: 2.8 MMOL/L (ref 0.4–2)
LACTATE SERPL-SCNC: 1.5 MMOL/L (ref 0.4–2)
LACTATE SERPL-SCNC: 2.9 MMOL/L (ref 0.4–2)
LYMPHOCYTES # BLD AUTO: 2.85 X10*3/UL (ref 1.2–4.8)
LYMPHOCYTES NFR BLD AUTO: 28.6 %
MCH RBC QN AUTO: 28.3 PG (ref 26–34)
MCHC RBC AUTO-ENTMCNC: 32.4 G/DL (ref 32–36)
MCV RBC AUTO: 87 FL (ref 80–100)
MONOCYTES # BLD AUTO: 0.88 X10*3/UL (ref 0.1–1)
MONOCYTES NFR BLD AUTO: 8.8 %
NEUTROPHILS # BLD AUTO: 5.81 X10*3/UL (ref 1.2–7.7)
NEUTROPHILS NFR BLD AUTO: 58.4 %
NRBC BLD-RTO: 0 /100 WBCS (ref 0–0)
OXYHGB MFR BLDV: 91.8 % (ref 45–75)
PCO2 BLDV: 35 MM HG (ref 41–51)
PH BLDV: 7.48 PH (ref 7.33–7.43)
PLATELET # BLD AUTO: 312 X10*3/UL (ref 150–450)
PO2 BLDV: 63 MM HG (ref 35–45)
POTASSIUM BLDV-SCNC: 3.6 MMOL/L (ref 3.5–5.3)
POTASSIUM SERPL-SCNC: 3.5 MMOL/L (ref 3.5–5.3)
PROT SERPL-MCNC: 6.2 G/DL (ref 6.4–8.2)
PROTHROMBIN TIME: 12.1 SECONDS (ref 9.8–12.8)
RBC # BLD AUTO: 4.2 X10*6/UL (ref 4–5.2)
RH FACTOR (ANTIGEN D): NORMAL
SAO2 % BLDV: 94 % (ref 45–75)
SODIUM BLDV-SCNC: 140 MMOL/L (ref 136–145)
SODIUM SERPL-SCNC: 141 MMOL/L (ref 136–145)
WBC # BLD AUTO: 10 X10*3/UL (ref 4.4–11.3)

## 2024-02-15 PROCEDURE — 99222 1ST HOSP IP/OBS MODERATE 55: CPT | Performed by: NURSE PRACTITIONER

## 2024-02-15 PROCEDURE — G0378 HOSPITAL OBSERVATION PER HR: HCPCS

## 2024-02-15 PROCEDURE — 96375 TX/PRO/DX INJ NEW DRUG ADDON: CPT | Performed by: INTERNAL MEDICINE

## 2024-02-15 PROCEDURE — 84132 ASSAY OF SERUM POTASSIUM: CPT | Mod: 59 | Performed by: STUDENT IN AN ORGANIZED HEALTH CARE EDUCATION/TRAINING PROGRAM

## 2024-02-15 PROCEDURE — 85025 COMPLETE CBC W/AUTO DIFF WBC: CPT | Performed by: STUDENT IN AN ORGANIZED HEALTH CARE EDUCATION/TRAINING PROGRAM

## 2024-02-15 PROCEDURE — 96374 THER/PROPH/DIAG INJ IV PUSH: CPT | Mod: 59 | Performed by: INTERNAL MEDICINE

## 2024-02-15 PROCEDURE — 96365 THER/PROPH/DIAG IV INF INIT: CPT | Performed by: INTERNAL MEDICINE

## 2024-02-15 PROCEDURE — 30901 CONTROL OF NOSEBLEED: CPT

## 2024-02-15 PROCEDURE — 84484 ASSAY OF TROPONIN QUANT: CPT | Performed by: STUDENT IN AN ORGANIZED HEALTH CARE EDUCATION/TRAINING PROGRAM

## 2024-02-15 PROCEDURE — 82330 ASSAY OF CALCIUM: CPT | Performed by: STUDENT IN AN ORGANIZED HEALTH CARE EDUCATION/TRAINING PROGRAM

## 2024-02-15 PROCEDURE — 99285 EMERGENCY DEPT VISIT HI MDM: CPT | Mod: 25 | Performed by: STUDENT IN AN ORGANIZED HEALTH CARE EDUCATION/TRAINING PROGRAM

## 2024-02-15 PROCEDURE — 93005 ELECTROCARDIOGRAM TRACING: CPT

## 2024-02-15 PROCEDURE — 85610 PROTHROMBIN TIME: CPT | Performed by: STUDENT IN AN ORGANIZED HEALTH CARE EDUCATION/TRAINING PROGRAM

## 2024-02-15 PROCEDURE — 2500000001 HC RX 250 WO HCPCS SELF ADMINISTERED DRUGS (ALT 637 FOR MEDICARE OP): Performed by: STUDENT IN AN ORGANIZED HEALTH CARE EDUCATION/TRAINING PROGRAM

## 2024-02-15 PROCEDURE — 83605 ASSAY OF LACTIC ACID: CPT | Performed by: STUDENT IN AN ORGANIZED HEALTH CARE EDUCATION/TRAINING PROGRAM

## 2024-02-15 PROCEDURE — 86900 BLOOD TYPING SEROLOGIC ABO: CPT | Performed by: STUDENT IN AN ORGANIZED HEALTH CARE EDUCATION/TRAINING PROGRAM

## 2024-02-15 PROCEDURE — 82248 BILIRUBIN DIRECT: CPT | Performed by: STUDENT IN AN ORGANIZED HEALTH CARE EDUCATION/TRAINING PROGRAM

## 2024-02-15 PROCEDURE — C9113 INJ PANTOPRAZOLE SODIUM, VIA: HCPCS | Performed by: NURSE PRACTITIONER

## 2024-02-15 PROCEDURE — 2500000004 HC RX 250 GENERAL PHARMACY W/ HCPCS (ALT 636 FOR OP/ED): Performed by: STUDENT IN AN ORGANIZED HEALTH CARE EDUCATION/TRAINING PROGRAM

## 2024-02-15 PROCEDURE — 2500000004 HC RX 250 GENERAL PHARMACY W/ HCPCS (ALT 636 FOR OP/ED): Performed by: NURSE PRACTITIONER

## 2024-02-15 RX ORDER — ACETAMINOPHEN 325 MG/1
650 TABLET ORAL EVERY 4 HOURS PRN
Status: DISCONTINUED | OUTPATIENT
Start: 2024-02-15 | End: 2024-02-16 | Stop reason: HOSPADM

## 2024-02-15 RX ORDER — CEFAZOLIN SODIUM 1 G/50ML
1 SOLUTION INTRAVENOUS ONCE
Status: COMPLETED | OUTPATIENT
Start: 2024-02-15 | End: 2024-02-15

## 2024-02-15 RX ORDER — ASPIRIN 81 MG/1
81 TABLET ORAL DAILY
Status: DISCONTINUED | OUTPATIENT
Start: 2024-02-16 | End: 2024-02-16 | Stop reason: HOSPADM

## 2024-02-15 RX ORDER — ONDANSETRON HYDROCHLORIDE 2 MG/ML
4 INJECTION, SOLUTION INTRAVENOUS ONCE
Status: COMPLETED | OUTPATIENT
Start: 2024-02-15 | End: 2024-02-15

## 2024-02-15 RX ORDER — OXYMETAZOLINE HCL 0.05 %
2 SPRAY, NON-AEROSOL (ML) NASAL ONCE
Status: COMPLETED | OUTPATIENT
Start: 2024-02-15 | End: 2024-02-15

## 2024-02-15 RX ORDER — ACETAMINOPHEN 650 MG/1
650 SUPPOSITORY RECTAL EVERY 4 HOURS PRN
Status: DISCONTINUED | OUTPATIENT
Start: 2024-02-15 | End: 2024-02-16 | Stop reason: HOSPADM

## 2024-02-15 RX ORDER — ONDANSETRON HYDROCHLORIDE 2 MG/ML
4 INJECTION, SOLUTION INTRAVENOUS EVERY 6 HOURS PRN
Status: DISCONTINUED | OUTPATIENT
Start: 2024-02-15 | End: 2024-02-16 | Stop reason: HOSPADM

## 2024-02-15 RX ORDER — PANTOPRAZOLE SODIUM 40 MG/10ML
40 INJECTION, POWDER, LYOPHILIZED, FOR SOLUTION INTRAVENOUS 2 TIMES DAILY
Status: DISCONTINUED | OUTPATIENT
Start: 2024-02-15 | End: 2024-02-16

## 2024-02-15 RX ORDER — HYDRALAZINE HYDROCHLORIDE 20 MG/ML
5 INJECTION INTRAMUSCULAR; INTRAVENOUS EVERY 6 HOURS PRN
Status: DISCONTINUED | OUTPATIENT
Start: 2024-02-15 | End: 2024-02-16 | Stop reason: HOSPADM

## 2024-02-15 RX ORDER — AMLODIPINE BESYLATE 5 MG/1
5 TABLET ORAL DAILY
Status: DISCONTINUED | OUTPATIENT
Start: 2024-02-16 | End: 2024-02-16 | Stop reason: HOSPADM

## 2024-02-15 RX ORDER — ACETAMINOPHEN 160 MG/5ML
650 SOLUTION ORAL EVERY 4 HOURS PRN
Status: DISCONTINUED | OUTPATIENT
Start: 2024-02-15 | End: 2024-02-16 | Stop reason: HOSPADM

## 2024-02-15 RX ADMIN — OXYMETAZOLINE HYDROCHLORIDE 2 SPRAY: 0.05 SPRAY NASAL at 16:20

## 2024-02-15 RX ADMIN — CEFAZOLIN SODIUM 1 G: 1 INJECTION, SOLUTION INTRAVENOUS at 20:59

## 2024-02-15 RX ADMIN — ONDANSETRON 4 MG: 2 INJECTION INTRAMUSCULAR; INTRAVENOUS at 17:50

## 2024-02-15 RX ADMIN — PANTOPRAZOLE SODIUM 40 MG: 40 INJECTION, POWDER, FOR SOLUTION INTRAVENOUS at 21:28

## 2024-02-15 SDOH — SOCIAL STABILITY: SOCIAL INSECURITY: ARE THERE ANY APPARENT SIGNS OF INJURIES/BEHAVIORS THAT COULD BE RELATED TO ABUSE/NEGLECT?: NO

## 2024-02-15 SDOH — SOCIAL STABILITY: SOCIAL INSECURITY: DOES ANYONE TRY TO KEEP YOU FROM HAVING/CONTACTING OTHER FRIENDS OR DOING THINGS OUTSIDE YOUR HOME?: NO

## 2024-02-15 SDOH — SOCIAL STABILITY: SOCIAL INSECURITY: HAVE YOU HAD THOUGHTS OF HARMING ANYONE ELSE?: NO

## 2024-02-15 SDOH — SOCIAL STABILITY: SOCIAL INSECURITY: ARE YOU OR HAVE YOU BEEN THREATENED OR ABUSED PHYSICALLY, EMOTIONALLY, OR SEXUALLY BY ANYONE?: NO

## 2024-02-15 SDOH — SOCIAL STABILITY: SOCIAL INSECURITY: DO YOU FEEL UNSAFE GOING BACK TO THE PLACE WHERE YOU ARE LIVING?: NO

## 2024-02-15 SDOH — SOCIAL STABILITY: SOCIAL INSECURITY: ABUSE: ADULT

## 2024-02-15 SDOH — SOCIAL STABILITY: SOCIAL INSECURITY: WERE YOU ABLE TO COMPLETE ALL THE BEHAVIORAL HEALTH SCREENINGS?: YES

## 2024-02-15 SDOH — SOCIAL STABILITY: SOCIAL INSECURITY: DO YOU FEEL ANYONE HAS EXPLOITED OR TAKEN ADVANTAGE OF YOU FINANCIALLY OR OF YOUR PERSONAL PROPERTY?: NO

## 2024-02-15 SDOH — SOCIAL STABILITY: SOCIAL INSECURITY: HAS ANYONE EVER THREATENED TO HURT YOUR FAMILY OR YOUR PETS?: NO

## 2024-02-15 ASSESSMENT — ACTIVITIES OF DAILY LIVING (ADL)
PATIENT'S MEMORY ADEQUATE TO SAFELY COMPLETE DAILY ACTIVITIES?: YES
TOILETING: INDEPENDENT
HEARING - LEFT EAR: FUNCTIONAL
BATHING: INDEPENDENT
FEEDING YOURSELF: INDEPENDENT
ADEQUATE_TO_COMPLETE_ADL: YES
LACK_OF_TRANSPORTATION: NO
GROOMING: INDEPENDENT
JUDGMENT_ADEQUATE_SAFELY_COMPLETE_DAILY_ACTIVITIES: YES
DRESSING YOURSELF: INDEPENDENT
WALKS IN HOME: INDEPENDENT
HEARING - RIGHT EAR: FUNCTIONAL

## 2024-02-15 ASSESSMENT — COLUMBIA-SUICIDE SEVERITY RATING SCALE - C-SSRS
1. SINCE LAST CONTACT, HAVE YOU WISHED YOU WERE DEAD OR WISHED YOU COULD GO TO SLEEP AND NOT WAKE UP?: NO
2. HAVE YOU ACTUALLY HAD ANY THOUGHTS OF KILLING YOURSELF?: NO
6. HAVE YOU EVER DONE ANYTHING, STARTED TO DO ANYTHING, OR PREPARED TO DO ANYTHING TO END YOUR LIFE?: NO
6. HAVE YOU EVER DONE ANYTHING, STARTED TO DO ANYTHING, OR PREPARED TO DO ANYTHING TO END YOUR LIFE?: NO
1. IN THE PAST MONTH, HAVE YOU WISHED YOU WERE DEAD OR WISHED YOU COULD GO TO SLEEP AND NOT WAKE UP?: NO
2. HAVE YOU ACTUALLY HAD ANY THOUGHTS OF KILLING YOURSELF?: NO

## 2024-02-15 ASSESSMENT — COGNITIVE AND FUNCTIONAL STATUS - GENERAL
PATIENT BASELINE BEDBOUND: NO
MOBILITY SCORE: 24
DAILY ACTIVITIY SCORE: 24

## 2024-02-15 ASSESSMENT — PAIN - FUNCTIONAL ASSESSMENT: PAIN_FUNCTIONAL_ASSESSMENT: 0-10

## 2024-02-15 ASSESSMENT — PATIENT HEALTH QUESTIONNAIRE - PHQ9
SUM OF ALL RESPONSES TO PHQ9 QUESTIONS 1 & 2: 0
1. LITTLE INTEREST OR PLEASURE IN DOING THINGS: NOT AT ALL
2. FEELING DOWN, DEPRESSED OR HOPELESS: NOT AT ALL

## 2024-02-15 ASSESSMENT — LIFESTYLE VARIABLES
HAVE PEOPLE ANNOYED YOU BY CRITICIZING YOUR DRINKING: NO
HOW MANY STANDARD DRINKS CONTAINING ALCOHOL DO YOU HAVE ON A TYPICAL DAY: PATIENT DOES NOT DRINK
HAVE YOU EVER FELT YOU SHOULD CUT DOWN ON YOUR DRINKING: NO
HOW OFTEN DO YOU HAVE A DRINK CONTAINING ALCOHOL: NEVER
AUDIT-C TOTAL SCORE: 0
AUDIT-C TOTAL SCORE: 0
SKIP TO QUESTIONS 9-10: 1
EVER HAD A DRINK FIRST THING IN THE MORNING TO STEADY YOUR NERVES TO GET RID OF A HANGOVER: NO
HOW OFTEN DO YOU HAVE 6 OR MORE DRINKS ON ONE OCCASION: NEVER
EVER FELT BAD OR GUILTY ABOUT YOUR DRINKING: NO

## 2024-02-15 ASSESSMENT — PAIN SCALES - GENERAL: PAINLEVEL_OUTOF10: 0 - NO PAIN

## 2024-02-15 NOTE — DISCHARGE INSTRUCTIONS
As we discussed in the emergency department, if you should experience another nosebleed, blow your nose to clear all the clots, spray the medication given into your nose, and hold pressure with a clamp given to you for 15 minutes.  If the bleeding stops, you do not need to do anything further.  If the bleeding continues, reapply pressure and return to the emergency department.

## 2024-02-15 NOTE — ED PROVIDER NOTES
HPI   Chief Complaint   Patient presents with    Epistaxis (Nose Bleed)     Nose started bleeding around 2pm.        On anticoagulation presents with epistaxis x 2 hours.  States she noticed that after inserting her digit into the right naris.  Applied pressure without relief.  Denies lightheadedness, palpitations, and shortness of breath.      History provided by:  Patient   used: No                        D Hanis Coma Scale Score: 15                     Patient History   Past Medical History:   Diagnosis Date    Abnormal findings on diagnostic imaging of other specified body structures     Abnormal CT of the chest    Personal history of other medical treatment     History of echocardiogram    Personal history of other medical treatment     History of transesophageal echocardiography (MAYKEL)    Personal history of other medical treatment     History of cardiac monitoring    Personal history of transient ischemic attack (TIA), and cerebral infarction without residual deficits     History of cerebrovascular accident    Tinea unguium 10/12/2021    Onychomycosis of toenail     Past Surgical History:   Procedure Laterality Date    APPENDECTOMY  2014    Appendectomy    BREAST LUMPECTOMY       SECTION, CLASSIC  2014     Section    IR CVC PORT  2023    IR CVC PORT 2023 [facility]    MR HEAD ANGIO WO IV CONTRAST  2020    MR HEAD ANGIO WO IV CONTRAST 2020 [facility]    MR NECK ANGIO WO IV CONTRAST  2020    MR NECK ANGIO WO IV CONTRAST 2020 [facility] CLINICAL LEGAC[facility]    OTHER SURGICAL HISTORY  2020    Esophagogastroduodenoscopy    OTHER SURGICAL HISTORY  2020    Colonoscopy    OTHER SURGICAL HISTORY  2021    Patent foramen ovale repair     Family History   Problem Relation Name Age of Onset    Diabetes Mother      Hypertension Mother      Cancer Mother      Coronary artery disease Father      Heart attack Maternal  Grandfather      Stroke Other       Social History     Tobacco Use    Smoking status: Never     Passive exposure: Never    Smokeless tobacco: Never   Vaping Use    Vaping Use: Never used   Substance Use Topics    Alcohol use: Never    Drug use: Never       Physical Exam   ED Triage Vitals [02/15/24 1612]   Temperature Heart Rate Respirations BP   37.1 °C (98.8 °F) (!) 101 20 (!) 160/96      Pulse Ox Temp Source Heart Rate Source Patient Position   97 % Tympanic -- --      BP Location FiO2 (%)     -- --       Physical Exam  Vitals and nursing note reviewed.   HENT:      Head: Atraumatic.      Nose: Nose normal.      Comments: No active bleeding on initial exam after nasal clamp removed from nose.     Mouth/Throat:      Mouth: Mucous membranes are moist.   Eyes:      Conjunctiva/sclera: Conjunctivae normal.   Cardiovascular:      Rate and Rhythm: Normal rate and regular rhythm.   Pulmonary:      Effort: Pulmonary effort is normal.   Abdominal:      Palpations: Abdomen is soft.      Tenderness: There is no abdominal tenderness.   Musculoskeletal:         General: No deformity.      Cervical back: Normal range of motion.   Skin:     General: Skin is warm and dry.   Neurological:      Mental Status: She is alert.      Comments: Moving all extremities         ED Course & MDM   ED Course as of 02/15/24 1857   Thu Feb 15, 2024   1641 Had patient blow her nose to remove clots.  Bleeding started from R naris after clot removed.  Afrin was administered.  Nasal clamp placed.  Will reassess in 15 minutes for hemostasis. [AB]   1728 No bleeding on repeat examination.  Reiterate steps to take if the bleeding recurs with the patient as well as her daughter at bedside.  Was given Afrin as well as 2 additional nasal clamps. [AB]   1809 Exam performed with headlamp and nasal speculum.  Able to isolate a polyp to the anterior nasal septum that is the source of bleeding.  Initially attempted Merisel.  Unfortunately, patient was oozing  through this.  Decision was made to insert a 4.5 cm anterior Rhino Rocket, which achieved hemostasis. [AB]   1814 Lactate(!): 2.9 [AB]   1815 ECG 12 lead  My ECG interpretation: Normal sinus rhythm, heart rate 94, no ST segment elevation, poor R wave progression noted across the precordium that was present on prior [AB]   1821 Wonder if her dark emesis was secondary to consume blood from epistaxis.  Also considering GI bleed, but does seem less likely than the former. [AB]      ED Course User Index  [AB] Everett Meyer MD         Diagnoses as of 02/15/24 1857   Epistaxis   Coffee ground emesis   Anticoagulated   Immunosuppressed status (CMS/HCC)       Medical Decision Making  Presents with epistaxis that is on anticoagulation that was ultimately controlled with Afrin.  No repeat bleeding after period of observation.  Initially plan for discharge.    After being discharged, patient stood up to use the restroom.  Apparently did not feel well after this.  She was sat back down in an ER bed by nursing.  Daughter then reportedly laid the patient flat.  Patient then began to have several episodes of dark emesis.  She was taken to the shock bay.  Emesis resolved without any acute intervention.  Was noted to have slight oozing from the right naris once again, which a nasal clamp was placed to temporize with sufficient hemostasis.  Given this episode, will expand diagnostics.  Daughter at bedside reiterates the patient's past medical history that includes breast cancer currently on chemotherapy, prior CVA, and history of PE on anticoagulation.  No history of GI bleed or cirrhosis.    In terms of epistaxis, ultimately able to isolate a polyp to the right anterior nasal septum.  Ultimately had to pack with a 4.5 cm Rhino Rocket.  Did give a dose of IV Ancef for prophylaxis.  Did reach out to Dr. Donovan with ENT, who can evaluate the patient in the morning.    My suspicion is that the dark emesis was secondary to blood  consumed from the patient's epistaxis.  However, I cannot definitively say that this was not secondary to a GI bleed.  My suspicion for this is lower, I do think that the patient would benefit from observation overnight.  Patient family member at bedside agree.    Amount and/or Complexity of Data Reviewed  Labs: ordered.  ECG/medicine tests: ordered and independent interpretation performed. Decision-making details documented in ED Course.  Discussion of management or test interpretation with external provider(s): Dr. Donovan with ENT via secure chat as well as the admitting medicine physician, Dr. Workman, via phone conversation    Risk  Decision regarding hospitalization.        Procedure  Procedures     Everett Meyer MD  02/15/24 1730       Everett Meyer MD  02/15/24 1748       Everett Meyer MD  02/15/24 [id number]

## 2024-02-16 VITALS
HEIGHT: 63 IN | RESPIRATION RATE: 19 BRPM | BODY MASS INDEX: 29.06 KG/M2 | SYSTOLIC BLOOD PRESSURE: 128 MMHG | DIASTOLIC BLOOD PRESSURE: 87 MMHG | WEIGHT: 164 LBS | HEART RATE: 85 BPM | TEMPERATURE: 98.6 F | OXYGEN SATURATION: 91 %

## 2024-02-16 LAB
ANION GAP SERPL CALC-SCNC: 11 MMOL/L (ref 10–20)
ATRIAL RATE: 94 BPM
BUN SERPL-MCNC: 25 MG/DL (ref 6–23)
CALCIUM SERPL-MCNC: 8.9 MG/DL (ref 8.6–10.3)
CHLORIDE SERPL-SCNC: 107 MMOL/L (ref 98–107)
CO2 SERPL-SCNC: 27 MMOL/L (ref 21–32)
CREAT SERPL-MCNC: 0.71 MG/DL (ref 0.5–1.05)
EGFRCR SERPLBLD CKD-EPI 2021: >90 ML/MIN/1.73M*2
ERYTHROCYTE [DISTWIDTH] IN BLOOD BY AUTOMATED COUNT: 15.2 % (ref 11.5–14.5)
GLUCOSE SERPL-MCNC: 122 MG/DL (ref 74–99)
HCT VFR BLD AUTO: 31.1 % (ref 36–46)
HGB BLD-MCNC: 10 G/DL (ref 12–16)
MCH RBC QN AUTO: 28.3 PG (ref 26–34)
MCHC RBC AUTO-ENTMCNC: 32.2 G/DL (ref 32–36)
MCV RBC AUTO: 88 FL (ref 80–100)
NRBC BLD-RTO: 0 /100 WBCS (ref 0–0)
P AXIS: 42 DEGREES
P OFFSET: 181 MS
P ONSET: 127 MS
PLATELET # BLD AUTO: 228 X10*3/UL (ref 150–450)
POTASSIUM SERPL-SCNC: 3.8 MMOL/L (ref 3.5–5.3)
PR INTERVAL: 174 MS
Q ONSET: 214 MS
QRS COUNT: 16 BEATS
QRS DURATION: 94 MS
QT INTERVAL: 378 MS
QTC CALCULATION(BAZETT): 472 MS
QTC FREDERICIA: 439 MS
R AXIS: -38 DEGREES
RBC # BLD AUTO: 3.53 X10*6/UL (ref 4–5.2)
SODIUM SERPL-SCNC: 141 MMOL/L (ref 136–145)
T AXIS: 13 DEGREES
T OFFSET: 403 MS
VENTRICULAR RATE: 94 BPM
WBC # BLD AUTO: 5.7 X10*3/UL (ref 4.4–11.3)

## 2024-02-16 PROCEDURE — 2500000004 HC RX 250 GENERAL PHARMACY W/ HCPCS (ALT 636 FOR OP/ED): Performed by: NURSE PRACTITIONER

## 2024-02-16 PROCEDURE — C9113 INJ PANTOPRAZOLE SODIUM, VIA: HCPCS | Performed by: NURSE PRACTITIONER

## 2024-02-16 PROCEDURE — 85027 COMPLETE CBC AUTOMATED: CPT | Performed by: NURSE PRACTITIONER

## 2024-02-16 PROCEDURE — G0378 HOSPITAL OBSERVATION PER HR: HCPCS

## 2024-02-16 PROCEDURE — 96376 TX/PRO/DX INJ SAME DRUG ADON: CPT | Performed by: INTERNAL MEDICINE

## 2024-02-16 PROCEDURE — 80048 BASIC METABOLIC PNL TOTAL CA: CPT | Performed by: NURSE PRACTITIONER

## 2024-02-16 RX ORDER — AMOXICILLIN AND CLAVULANATE POTASSIUM 875; 125 MG/1; MG/1
1 TABLET, FILM COATED ORAL EVERY 12 HOURS SCHEDULED
Status: DISCONTINUED | OUTPATIENT
Start: 2024-02-16 | End: 2024-02-16 | Stop reason: HOSPADM

## 2024-02-16 RX ORDER — PANTOPRAZOLE SODIUM 40 MG/1
40 TABLET, DELAYED RELEASE ORAL
Status: DISCONTINUED | OUTPATIENT
Start: 2024-02-17 | End: 2024-02-16 | Stop reason: HOSPADM

## 2024-02-16 RX ORDER — ACETAMINOPHEN 325 MG/1
650 TABLET ORAL EVERY 4 HOURS PRN
Qty: 30 TABLET | Refills: 0 | Status: SHIPPED | OUTPATIENT
Start: 2024-02-16

## 2024-02-16 RX ORDER — AMOXICILLIN AND CLAVULANATE POTASSIUM 875; 125 MG/1; MG/1
1 TABLET, FILM COATED ORAL EVERY 12 HOURS SCHEDULED
Qty: 20 TABLET | Refills: 0 | Status: SHIPPED | OUTPATIENT
Start: 2024-02-16 | End: 2024-02-26

## 2024-02-16 RX ORDER — PANTOPRAZOLE SODIUM 40 MG/1
40 TABLET, DELAYED RELEASE ORAL
Qty: 30 TABLET | Refills: 0 | Status: SHIPPED | OUTPATIENT
Start: 2024-02-17 | End: 2024-03-18

## 2024-02-16 RX ORDER — FERROUS SULFATE 325(65) MG
65 TABLET ORAL
Status: DISCONTINUED | OUTPATIENT
Start: 2024-02-17 | End: 2024-02-16 | Stop reason: HOSPADM

## 2024-02-16 RX ADMIN — PANTOPRAZOLE SODIUM 40 MG: 40 INJECTION, POWDER, FOR SOLUTION INTRAVENOUS at 08:52

## 2024-02-16 ASSESSMENT — COGNITIVE AND FUNCTIONAL STATUS - GENERAL
DAILY ACTIVITIY SCORE: 24
MOBILITY SCORE: 24

## 2024-02-16 ASSESSMENT — PAIN SCALES - GENERAL: PAINLEVEL_OUTOF10: 0 - NO PAIN

## 2024-02-16 NOTE — H&P
History Of Present Illness  Cassandra Lang is a 63-year-old female with past medical history of hypertension, hyperlipidemia, prediabetes, PE on Eliquis, CVA, PFO s/p repair GERD, IBS, anxiety, depression, vitamin D deficiency, right breast cancer s/p lumpectomy and SLN and current treatment weekly paclitaxel/trastuzumab.  Patient presents with epistaxis.  Patient is primarily Polish speaking,  services offered but declined.  Daughter at bedside who assisted with history.  She reports bleeding started around 2 PM, she noted taste of blood in her mouth and feels she swallowed a significant amount.  Reports prior history of epistaxis, however never previously needed emergent treatment. In the ED patient received Afrin and underwent examination with suspected polyp in the anterior nasal septum as a source of bleeding, hemostasis was achieved with 4.5 cm anterior Rhino Rocket.  Patient originally was going to be discharged but subsequently developed nausea with several episodes of dark emesis and associated syncopal episode/dizziness.  No prior history of GI bleed or peptic ulcers disease.  Denies focal weakness, chest pain, palpitations, rapid heart rate, shortness of breath, cough, wheezing, abdominal pain, recent change in bowel pattern, urinary complaints, wounds, and edema.  Denies NSAID use other than baby aspirin daily.  EGD 9/8/2021 for GERD notable for erosive gastropathy with stigmata of recent bleeding and single gastric polyp. Dr. Donovan with ENT was placed on consult.    ER course: Hemodynamically stable, afebrile, SpO2 97% on room air. EKG per ER provider interpretation normal sinus rhythm, heart rate 94, no ST segment elevation, poor R wave progression noted across the precordium that was present on prior.  WBC 10.0, hemoglobin 7.9/hematocrit 36.7 (recent baseline 11-12, but had been 13-14 earlier in 2023), platelets 312.  INR 1.1.  Glucose 177 otherwise CMP was unremarkable.  Lactate 2.9,  repeat 1.5.  High-sensitivity troponin 4.    Past medical history: As above  Past surgical history: Appendectomy,  section, PFO repair  Social history: Denies history of smoking, alcohol abuse, or illicit drug use.  Family history: CAD, kidney cancer, diabetes, hypertension.     EGD 2021  Impression:      - Esophagogastric landmarks identified.                         - Erosive gastropathy with stigmata of recent bleeding. Biopsied.                         - A single gastric polyp. Biopsied.                         - Normal examined duodenum.     Past Medical History  Past Medical History:   Diagnosis Date    Abnormal findings on diagnostic imaging of other specified body structures     Abnormal CT of the chest    Personal history of other medical treatment     History of echocardiogram    Personal history of other medical treatment     History of transesophageal echocardiography (MAYKEL)    Personal history of other medical treatment     History of cardiac monitoring    Personal history of transient ischemic attack (TIA), and cerebral infarction without residual deficits     History of cerebrovascular accident    Tinea unguium 10/12/2021    Onychomycosis of toenail       Surgical History  Past Surgical History:   Procedure Laterality Date    APPENDECTOMY  2014    Appendectomy    BREAST LUMPECTOMY       SECTION, CLASSIC  2014     Section    IR CVC PORT  2023    IR CVC PORT 2023 U CVEPINV    MR HEAD ANGIO WO IV CONTRAST  2020    MR HEAD ANGIO WO IV CONTRAST 2020 [facility] CLINICAL LEGACY    MR NECK ANGIO WO IV CONTRAST  2020    MR NECK ANGIO WO IV CONTRAST 2020 [facility] CLINICAL LEGACY    OTHER SURGICAL HISTORY  2020    Esophagogastroduodenoscopy    OTHER SURGICAL HISTORY  2020    Colonoscopy    OTHER SURGICAL HISTORY  2021    Patent foramen ovale repair        Social History  She reports that she has never smoked. She has never been  exposed to tobacco smoke. She has never used smokeless tobacco. She reports that she does not drink alcohol and does not use drugs.    Family History  Family History   Problem Relation Name Age of Onset    Diabetes Mother      Hypertension Mother      Cancer Mother      Coronary artery disease Father      Heart attack Maternal Grandfather      Stroke Other          Allergies  Sulfamethoxazole-trimethoprim    Review of systems    10 point ROS negative except as noted above in HPI     Physical Exam  Constitutional:       General: She is awake. She is not in acute distress.     Appearance: Normal appearance. She is not toxic-appearing.   HENT:      Head: Atraumatic.      Nose:      Comments: Rhino Rocket in right nare, unable to visualize posterior oral cavity any bleeding.     Mouth/Throat:      Mouth: Mucous membranes are moist.   Eyes:      Conjunctiva/sclera: Conjunctivae normal.   Cardiovascular:      Rate and Rhythm: Normal rate and regular rhythm.      Pulses: Normal pulses.      Heart sounds: No murmur heard.  Pulmonary:      Effort: Pulmonary effort is normal. No respiratory distress.      Breath sounds: Normal breath sounds. No wheezing or rhonchi.   Abdominal:      General: Bowel sounds are normal. There is no distension.      Palpations: Abdomen is soft.      Tenderness: There is no abdominal tenderness. There is no guarding.   Musculoskeletal:         General: No swelling, deformity or signs of injury. Normal range of motion.      Cervical back: Neck supple.      Right lower leg: No edema.      Left lower leg: No edema.   Skin:     General: Skin is warm and dry.      Capillary Refill: Capillary refill takes less than 2 seconds.      Findings: No ecchymosis, erythema or wound.   Neurological:      General: No focal deficit present.      Mental Status: She is alert and oriented to person, place, and time.   Psychiatric:         Mood and Affect: Mood normal.         Behavior: Behavior is cooperative.         "  Last Recorded Vitals  Blood pressure 122/84, pulse 94, temperature 37.1 °C (98.8 °F), temperature source Tympanic, resp. rate 18, height 1.6 m (5' 3\"), weight 74.4 kg (164 lb), SpO2 94 %.    Relevant Results      BI mammo bilateral diagnostic tomosynthesis    Result Date: 2/1/2024  Interpreted By:  Virginia Stallings, STUDY: BI MAMMO BILATERAL DIAGNOSTIC TOMOSYNTHESIS; BI US BREAST LIMITED LEFT;  2/1/2024 11:01 am; 2/1/2024 11:31 am   ACCESSION NUMBER(S): [id number]; [id number]   ORDERING CLINICIAN: RENETTA COATES   INDICATION: Patient presents for her annual bilateral mammogram and a final follow-up ultrasound for a left breast mass. History of a right lumpectomy with chemotherapy. MediPort in the upper right chest. Best right MLO possible.   COMPARISON: 08/23/2023, 08/08/2023   FINDINGS: MAMMOGRAPHY: 2D and tomosynthesis images were reviewed at 1 mm slice thickness.   Density:  There are areas of scattered fibroglandular tissue.   In the upper-outer quadrant of the right breast at anterior depth there are surgical clips and scarring at the lumpectomy site. The lumpectomy site is new. There is skin and trabecular thickening of the right breast which is most consistent with the patient's history of radiation therapy. A surgical clip is noted in the right axilla. There is limited evaluation of the posterior tissue on the right. No suspicious masses or calcifications are identified.   ULTRASOUND:  Targeted left breast sonographic follow-up was performed. In the left breast at the 3 o'clock position 3 cm from the nipple there is an oval circumscribed hypoechoic mass. The mass measures 0.5 x 0.4 x 0.5 cm. It is soft with elastography and there is no internal vascularity. This mass is stable. No further follow-up is recommended.       No mammographic evidence of malignancy in either breast. New post treatment changes on the right.   BI-RADS CATEGORY:   BI-RADS Category:  2 Benign. Recommendation:  Annual Screening. " Recommended Date:  1 Year. Laterality:  Bilateral. For any future breast imaging appointments, please call [phone]).   MACRO: None   Signed by: Virginia Stallings 2/1/2024 1:51 PM Dictation workstation:   [id number]     BI US breast limited left    Result Date: 2/1/2024  Interpreted By:  Virginia Stallings, STUDY: BI MAMMO BILATERAL DIAGNOSTIC TOMOSYNTHESIS; BI US BREAST LIMITED LEFT;  2/1/2024 11:01 am; 2/1/2024 11:31 am   ACCESSION NUMBER(S): [id number]; [id number]   ORDERING CLINICIAN: RENETTA COATES   INDICATION: Patient presents for her annual bilateral mammogram and a final follow-up ultrasound for a left breast mass. History of a right lumpectomy with chemotherapy. MediPort in the upper right chest. Best right MLO possible.   COMPARISON: 08/23/2023, 08/08/2023   FINDINGS: MAMMOGRAPHY: 2D and tomosynthesis images were reviewed at 1 mm slice thickness.   Density:  There are areas of scattered fibroglandular tissue.   In the upper-outer quadrant of the right breast at anterior depth there are surgical clips and scarring at the lumpectomy site. The lumpectomy site is new. There is skin and trabecular thickening of the right breast which is most consistent with the patient's history of radiation therapy. A surgical clip is noted in the right axilla. There is limited evaluation of the posterior tissue on the right. No suspicious masses or calcifications are identified.   ULTRASOUND:  Targeted left breast sonographic follow-up was performed. In the left breast at the 3 o'clock position 3 cm from the nipple there is an oval circumscribed hypoechoic mass. The mass measures 0.5 x 0.4 x 0.5 cm. It is soft with elastography and there is no internal vascularity. This mass is stable. No further follow-up is recommended.       No mammographic evidence of malignancy in either breast. New post treatment changes on the right.   BI-RADS CATEGORY:   BI-RADS Category:  2 Benign. Recommendation:  Annual Screening.  Recommended Date:  1 Year. Laterality:  Bilateral. For any future breast imaging appointments, please call [phone]).   MACRO: None   Signed by: Virginia Stallings 2/1/2024 1:51 PM Dictation workstation:   [id number]     Transthoracic Echo (TTE) Limited    Result Date: 2/1/2024   [facility], [address] [place] [address]              Tel [phone] and Fax [phone] TRANSTHORACIC ECHOCARDIOGRAM REPORT  Patient Name:      ASHLY HICKS     Reading Physician:    [id number] Marlon Augustine DO Study Date:        2/1/2024             Ordering Provider:    [id number] GLO SMITH MRN/PID:           [id number]             Fellow: Accession#:        [id number]         Nurse: Date of Birth/Age: 1960 / 63 years Sonographer:          Beverly Garcia RDCS Gender:            F                    Additional Staff: Height:            160.00 cm            Admit Date:           2/1/2024 Weight:            75.00 kg             Admission Status:     Outpatient BSA:               1.78 m2              Encounter#:           [id number]                                         Department Location:  [facility] Non                                                               Invasive Blood Pressure: 134 /95 mmHg Study Type:    TRANSTHORACIC ECHO (TTE) LIMITED Diagnosis/ICD: Encounter for other specified special examinations-Z01.89 Indication:    cardiotoxic drug therapy CPT Code:      Echo Subhash LTD-44460; Doppler Limited-53931; Doppler Full-78590;                3D Rendering with independent workstation-[id number]; Myocardial                Strain Imaging-96983 Patient History: Pertinent History: HTN, PE, Palpitations and Hyperlipidemia. PFO, stroke, breast                    Cancer. Study Detail: The following Echo studies were performed: 2D, M-Mode, Doppler,               color flow,  Strain and 3D.  PHYSICIAN INTERPRETATION: Left Ventricle: The left ventricular systolic function is normal, with an estimated ejection fraction of 60%. There are no regional wall motion abnormalities. The left ventricular cavity size is normal. Spectral Doppler shows an impaired relaxation pattern of left ventricular diastolic filling. Strain values are normal, which imply normal myocardial function. Left Atrium: The left atrium is normal in size. Right Ventricle: The right ventricle is normal in size. There is normal right ventricular global systolic function. Right Atrium: The right atrium is normal in size. Aortic Valve: The aortic valve appears structurally normal. There is no evidence of aortic valve regurgitation. Mitral Valve: The mitral valve is normal in structure. There is trace to mild mitral valve regurgitation. Tricuspid Valve: The tricuspid valve is structurally normal. There is trace tricuspid regurgitation. The Doppler estimated RVSP is within normal limits at 23.5 mmHg. Pulmonic Valve: The pulmonic valve is structurally normal. There is no indication of pulmonic valve regurgitation. Pericardium: There is no pericardial effusion noted. Aorta: The aortic root is normal. In comparison to the previous echocardiogram(s): Compared with study from 11/8/2023, no significant change.  CONCLUSIONS:  1. Left ventricular systolic function is normal with a 60% estimated ejection fraction.  2. Spectral Doppler shows an impaired relaxation pattern of left ventricular diastolic filling.  3. RVSP within normal limits. QUANTITATIVE DATA SUMMARY: 2D MEASUREMENTS:                          Normal Ranges: IVSd:          0.98 cm   (0.6-1.1cm) LVPWd:         1.00 cm   (0.6-1.1cm) LVIDd:         4.12 cm   (3.9-5.9cm) LVIDs:         2.61 cm LV Mass Index: 73.1 g/m2 LV % FS        36.7 % LV SYSTOLIC FUNCTION BY 2D PLANIMETRY (MOD):                     Normal Ranges: EF-A4C View: 53.7 % (>=55%) EF-A2C View: 51.1 % EF-Biplane:   52.2 % LV DIASTOLIC FUNCTION:                               Normal Ranges: MV Peak E:        0.58 m/s    (0.7-1.2 m/s) MV Peak A:        0.88 m/s    (0.42-0.7 m/s) E/A Ratio:        0.66        (1.0-2.2) MV A Dur:         83.04 msec PulmV Sys Mahesh:    32.57 cm/s PulmV Coles Mahesh:   29.00 cm/s PulmV S/D Mahesh:    1.12 PulmV A Revs Mahesh: 44.65 cm/s PulmV A Revs Dur: 138.41 msec MITRAL VALVE:                 Normal Ranges: MV DT: 161 msec (150-240msec) MITRAL INSUFFICIENCY:                      Normal Ranges: MR VTI:  191.10 cm MR Vmax: 582.83 cm/s TRICUSPID VALVE/RVSP:                             Normal Ranges: Peak TR Velocity: 2.26 m/s RV Syst Pressure: 23.5 mmHg (< 30mmHg) PULMONIC VALVE:                         Normal Ranges: PV Accel Time: 118 msec (>120ms) PV Max Mahesh:    1.0 m/s  (0.6-0.9m/s) PV Max P.4 mmHg Pulmonary Veins: PulmV A Revs Dur: 138.41 msec PulmV A Revs Mahesh: 44.65 cm/s PulmV Coles Mahesh:   29.00 cm/s PulmV S/D Mahesh:    1.12 PulmV Sys Mahesh:    32.57 cm/s  [id number] Marlon Augustine DO Electronically signed on 2024 at 10:01:24 AM  ** Final **     XR chest 2 views    Result Date: 2024  Interpreted By:  Ranjit Gallegos, STUDY: XR CHEST 2 VIEWS;  2024 2:59 pm   INDICATION: Signs/Symptoms:Exertional fatigue, shortness of breath.   COMPARISON: Chest radiograph 2020   ACCESSION NUMBER(S): [id number]   ORDERING CLINICIAN: GLO SMITH   FINDINGS:     CARDIOMEDIASTINAL SILHOUETTE: Cardiomediastinal silhouette is unchanged.  Right-sided medication port.   LUNGS: No consolidation, pneumothorax, or effusion. Minimal left basilar atelectasis or scarring.   ABDOMEN: No remarkable upper abdominal findings.   BONES: No acute osseous changes.   Remaining findings appear stable since prior comparison radiograph.       1.  No evidence of acute cardiopulmonary process. 2. Minimal left basilar atelectasis or scarring.     Signed by: aRnjit Gallegos 2024 8:17 PM Dictation workstation:   [id number]      Results for orders placed or performed during the hospital encounter of 02/15/24 (from the past 24 hour(s))   CBC and Auto Differential   Result Value Ref Range    WBC 10.0 4.4 - 11.3 x10*3/uL    nRBC 0.0 0.0 - 0.0 /100 WBCs    RBC 4.20 4.00 - 5.20 x10*6/uL    Hemoglobin 11.9 (L) 12.0 - 16.0 g/dL    Hematocrit 36.7 36.0 - 46.0 %    MCV 87 80 - 100 fL    MCH 28.3 26.0 - 34.0 pg    MCHC 32.4 32.0 - 36.0 g/dL    RDW 15.1 (H) 11.5 - 14.5 %    Platelets 312 150 - 450 x10*3/uL    Neutrophils % 58.4 40.0 - 80.0 %    Immature Granulocytes %, Automated 0.4 0.0 - 0.9 %    Lymphocytes % 28.6 13.0 - 44.0 %    Monocytes % 8.8 2.0 - 10.0 %    Eosinophils % 2.7 0.0 - 6.0 %    Basophils % 1.1 0.0 - 2.0 %    Neutrophils Absolute 5.81 1.20 - 7.70 x10*3/uL    Immature Granulocytes Absolute, Automated 0.04 0.00 - 0.70 x10*3/uL    Lymphocytes Absolute 2.85 1.20 - 4.80 x10*3/uL    Monocytes Absolute 0.88 0.10 - 1.00 x10*3/uL    Eosinophils Absolute 0.27 0.00 - 0.70 x10*3/uL    Basophils Absolute 0.11 (H) 0.00 - 0.10 x10*3/uL   Basic metabolic panel   Result Value Ref Range    Glucose 177 (H) 74 - 99 mg/dL    Sodium 141 136 - 145 mmol/L    Potassium 3.5 3.5 - 5.3 mmol/L    Chloride 106 98 - 107 mmol/L    Bicarbonate 23 21 - 32 mmol/L    Anion Gap 16 10 - 20 mmol/L    Urea Nitrogen 22 6 - 23 mg/dL    Creatinine 0.66 0.50 - 1.05 mg/dL    eGFR >90 >60 mL/min/1.73m*2    Calcium 9.1 8.6 - 10.3 mg/dL   Hepatic function panel   Result Value Ref Range    Albumin 4.1 3.4 - 5.0 g/dL    Bilirubin, Total 0.4 0.0 - 1.2 mg/dL    Bilirubin, Direct 0.1 0.0 - 0.3 mg/dL    Alkaline Phosphatase 57 33 - 136 U/L    ALT 21 7 - 45 U/L    AST 15 9 - 39 U/L    Total Protein 6.2 (L) 6.4 - 8.2 g/dL   Protime-INR   Result Value Ref Range    Protime 12.1 9.8 - 12.8 seconds    INR 1.1 0.9 - 1.1   Type And Screen   Result Value Ref Range    ABO TYPE A     Rh TYPE POS     ANTIBODY SCREEN NEG    Blood Gas Venous Full Panel   Result Value Ref Range    POCT pH, Venous  7.48 (H) 7.33 - 7.43 pH    POCT pCO2, Venous 35 (L) 41 - 51 mm Hg    POCT pO2, Venous 63 (H) 35 - 45 mm Hg    POCT SO2, Venous 94 (H) 45 - 75 %    POCT Oxy Hemoglobin, Venous 91.8 (H) 45.0 - 75.0 %    POCT Hematocrit Calculated, Venous 36.0 36.0 - 46.0 %    POCT Sodium, Venous 140 136 - 145 mmol/L    POCT Potassium, Venous 3.6 3.5 - 5.3 mmol/L    POCT Chloride, Venous 105 98 - 107 mmol/L    POCT Ionized Calicum, Venous 1.20 1.10 - 1.33 mmol/L    POCT Glucose, Venous 177 (H) 74 - 99 mg/dL    POCT Lactate, Venous 2.8 (H) 0.4 - 2.0 mmol/L    POCT Base Excess, Venous 2.7 -2.0 - 3.0 mmol/L    POCT HCO3 Calculated, Venous 26.1 (H) 22.0 - 26.0 mmol/L    POCT Hemoglobin, Venous 12.0 12.0 - 16.0 g/dL    POCT Anion Gap, Venous 13.0 10.0 - 25.0 mmol/L    Patient Temperature 37.0 degrees Celsius    FiO2 21 %   Lactate   Result Value Ref Range    Lactate 2.9 (H) 0.4 - 2.0 mmol/L   Troponin I, High Sensitivity   Result Value Ref Range    Troponin I, High Sensitivity 4 0 - 13 ng/L   Lactate   Result Value Ref Range    Lactate 1.5 0.4 - 2.0 mmol/L   SST TOP   Result Value Ref Range    Extra Tube Hold for add-ons.              Assessment/Plan   Principal Problem:    Epistaxis    63-year-old female with past medical history of hypertension, hyperlipidemia, PE on Eliquis, CVA, PFO s/p repair GERD, IBS, anxiety, depression, vitamin D deficiency, right breast cancer s/p lumpectomy and SLN and current treatment weekly paclitaxel/trastuzumab.  Patient presents with epistaxis.  Hemostasis was achieved in the ED, however patient has subsequent syncopal episode with several episodes of bloody emesis.  She is hospitalized for further evaluation and management.    #Epistaxis  Hemostasis achieved in ED  Dr. Donovan with ENT on consult  Monitor H&H    #Bloody emesis, suspect secondary to swallowed blood from epistaxis    Hold Eliquis and antihypertensives, resume pending clinical course  History of erosive gastropathy on EGD on 9/2021  Trend H&H,  monitor for overt bleeding  No suspicion for upper GI bleed, however will empirically treat with Protonix 40 mg IV twice daily  Clear liquid diet   hydralazine 5 mg IV every 6 hours as needed for SBP greater than 160 while BP meds on hold    Chronic conditions:  #Hypertension  #Hyperlipidemia  #History of PE  #CVA  #History of PFO s/p repair  #GERD  #IBS  #Anxiety  #Depression  #Right breast cancer s/p lumpectomy and SLN receiving chemo    Continue with patient's home medications as appropriate.    #DVT prophylaxis:  SCDs  Hold Eliquis for now  Patient fully evaluated and plan as above    Lam Acevedo, APRN-CNP

## 2024-02-16 NOTE — DISCHARGE SUMMARY
Discharge Diagnosis  Epistaxis    Issues Requiring Follow-Up  Patient ready for discharge.     Discharge Meds     Your medication list        ASK your doctor about these medications        Instructions Last Dose Given Next Dose Due   amLODIPine 5 mg tablet  Commonly known as: Norvasc      Take 1 tablet (5 mg) by mouth once daily.       apixaban 5 mg tablet  Commonly known as: Eliquis      Take 1 tablet (5 mg) by mouth 2 times a day.       aspirin 81 mg EC tablet      Take 1 tablet (81 mg) by mouth once daily.       cholecalciferol 25 MCG (1000 UT) capsule  Commonly known as: Vitamin D-3      Take 1 capsule (25 mcg) by mouth once daily.       ezetimibe-simvastatin 10-10 mg tablet  Commonly known as: Vytorin      Take 1 tablet by mouth once daily in the evening.       lidocaine-prilocaine 2.5-2.5 % cream  Commonly known as: Emla      Apply topically to affected area once a day, As Needed  30-60 minutes prior to port access       omeprazole 40 mg DR capsule  Commonly known as: PriLOSEC      Take 1 capsule (40 mg) by mouth once daily in the morning. Take before meals. Do not crush or chew.                Test Results Pending At Discharge  Pending Labs       No current pending labs.            Hospital Course   Cassandra Lang is a 63-year-old female with past medical history of hypertension, hyperlipidemia, prediabetes, PE on Eliquis, CVA, PFO s/p repair GERD, IBS, anxiety, depression, vitamin D deficiency, right breast cancer s/p lumpectomy and SLN and current treatment weekly paclitaxel/trastuzumab.  Patient presents with epistaxis.  Patient is primarily Polish speaking,  services offered but declined.  Daughter at bedside who assisted with history.  She reports bleeding started around 2 PM, she noted taste of blood in her mouth and feels she swallowed a significant amount.  Reports prior history of epistaxis, however never previously needed emergent treatment. In the ED patient received Afrin and  underwent examination with suspected polyp in the anterior nasal septum as a source of bleeding, hemostasis was achieved with 4.5 cm anterior Rhino Rocket.  Patient originally was going to be discharged but subsequently developed nausea with several episodes of dark emesis and associated syncopal episode/dizziness.  No prior history of GI bleed or peptic ulcers disease.  Denies focal weakness, chest pain, palpitations, rapid heart rate, shortness of breath, cough, wheezing, abdominal pain, recent change in bowel pattern, urinary complaints, wounds, and edema.  Denies NSAID use other than baby aspirin daily.  EGD 9/8/2021 for GERD notable for erosive gastropathy with stigmata of recent bleeding and single gastric polyp. Dr. Donovan with ENT was placed on consult.     ER course: Hemodynamically stable, afebrile, SpO2 97% on room air. EKG per ER provider interpretation normal sinus rhythm, heart rate 94, no ST segment elevation, poor R wave progression noted across the precordium that was present on prior.  WBC 10.0, hemoglobin 7.9/hematocrit 36.7 (recent baseline 11-12, but had been 13-14 earlier in 2023), platelets 312.  INR 1.1.  Glucose 177 otherwise CMP was unremarkable.  Lactate 2.9, repeat 1.5.  High-sensitivity troponin 4.    Patient fully evaluated on February 16. Stable H+H. ENT consult obtained with plans to follow up on Monday. Started on oral antibiotics. Ready for discharge.     Pertinent Physical Exam At Time of Discharge  Physical Exam    Outpatient Follow-Up  Future Appointments   Date Time Provider Department Center   2/21/2024  1:00 PM Shawna Mccallum, APRN-CNP [id number] [facility]   2/21/2024  2:00 PM INF 07 MINOFF [id number] [facility]   3/13/2024  1:30 PM INF 11 MINOFF [id number] [facility]   4/3/2024  1:00 PM INF 07 MINOFF [id number] [facility]   4/24/2024  1:00 PM INF 07 MINOFF [id number] [facility]   5/15/2024  1:00 PM INF 07 MINOFF [id number] [facility]   6/5/2024  1:00 PM INF 07 MINOFF [id number] [facility]   10/22/2024   9:30 AM [facility] JOHN KHOI 4 CMCAHUMAM SARANYAU Rad   10/22/2024 10:30 AM [facility] Soria [id number]

## 2024-02-16 NOTE — PROGRESS NOTES
02/16/24 1155   Discharge Planning   Living Arrangements Spouse/significant other;Children   Patient expects to be discharged to: Home   Does the patient need discharge transport arranged? No     TC to pt room as this TCC is remote. Pt dtr Merry provides most information. Pt resides at home with her  and children. Pt is independent. Bedroom/bathroom are on the main floor of the home. The pt PCP is Dr. Lopez. Prescriptions are filled at [facility] with no barrier noted. Pt has a ride home upon dc. No needs identified at this time.   
Pharmacy Medication History Review    Cassandra Lang is a 63 y.o. female admitted for No Principal Problem: There is no principal problem currently on the Problem List. Please update the Problem List and refresh.. Pharmacy reviewed the patient's [id number] medications and allergies for accuracy.    The list below reflectives the updated PTA list. Please review each medication in order reconciliation for additional clarification and justification.  Prior to Admission medications    Medication Sig Start Date End Date Taking? Authorizing Provider   amLODIPine (Norvasc) 5 mg tablet Take 1 tablet (5 mg) by mouth once daily. 10/26/23   Jodi Lopez MD   apixaban (Eliquis) 5 mg tablet Take 1 tablet (5 mg) by mouth 2 times a day. 10/26/23   Jodi Lopez MD   aspirin 81 mg EC tablet Take 1 tablet (81 mg) by mouth once daily. 10/26/23   Jodi Lopez MD   cholecalciferol (Vitamin D-3) 25 MCG (1000 UT) capsule Take 1 capsule (25 mcg) by mouth once daily. 10/26/23   Jodi Lopez MD   ezetimibe-simvastatin (Vytorin) 10-10 mg tablet Take 1 tablet by mouth once daily in the evening. 10/26/23   Jodi Lopez MD        The list below reflectives the updated allergy list. Please review each documented allergy for additional clarification and justification.  Allergies  Reviewed by Sander Chavez LPN on 2/15/2024        Severity Reactions Comments    Sulfamethoxazole-trimethoprim Low Rash             Below are additional concerns with the patient's PTA list.      Hayley Kumar CPhT    
n/a

## 2024-02-16 NOTE — CARE PLAN
Problem: Pain - Adult  Goal: Verbalizes/displays adequate comfort level or baseline comfort level  Outcome: Progressing   The patient's goals for the shift include      The clinical goals for the shift include monitor rhino rocket, vitals, safety

## 2024-02-19 ENCOUNTER — PATIENT OUTREACH (OUTPATIENT)
Dept: CARE COORDINATION | Facility: CLINIC | Age: 64
End: 2024-02-19
Payer: COMMERCIAL

## 2024-02-19 ENCOUNTER — DOCUMENTATION (OUTPATIENT)
Dept: PRIMARY CARE | Facility: CLINIC | Age: 64
End: 2024-02-19
Payer: COMMERCIAL

## 2024-02-19 NOTE — PROGRESS NOTES
Discharge Facility: [facility]  Discharge Diagnosis:Epistaxis  Admission Date:02/15/24  Discharge Date:02/18/24     PCP Appointment Date:none available sent to pcp office  Specialist Appointment Date:   Hospital Encounter and Summary: Linked   See discharge assessment below for further details  Engagement  Call Start Time: 1040 (2/19/2024 10:40 AM)    Medications  Medications reviewed with patient/caregiver?: Yes (asa 81mg amox/clav 825/125) (2/19/2024 10:40 AM)  Is the patient having any side effects they believe may be caused by any medication additions or changes?: No (2/19/2024 10:40 AM)  Does the patient have all medications ordered at discharge?: Yes (2/19/2024 10:40 AM)  Is the patient taking all medications as directed (includes completed medication regime)?: Yes (2/19/2024 10:40 AM)    Appointments  Does the patient have a primary care provider?: Yes (2/19/2024 10:40 AM)  Care Management Interventions: Advised patient to make appointment (2/19/2024 10:40 AM)  Care Management Interventions: Advised to reschedule appointment (2/19/2024 10:40 AM)    Self Management  Has home health visited the patient within 72 hours of discharge?: No (2/19/2024 10:40 AM)  Has all Durable Medical Equipment (DME) been delivered?: No (2/19/2024 10:40 AM)    Patient Teaching  Does the patient have access to their discharge instructions?: Yes (2/19/2024 10:40 AM)  Care Management Interventions: Reviewed instructions with patient (2/19/2024 10:40 AM)  What is the patient's perception of their health status since discharge?: Improving (2/19/2024 10:40 AM)    Wrap Up  Call End Time: 1050 (2/19/2024 10:40 AM)

## 2024-02-20 ENCOUNTER — OFFICE VISIT (OUTPATIENT)
Dept: OTOLARYNGOLOGY | Facility: CLINIC | Age: 64
End: 2024-02-20
Payer: COMMERCIAL

## 2024-02-20 VITALS
SYSTOLIC BLOOD PRESSURE: 143 MMHG | BODY MASS INDEX: 28.79 KG/M2 | OXYGEN SATURATION: 99 % | HEIGHT: 63 IN | WEIGHT: 162.5 LBS | RESPIRATION RATE: 16 BRPM | DIASTOLIC BLOOD PRESSURE: 89 MMHG | TEMPERATURE: 98 F | HEART RATE: 111 BPM

## 2024-02-20 DIAGNOSIS — J34.2 NASAL SEPTAL DEVIATION: ICD-10-CM

## 2024-02-20 DIAGNOSIS — D62 ACUTE BLOOD LOSS ANEMIA: ICD-10-CM

## 2024-02-20 DIAGNOSIS — R04.0 EPISTAXIS: Primary | ICD-10-CM

## 2024-02-20 PROCEDURE — 99203 OFFICE O/P NEW LOW 30 MIN: CPT | Performed by: STUDENT IN AN ORGANIZED HEALTH CARE EDUCATION/TRAINING PROGRAM

## 2024-02-20 PROCEDURE — 3079F DIAST BP 80-89 MM HG: CPT | Performed by: STUDENT IN AN ORGANIZED HEALTH CARE EDUCATION/TRAINING PROGRAM

## 2024-02-20 PROCEDURE — 1036F TOBACCO NON-USER: CPT | Performed by: STUDENT IN AN ORGANIZED HEALTH CARE EDUCATION/TRAINING PROGRAM

## 2024-02-20 PROCEDURE — 3077F SYST BP >= 140 MM HG: CPT | Performed by: STUDENT IN AN ORGANIZED HEALTH CARE EDUCATION/TRAINING PROGRAM

## 2024-02-20 PROCEDURE — 30901 CONTROL OF NOSEBLEED: CPT | Performed by: STUDENT IN AN ORGANIZED HEALTH CARE EDUCATION/TRAINING PROGRAM

## 2024-02-20 PROCEDURE — 99213 OFFICE O/P EST LOW 20 MIN: CPT | Performed by: STUDENT IN AN ORGANIZED HEALTH CARE EDUCATION/TRAINING PROGRAM

## 2024-02-20 RX ORDER — MUPIROCIN 20 MG/G
OINTMENT TOPICAL 2 TIMES DAILY
Qty: 30 G | Refills: 2 | Status: SHIPPED | OUTPATIENT
Start: 2024-02-20 | End: 2024-03-01

## 2024-02-20 ASSESSMENT — COLUMBIA-SUICIDE SEVERITY RATING SCALE - C-SSRS
2. HAVE YOU ACTUALLY HAD ANY THOUGHTS OF KILLING YOURSELF?: NO
6. HAVE YOU EVER DONE ANYTHING, STARTED TO DO ANYTHING, OR PREPARED TO DO ANYTHING TO END YOUR LIFE?: NO
1. IN THE PAST MONTH, HAVE YOU WISHED YOU WERE DEAD OR WISHED YOU COULD GO TO SLEEP AND NOT WAKE UP?: NO

## 2024-02-20 ASSESSMENT — PATIENT HEALTH QUESTIONNAIRE - PHQ9
SUM OF ALL RESPONSES TO PHQ9 QUESTIONS 1 AND 2: 4
1. LITTLE INTEREST OR PLEASURE IN DOING THINGS: SEVERAL DAYS
2. FEELING DOWN, DEPRESSED OR HOPELESS: NEARLY EVERY DAY

## 2024-02-20 ASSESSMENT — ENCOUNTER SYMPTOMS
OCCASIONAL FEELINGS OF UNSTEADINESS: 0
LOSS OF SENSATION IN FEET: 0
DEPRESSION: 1

## 2024-02-20 ASSESSMENT — PAIN SCALES - GENERAL: PAINLEVEL: 0-NO PAIN

## 2024-02-20 NOTE — PATIENT INSTRUCTIONS
Use a pea-sized amount of mupirocin ointment inside both nostrils twice a day.  Use frequent saline spray during the day to keep your nose moist.  Use a humidifier in your bedroom. Turn it on 1-2 hours before bed in a closed bedroom to maximize the room's humidity.    For nose bleeds lean slightly forward and pinch the front of the nose for 10-15 minutes.  During nose bleeds you can use certain products, such as over-the-counter oxymetazoline spray, or some packings, such as NasalCease, to encourage the bleeding to stop as well.

## 2024-02-20 NOTE — LETTER
February 20, 2024     Jodi Lopez MD  [address] [facility] [id number]    Patient: Cassandra Lang   YOB: 1960   Date of Visit: 2/20/2024       Dear Dr. Jodi Lopez MD:    Thank you for referring Cassandra Lang to me for evaluation. Below are my notes for this consultation.  If you have questions, please do not hesitate to call me. I look forward to following your patient along with you.       Sincerely,     Jay Donovan MD      CC: Shawna Mccallum, APRN-CNP  Everett Meyer MD  ______________________________________________________________________________________    SUBJECTIVE  Patient ID: Cassandra Lang is a 63 y.o. female who presents for New Patient Visit.    Referred by Dr. Everett Meyer. ED note and H&P reviewed.    Patient was recently admitted to overnight observation 2/15/2024 for right-sided epistaxis.  Dr. Meyer was concerned that bleeding was coming from the anterior nasal polyp.  Patient was initially controlled with placement of a pack but had dark emesis concerning for swallowed blood.  With Rhino Rocket in place patient did not have further bleeding and she was discharged 2/16/2024 prior to my ability to see her in-house.  Her daughter called me the day after discharge over concerns that the patient's Rhino Rocket was slowly coming out and that her blood pressure was elevated.  I advised them to present to the emergency department if she had concerns.    The patient presents today with her .  She reports that the Rhino Rocket fell out on its own over the weekend.  She has not had further bleeding but is avoiding digital manipulation.  She has been off of her Eliquis since admission.  Reports that she has been recently on chemotherapy for breast cancer.  Has immunotherapy scheduled for tomorrow. No prior issues with nosebleeds.    Review of Systems  Complete ROS negative except as noted above or on patient intake form and as  above.    OBJECTIVE  Physical Exam  CONSTITUTIONAL: Well appearing female who appears stated age.  PSYCHIATRIC: Alert, appropriate mood and affect.  RESPIRATORY: Normal inspiration and expiration and chest wall expansion; no use of accessory muscles to breathe.  VOICE: Clear speech without hoarseness. No stridor nor stertor.  HEAD, FACE, AND SKIN: Symmetric facial features.  EARS: External ears were normally formed with no lesions. The external auditory canals were clear. The tympanic membranes were intact and in the neutral position. No significant retraction pockets, effusions, nor hemotympanum were appreciated.  NOSE: Nasal dorsum was midline. Anterior rhinoscopy demonstrated a septum deviated to the left.  There is crusting within the right nasal cavity cleaned.  There is an outpouching of mucosa along the anterior right septum consistent with either small nasal polyp versus superficial vessel that corresponds with report from the emergency department.  No evidence of recent bleeding.  No other obvious nasal masses, polyps, mucopurulence, nor other lesions were appreciated.  OROPHARYNX: No lesion nor mucosal abnormality. The uvula was normal appearing.  No blood in the oropharynx.    Lab Results   Component Value Date    WBC 5.7 02/16/2024    HGB 10.0 (L) 02/16/2024    HCT 31.1 (L) 02/16/2024    MCV 88 02/16/2024     02/16/2024     --------------------------------------------------  Procedure: Control of epistaxis, simple  Indication: Epistaxis, right  Informed consent verbally obtained: The risks, benefits, alternatives, and expectations were discussed with the patient, who wished to proceed  Anesthesia: Topical lidocaine and oxymetazoline    After anesthesia and decongestion the nasal cavities were examined with a a headlight.  Silver nitrate cautery was applied to the right anterior septum at superficial vessel versus nasal polyp.  Once hemostasis was achieved the silver nitrate was deactivated with  the use of saline.  Surgicel was then draped over the area of cautery to aid in healing.  Finally, bacitracin ointment was applied to the nasal sill.    There were no complications and the patient tolerated the procedure well.  --------------------------------------------------    ASSESSMENT/PLAN  Diagnoses and all orders for this visit:  Epistaxis  -     mupirocin (Bactroban) 2 % ointment; Apply topically 2 times a day for 10 days. Apply a pea-sized amount in both nostrils.  Acute blood loss anemia  Nasal septal deviation    63 y.o. female presenting with significant right-sided epistaxis.    1.  Epistaxis, suspected superficial vessel, history of anticoagulation for history of PE  The patient is presenting with quite copious right-sided epistaxis that required multiple interventions in the emergency department prior to control.  She was admitted for observation secondary to bloody emesis.  Upon discharge the patient's nasal pack fell out but she has not had further bleeding.  On exam she has relatively healthy appearing mucosa with expected nasal crusting within the right nasal cavity following pack removal.  There is a small exophytic lesion off the right anterior septum consistent with ED report of nasal polyp versus superficial vessel.    We discussed the patient's options at this time, which include continued observation, medical therapy, and or procedural intervention.  Given the significant bleeding that she experienced I recommended cauterization of this lesion with plan to resume her anticoagulation in 2 days (Thursday, 2/22/2024).  I will send my note to her PCP so she is aware that she has been off anticoagulation for the last 5 days.  I also encouraged her to keep her appointments over the next several days with hematology/oncology and her PCP.    Written instruction regarding post-visit care as well as instructions on how to manage future epistaxis episodes was provided.  She will use mupirocin  ointment within the right nasal cavity for the next 10 days.    The patient will follow up 4 weeks to check on her recovery.    This note was created using speech recognition transcription software. Despite proofreading, typographical errors may be present that affect the meaning of the content. Please contact my office with any questions.

## 2024-02-20 NOTE — PROGRESS NOTES
SUBJECTIVE  Patient ID: Cassandra Lang is a 63 y.o. female who presents for New Patient Visit.    Referred by Dr. Everett Meyer. ED note and H&P reviewed.    Patient was recently admitted to overnight observation 2/15/2024 for right-sided epistaxis.  Dr. Meyer was concerned that bleeding was coming from the anterior nasal polyp.  Patient was initially controlled with placement of a pack but had dark emesis concerning for swallowed blood.  With Rhino Rocket in place patient did not have further bleeding and she was discharged 2/16/2024 prior to my ability to see her in-house.  Her daughter called me the day after discharge over concerns that the patient's Rhino Rocket was slowly coming out and that her blood pressure was elevated.  I advised them to present to the emergency department if she had concerns.    The patient presents today with her .  She reports that the Rhino Rocket fell out on its own over the weekend.  She has not had further bleeding but is avoiding digital manipulation.  She has been off of her Eliquis since admission.  Reports that she has been recently on chemotherapy for breast cancer.  Has immunotherapy scheduled for tomorrow. No prior issues with nosebleeds.    Review of Systems  Complete ROS negative except as noted above or on patient intake form and as above.    OBJECTIVE  Physical Exam  CONSTITUTIONAL: Well appearing female who appears stated age.  PSYCHIATRIC: Alert, appropriate mood and affect.  RESPIRATORY: Normal inspiration and expiration and chest wall expansion; no use of accessory muscles to breathe.  VOICE: Clear speech without hoarseness. No stridor nor stertor.  HEAD, FACE, AND SKIN: Symmetric facial features.  EARS: External ears were normally formed with no lesions. The external auditory canals were clear. The tympanic membranes were intact and in the neutral position. No significant retraction pockets, effusions, nor hemotympanum were appreciated.  NOSE: Nasal dorsum  was midline. Anterior rhinoscopy demonstrated a septum deviated to the left.  There is crusting within the right nasal cavity cleaned.  There is an outpouching of mucosa along the anterior right septum consistent with either small nasal polyp versus superficial vessel that corresponds with report from the emergency department.  No evidence of recent bleeding.  No other obvious nasal masses, polyps, mucopurulence, nor other lesions were appreciated.  OROPHARYNX: No lesion nor mucosal abnormality. The uvula was normal appearing.  No blood in the oropharynx.    Lab Results   Component Value Date    WBC 5.7 02/16/2024    HGB 10.0 (L) 02/16/2024    HCT 31.1 (L) 02/16/2024    MCV 88 02/16/2024     02/16/2024     --------------------------------------------------  Procedure: Control of epistaxis, simple  Indication: Epistaxis, right  Informed consent verbally obtained: The risks, benefits, alternatives, and expectations were discussed with the patient, who wished to proceed  Anesthesia: Topical lidocaine and oxymetazoline    After anesthesia and decongestion the nasal cavities were examined with a a headlight.  Silver nitrate cautery was applied to the right anterior septum at superficial vessel versus nasal polyp.  Once hemostasis was achieved the silver nitrate was deactivated with the use of saline.  Surgicel was then draped over the area of cautery to aid in healing.  Finally, bacitracin ointment was applied to the nasal sill.    There were no complications and the patient tolerated the procedure well.  --------------------------------------------------    ASSESSMENT/PLAN  Diagnoses and all orders for this visit:  Epistaxis  -     mupirocin (Bactroban) 2 % ointment; Apply topically 2 times a day for 10 days. Apply a pea-sized amount in both nostrils.  Acute blood loss anemia  Nasal septal deviation    63 y.o. female presenting with significant right-sided epistaxis.    1.  Epistaxis, suspected superficial  vessel, history of anticoagulation for history of PE  The patient is presenting with quite copious right-sided epistaxis that required multiple interventions in the emergency department prior to control.  She was admitted for observation secondary to bloody emesis.  Upon discharge the patient's nasal pack fell out but she has not had further bleeding.  On exam she has relatively healthy appearing mucosa with expected nasal crusting within the right nasal cavity following pack removal.  There is a small exophytic lesion off the right anterior septum consistent with ED report of nasal polyp versus superficial vessel.    We discussed the patient's options at this time, which include continued observation, medical therapy, and or procedural intervention.  Given the significant bleeding that she experienced I recommended cauterization of this lesion with plan to resume her anticoagulation in 2 days (Thursday, 2/22/2024).  I will send my note to her PCP so she is aware that she has been off anticoagulation for the last 5 days.  I also encouraged her to keep her appointments over the next several days with hematology/oncology and her PCP.    Written instruction regarding post-visit care as well as instructions on how to manage future epistaxis episodes was provided.  She will use mupirocin ointment within the right nasal cavity for the next 10 days.    The patient will follow up 4 weeks to check on her recovery.    This note was created using speech recognition transcription software. Despite proofreading, typographical errors may be present that affect the meaning of the content. Please contact my office with any questions.

## 2024-02-21 ENCOUNTER — OFFICE VISIT (OUTPATIENT)
Dept: HEMATOLOGY/ONCOLOGY | Facility: CLINIC | Age: 64
End: 2024-02-21
Payer: COMMERCIAL

## 2024-02-21 ENCOUNTER — APPOINTMENT (OUTPATIENT)
Dept: HEMATOLOGY/ONCOLOGY | Facility: CLINIC | Age: 64
End: 2024-02-21
Payer: COMMERCIAL

## 2024-02-21 VITALS
DIASTOLIC BLOOD PRESSURE: 81 MMHG | BODY MASS INDEX: 28.96 KG/M2 | TEMPERATURE: 99.1 F | OXYGEN SATURATION: 97 % | HEART RATE: 97 BPM | SYSTOLIC BLOOD PRESSURE: 124 MMHG | WEIGHT: 163.47 LBS

## 2024-02-21 DIAGNOSIS — Z51.81 ENCOUNTER FOR MONITORING CARDIOTOXIC DRUG THERAPY: ICD-10-CM

## 2024-02-21 DIAGNOSIS — Z17.1 MALIGNANT NEOPLASM OF UPPER-OUTER QUADRANT OF RIGHT BREAST IN FEMALE, ESTROGEN RECEPTOR NEGATIVE (MULTI): ICD-10-CM

## 2024-02-21 DIAGNOSIS — Z79.899 ENCOUNTER FOR MONITORING CARDIOTOXIC DRUG THERAPY: ICD-10-CM

## 2024-02-21 DIAGNOSIS — Z51.11 ENCOUNTER FOR ANTINEOPLASTIC CHEMOTHERAPY: ICD-10-CM

## 2024-02-21 DIAGNOSIS — C50.411 MALIGNANT NEOPLASM OF UPPER-OUTER QUADRANT OF RIGHT BREAST IN FEMALE, ESTROGEN RECEPTOR NEGATIVE (MULTI): ICD-10-CM

## 2024-02-21 PROCEDURE — 3079F DIAST BP 80-89 MM HG: CPT | Performed by: NURSE PRACTITIONER

## 2024-02-21 PROCEDURE — 99215 OFFICE O/P EST HI 40 MIN: CPT | Performed by: NURSE PRACTITIONER

## 2024-02-21 PROCEDURE — 3074F SYST BP LT 130 MM HG: CPT | Performed by: NURSE PRACTITIONER

## 2024-02-21 PROCEDURE — 1036F TOBACCO NON-USER: CPT | Performed by: NURSE PRACTITIONER

## 2024-02-21 ASSESSMENT — PAIN SCALES - GENERAL: PAINLEVEL: 0-NO PAIN

## 2024-02-21 NOTE — PATIENT INSTRUCTIONS
Please continue every 3 week Herceptin. We are trying to get you scheduled for Friday 2/23/24    Next doses are 3/13, 4/3 and 4/24/24    Appointment with Dr Rosenbaum 4/24/24    Next Echo is due prior to 5/2/24- please schedule this     Please continue to stay as active as possible to rehabilitate your body, eat healthy balanced meals, drink 6-8 glasses of water daily (8oz) and get 7-8 hr of sleep at night.     Please call [phone] with any questions or concerns prior to your next office visit.

## 2024-02-21 NOTE — PROGRESS NOTES
Breast [facility] Oncology Clinic  Location: [facility]      BREAST CANCER DIAGNOSIS  Malignant neoplasm of upper-outer quadrant of right breast in female, estrogen receptor negative (CMS/HCC), Pathologic: Stage IIB (pT2, pN1mi(sn), cM0, G3, ER-, ME-, HER2+)       ONCOLOGIC HISTORY  S/p recent lumpectomy and SLN. Adjuvant Paclitaxel Herceptin x12 cycles completed 1/31/24    CURRENT THERAPY  Maintenance trastuzumab    HISTORY OF PRESENT ILLNESS    Cassandra Lang is a 63 y.o. woman here for breast cancer treatment follow up. She is accompanied today by daughter Gladys.  She has canceled her herceptin for today due to complication with nose bleeding. She did undergo cautery yesterday and will go back on eliquis on 2/22.     She has continued to feel fatigued. She reports sleep is on and off. She denies any fever or chills. She denies any headaches, no dizziness, balance issues or falls. She denies any neuropathy in Fingers/ Hands and toes or feet.     She denies any chest pain or breathing issue or shortness of breath. She does report breathing is harder with right side of the nose packed.      She denies any new or unexplained bone aches or pains, she reports baseline spinal arthritis which is worse with cold weather    She denies any skin lesions or masses, oral sores lesions or infections    She reports a normal appetite, normal bowels and normal urination.        Review of Systems   ROS 14 points performed, See HPI for exceptions      Past Medical History:  has a past medical history of Abnormal findings on diagnostic imaging of other specified body structures, Cancer (CMS/HCC), Heart disease, High blood pressure, Personal history of other medical treatment, Personal history of other medical treatment, Personal history of other medical treatment, Personal history of transient ischemic attack (TIA), and cerebral infarction without residual deficits, Stroke (CMS/HCC), and Tinea unguium (10/12/2021).  Surgical History:    has a past surgical history that includes  section, classic (2014); Appendectomy (2014); Other surgical history (2020); Other surgical history (2020); Other surgical history (2021); MR angio head wo IV contrast (2020); MR angio neck wo IV contrast (2020); Breast lumpectomy; and IR CVC port (2023).  Social History:   reports that she has never smoked. She has never been exposed to tobacco smoke. She has never used smokeless tobacco. She reports that she does not drink alcohol and does not use drugs.Lives in a home with  Vin (Hx of a stroke), 3 adult children all live in the home - Merry is a researcher and is going to [employer], daughter Gladys [facility], son Phani is disabled from severe RA. 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 Pt and  [place]oved [place] in    Family History:    Family History   Problem Relation Name Age of Onset    Diabetes Mother      Hypertension Mother      Cancer Mother      Coronary artery disease Father      Heart attack Maternal Grandfather      Stroke Other       Family Oncology History:  Cancer-related family history includes Cancer in her mother.      OBJECTIVE    VS / Pain:  /81 (BP Location: Left arm, Patient Position: Sitting, BP Cuff Size: Adult)   Pulse 97   Temp 37.3 °C (99.1 °F) (Temporal)   Wt 74.2 kg (163 lb 7.5 oz)   LMP  (LMP Unknown)   SpO2 97%   BMI 28.96 kg/m²   BSA: 1.82 meters squared   Pain Scale: 0    Performance Status:  The ECOG performance scale today is ECO- Restricted in physically strenuous activity.  Carries out light duty.      Physical Exam  Constitutional: Well developed, awake/alert/oriented x4, no distress, alert and cooperative  EYES: Sclera clear  ENMT: mucous membranes moist, no apparent injury, no lesions seen  Head/Neck: Neck supple, no apparent injury, thyroid without mass or tenderness, No JVD, trachea midline, no bruits. + partial alopecia    Respiratory / Thoracic: Patent airways, clear to all lobes, normal breath sounds with good chest expansion, thorax symmetric.  Cardiovascular: Regular, rate and rhythm, no murmurs, 2+ equal pulses of the extremities, normal auscultated S 1and S 2  GI: Nondistended, soft, non-tender, no rebound tenderness or guarding, no masses palpable, no organomegaly, +BS, no bruits  Musculoskeletal: ROM intact, no joint swelling, normal strength, no spinal tenderness  Extremities: normal extremities, no cyanosis edema, contusions or wounds, no clubbing  Neurological: alert and oriented x4, intact senses, motor, response and reflexes, normal strength  Breast: S/p right lumpectomy. No palpable masses or lesions in either breast   Lymphatic: No cervical, supraclavicular, infraclavicular or axillary lymphadenopathy  Psychological: Appropriate and talkative mood and behavior  Skin: Warm and dry, no lesions, no rashes, no jaundice    Diagnostic Results   === 08/25/22 ===    CT HEAD WO IV CONTRAST    - Impression -  No acute intracranial hemorrhage or mass-effect. Remote infarct in  the left basal ganglia/corona radiata.   No results found for this or any previous visit from the past 365 days.     No images are attached to the encounter.    Patient Name:      ASHLY Haney Physician:    [id number] Marlon Augustine DO  Study Date:        2/1/2024             Ordering Provider:    [id number] GLO SMITH  MRN/PID:           [id number]             Fellow:  Accession#:        [id number]         Nurse:  Date of Birth/Age: 1960 / 63 years Sonographer:          Beverly Garcia [facility]  Gender:            F                    Additional Staff:  Height:            160.00 cm            Admit Date:           2/1/2024  Weight:            75.00 kg             Admission Status:     Outpatient  BSA:               1.78  m2              Encounter#:           [id number]                                          Department Location:  [facility] Non                                                                Invasive  Blood Pressure: 134 /95 mmHg     Study Type:    TRANSTHORACIC ECHO (TTE) LIMITED  Diagnosis/ICD: Encounter for other specified special examinations-Z01.89  Indication:    cardiotoxic drug therapy  CPT Code:      Echo Subhash LTD-98641; Doppler Limited-62767; Doppler Full-40072;                 3D Rendering with independent workstation-[id number]; Myocardial                 Strain Imaging-81201     Patient History:  Pertinent History: HTN, PE, Palpitations and Hyperlipidemia. PFO, stroke, breast                     Cancer.     Study Detail: The following Echo studies were performed: 2D, M-Mode, Doppler,                color flow, Strain and 3D.        PHYSICIAN INTERPRETATION:  Left Ventricle: The left ventricular systolic function is normal, with an estimated ejection fraction of 60%. There are no regional wall motion abnormalities. The left ventricular cavity size is normal. Spectral Doppler shows an iz . Strain values are normal, which imply normal myocardial function.  Left Atrium: The left atrium is normal in size.  Right Ventricle: The right ventricle is normal in size. There is normal right ventricular global systolic function.  Right Atrium: The right atrium is normal in size.  Aortic Valve: The aortic valve appears structurally normal. There is no evidence of aortic valve regurgitation.  Mitral Valve: The mitral valve is normal in structure. There is trace to mild mitral valve regurgitation.  Tricuspid Valve: The tricuspid valve is structurally normal. There is trace tricuspid regurgitation. The Doppler estimated RVSP is within normal limits at 23.5 mmHg.  Pulmonic Valve: The pulmonic valve is structurally normal. There is no indication of pulmonic valve regurgitation.  Pericardium: There is no pericardial effusion  noted.  Aorta: The aortic root is normal.  In comparison to the previous echocardiogram(s): Compared with study from 2023, no significant change.        CONCLUSIONS:   1. Left ventricular systolic function is normal with a 60% estimated ejection fraction.   2. Spectral Doppler shows an impaired relaxation pattern of left ventricular diastolic filling.   3. RVSP within normal limits.     QUANTITATIVE DATA SUMMARY:  2D MEASUREMENTS:                           Normal Ranges:  IVSd:          0.98 cm   (0.6-1.1cm)  LVPWd:         1.00 cm   (0.6-1.1cm)  LVIDd:         4.12 cm   (3.9-5.9cm)  LVIDs:         2.61 cm  LV Mass Index: 73.1 g/m2  LV % FS        36.7 %     LV SYSTOLIC FUNCTION BY 2D PLANIMETRY (MOD):                      Normal Ranges:  EF-A4C View: 53.7 % (>=55%)  EF-A2C View: 51.1 %  EF-Biplane:  52.2 %     LV DIASTOLIC FUNCTION:                                Normal Ranges:  MV Peak E:        0.58 m/s    (0.7-1.2 m/s)  MV Peak A:        0.88 m/s    (0.42-0.7 m/s)  E/A Ratio:        0.66        (1.0-2.2)  MV A Dur:         83.04 msec  PulmV Sys Mahesh:    32.57 cm/s  PulmV Coles Mahesh:   29.00 cm/s  PulmV S/D Mahesh:    1.12  PulmV A Revs Mahesh: 44.65 cm/s  PulmV A Revs Dur: 138.41 msec     MITRAL VALVE:                  Normal Ranges:  MV DT: 161 msec (150-240msec)     MITRAL INSUFFICIENCY:                       Normal Ranges:  MR VTI:  191.10 cm  MR Vmax: 582.83 cm/s     TRICUSPID VALVE/RVSP:                              Normal Ranges:  Peak TR Velocity: 2.26 m/s  RV Syst Pressure: 23.5 mmHg (< 30mmHg)     PULMONIC VALVE:                          Normal Ranges:  PV Accel Time: 118 msec (>120ms)  PV Max Mahesh:    1.0 m/s  (0.6-0.9m/s)  PV Max P.4 mmHg     Pulmonary Veins:  PulmV A Revs Dur: 138.41 msec  PulmV A Revs Mahesh: 44.65 cm/s  PulmV Coles Mahesh:   29.00 cm/s  PulmV S/D Mahesh:    1.12  PulmV Sys Mahesh:    32.57 cm/s        [id number] Marlon Augustine DO  Electronically signed on 2024 at 10:01:24 AM    Narrative &  Impression   Interpreted By:  Virginia Stallings,   STUDY:  BI MAMMO BILATERAL DIAGNOSTIC TOMOSYNTHESIS; BI US BREAST LIMITED  LEFT;  2/1/2024 11:01 am; 2/1/2024 11:31 am      ACCESSION NUMBER(S):  [id number]; [id number]      ORDERING CLINICIAN:  RENETTA COATES      INDICATION:  Patient presents for her annual bilateral mammogram and a final  follow-up ultrasound for a left breast mass. History of a right  lumpectomy with chemotherapy. MediPort in the upper right chest. Best  right MLO possible.      COMPARISON:  08/23/2023, 08/08/2023      FINDINGS:  MAMMOGRAPHY: 2D and tomosynthesis images were reviewed at 1 mm slice  thickness.      Density:  There are areas of scattered fibroglandular tissue.      In the upper-outer quadrant of the right breast at anterior depth  there are surgical clips and scarring at the lumpectomy site. The  lumpectomy site is new. There is skin and trabecular thickening of  the right breast which is most consistent with the patient's history  of radiation therapy. A surgical clip is noted in the right axilla.  There is limited evaluation of the posterior tissue on the right. No  suspicious masses or calcifications are identified.      ULTRASOUND:  Targeted left breast sonographic follow-up was  performed. In the left breast at the 3 o'clock position 3 cm from the  nipple there is an oval circumscribed hypoechoic mass. The mass  measures 0.5 x 0.4 x 0.5 cm. It is soft with elastography and there  is no internal vascularity. This mass is stable. No further follow-up  is recommended.      IMPRESSION:  No mammographic evidence of malignancy in either breast. New post  treatment changes on the right.      BI-RADS CATEGORY:      BI-RADS Category:  2 Benign.  Recommendation:  Annual Screening.  Recommended Date:  1 Year.  Laterality:  Bilateral.  For any future breast imaging appointments, please call [phone]      MACRO:  None      Signed by: Virginia Stallings 2/1/2024 1:51 PM            LABORATORY/PATHOLOGY DATA    Admission on 02/15/2024, Discharged on 02/16/2024   Component Date Value Ref Range Status    WBC 02/15/2024 10.0  4.4 - 11.3 x10*3/uL Final    nRBC 02/15/2024 0.0  0.0 - 0.0 /100 WBCs Final    RBC 02/15/2024 4.20  4.00 - 5.20 x10*6/uL Final    Hemoglobin 02/15/2024 11.9 (L)  12.0 - 16.0 g/dL Final    Hematocrit 02/15/2024 36.7  36.0 - 46.0 % Final    MCV 02/15/2024 87  80 - 100 fL Final    MCH 02/15/2024 28.3  26.0 - 34.0 pg Final    MCHC 02/15/2024 32.4  32.0 - 36.0 g/dL Final    RDW 02/15/2024 15.1 (H)  11.5 - 14.5 % Final    Platelets 02/15/2024 312  150 - 450 x10*3/uL Final    Neutrophils % 02/15/2024 58.4  40.0 - 80.0 % Final    Immature Granulocytes %, Automated 02/15/2024 0.4  0.0 - 0.9 % Final    Lymphocytes % 02/15/2024 28.6  13.0 - 44.0 % Final    Monocytes % 02/15/2024 8.8  2.0 - 10.0 % Final    Eosinophils % 02/15/2024 2.7  0.0 - 6.0 % Final    Basophils % 02/15/2024 1.1  0.0 - 2.0 % Final    Neutrophils Absolute 02/15/2024 5.81  1.20 - 7.70 x10*3/uL Final    Immature Granulocytes Absolute, Au* 02/15/2024 0.04  0.00 - 0.70 x10*3/uL Final    Lymphocytes Absolute 02/15/2024 2.85  1.20 - 4.80 x10*3/uL Final    Monocytes Absolute 02/15/2024 0.88  0.10 - 1.00 x10*3/uL Final    Eosinophils Absolute 02/15/2024 0.27  0.00 - 0.70 x10*3/uL Final    Basophils Absolute 02/15/2024 0.11 (H)  0.00 - 0.10 x10*3/uL Final    Glucose 02/15/2024 177 (H)  74 - 99 mg/dL Final    Sodium 02/15/2024 141  136 - 145 mmol/L Final    Potassium 02/15/2024 3.5  3.5 - 5.3 mmol/L Final    Chloride 02/15/2024 106  98 - 107 mmol/L Final    Bicarbonate 02/15/2024 23  21 - 32 mmol/L Final    Anion Gap 02/15/2024 16  10 - 20 mmol/L Final    Urea Nitrogen 02/15/2024 22  6 - 23 mg/dL Final    Creatinine 02/15/2024 0.66  0.50 - 1.05 mg/dL Final    eGFR 02/15/2024 >90  >60 mL/min/1.73m*2 Final    Calcium 02/15/2024 9.1  8.6 - 10.3 mg/dL Final    Albumin 02/15/2024 4.1  3.4 - 5.0 g/dL Final    Bilirubin,  Total 02/15/2024 0.4  0.0 - 1.2 mg/dL Final    Bilirubin, Direct 02/15/2024 0.1  0.0 - 0.3 mg/dL Final    Alkaline Phosphatase 02/15/2024 57  33 - 136 U/L Final    ALT 02/15/2024 21  7 - 45 U/L Final    AST 02/15/2024 15  9 - 39 U/L Final    Total Protein 02/15/2024 6.2 (L)  6.4 - 8.2 g/dL Final    Protime 02/15/2024 12.1  9.8 - 12.8 seconds Final    INR 02/15/2024 1.1  0.9 - 1.1 Final    ABO TYPE 02/15/2024 A   Final    Rh TYPE 02/15/2024 POS   Final    ANTIBODY SCREEN 02/15/2024 NEG   Final    POCT pH, Venous 02/15/2024 7.48 (H)  7.33 - 7.43 pH Final    POCT pCO2, Venous 02/15/2024 35 (L)  41 - 51 mm Hg Final    POCT pO2, Venous 02/15/2024 63 (H)  35 - 45 mm Hg Final    POCT SO2, Venous 02/15/2024 94 (H)  45 - 75 % Final    POCT Oxy Hemoglobin, Venous 02/15/2024 91.8 (H)  45.0 - 75.0 % Final    POCT Hematocrit Calculated, Venous 02/15/2024 36.0  36.0 - 46.0 % Final    POCT Sodium, Venous 02/15/2024 140  136 - 145 mmol/L Final    POCT Potassium, Venous 02/15/2024 3.6  3.5 - 5.3 mmol/L Final    POCT Chloride, Venous 02/15/2024 105  98 - 107 mmol/L Final    POCT Ionized Calicum, Venous 02/15/2024 1.20  1.10 - 1.33 mmol/L Final    POCT Glucose, Venous 02/15/2024 177 (H)  74 - 99 mg/dL Final    POCT Lactate, Venous 02/15/2024 2.8 (H)  0.4 - 2.0 mmol/L Final    POCT Base Excess, Venous 02/15/2024 2.7  -2.0 - 3.0 mmol/L Final    POCT HCO3 Calculated, Venous 02/15/2024 26.1 (H)  22.0 - 26.0 mmol/L Final    POCT Hemoglobin, Venous 02/15/2024 12.0  12.0 - 16.0 g/dL Final    POCT Anion Gap, Venous 02/15/2024 13.0  10.0 - 25.0 mmol/L Final    Patient Temperature 02/15/2024 37.0  degrees Celsius Final    FiO2 02/15/2024 21  % Final    Ventricular Rate 02/15/2024 94  BPM Final    Atrial Rate 02/15/2024 94  BPM Final    TX Interval 02/15/2024 174  ms Final    QRS Duration 02/15/2024 94  ms Final    QT Interval 02/15/2024 378  ms Final    QTC Calculation(Bazett) 02/15/2024 472  ms Final    P Axis 02/15/2024 42  degrees Final    R  Axis 02/15/2024 -38  degrees Final    T Axis 02/15/2024 13  degrees Final    QRS Count 02/15/2024 16  beats Final    Q Onset 02/15/2024 214  ms Final    P Onset 02/15/2024 127  ms Final    P Offset 02/15/2024 181  ms Final    T Offset 02/15/2024 403  ms Final    QTC Fredericia 02/15/2024 439  ms Final    Lactate 02/15/2024 2.9 (H)  0.4 - 2.0 mmol/L Final    Troponin I, High Sensitivity 02/15/2024 4  0 - 13 ng/L Final    Extra Tube 02/15/2024 Hold for add-ons.   Final    Lactate 02/15/2024 1.5  0.4 - 2.0 mmol/L Final    WBC 02/16/2024 5.7  4.4 - 11.3 x10*3/uL Final    nRBC 02/16/2024 0.0  0.0 - 0.0 /100 WBCs Final    RBC 02/16/2024 3.53 (L)  4.00 - 5.20 x10*6/uL Final    Hemoglobin 02/16/2024 10.0 (L)  12.0 - 16.0 g/dL Final    Hematocrit 02/16/2024 31.1 (L)  36.0 - 46.0 % Final    MCV 02/16/2024 88  80 - 100 fL Final    MCH 02/16/2024 28.3  26.0 - 34.0 pg Final    MCHC 02/16/2024 32.2  32.0 - 36.0 g/dL Final    RDW 02/16/2024 15.2 (H)  11.5 - 14.5 % Final    Platelets 02/16/2024 228  150 - 450 x10*3/uL Final    Glucose 02/16/2024 122 (H)  74 - 99 mg/dL Final    Sodium 02/16/2024 141  136 - 145 mmol/L Final    Potassium 02/16/2024 3.8  3.5 - 5.3 mmol/L Final    Chloride 02/16/2024 107  98 - 107 mmol/L Final    Bicarbonate 02/16/2024 27  21 - 32 mmol/L Final    Anion Gap 02/16/2024 11  10 - 20 mmol/L Final    Urea Nitrogen 02/16/2024 25 (H)  6 - 23 mg/dL Final    Creatinine 02/16/2024 0.71  0.50 - 1.05 mg/dL Final    eGFR 02/16/2024 >90  >60 mL/min/1.73m*2 Final    Calcium 02/16/2024 8.9  8.6 - 10.3 mg/dL Final   Hospital Outpatient Visit on 02/01/2024   Component Date Value Ref Range Status    LV biplane EF 02/01/2024 52  % Final    MV E/A ratio 02/01/2024 0.66   Final    LVIDd 02/01/2024 4.12  cm Final    RVSP 02/01/2024 23.5  mmHg Final    LV A4C EF 02/01/2024 53.7   Final   Infusion on 01/31/2024   Component Date Value Ref Range Status    WBC 01/31/2024 4.3 (L)  4.4 - 11.3 x10*3/uL Final    nRBC 01/31/2024     Final    RBC 01/31/2024 4.02  4.00 - 5.20 x10*6/uL Final    Hemoglobin 01/31/2024 11.4 (L)  12.0 - 16.0 g/dL Final    Hematocrit 01/31/2024 34.5 (L)  36.0 - 46.0 % Final    MCV 01/31/2024 86  80 - 100 fL Final    MCH 01/31/2024 28.4  26.0 - 34.0 pg Final    MCHC 01/31/2024 33.0  32.0 - 36.0 g/dL Final    RDW 01/31/2024 15.3 (H)  11.5 - 14.5 % Final    Platelets 01/31/2024 278  150 - 450 x10*3/uL Final    Neutrophils % 01/31/2024 59.6  40.0 - 80.0 % Final    Immature Granulocytes %, Automated 01/31/2024 1.4 (H)  0.0 - 0.9 % Final    Lymphocytes % 01/31/2024 24.4  13.0 - 44.0 % Final    Monocytes % 01/31/2024 8.4  2.0 - 10.0 % Final    Eosinophils % 01/31/2024 4.6  0.0 - 6.0 % Final    Basophils % 01/31/2024 1.6  0.0 - 2.0 % Final    Neutrophils Absolute 01/31/2024 2.57  1.20 - 7.70 x10*3/uL Final    Immature Granulocytes Absolute, Au* 01/31/2024 0.06  0.00 - 0.70 x10*3/uL Final    Lymphocytes Absolute 01/31/2024 1.05 (L)  1.20 - 4.80 x10*3/uL Final    Monocytes Absolute 01/31/2024 0.36  0.10 - 1.00 x10*3/uL Final    Eosinophils Absolute 01/31/2024 0.20  0.00 - 0.70 x10*3/uL Final    Basophils Absolute 01/31/2024 0.07  0.00 - 0.10 x10*3/uL Final    Glucose 01/31/2024 106 (H)  74 - 99 mg/dL Final    Sodium 01/31/2024 141  136 - 145 mmol/L Final    Potassium 01/31/2024 3.9  3.5 - 5.3 mmol/L Final    Chloride 01/31/2024 106  98 - 107 mmol/L Final    Bicarbonate 01/31/2024 27  21 - 32 mmol/L Final    Anion Gap 01/31/2024 12  10 - 20 mmol/L Final    Urea Nitrogen 01/31/2024 11  6 - 23 mg/dL Final    Creatinine 01/31/2024 0.68  0.50 - 1.05 mg/dL Final    eGFR 01/31/2024 >90  >60 mL/min/1.73m*2 Final    Calcium 01/31/2024 9.5  8.6 - 10.6 mg/dL Final    Albumin 01/31/2024 4.2  3.4 - 5.0 g/dL Final    Alkaline Phosphatase 01/31/2024 48  33 - 136 U/L Final    Total Protein 01/31/2024 5.9 (L)  6.4 - 8.2 g/dL Final    AST 01/31/2024 19  9 - 39 U/L Final    Bilirubin, Total 01/31/2024 0.4  0.0 - 1.2 mg/dL Final    ALT 01/31/2024  24  7 - 45 U/L Final   Infusion on 01/24/2024   Component Date Value Ref Range Status    WBC 01/24/2024 3.7 (L)  4.4 - 11.3 x10*3/uL Final    nRBC 01/24/2024    Final    RBC 01/24/2024 4.12  4.00 - 5.20 x10*6/uL Final    Hemoglobin 01/24/2024 11.5 (L)  12.0 - 16.0 g/dL Final    Hematocrit 01/24/2024 34.9 (L)  36.0 - 46.0 % Final    MCV 01/24/2024 85  80 - 100 fL Final    MCH 01/24/2024 27.9  26.0 - 34.0 pg Final    MCHC 01/24/2024 33.0  32.0 - 36.0 g/dL Final    RDW 01/24/2024 15.3 (H)  11.5 - 14.5 % Final    Platelets 01/24/2024 275  150 - 450 x10*3/uL Final    Neutrophils % 01/24/2024 55.3  40.0 - 80.0 % Final    Immature Granulocytes %, Automated 01/24/2024 0.8  0.0 - 0.9 % Final    Lymphocytes % 01/24/2024 26.4  13.0 - 44.0 % Final    Monocytes % 01/24/2024 9.0  2.0 - 10.0 % Final    Eosinophils % 01/24/2024 6.3  0.0 - 6.0 % Final    Basophils % 01/24/2024 2.2  0.0 - 2.0 % Final    Neutrophils Absolute 01/24/2024 2.04  1.20 - 7.70 x10*3/uL Final    Immature Granulocytes Absolute, Au* 01/24/2024 0.03  0.00 - 0.70 x10*3/uL Final    Lymphocytes Absolute 01/24/2024 0.97 (L)  1.20 - 4.80 x10*3/uL Final    Monocytes Absolute 01/24/2024 0.33  0.10 - 1.00 x10*3/uL Final    Eosinophils Absolute 01/24/2024 0.23  0.00 - 0.70 x10*3/uL Final    Basophils Absolute 01/24/2024 0.08  0.00 - 0.10 x10*3/uL Final    Glucose 01/24/2024 120 (H)  74 - 99 mg/dL Final    Sodium 01/24/2024 141  136 - 145 mmol/L Final    Potassium 01/24/2024 3.7  3.5 - 5.3 mmol/L Final    Chloride 01/24/2024 104  98 - 107 mmol/L Final    Bicarbonate 01/24/2024 29  21 - 32 mmol/L Final    Anion Gap 01/24/2024 12  10 - 20 mmol/L Final    Urea Nitrogen 01/24/2024 14  6 - 23 mg/dL Final    Creatinine 01/24/2024 0.65  0.50 - 1.05 mg/dL Final    eGFR 01/24/2024 >90  >60 mL/min/1.73m*2 Final    Calcium 01/24/2024 9.6  8.6 - 10.6 mg/dL Final    Albumin 01/24/2024 4.3  3.4 - 5.0 g/dL Final    Alkaline Phosphatase 01/24/2024 50  33 - 136 U/L Final    Total Protein  01/24/2024 6.2 (L)  6.4 - 8.2 g/dL Final    AST 01/24/2024 19  9 - 39 U/L Final    Bilirubin, Total 01/24/2024 0.4  0.0 - 1.2 mg/dL Final    ALT 01/24/2024 27  7 - 45 U/L Final   Infusion on 01/17/2024   Component Date Value Ref Range Status    WBC 01/17/2024 4.2 (L)  4.4 - 11.3 x10*3/uL Final    nRBC 01/17/2024    Final    RBC 01/17/2024 4.12  4.00 - 5.20 x10*6/uL Final    Hemoglobin 01/17/2024 11.6 (L)  12.0 - 16.0 g/dL Final    Hematocrit 01/17/2024 34.5 (L)  36.0 - 46.0 % Final    MCV 01/17/2024 84  80 - 100 fL Final    MCH 01/17/2024 28.2  26.0 - 34.0 pg Final    MCHC 01/17/2024 33.6  32.0 - 36.0 g/dL Final    RDW 01/17/2024 14.8 (H)  11.5 - 14.5 % Final    Platelets 01/17/2024 285  150 - 450 x10*3/uL Final    Neutrophils % 01/17/2024 56.7  40.0 - 80.0 % Final    Immature Granulocytes %, Automated 01/17/2024 1.2 (H)  0.0 - 0.9 % Final    Lymphocytes % 01/17/2024 27.1  13.0 - 44.0 % Final    Monocytes % 01/17/2024 7.9  2.0 - 10.0 % Final    Eosinophils % 01/17/2024 5.7  0.0 - 6.0 % Final    Basophils % 01/17/2024 1.4  0.0 - 2.0 % Final    Neutrophils Absolute 01/17/2024 2.38  1.20 - 7.70 x10*3/uL Final    Immature Granulocytes Absolute, Au* 01/17/2024 0.05  0.00 - 0.70 x10*3/uL Final    Lymphocytes Absolute 01/17/2024 1.14 (L)  1.20 - 4.80 x10*3/uL Final    Monocytes Absolute 01/17/2024 0.33  0.10 - 1.00 x10*3/uL Final    Eosinophils Absolute 01/17/2024 0.24  0.00 - 0.70 x10*3/uL Final    Basophils Absolute 01/17/2024 0.06  0.00 - 0.10 x10*3/uL Final    Glucose 01/17/2024 145 (H)  74 - 99 mg/dL Final    Sodium 01/17/2024 141  136 - 145 mmol/L Final    Potassium 01/17/2024 3.6  3.5 - 5.3 mmol/L Final    Chloride 01/17/2024 103  98 - 107 mmol/L Final    Bicarbonate 01/17/2024 26  21 - 32 mmol/L Final    Anion Gap 01/17/2024 16  10 - 20 mmol/L Final    Urea Nitrogen 01/17/2024 13  6 - 23 mg/dL Final    Creatinine 01/17/2024 0.59  0.50 - 1.05 mg/dL Final    eGFR 01/17/2024 >90  >60 mL/min/1.73m*2 Final    Calcium  01/17/2024 9.4  8.6 - 10.6 mg/dL Final    Albumin 01/17/2024 4.2  3.4 - 5.0 g/dL Final    Alkaline Phosphatase 01/17/2024 53  33 - 136 U/L Final    Total Protein 01/17/2024 6.0 (L)  6.4 - 8.2 g/dL Final    AST 01/17/2024 19  9 - 39 U/L Final    Bilirubin, Total 01/17/2024 0.3  0.0 - 1.2 mg/dL Final    ALT 01/17/2024 23  7 - 45 U/L Final    Color, Urine 01/17/2024 Yellow  Straw, Yellow Final    Appearance, Urine 01/17/2024 Clear  Clear Final    Specific Gravity, Urine 01/17/2024 1.010  1.005 - 1.035 Final    pH, Urine 01/17/2024 6.0  5.0, 5.5, 6.0, 6.5, 7.0, 7.5, 8.0 Final    Protein, Urine 01/17/2024 NEGATIVE  NEGATIVE, TRACE mg/dL Final    Glucose, Urine 01/17/2024 NEGATIVE  NEGATIVE mg/dL Final    Blood, Urine 01/17/2024 NEGATIVE  NEGATIVE Final    Ketones, Urine 01/17/2024 NEGATIVE  NEGATIVE mg/dL Final    Bilirubin, Urine 01/17/2024 NEGATIVE  NEGATIVE Final    Urobilinogen, Urine 01/17/2024 0.2  0.2, 1.0 mg/dL Final    Nitrite, Urine 01/17/2024 NEGATIVE  NEGATIVE Final    Leukocyte Esterase, Urine 01/17/2024 NEGATIVE  NEGATIVE Final          IMPRESSION/PLAN    1. Right sided [id number] HER2+ breast cancer, unexpectedly found after excisional biopsy for sclerosing adenosis.   Completion adjuvant chemo-HER2  therapy 1/31/24, now on maintenance herceptin. Dose delayed today due to recent right nares epistaxis and cautery, will restart eliquis on 2/22/24. She is agreeable to make up missed dose on 2/23.      Grade 2 neuropathy essentially resolved, aches in extremities are consistent with treatment related arthralgias and baseline arthritic aches. Will continue to monitor.     Continued stable exertional fatigue, likely from chemo deconditioning. Prior CXR WNL, EF on Echo WNL, previously placed orders for Onco-cardiology referral due to mention regarding impaired relaxation pattern of left ventricular diastolic filling. Orders placed today for next Q3 month Echo due prior to 5/3/24.       Return 8-9 weeks to follow-up  with Dr Brendon Rosenbaum.     Excuse letter written today for Cassandra's daughter Gladys for excuse to make up missed college test due to helping with her mother's appointments.        2. Hypercoagulable state. extensive medical history of CVA, breast cancer,  HTN, Hyperlipidemia, hypercholesterolemia. Eliquis will restart post right nares cautery on 2/22/24.      We discussed the clinical significance of diagnosis, goals of care and treatment plan in detail.     At least 30 minutes of direct consultation was spent with the patient today reviewing her cancer care plan, educating and answering questions regarding ongoing follow up, greater than 50% in counseling and coordination of care         Shawna Mccallum MSN, APRN, FNP-C  [facility]  Division of Medical Oncology- Breast   Collaborating Physician Dr. Brendon Rosenbaum   [facility] Priya Li, Claire Cali [place] [address]  Phone: [phone]  Fax: [phone]  Available via Secure Chat    Confidential Peer Review Document-  Privilege  Privileged Pursuant to [place] Revised Code Section 2305.24, .25, .251 & .252

## 2024-02-21 NOTE — LETTER
February 21, 2024     Patient: Cassandra Lang   Date of Birth:    Date of Visit: 2/21/2024       To Whom It May Concern:    Cassandra Lang who is the mother of Alma Lang was seen in my clinic on 2/21/2024 at 1:00 pm. Alma's mother is undergoing continuous therapy for her medical condition. I am asking an excuse / delay for Alma of her upcoming test for tomorrow 2/22/24 at 12:30.  Please allow her to have until 2/28/24 to take this test due to being her mother's care giver and frequent appointments with our office.         If you have any questions or concerns, please don't hesitate to call.         Sincerely,         Shawna Mccallum MSN, APRN, FNP-C  [facility]  Division of Medical Oncology- Breast   Collaborating [place] [address]  Phone: [phone]  Email [email or web address]            CC: No Recipients

## 2024-02-22 ENCOUNTER — OFFICE VISIT (OUTPATIENT)
Dept: PRIMARY CARE | Facility: CLINIC | Age: 64
End: 2024-02-22
Payer: COMMERCIAL

## 2024-02-22 VITALS
BODY MASS INDEX: 28.87 KG/M2 | WEIGHT: 163 LBS | TEMPERATURE: 97.3 F | HEART RATE: 92 BPM | DIASTOLIC BLOOD PRESSURE: 87 MMHG | SYSTOLIC BLOOD PRESSURE: 144 MMHG

## 2024-02-22 DIAGNOSIS — F41.9 ANXIETY: ICD-10-CM

## 2024-02-22 DIAGNOSIS — Z00.00 ANNUAL PHYSICAL EXAM: ICD-10-CM

## 2024-02-22 DIAGNOSIS — Z09 HOSPITAL DISCHARGE FOLLOW-UP: Primary | ICD-10-CM

## 2024-02-22 PROCEDURE — 3077F SYST BP >= 140 MM HG: CPT | Performed by: FAMILY MEDICINE

## 2024-02-22 PROCEDURE — 1036F TOBACCO NON-USER: CPT | Performed by: FAMILY MEDICINE

## 2024-02-22 PROCEDURE — 3079F DIAST BP 80-89 MM HG: CPT | Performed by: FAMILY MEDICINE

## 2024-02-22 PROCEDURE — 99496 TRANSJ CARE MGMT HIGH F2F 7D: CPT | Performed by: FAMILY MEDICINE

## 2024-02-22 RX ORDER — ASPIRIN 81 MG/1
81 TABLET ORAL DAILY
COMMUNITY
End: 2024-03-14 | Stop reason: ALTCHOICE

## 2024-02-22 RX ORDER — CITALOPRAM 20 MG/1
20 TABLET, FILM COATED ORAL DAILY
Qty: 30 TABLET | Refills: 1 | Status: SHIPPED | OUTPATIENT
Start: 2024-02-22 | End: 2024-03-19

## 2024-02-22 RX ORDER — AMLODIPINE BESYLATE 5 MG/1
5 TABLET ORAL DAILY
COMMUNITY
End: 2024-04-05 | Stop reason: SDUPTHER

## 2024-02-22 ASSESSMENT — PAIN SCALES - GENERAL: PAINLEVEL: 0-NO PAIN

## 2024-02-22 NOTE — PROGRESS NOTES
Subjective   Patient ID: Cassandra Lang is a 63 y.o. female who presents for Follow-up and Med Refill.  Med Refill      Cassandra Lang is a 63-year-old female with past medical history of hypertension, hyperlipidemia, prediabetes, PE on Eliquis, CVA, PFO s/p repair GERD, IBS, anxiety, depression, vitamin D deficiency, right breast cancer s/p lumpectomy and SLN and current treatment weekly paclitaxel/trastuzumab.  Patient is here for a post ER follow-up visit from 2/14/2024.          History Of Present Illness  Cassandra Lang is a 63-year-old female with past medical history of hypertension, hyperlipidemia, prediabetes, PE on Eliquis, CVA, PFO s/p repair GERD, IBS, anxiety, depression, vitamin D deficiency, right breast cancer s/p lumpectomy and SLN and current treatment weekly paclitaxel/trastuzumab.  Patient presents with right sided epistaxis.    She reports bleeding started around 2 PM, she noted taste of blood in her mouth and feels she swallowed a significant amount. Reports prior history of epistaxis, however never previously needed emergent treatment. In the ED patient received Afrin and underwent examination with suspected polyp in the anterior nasal septum as a source of bleeding, hemostasis was achieved with 4.5 cm anterior Rhino Rocket. Patient originally was going to be discharged but subsequently developed nausea with several episodes of dark emesis and associated syncopal episode/dizziness. No prior history of GI bleed or peptic ulcers disease. Denies focal weakness, chest pain, palpitations, rapid heart rate, shortness of breath, cough, wheezing, abdominal pain, recent change in bowel pattern, urinary complaints, wounds, and edema. Denies NSAID use other than baby aspirin daily. EGD 9/8/2021 for GERD notable for erosive gastropathy with stigmata of recent bleeding and single gastric polyp.     Since she was discharged home, she has not have another epistaxis episode.  She was off the Eliquis,  Amlodipine and ASA since the hospital discharge and resumed Eliquis today.  She also resumed Amlodipine 2 days after her discharge home.         The patient also mentions that she is stressed. She is requesting a medication for it.   A review of system was completed.  All systems were reviewed and were normal, except for the ones that are listed in the HPI.    Objective   Physical Exam  Constitutional:       Appearance: Normal appearance.   HENT:      Head: Normocephalic and atraumatic.      Right Ear: Tympanic membrane, ear canal and external ear normal.      Left Ear: Tympanic membrane, ear canal and external ear normal.      Nose: Nose normal.      Mouth/Throat:      Mouth: Mucous membranes are moist.      Pharynx: Oropharynx is clear.   Eyes:      Extraocular Movements: Extraocular movements intact.      Conjunctiva/sclera: Conjunctivae normal.      Pupils: Pupils are equal, round, and reactive to light.   Cardiovascular:      Rate and Rhythm: Normal rate and regular rhythm.      Pulses: Normal pulses.   Pulmonary:      Effort: Pulmonary effort is normal.      Breath sounds: Normal breath sounds.   Abdominal:      General: Abdomen is flat. Bowel sounds are normal.      Palpations: Abdomen is soft.   Musculoskeletal:         General: Normal range of motion.      Cervical back: Normal range of motion and neck supple.   Skin:     General: Skin is warm.   Neurological:      General: No focal deficit present.      Mental Status: She is alert and oriented to person, place, and time. Mental status is at baseline.   Psychiatric:         Mood and Affect: Mood normal.         Behavior: Behavior normal.         Thought Content: Thought content normal.         Judgment: Judgment normal.     Assessment/Plan   Problem List Items Addressed This Visit       Hospital discharge follow-up - Primary     1-  Epistaxis.   Epistaxis, suspected superficial vessel, history of anticoagulation for history of PE  The patient is presenting  with quite copious right-sided epistaxis that required multiple interventions in the emergency department prior to control.  She was admitted for observation secondary to bloody emesis.  Upon discharge the patient's nasal pack fell out but she has not had further bleeding.  On exam she has relatively healthy appearing mucosa with expected nasal crusting within the right nasal cavity following pack removal.  There is a small exophytic lesion off the right anterior septum consistent with ED report of nasal polyp versus superficial vessel.  It was cauterized on 2/22/2024 by ENT.     -Held Eliquis, ASA and antihypertensives.  She resumed Eliquis today.  She also resumed Amlodipine 2 days after her discharge home because of pounding headaches.   -History of erosive gastropathy on EGD on 9/2021  -Trend H&H, monitor for overt bleeding  -No suspicion for upper GI bleed, however will empirically treat with Protonix 40 mg every day.         Anxiety     Citalopram 20 mg every day started today.         Relevant Medications    citalopram (CeleXA) 20 mg tablet     Other Visit Diagnoses       Annual physical exam        Relevant Orders    CBC         Patient to return to office in 1 month for your BP monitoring and anxiety management.

## 2024-02-22 NOTE — ASSESSMENT & PLAN NOTE
1-  Epistaxis.   Epistaxis, suspected superficial vessel, history of anticoagulation for history of PE  The patient is presenting with quite copious right-sided epistaxis that required multiple interventions in the emergency department prior to control.  She was admitted for observation secondary to bloody emesis.  Upon discharge the patient's nasal pack fell out but she has not had further bleeding.  On exam she has relatively healthy appearing mucosa with expected nasal crusting within the right nasal cavity following pack removal.  There is a small exophytic lesion off the right anterior septum consistent with ED report of nasal polyp versus superficial vessel.  It was cauterized on 2/22/2024 by ENT.     -Held Eliquis, ASA and antihypertensives.  She resumed Eliquis today.  She also resumed Amlodipine 2 days after her discharge home because of pounding headaches.   -History of erosive gastropathy on EGD on 9/2021  -Trend H&H, monitor for overt bleeding  -No suspicion for upper GI bleed, however will empirically treat with Protonix 40 mg every day.

## 2024-02-23 ENCOUNTER — INFUSION (OUTPATIENT)
Dept: HEMATOLOGY/ONCOLOGY | Facility: CLINIC | Age: 64
End: 2024-02-23
Payer: COMMERCIAL

## 2024-02-23 VITALS
WEIGHT: 164.3 LBS | RESPIRATION RATE: 18 BRPM | TEMPERATURE: 99 F | HEART RATE: 85 BPM | OXYGEN SATURATION: 95 % | BODY MASS INDEX: 29.1 KG/M2

## 2024-02-23 DIAGNOSIS — Z79.899 ENCOUNTER FOR MONITORING CARDIOTOXIC DRUG THERAPY: ICD-10-CM

## 2024-02-23 DIAGNOSIS — Z17.1 MALIGNANT NEOPLASM OF UPPER-OUTER QUADRANT OF RIGHT BREAST IN FEMALE, ESTROGEN RECEPTOR NEGATIVE (MULTI): ICD-10-CM

## 2024-02-23 DIAGNOSIS — Z00.00 ANNUAL PHYSICAL EXAM: ICD-10-CM

## 2024-02-23 DIAGNOSIS — C50.411 MALIGNANT NEOPLASM OF UPPER-OUTER QUADRANT OF RIGHT BREAST IN FEMALE, ESTROGEN RECEPTOR NEGATIVE (MULTI): ICD-10-CM

## 2024-02-23 DIAGNOSIS — Z51.81 ENCOUNTER FOR MONITORING CARDIOTOXIC DRUG THERAPY: ICD-10-CM

## 2024-02-23 LAB
ERYTHROCYTE [DISTWIDTH] IN BLOOD BY AUTOMATED COUNT: 14.4 % (ref 11.5–14.5)
HCT VFR BLD AUTO: 31.7 % (ref 36–46)
HGB BLD-MCNC: 10.3 G/DL (ref 12–16)
MCH RBC QN AUTO: 28.1 PG (ref 26–34)
MCHC RBC AUTO-ENTMCNC: 32.5 G/DL (ref 32–36)
MCV RBC AUTO: 87 FL (ref 80–100)
NRBC BLD-RTO: ABNORMAL /100{WBCS}
PLATELET # BLD AUTO: 292 X10*3/UL (ref 150–450)
RBC # BLD AUTO: 3.66 X10*6/UL (ref 4–5.2)
WBC # BLD AUTO: 5.2 X10*3/UL (ref 4.4–11.3)

## 2024-02-23 PROCEDURE — 96413 CHEMO IV INFUSION 1 HR: CPT

## 2024-02-23 PROCEDURE — 2500000004 HC RX 250 GENERAL PHARMACY W/ HCPCS (ALT 636 FOR OP/ED): Performed by: INTERNAL MEDICINE

## 2024-02-23 PROCEDURE — 85027 COMPLETE CBC AUTOMATED: CPT

## 2024-02-23 RX ORDER — EPINEPHRINE 0.3 MG/.3ML
0.3 INJECTION SUBCUTANEOUS EVERY 5 MIN PRN
Status: DISCONTINUED | OUTPATIENT
Start: 2024-02-23 | End: 2024-02-23 | Stop reason: HOSPADM

## 2024-02-23 RX ORDER — HEPARIN 100 UNIT/ML
500 SYRINGE INTRAVENOUS AS NEEDED
Status: CANCELLED | OUTPATIENT
Start: 2024-02-23

## 2024-02-23 RX ORDER — PROCHLORPERAZINE EDISYLATE 5 MG/ML
10 INJECTION INTRAMUSCULAR; INTRAVENOUS EVERY 6 HOURS PRN
Status: DISCONTINUED | OUTPATIENT
Start: 2024-02-23 | End: 2024-02-23 | Stop reason: HOSPADM

## 2024-02-23 RX ORDER — HEPARIN 100 UNIT/ML
500 SYRINGE INTRAVENOUS AS NEEDED
Status: DISCONTINUED | OUTPATIENT
Start: 2024-02-23 | End: 2024-02-23 | Stop reason: HOSPADM

## 2024-02-23 RX ORDER — DIPHENHYDRAMINE HYDROCHLORIDE 50 MG/ML
50 INJECTION INTRAMUSCULAR; INTRAVENOUS AS NEEDED
Status: DISCONTINUED | OUTPATIENT
Start: 2024-02-23 | End: 2024-02-23 | Stop reason: HOSPADM

## 2024-02-23 RX ORDER — ALBUTEROL SULFATE 0.83 MG/ML
3 SOLUTION RESPIRATORY (INHALATION) AS NEEDED
Status: DISCONTINUED | OUTPATIENT
Start: 2024-02-23 | End: 2024-02-23 | Stop reason: HOSPADM

## 2024-02-23 RX ORDER — PROCHLORPERAZINE MALEATE 10 MG
10 TABLET ORAL EVERY 6 HOURS PRN
Status: DISCONTINUED | OUTPATIENT
Start: 2024-02-23 | End: 2024-02-23 | Stop reason: HOSPADM

## 2024-02-23 RX ORDER — HEPARIN SODIUM,PORCINE/PF 10 UNIT/ML
50 SYRINGE (ML) INTRAVENOUS AS NEEDED
Status: CANCELLED | OUTPATIENT
Start: 2024-02-23

## 2024-02-23 RX ORDER — FAMOTIDINE 10 MG/ML
20 INJECTION INTRAVENOUS ONCE AS NEEDED
Status: DISCONTINUED | OUTPATIENT
Start: 2024-02-23 | End: 2024-02-23 | Stop reason: HOSPADM

## 2024-02-23 RX ORDER — HEPARIN SODIUM,PORCINE/PF 10 UNIT/ML
50 SYRINGE (ML) INTRAVENOUS AS NEEDED
Status: DISCONTINUED | OUTPATIENT
Start: 2024-02-23 | End: 2024-02-23 | Stop reason: HOSPADM

## 2024-02-23 RX ADMIN — HEPARIN 500 UNITS: 100 SYRINGE at 14:12

## 2024-02-23 RX ADMIN — TRASTUZUMAB-ANNS 450 MG: 150 INJECTION, POWDER, LYOPHILIZED, FOR SOLUTION INTRAVENOUS at 13:35

## 2024-02-23 ASSESSMENT — PAIN SCALES - GENERAL: PAINLEVEL: 0-NO PAIN

## 2024-02-23 NOTE — SIGNIFICANT EVENT
02/23/24 1234   Prechemo Checklist   Has the patient been in the hospital, ED, or urgent care since last date of service Yes  (Went to ED for nosebleed, seen CORINNA on 2/21/23, ok for treatment today)   Chemo/Immuno Consent Signed Yes   Protocol/Indications Verified Yes   Confirmed to previous date/time of medication Yes   Compared to previous dose Yes   All medications are dated accurately Yes   Pregnancy Test Negative Not applicable   Parameters Met Yes   BSA/Weight-Height Verified Yes   Dose Calculations Verified Yes

## 2024-02-28 ENCOUNTER — APPOINTMENT (OUTPATIENT)
Dept: CARDIOLOGY | Facility: CLINIC | Age: 64
End: 2024-02-28
Payer: COMMERCIAL

## 2024-03-13 ENCOUNTER — APPOINTMENT (OUTPATIENT)
Dept: HEMATOLOGY/ONCOLOGY | Facility: CLINIC | Age: 64
End: 2024-03-13
Payer: COMMERCIAL

## 2024-03-13 ENCOUNTER — INFUSION (OUTPATIENT)
Dept: HEMATOLOGY/ONCOLOGY | Facility: CLINIC | Age: 64
End: 2024-03-13
Payer: COMMERCIAL

## 2024-03-13 VITALS
SYSTOLIC BLOOD PRESSURE: 134 MMHG | DIASTOLIC BLOOD PRESSURE: 90 MMHG | OXYGEN SATURATION: 97 % | WEIGHT: 164 LBS | HEART RATE: 78 BPM | BODY MASS INDEX: 29.05 KG/M2 | RESPIRATION RATE: 18 BRPM | TEMPERATURE: 95 F

## 2024-03-13 DIAGNOSIS — C50.411 MALIGNANT NEOPLASM OF UPPER-OUTER QUADRANT OF RIGHT BREAST IN FEMALE, ESTROGEN RECEPTOR NEGATIVE (MULTI): ICD-10-CM

## 2024-03-13 DIAGNOSIS — Z17.1 MALIGNANT NEOPLASM OF UPPER-OUTER QUADRANT OF RIGHT BREAST IN FEMALE, ESTROGEN RECEPTOR NEGATIVE (MULTI): ICD-10-CM

## 2024-03-13 PROCEDURE — 96413 CHEMO IV INFUSION 1 HR: CPT

## 2024-03-13 PROCEDURE — 2500000004 HC RX 250 GENERAL PHARMACY W/ HCPCS (ALT 636 FOR OP/ED): Performed by: INTERNAL MEDICINE

## 2024-03-13 RX ORDER — PROCHLORPERAZINE EDISYLATE 5 MG/ML
10 INJECTION INTRAMUSCULAR; INTRAVENOUS EVERY 6 HOURS PRN
Status: DISCONTINUED | OUTPATIENT
Start: 2024-03-13 | End: 2024-03-13 | Stop reason: HOSPADM

## 2024-03-13 RX ORDER — HEPARIN SODIUM,PORCINE/PF 10 UNIT/ML
50 SYRINGE (ML) INTRAVENOUS AS NEEDED
Status: DISCONTINUED | OUTPATIENT
Start: 2024-03-13 | End: 2024-03-13 | Stop reason: HOSPADM

## 2024-03-13 RX ORDER — HEPARIN SODIUM,PORCINE/PF 10 UNIT/ML
50 SYRINGE (ML) INTRAVENOUS AS NEEDED
Status: CANCELLED | OUTPATIENT
Start: 2024-03-13

## 2024-03-13 RX ORDER — HEPARIN 100 UNIT/ML
500 SYRINGE INTRAVENOUS AS NEEDED
Status: CANCELLED | OUTPATIENT
Start: 2024-03-13

## 2024-03-13 RX ORDER — DIPHENHYDRAMINE HYDROCHLORIDE 50 MG/ML
50 INJECTION INTRAMUSCULAR; INTRAVENOUS AS NEEDED
Status: DISCONTINUED | OUTPATIENT
Start: 2024-03-13 | End: 2024-03-13 | Stop reason: HOSPADM

## 2024-03-13 RX ORDER — EPINEPHRINE 0.3 MG/.3ML
0.3 INJECTION SUBCUTANEOUS EVERY 5 MIN PRN
Status: DISCONTINUED | OUTPATIENT
Start: 2024-03-13 | End: 2024-03-13 | Stop reason: HOSPADM

## 2024-03-13 RX ORDER — FAMOTIDINE 10 MG/ML
20 INJECTION INTRAVENOUS ONCE AS NEEDED
Status: DISCONTINUED | OUTPATIENT
Start: 2024-03-13 | End: 2024-03-13 | Stop reason: HOSPADM

## 2024-03-13 RX ORDER — ALBUTEROL SULFATE 0.83 MG/ML
3 SOLUTION RESPIRATORY (INHALATION) AS NEEDED
Status: DISCONTINUED | OUTPATIENT
Start: 2024-03-13 | End: 2024-03-13 | Stop reason: HOSPADM

## 2024-03-13 RX ORDER — PROCHLORPERAZINE MALEATE 10 MG
10 TABLET ORAL EVERY 6 HOURS PRN
Status: DISCONTINUED | OUTPATIENT
Start: 2024-03-13 | End: 2024-03-13 | Stop reason: HOSPADM

## 2024-03-13 RX ORDER — HEPARIN 100 UNIT/ML
500 SYRINGE INTRAVENOUS AS NEEDED
Status: DISCONTINUED | OUTPATIENT
Start: 2024-03-13 | End: 2024-03-13 | Stop reason: HOSPADM

## 2024-03-13 RX ADMIN — TRASTUZUMAB-ANNS 449.4 MG: 420 INJECTION, POWDER, LYOPHILIZED, FOR SOLUTION INTRAVENOUS at 09:35

## 2024-03-13 RX ADMIN — HEPARIN 500 UNITS: 100 SYRINGE at 10:16

## 2024-03-13 ASSESSMENT — PAIN SCALES - GENERAL: PAINLEVEL: 0-NO PAIN

## 2024-03-13 NOTE — SIGNIFICANT EVENT
03/13/24 0849   Prechemo Checklist   Has the patient been in the hospital, ED, or urgent care since last date of service No   Chemo/Immuno Consent Signed Yes   Protocol/Indications Verified Yes   Confirmed to previous date/time of medication Yes   Compared to previous dose Yes   All medications are dated accurately Yes   Pregnancy Test Negative Not applicable   Parameters Met Yes   BSA/Weight-Height Verified Yes   Dose Calculations Verified Yes

## 2024-03-14 ENCOUNTER — OFFICE VISIT (OUTPATIENT)
Dept: OTOLARYNGOLOGY | Facility: CLINIC | Age: 64
End: 2024-03-14
Payer: COMMERCIAL

## 2024-03-14 VITALS
SYSTOLIC BLOOD PRESSURE: 122 MMHG | HEART RATE: 82 BPM | DIASTOLIC BLOOD PRESSURE: 81 MMHG | RESPIRATION RATE: 16 BRPM | TEMPERATURE: 97.3 F | BODY MASS INDEX: 29.06 KG/M2 | HEIGHT: 63 IN | WEIGHT: 164 LBS | OXYGEN SATURATION: 98 %

## 2024-03-14 DIAGNOSIS — R04.0 EPISTAXIS: Primary | ICD-10-CM

## 2024-03-14 PROCEDURE — 3079F DIAST BP 80-89 MM HG: CPT | Performed by: STUDENT IN AN ORGANIZED HEALTH CARE EDUCATION/TRAINING PROGRAM

## 2024-03-14 PROCEDURE — 99212 OFFICE O/P EST SF 10 MIN: CPT | Performed by: STUDENT IN AN ORGANIZED HEALTH CARE EDUCATION/TRAINING PROGRAM

## 2024-03-14 PROCEDURE — 3074F SYST BP LT 130 MM HG: CPT | Performed by: STUDENT IN AN ORGANIZED HEALTH CARE EDUCATION/TRAINING PROGRAM

## 2024-03-14 PROCEDURE — 1036F TOBACCO NON-USER: CPT | Performed by: STUDENT IN AN ORGANIZED HEALTH CARE EDUCATION/TRAINING PROGRAM

## 2024-03-14 SDOH — ECONOMIC STABILITY: FOOD INSECURITY: WITHIN THE PAST 12 MONTHS, THE FOOD YOU BOUGHT JUST DIDN'T LAST AND YOU DIDN'T HAVE MONEY TO GET MORE.: NEVER TRUE

## 2024-03-14 SDOH — ECONOMIC STABILITY: FOOD INSECURITY: WITHIN THE PAST 12 MONTHS, YOU WORRIED THAT YOUR FOOD WOULD RUN OUT BEFORE YOU GOT MONEY TO BUY MORE.: NEVER TRUE

## 2024-03-14 ASSESSMENT — ENCOUNTER SYMPTOMS
LOSS OF SENSATION IN FEET: 0
OCCASIONAL FEELINGS OF UNSTEADINESS: 0
DEPRESSION: 0

## 2024-03-14 ASSESSMENT — COLUMBIA-SUICIDE SEVERITY RATING SCALE - C-SSRS
1. IN THE PAST MONTH, HAVE YOU WISHED YOU WERE DEAD OR WISHED YOU COULD GO TO SLEEP AND NOT WAKE UP?: NO
2. HAVE YOU ACTUALLY HAD ANY THOUGHTS OF KILLING YOURSELF?: NO
6. HAVE YOU EVER DONE ANYTHING, STARTED TO DO ANYTHING, OR PREPARED TO DO ANYTHING TO END YOUR LIFE?: NO

## 2024-03-14 ASSESSMENT — PATIENT HEALTH QUESTIONNAIRE - PHQ9
2. FEELING DOWN, DEPRESSED OR HOPELESS: NOT AT ALL
SUM OF ALL RESPONSES TO PHQ9 QUESTIONS 1 AND 2: 0
1. LITTLE INTEREST OR PLEASURE IN DOING THINGS: NOT AT ALL

## 2024-03-14 ASSESSMENT — PAIN SCALES - GENERAL: PAINLEVEL: 0-NO PAIN

## 2024-03-14 NOTE — PROGRESS NOTES
SUBJECTIVE  Patient ID: Cassandra Lang is a 63 y.o. female who presents for Follow-up.    History 2/20/2024: Referred by Dr. Everett Meyer. ED note and H&P reviewed.    Patient was recently admitted to overnight observation 2/15/2024 for right-sided epistaxis.  Dr. Meyer was concerned that bleeding was coming from the anterior nasal polyp.  Patient was initially controlled with placement of a pack but had dark emesis concerning for swallowed blood.  With Rhino Rocket in place patient did not have further bleeding and she was discharged 2/16/2024 prior to my ability to see her in-house.  Her daughter called me the day after discharge over concerns that the patient's Rhino Rocket was slowly coming out and that her blood pressure was elevated.  I advised them to present to the emergency department if she had concerns.    The patient presents today with her .  She reports that the Rhino Rocket fell out on its own over the weekend.  She has not had further bleeding but is avoiding digital manipulation.  She has been off of her Eliquis since admission.  Reports that she has been recently on chemotherapy for breast cancer.  Has immunotherapy scheduled for tomorrow. No prior issues with nosebleeds.    Update 3/14/2024:  Follow-up today for epistaxis status post cautery.  Reports that she had a small amount of bleeding roughly 10 days ago.  Otherwise not having issues.  Use mupirocin ointment for 10 days as recommended.    OBJECTIVE  Physical Exam  CONSTITUTIONAL: Well appearing female who appears stated age.  PSYCHIATRIC: Alert, appropriate mood and affect.  RESPIRATORY: Normal inspiration and expiration and chest wall expansion; no use of accessory muscles to breathe.  VOICE: Clear speech without hoarseness. No stridor nor stertor.  HEAD, FACE, AND SKIN: Symmetric facial features.  NOSE: Nasal dorsum was midline. Anterior rhinoscopy demonstrated a septum deviated to the left.  The area of prior cautery along the  anterior septum is fully healed without evidence of recent bleeding.  No persistent lesion of concern noted.  There is a small area of heaped tissue along the floor of the right nasal cavity that was explored but this appears to overly the normal floor of the nasal cavity bone rather than a mass.  Small amount of recent oozing noted along the left anterior septum.  No other obvious nasal masses, polyps, mucopurulence, nor other lesions were appreciated.    ASSESSMENT/PLAN  Diagnoses and all orders for this visit:  Epistaxis    63 y.o. female presenting with significant right-sided epistaxis.    1.  Epistaxis, suspected superficial vessel, history of anticoagulation for history of PE  The patient is presenting with quite copious right-sided epistaxis that required multiple interventions in the emergency department prior to control.  She was admitted for observation secondary to bloody emesis.  Upon discharge the patient's nasal pack fell out but she has not had further bleeding.  On exam she has relatively healthy appearing mucosa with expected nasal crusting within the right nasal cavity following pack removal.  There is a small exophytic lesion off the right anterior septum consistent with ED report of nasal polyp versus superficial vessel.    Patient is now status post cauterization of this lesion along the right anterior septum.  She is healed fully without evidence of recurrent lesion.  We discussed general tips for keeping the nasal mucosa moist and healthy.  She can see me on an as-needed basis.    This note was created using speech recognition transcription software. Despite proofreading, typographical errors may be present that affect the meaning of the content. Please contact my office with any questions.

## 2024-03-19 NOTE — PROGRESS NOTES
Subjective   Cassandra Lang is a 63 y.o. female     Cancer Diagnosis: Breast   Treatment: Trastuzumab 2024 to current   Cumulative Dose: NA  Radiation: No     Risk Factors: CVA  s/p PFO repair, HTN, HLD, PE 2020   Social Hx: Never Smoker   Family Hx: Father CAD     Echo 2024 EF 60%   CT Chest  EKG 2/15/2024   BNP 2023 - 27  HSTrop 2/15/2024 - 4  Lipid 3/31/2023 Total 188, LDL 99      She is here for cardiovascular evaluation due to treatment with trastuzumab     Review of Systems  {ros; complete:48760}     Past Medical History:   Diagnosis Date    Abnormal findings on diagnostic imaging of other specified body structures     Abnormal CT of the chest    Cancer (CMS/HCC)     Heart disease     High blood pressure     Personal history of other medical treatment     History of echocardiogram    Personal history of other medical treatment     History of transesophageal echocardiography (MAYKEL)    Personal history of other medical treatment     History of cardiac monitoring    Personal history of transient ischemic attack (TIA), and cerebral infarction without residual deficits     History of cerebrovascular accident    Stroke (CMS/HCC)     Tinea unguium 10/12/2021    Onychomycosis of toenail       Past Surgical History:   Procedure Laterality Date    APPENDECTOMY  2014    Appendectomy    BREAST LUMPECTOMY       SECTION, CLASSIC  2014     Section    IR CVC PORT  2023    IR CVC PORT 2023 [facility] CVEPINV    MR HEAD ANGIO WO IV CONTRAST  2020    MR HEAD ANGIO WO IV CONTRAST 2020 [facility] CLINICAL LEGACY    MR NECK ANGIO WO IV CONTRAST  2020    MR NECK ANGIO WO IV CONTRAST 2020 [facility] CLINICAL LEGACY    OTHER SURGICAL HISTORY  2020    Esophagogastroduodenoscopy    OTHER SURGICAL HISTORY  2020    Colonoscopy    OTHER SURGICAL HISTORY  2021    Patent foramen ovale repair       Allergies   Allergen Reactions    Sulfamethoxazole-Trimethoprim  Rash       Family History   Problem Relation Name Age of Onset    Diabetes Mother      Hypertension Mother      Cancer Mother      Coronary artery disease Father      Heart attack Maternal Grandfather      Stroke Other         Objective   Visit Vitals  LMP  (LMP Unknown)   OB Status Postmenopausal   Smoking Status Never       Physical Exam  Visit Vitals  LMP  (LMP Unknown)   OB Status Postmenopausal   Smoking Status Never       General: awake, alert and oriented. No acute distress.   Skin: Skin is warm, dry and intact without rashes or lesions. Appropriate color for ethnicity. Nail beds pink with no cyanosis or clubbing  HEENT: normocephalic, atraumatic; conjunctivae are clear without exudates or hemorrhage. Sclera is non-icteric. Eyelids are normal in appearance without swelling or lesions. Hearing intact. Nares are patent bilaterally. Moist mucous membranes.   Cardiovascular: Regular. No murmurs, gallops, or rubs are auscultated. S1 and S2 are heard and are of normal intensity. No JVD, no carotid bruits  Respiratory: Thorax symmetric. CTAB, breath sounds vesicular. No crackles, wheezes or ronchi.   Gastrointestinal: soft, non-distended, BS + x 4  Genitourinary: exam deferred  Musculoskeletal: moves all extremities  Extremities: pulses palpable bilaterally; no swelling or erythema; no edema  Neurological: alert & oriented x 3; no focal deficits  Psychiatric: appropriate mood and affect      Cardiographics  Most recent echocardiogram:  No results found for this or any previous visit from the past 1095 days.      Lab Review   Lab Results   Component Value Date    HGB 10.3 (L) 02/23/2024    HGB 10.0 (L) 02/16/2024    HGB 11.9 (L) 02/15/2024     02/23/2024    WBC 5.2 02/23/2024     02/16/2024    K 3.8 02/16/2024    CREATININE 0.71 02/16/2024    CREATININE 0.66 02/15/2024    CREATININE 0.68 01/31/2024    BUN 25 (H) 02/16/2024    CALCIUM 8.9 02/16/2024    INR 1.1 02/15/2024    BNP 27 07/12/2023    TROPHS 4  02/15/2024    TROPHS CANCELED 07/12/2023    TROPHS 3 07/12/2023    LDLF 99 03/31/2023        Assessment/Plan   {cardiac diagnoses:19676}     Yuan Zavala MD  Advanced Heart Failure/Transplant Cardiology  Cardio-Oncology  [facility]

## 2024-03-20 ENCOUNTER — APPOINTMENT (OUTPATIENT)
Dept: CARDIOLOGY | Facility: CLINIC | Age: 64
End: 2024-03-20
Payer: COMMERCIAL

## 2024-04-03 ENCOUNTER — SOCIAL WORK (OUTPATIENT)
Dept: CASE MANAGEMENT | Facility: HOSPITAL | Age: 64
End: 2024-04-03

## 2024-04-03 ENCOUNTER — APPOINTMENT (OUTPATIENT)
Dept: HEMATOLOGY/ONCOLOGY | Facility: CLINIC | Age: 64
End: 2024-04-03
Payer: COMMERCIAL

## 2024-04-03 ENCOUNTER — INFUSION (OUTPATIENT)
Dept: HEMATOLOGY/ONCOLOGY | Facility: CLINIC | Age: 64
End: 2024-04-03
Payer: COMMERCIAL

## 2024-04-03 VITALS
BODY MASS INDEX: 28.87 KG/M2 | DIASTOLIC BLOOD PRESSURE: 81 MMHG | SYSTOLIC BLOOD PRESSURE: 129 MMHG | TEMPERATURE: 95.2 F | HEART RATE: 78 BPM | WEIGHT: 163 LBS | OXYGEN SATURATION: 97 % | RESPIRATION RATE: 18 BRPM

## 2024-04-03 DIAGNOSIS — C50.411 MALIGNANT NEOPLASM OF UPPER-OUTER QUADRANT OF RIGHT BREAST IN FEMALE, ESTROGEN RECEPTOR NEGATIVE (MULTI): ICD-10-CM

## 2024-04-03 DIAGNOSIS — E55.9 VITAMIN D DEFICIENCY: ICD-10-CM

## 2024-04-03 DIAGNOSIS — I10 ESSENTIAL (PRIMARY) HYPERTENSION: ICD-10-CM

## 2024-04-03 DIAGNOSIS — Z17.1 MALIGNANT NEOPLASM OF UPPER-OUTER QUADRANT OF RIGHT BREAST IN FEMALE, ESTROGEN RECEPTOR NEGATIVE (MULTI): ICD-10-CM

## 2024-04-03 DIAGNOSIS — I10 UNCONTROLLED HYPERTENSION: Primary | ICD-10-CM

## 2024-04-03 LAB — 25(OH)D3 SERPL-MCNC: 26 NG/ML (ref 30–100)

## 2024-04-03 PROCEDURE — 2500000004 HC RX 250 GENERAL PHARMACY W/ HCPCS (ALT 636 FOR OP/ED): Performed by: INTERNAL MEDICINE

## 2024-04-03 PROCEDURE — 96413 CHEMO IV INFUSION 1 HR: CPT

## 2024-04-03 PROCEDURE — 82306 VITAMIN D 25 HYDROXY: CPT

## 2024-04-03 RX ORDER — FAMOTIDINE 10 MG/ML
20 INJECTION INTRAVENOUS ONCE AS NEEDED
Status: DISCONTINUED | OUTPATIENT
Start: 2024-04-03 | End: 2024-04-03 | Stop reason: HOSPADM

## 2024-04-03 RX ORDER — HEPARIN 100 UNIT/ML
500 SYRINGE INTRAVENOUS AS NEEDED
Status: DISCONTINUED | OUTPATIENT
Start: 2024-04-03 | End: 2024-04-03 | Stop reason: HOSPADM

## 2024-04-03 RX ORDER — HEPARIN 100 UNIT/ML
500 SYRINGE INTRAVENOUS AS NEEDED
Status: CANCELLED | OUTPATIENT
Start: 2024-04-03

## 2024-04-03 RX ORDER — ALBUTEROL SULFATE 0.83 MG/ML
3 SOLUTION RESPIRATORY (INHALATION) AS NEEDED
Status: DISCONTINUED | OUTPATIENT
Start: 2024-04-03 | End: 2024-04-03 | Stop reason: HOSPADM

## 2024-04-03 RX ORDER — HEPARIN SODIUM,PORCINE/PF 10 UNIT/ML
50 SYRINGE (ML) INTRAVENOUS AS NEEDED
Status: DISCONTINUED | OUTPATIENT
Start: 2024-04-03 | End: 2024-04-03 | Stop reason: HOSPADM

## 2024-04-03 RX ORDER — DIPHENHYDRAMINE HYDROCHLORIDE 50 MG/ML
50 INJECTION INTRAMUSCULAR; INTRAVENOUS AS NEEDED
Status: DISCONTINUED | OUTPATIENT
Start: 2024-04-03 | End: 2024-04-03 | Stop reason: HOSPADM

## 2024-04-03 RX ORDER — HEPARIN SODIUM,PORCINE/PF 10 UNIT/ML
50 SYRINGE (ML) INTRAVENOUS AS NEEDED
Status: CANCELLED | OUTPATIENT
Start: 2024-04-03

## 2024-04-03 RX ORDER — PROCHLORPERAZINE EDISYLATE 5 MG/ML
10 INJECTION INTRAMUSCULAR; INTRAVENOUS EVERY 6 HOURS PRN
Status: DISCONTINUED | OUTPATIENT
Start: 2024-04-03 | End: 2024-04-03 | Stop reason: HOSPADM

## 2024-04-03 RX ORDER — PROCHLORPERAZINE MALEATE 10 MG
10 TABLET ORAL EVERY 6 HOURS PRN
Status: DISCONTINUED | OUTPATIENT
Start: 2024-04-03 | End: 2024-04-03 | Stop reason: HOSPADM

## 2024-04-03 RX ORDER — EPINEPHRINE 0.3 MG/.3ML
0.3 INJECTION SUBCUTANEOUS EVERY 5 MIN PRN
Status: DISCONTINUED | OUTPATIENT
Start: 2024-04-03 | End: 2024-04-03 | Stop reason: HOSPADM

## 2024-04-03 RX ADMIN — TRASTUZUMAB-ANNS 449.4 MG: 420 INJECTION, POWDER, LYOPHILIZED, FOR SOLUTION INTRAVENOUS at 09:09

## 2024-04-03 RX ADMIN — HEPARIN 500 UNITS: 100 SYRINGE at 09:47

## 2024-04-03 ASSESSMENT — PAIN SCALES - GENERAL: PAINLEVEL: 8

## 2024-04-03 NOTE — SIGNIFICANT EVENT
04/03/24 0812   Prechemo Checklist   Has the patient been in the hospital, ED, or urgent care since last date of service No   Chemo/Immuno Consent Signed Yes   Protocol/Indications Verified Yes   Confirmed to previous date/time of medication Yes   Compared to previous dose Yes   All medications are dated accurately Yes   Pregnancy Test Negative Not applicable   Parameters Met Yes   BSA/Weight-Height Verified Yes   Dose Calculations Verified (current, total, cumulative) Yes

## 2024-04-04 DIAGNOSIS — I10 UNCONTROLLED HYPERTENSION: Primary | ICD-10-CM

## 2024-04-04 NOTE — PROGRESS NOTES
SW met with patient in the infusion center of [facility]: [facility]. Patient continues on immunotherapy. Patient expressed concern with medical debt. SW offered to make referral to the financial counselor and financial navigator for assistance with co-pays/deductibles. Patient would like to arrange a payment plan if she doesn't qualify for financial assistance. SW made referral to the financial counselor and navigator. SW also provided patient with their names and contact information. Patient's spouse may be retiring in the next few months. SW provided contact information for an  who can assist with enrollment. Support offered. SW to follow.

## 2024-04-05 ENCOUNTER — APPOINTMENT (OUTPATIENT)
Dept: RADIOLOGY | Facility: HOSPITAL | Age: 64
End: 2024-04-05
Payer: COMMERCIAL

## 2024-04-05 ENCOUNTER — HOSPITAL ENCOUNTER (EMERGENCY)
Facility: HOSPITAL | Age: 64
Discharge: HOME | End: 2024-04-05
Payer: COMMERCIAL

## 2024-04-05 ENCOUNTER — APPOINTMENT (OUTPATIENT)
Dept: CARDIOLOGY | Facility: HOSPITAL | Age: 64
End: 2024-04-05
Payer: COMMERCIAL

## 2024-04-05 ENCOUNTER — OFFICE VISIT (OUTPATIENT)
Dept: PRIMARY CARE | Facility: CLINIC | Age: 64
End: 2024-04-05
Payer: COMMERCIAL

## 2024-04-05 VITALS
WEIGHT: 164 LBS | RESPIRATION RATE: 16 BRPM | DIASTOLIC BLOOD PRESSURE: 86 MMHG | HEART RATE: 84 BPM | SYSTOLIC BLOOD PRESSURE: 135 MMHG | OXYGEN SATURATION: 98 % | TEMPERATURE: 97.7 F | BODY MASS INDEX: 29.05 KG/M2

## 2024-04-05 VITALS
DIASTOLIC BLOOD PRESSURE: 74 MMHG | SYSTOLIC BLOOD PRESSURE: 129 MMHG | TEMPERATURE: 98 F | HEART RATE: 94 BPM | BODY MASS INDEX: 29.05 KG/M2 | WEIGHT: 164 LBS

## 2024-04-05 DIAGNOSIS — Q21.12 PFO (PATENT FORAMEN OVALE) (HHS-HCC): ICD-10-CM

## 2024-04-05 DIAGNOSIS — F32.A ANXIETY AND DEPRESSION: ICD-10-CM

## 2024-04-05 DIAGNOSIS — M79.609 POPLITEAL PAIN: Primary | ICD-10-CM

## 2024-04-05 DIAGNOSIS — I63.9 CEREBRAL INFARCTION, UNSPECIFIED (MULTI): ICD-10-CM

## 2024-04-05 DIAGNOSIS — F41.9 ANXIETY AND DEPRESSION: ICD-10-CM

## 2024-04-05 DIAGNOSIS — M79.604 PAIN OF RIGHT LOWER EXTREMITY: Primary | ICD-10-CM

## 2024-04-05 DIAGNOSIS — E55.9 VITAMIN D DEFICIENCY: ICD-10-CM

## 2024-04-05 DIAGNOSIS — I10 UNCONTROLLED HYPERTENSION: ICD-10-CM

## 2024-04-05 LAB
ALBUMIN SERPL BCP-MCNC: 4.3 G/DL (ref 3.4–5)
ALP SERPL-CCNC: 62 U/L (ref 33–136)
ALT SERPL W P-5'-P-CCNC: 21 U/L (ref 7–45)
ANION GAP SERPL CALC-SCNC: 12 MMOL/L (ref 10–20)
AST SERPL W P-5'-P-CCNC: 19 U/L (ref 9–39)
BASOPHILS # BLD AUTO: 0.08 X10*3/UL (ref 0–0.1)
BASOPHILS NFR BLD AUTO: 1.5 %
BILIRUB SERPL-MCNC: 0.3 MG/DL (ref 0–1.2)
BUN SERPL-MCNC: 19 MG/DL (ref 6–23)
CALCIUM SERPL-MCNC: 9.1 MG/DL (ref 8.6–10.3)
CHLORIDE SERPL-SCNC: 105 MMOL/L (ref 98–107)
CO2 SERPL-SCNC: 28 MMOL/L (ref 21–32)
CREAT SERPL-MCNC: 0.81 MG/DL (ref 0.5–1.05)
EGFRCR SERPLBLD CKD-EPI 2021: 83 ML/MIN/1.73M*2
EOSINOPHIL # BLD AUTO: 0.4 X10*3/UL (ref 0–0.7)
EOSINOPHIL NFR BLD AUTO: 7.3 %
ERYTHROCYTE [DISTWIDTH] IN BLOOD BY AUTOMATED COUNT: 13.3 % (ref 11.5–14.5)
GLUCOSE SERPL-MCNC: 121 MG/DL (ref 74–99)
HCT VFR BLD AUTO: 36.9 % (ref 36–46)
HGB BLD-MCNC: 11.8 G/DL (ref 12–16)
IMM GRANULOCYTES # BLD AUTO: 0.01 X10*3/UL (ref 0–0.7)
IMM GRANULOCYTES NFR BLD AUTO: 0.2 % (ref 0–0.9)
LYMPHOCYTES # BLD AUTO: 1.85 X10*3/UL (ref 1.2–4.8)
LYMPHOCYTES NFR BLD AUTO: 33.6 %
MCH RBC QN AUTO: 26.1 PG (ref 26–34)
MCHC RBC AUTO-ENTMCNC: 32 G/DL (ref 32–36)
MCV RBC AUTO: 82 FL (ref 80–100)
MONOCYTES # BLD AUTO: 0.52 X10*3/UL (ref 0.1–1)
MONOCYTES NFR BLD AUTO: 9.4 %
NEUTROPHILS # BLD AUTO: 2.65 X10*3/UL (ref 1.2–7.7)
NEUTROPHILS NFR BLD AUTO: 48 %
NRBC BLD-RTO: 0 /100 WBCS (ref 0–0)
PLATELET # BLD AUTO: 272 X10*3/UL (ref 150–450)
POTASSIUM SERPL-SCNC: 3.8 MMOL/L (ref 3.5–5.3)
PROT SERPL-MCNC: 6.7 G/DL (ref 6.4–8.2)
RBC # BLD AUTO: 4.52 X10*6/UL (ref 4–5.2)
SODIUM SERPL-SCNC: 141 MMOL/L (ref 136–145)
WBC # BLD AUTO: 5.5 X10*3/UL (ref 4.4–11.3)

## 2024-04-05 PROCEDURE — 93005 ELECTROCARDIOGRAM TRACING: CPT

## 2024-04-05 PROCEDURE — 99214 OFFICE O/P EST MOD 30 MIN: CPT | Performed by: FAMILY MEDICINE

## 2024-04-05 PROCEDURE — 85025 COMPLETE CBC W/AUTO DIFF WBC: CPT | Performed by: PHYSICIAN ASSISTANT

## 2024-04-05 PROCEDURE — 80053 COMPREHEN METABOLIC PANEL: CPT | Performed by: PHYSICIAN ASSISTANT

## 2024-04-05 PROCEDURE — 99285 EMERGENCY DEPT VISIT HI MDM: CPT

## 2024-04-05 PROCEDURE — 93971 EXTREMITY STUDY: CPT

## 2024-04-05 PROCEDURE — 93971 EXTREMITY STUDY: CPT | Performed by: RADIOLOGY

## 2024-04-05 PROCEDURE — 3078F DIAST BP <80 MM HG: CPT | Performed by: FAMILY MEDICINE

## 2024-04-05 PROCEDURE — 3074F SYST BP LT 130 MM HG: CPT | Performed by: FAMILY MEDICINE

## 2024-04-05 RX ORDER — AMLODIPINE BESYLATE 5 MG/1
5 TABLET ORAL DAILY
Qty: 90 TABLET | Refills: 1 | Status: SHIPPED | OUTPATIENT
Start: 2024-04-05 | End: 2024-05-30 | Stop reason: SDUPTHER

## 2024-04-05 RX ORDER — EZETIMIBE AND SIMVASTATIN 10; 10 MG/1; MG/1
1 TABLET ORAL EVERY EVENING
Qty: 90 TABLET | Refills: 1 | Status: SHIPPED | OUTPATIENT
Start: 2024-04-05

## 2024-04-05 RX ORDER — ASPIRIN 81 MG/1
81 TABLET ORAL DAILY
COMMUNITY
End: 2024-04-05 | Stop reason: SDUPTHER

## 2024-04-05 RX ORDER — VIT C/E/ZN/COPPR/LUTEIN/ZEAXAN 250MG-90MG
25 CAPSULE ORAL DAILY
Qty: 90 CAPSULE | Refills: 1 | Status: SHIPPED | OUTPATIENT
Start: 2024-04-05 | End: 2024-04-14

## 2024-04-05 RX ORDER — BUPROPION HYDROCHLORIDE 75 MG/1
75 TABLET ORAL 2 TIMES DAILY
Qty: 60 TABLET | Refills: 1 | Status: SHIPPED | OUTPATIENT
Start: 2024-04-05 | End: 2024-04-30

## 2024-04-05 RX ORDER — ASPIRIN 81 MG/1
81 TABLET ORAL DAILY
Qty: 90 TABLET | Refills: 1 | Status: SHIPPED | OUTPATIENT
Start: 2024-04-05

## 2024-04-05 ASSESSMENT — PAIN DESCRIPTION - ORIENTATION: ORIENTATION: RIGHT

## 2024-04-05 ASSESSMENT — LIFESTYLE VARIABLES
EVER HAD A DRINK FIRST THING IN THE MORNING TO STEADY YOUR NERVES TO GET RID OF A HANGOVER: NO
HAVE PEOPLE ANNOYED YOU BY CRITICIZING YOUR DRINKING: NO
HAVE YOU EVER FELT YOU SHOULD CUT DOWN ON YOUR DRINKING: NO
EVER FELT BAD OR GUILTY ABOUT YOUR DRINKING: NO
TOTAL SCORE: 0

## 2024-04-05 ASSESSMENT — PAIN - FUNCTIONAL ASSESSMENT: PAIN_FUNCTIONAL_ASSESSMENT: 0-10

## 2024-04-05 ASSESSMENT — COLUMBIA-SUICIDE SEVERITY RATING SCALE - C-SSRS
6. HAVE YOU EVER DONE ANYTHING, STARTED TO DO ANYTHING, OR PREPARED TO DO ANYTHING TO END YOUR LIFE?: NO
2. HAVE YOU ACTUALLY HAD ANY THOUGHTS OF KILLING YOURSELF?: NO
1. IN THE PAST MONTH, HAVE YOU WISHED YOU WERE DEAD OR WISHED YOU COULD GO TO SLEEP AND NOT WAKE UP?: NO

## 2024-04-05 ASSESSMENT — PAIN DESCRIPTION - PAIN TYPE: TYPE: ACUTE PAIN

## 2024-04-05 ASSESSMENT — PAIN SCALES - GENERAL
PAINLEVEL_OUTOF10: 3
PAINLEVEL_OUTOF10: 10 - WORST POSSIBLE PAIN

## 2024-04-05 ASSESSMENT — ENCOUNTER SYMPTOMS: LEG PAIN: 1

## 2024-04-05 ASSESSMENT — PAIN DESCRIPTION - LOCATION: LOCATION: LEG

## 2024-04-05 NOTE — PROGRESS NOTES
Subjective   Patient ID: Cassandra Lang is a 63 y.o. female who presents for Leg Pain.  Leg Pain       Cassandra Lang is a 63-year-old female with past medical history of hypertension, hyperlipidemia, prediabetes, PE on Eliquis, CVA, PFO s/p repair GERD, IBS, anxiety, depression, vitamin D deficiency, right breast cancer s/p lumpectomy and SLN and current treatment weekly paclitaxel/trastuzumab.  Patient is here for:  1- the management of her right posterior calf / knee pain that started a month ago without any obvious trauma.  The pain worsen a week ago.  Patient stopped her Eliquis few weeks ago when she had some epistaxis.  No chest pain or exertional dyspnea.       2- Depression.  Citalopram was interfering with her Eliqis and was stopped.     A review of system was completed.  All systems were reviewed and were normal, except for the ones that are listed in the HPI.    Objective   Physical Exam  Constitutional:       Appearance: Normal appearance.   HENT:      Head: Normocephalic and atraumatic.      Right Ear: Tympanic membrane, ear canal and external ear normal.      Left Ear: Tympanic membrane, ear canal and external ear normal.      Nose: Nose normal.      Mouth/Throat:      Mouth: Mucous membranes are moist.      Pharynx: Oropharynx is clear.   Eyes:      Extraocular Movements: Extraocular movements intact.      Conjunctiva/sclera: Conjunctivae normal.      Pupils: Pupils are equal, round, and reactive to light.   Cardiovascular:      Rate and Rhythm: Normal rate and regular rhythm.      Pulses: Normal pulses.   Pulmonary:      Effort: Pulmonary effort is normal.      Breath sounds: Normal breath sounds.   Abdominal:      General: Abdomen is flat. Bowel sounds are normal.      Palpations: Abdomen is soft.   Musculoskeletal:         General: Normal range of motion.      Cervical back: Normal range of motion and neck supple.      Comments: -Tenderness to palpation of the right popliteal fossa.    Skin:      General: Skin is warm.   Neurological:      General: No focal deficit present.      Mental Status: She is alert and oriented to person, place, and time. Mental status is at baseline.   Psychiatric:         Mood and Affect: Mood normal.         Behavior: Behavior normal.         Thought Content: Thought content normal.         Judgment: Judgment normal.     Assessment/Plan   Problem List Items Addressed This Visit       Anxiety and depression     -Citalopram was interfering with her Eliqis and was stopped.   -Bupropion 75 mg BID started.          Relevant Medications    buPROPion (Wellbutrin) 75 mg tablet    Uncontrolled hypertension    Relevant Medications    amLODIPine (Norvasc) 5 mg tablet    PFO (patent foramen ovale) ([facility])    Relevant Medications    amLODIPine (Norvasc) 5 mg tablet    aspirin 81 mg EC tablet    apixaban (Eliquis) 5 mg tablet    Vitamin D deficiency    Cerebral infarction, unspecified (Multi)    Relevant Medications    ezetimibe-simvastatin (Vytorin) 10-10 mg tablet    aspirin 81 mg EC tablet    Popliteal pain - Primary     Tylenol  mg TID PRN.  -Venous doppler STAT ordered.         Relevant Orders    Vascular US Lower Extremity Venous Duplex Right    Patient to return to office in 2- 4 weeks for reassessment.

## 2024-04-06 NOTE — ED PROVIDER NOTES
HPI   Chief Complaint   Patient presents with    Leg Pain     62 y/o female with right leg/knee pain concerning for blood clot. Patient with prior history of same and treated for breast cancer. Patient denies ha,cp,sob,n/v/d at time of triage. Patient currently on Eliquis.       63-year-old female with a significant past medical history of CVA, hypertension, PFO currently anticoagulated on Eliquis, and breast cancer presents today complaining of atraumatic pain to her right leg.  She states discomfort for the past few days behind her right knee.  She reports that she was scheduled to have an outpatient ultrasound this coming Monday however the pain has intensified.  She denies any recent trauma or fall.  She reports full range of motion of the right lower extremity denies any weakness or paresthesias.  Denies any cyanosis or erythema.  She states that she has remained ambulatory.  Denies any chest pain, shortness of breath or difficulty breathing                          [place] Coma Scale Score: 15                     Patient History   Past Medical History:   Diagnosis Date    Abnormal findings on diagnostic imaging of other specified body structures     Abnormal CT of the chest    Cancer (CMS/HCC)     Heart disease     High blood pressure     Personal history of other medical treatment     History of echocardiogram    Personal history of other medical treatment     History of transesophageal echocardiography (MAYKEL)    Personal history of other medical treatment     History of cardiac monitoring    Personal history of transient ischemic attack (TIA), and cerebral infarction without residual deficits     History of cerebrovascular accident    Stroke (CMS/HCC)     Tinea unguium 10/12/2021    Onychomycosis of toenail     Past Surgical History:   Procedure Laterality Date    APPENDECTOMY  2014    Appendectomy    BREAST LUMPECTOMY       SECTION, CLASSIC  2014     Section    IR CVC PORT   11/13/2023    IR CVC PORT 11/13/2023 [facility]    MR HEAD ANGIO WO IV CONTRAST  05/13/2020    MR HEAD ANGIO WO IV CONTRAST 5/13/2020 [facility]INICAL LEGAC[facility]    MR NECK ANGIO WO IV CONTRAST  05/13/2020    MR NECK ANGIO WO IV CONTRAST 5/13/2020 [facility] CLINICAL LEGACY    OTHER SURGICAL HISTORY  06/24/2020    Esophagogastroduodenoscopy    OTHER SURGICAL HISTORY  06/24/2020    Colonoscopy    OTHER SURGICAL HISTORY  03/04/2021    Patent foramen ovale repair     Family History   Problem Relation Name Age of Onset    Diabetes Mother      Hypertension Mother      Cancer Mother      Coronary artery disease Father      Heart attack Maternal Grandfather      Stroke Other       Social History     Tobacco Use    Smoking status: Never     Passive exposure: Never    Smokeless tobacco: Never   Vaping Use    Vaping Use: Never used   Substance Use Topics    Alcohol use: Never    Drug use: Never       Physical Exam   ED Triage Vitals [04/05/24 1944]   Temperature Heart Rate Respirations BP   36.5 °C (97.7 °F) 83 16 150/90      Pulse Ox Temp Source Heart Rate Source Patient Position   97 % Temporal Monitor Sitting      BP Location FiO2 (%)     Right arm --       Physical Exam  Vitals and nursing note reviewed.   Constitutional:       General: She is not in acute distress.     Appearance: Normal appearance. She is normal weight. She is not ill-appearing, toxic-appearing or diaphoretic.   HENT:      Head: Normocephalic.   Cardiovascular:      Rate and Rhythm: Normal rate and regular rhythm.      Pulses: Normal pulses.   Pulmonary:      Effort: Pulmonary effort is normal. No respiratory distress.      Breath sounds: Normal breath sounds.   Musculoskeletal:         General: Normal range of motion.      Comments: No obvious asymmetry to the lower extremities.  There is some mild discomfort without obvious palpable mass or cord to the posterior right knee.  Patient's lower extremities are neurovascularly intact throughout with soft compartments  brisk cap refill and easily palpable distal pulses.   Skin:     General: Skin is warm and dry.      Capillary Refill: Capillary refill takes less than 2 seconds.   Neurological:      General: No focal deficit present.      Mental Status: She is alert and oriented to person, place, and time.   Psychiatric:         Mood and Affect: Mood normal.         Behavior: Behavior normal.         Thought Content: Thought content normal.         Judgment: Judgment normal.         ED Course & [facility]   ED Course as of 04/05/24 2149 Fri Apr 05, 2024 2143 IMPRESSION:  No evidence of deep vein thrombosis in the imaged veins of the right  lower extremity.   [DS]      ED Course User Index  [DS] Gagandeep Richard PA-C         Diagnoses as of 04/05/24 2149   Pain of right lower extremity       Medical Decision Making  Ultrasound imaging was obtained and revealed no evidence of DVT.  Blood work was found to be without concerning abnormality.  Patient notified of findings and verbalized understanding.  Discussed differentials at length.  No indication for further imaging at this time.  Do not suspect ischemic limb however I did consider this in my differential.  The patient has brisk cap refill and easily palpable distal pulse.  She has full range of motion and is without bony tenderness lowering my suspicion for pathologic fracture.  Recommended Tylenol as needed for pain and encouraged patient follow-up closely with her primary care physician        Procedure  Procedures     Gagandeep Richard PA-C  04/05/24 2150

## 2024-04-07 RX ORDER — AMLODIPINE BESYLATE 5 MG/1
5 TABLET ORAL DAILY
Qty: 30 TABLET | Refills: 1 | Status: SHIPPED | OUTPATIENT
Start: 2024-04-07 | End: 2024-05-30 | Stop reason: WASHOUT

## 2024-04-07 RX ORDER — AMLODIPINE BESYLATE 5 MG/1
5 TABLET ORAL DAILY
Qty: 90 TABLET | Refills: 1 | Status: SHIPPED | OUTPATIENT
Start: 2024-04-07 | End: 2024-05-30 | Stop reason: WASHOUT

## 2024-04-08 ENCOUNTER — APPOINTMENT (OUTPATIENT)
Dept: VASCULAR MEDICINE | Facility: HOSPITAL | Age: 64
End: 2024-04-08
Payer: COMMERCIAL

## 2024-04-08 LAB
ATRIAL RATE: 86 BPM
P AXIS: 75 DEGREES
PR INTERVAL: 228 MS
Q ONSET: 253 MS
QRS COUNT: 14 BEATS
QRS DURATION: 94 MS
QT INTERVAL: 380 MS
QTC CALCULATION(BAZETT): 455 MS
QTC FREDERICIA: 428 MS
R AXIS: -29 DEGREES
T AXIS: 3 DEGREES
T OFFSET: 443 MS
VENTRICULAR RATE: 86 BPM

## 2024-04-08 RX ORDER — AMLODIPINE BESYLATE 5 MG/1
5 TABLET ORAL DAILY
Qty: 90 TABLET | Refills: 1 | Status: SHIPPED | OUTPATIENT
Start: 2024-04-08 | End: 2024-05-30 | Stop reason: WASHOUT

## 2024-04-17 ENCOUNTER — TELEPHONE (OUTPATIENT)
Dept: PRIMARY CARE | Facility: CLINIC | Age: 64
End: 2024-04-17

## 2024-04-18 ENCOUNTER — HOSPITAL ENCOUNTER (OUTPATIENT)
Dept: CARDIOLOGY | Facility: HOSPITAL | Age: 64
Discharge: HOME | End: 2024-04-18
Payer: COMMERCIAL

## 2024-04-18 DIAGNOSIS — Z79.899 ENCOUNTER FOR MONITORING CARDIOTOXIC DRUG THERAPY: ICD-10-CM

## 2024-04-18 DIAGNOSIS — Z51.81 ENCOUNTER FOR MONITORING CARDIOTOXIC DRUG THERAPY: ICD-10-CM

## 2024-04-18 DIAGNOSIS — Z17.1 MALIGNANT NEOPLASM OF UPPER-OUTER QUADRANT OF RIGHT BREAST IN FEMALE, ESTROGEN RECEPTOR NEGATIVE (MULTI): ICD-10-CM

## 2024-04-18 DIAGNOSIS — C50.411 MALIGNANT NEOPLASM OF UPPER-OUTER QUADRANT OF RIGHT BREAST IN FEMALE, ESTROGEN RECEPTOR NEGATIVE (MULTI): ICD-10-CM

## 2024-04-18 LAB
AORTIC VALVE PEAK VELOCITY: 1.3 M/S
AV PEAK GRADIENT: 6.8 MMHG
EJECTION FRACTION APICAL 4 CHAMBER: 52.3
LEFT VENTRICLE INTERNAL DIMENSION DIASTOLE: 4.5 CM (ref 3.5–6)
LV EJECTION FRACTION BIPLANE: 56 %
MITRAL VALVE E/A RATIO: 0.68
RIGHT VENTRICLE PEAK SYSTOLIC PRESSURE: 8.6 MMHG
TRICUSPID ANNULAR PLANE SYSTOLIC EXCURSION: 2.3 CM

## 2024-04-18 PROCEDURE — 93308 TTE F-UP OR LMTD: CPT | Performed by: INTERNAL MEDICINE

## 2024-04-18 PROCEDURE — 93308 TTE F-UP OR LMTD: CPT

## 2024-04-24 ENCOUNTER — HOSPITAL ENCOUNTER (OUTPATIENT)
Dept: RADIOLOGY | Facility: HOSPITAL | Age: 64
Discharge: HOME | End: 2024-04-24
Payer: COMMERCIAL

## 2024-04-24 ENCOUNTER — APPOINTMENT (OUTPATIENT)
Dept: HEMATOLOGY/ONCOLOGY | Facility: CLINIC | Age: 64
End: 2024-04-24
Payer: COMMERCIAL

## 2024-04-24 ENCOUNTER — OFFICE VISIT (OUTPATIENT)
Dept: HEMATOLOGY/ONCOLOGY | Facility: CLINIC | Age: 64
End: 2024-04-24
Payer: COMMERCIAL

## 2024-04-24 ENCOUNTER — SOCIAL WORK (OUTPATIENT)
Dept: CASE MANAGEMENT | Facility: HOSPITAL | Age: 64
End: 2024-04-24

## 2024-04-24 ENCOUNTER — INFUSION (OUTPATIENT)
Dept: HEMATOLOGY/ONCOLOGY | Facility: CLINIC | Age: 64
End: 2024-04-24
Payer: COMMERCIAL

## 2024-04-24 VITALS
TEMPERATURE: 97.2 F | DIASTOLIC BLOOD PRESSURE: 88 MMHG | BODY MASS INDEX: 29.03 KG/M2 | RESPIRATION RATE: 18 BRPM | SYSTOLIC BLOOD PRESSURE: 129 MMHG | OXYGEN SATURATION: 97 % | HEART RATE: 72 BPM | WEIGHT: 163.9 LBS

## 2024-04-24 VITALS
SYSTOLIC BLOOD PRESSURE: 135 MMHG | WEIGHT: 163.03 LBS | BODY MASS INDEX: 28.88 KG/M2 | DIASTOLIC BLOOD PRESSURE: 93 MMHG | HEART RATE: 82 BPM | TEMPERATURE: 96.8 F

## 2024-04-24 DIAGNOSIS — M79.604 RIGHT LEG PAIN: ICD-10-CM

## 2024-04-24 DIAGNOSIS — Z17.1 MALIGNANT NEOPLASM OF UPPER-OUTER QUADRANT OF RIGHT BREAST IN FEMALE, ESTROGEN RECEPTOR NEGATIVE (MULTI): Primary | ICD-10-CM

## 2024-04-24 DIAGNOSIS — Z51.81 ENCOUNTER FOR MONITORING CARDIOTOXIC DRUG THERAPY: ICD-10-CM

## 2024-04-24 DIAGNOSIS — C50.411 MALIGNANT NEOPLASM OF UPPER-OUTER QUADRANT OF RIGHT BREAST IN FEMALE, ESTROGEN RECEPTOR NEGATIVE (MULTI): Primary | ICD-10-CM

## 2024-04-24 DIAGNOSIS — C50.411 MALIGNANT NEOPLASM OF UPPER-OUTER QUADRANT OF RIGHT BREAST IN FEMALE, ESTROGEN RECEPTOR NEGATIVE (MULTI): ICD-10-CM

## 2024-04-24 DIAGNOSIS — Z17.1 MALIGNANT NEOPLASM OF UPPER-OUTER QUADRANT OF RIGHT BREAST IN FEMALE, ESTROGEN RECEPTOR NEGATIVE (MULTI): ICD-10-CM

## 2024-04-24 DIAGNOSIS — Z79.899 ENCOUNTER FOR MONITORING CARDIOTOXIC DRUG THERAPY: ICD-10-CM

## 2024-04-24 PROCEDURE — 2500000004 HC RX 250 GENERAL PHARMACY W/ HCPCS (ALT 636 FOR OP/ED): Performed by: INTERNAL MEDICINE

## 2024-04-24 PROCEDURE — 3080F DIAST BP >= 90 MM HG: CPT | Performed by: INTERNAL MEDICINE

## 2024-04-24 PROCEDURE — 99214 OFFICE O/P EST MOD 30 MIN: CPT | Performed by: INTERNAL MEDICINE

## 2024-04-24 PROCEDURE — 73562 X-RAY EXAM OF KNEE 3: CPT | Mod: RIGHT SIDE | Performed by: RADIOLOGY

## 2024-04-24 PROCEDURE — 96413 CHEMO IV INFUSION 1 HR: CPT

## 2024-04-24 PROCEDURE — 73562 X-RAY EXAM OF KNEE 3: CPT | Mod: RT

## 2024-04-24 PROCEDURE — 3075F SYST BP GE 130 - 139MM HG: CPT | Performed by: INTERNAL MEDICINE

## 2024-04-24 RX ORDER — DIPHENHYDRAMINE HYDROCHLORIDE 50 MG/ML
50 INJECTION INTRAMUSCULAR; INTRAVENOUS AS NEEDED
Status: DISCONTINUED | OUTPATIENT
Start: 2024-04-24 | End: 2024-04-24 | Stop reason: HOSPADM

## 2024-04-24 RX ORDER — FAMOTIDINE 10 MG/ML
20 INJECTION INTRAVENOUS ONCE AS NEEDED
Status: CANCELLED | OUTPATIENT
Start: 2024-08-07

## 2024-04-24 RX ORDER — PROCHLORPERAZINE MALEATE 10 MG
10 TABLET ORAL EVERY 6 HOURS PRN
Status: CANCELLED | OUTPATIENT
Start: 2024-07-17

## 2024-04-24 RX ORDER — ALBUTEROL SULFATE 0.83 MG/ML
3 SOLUTION RESPIRATORY (INHALATION) AS NEEDED
Status: CANCELLED | OUTPATIENT
Start: 2024-08-28

## 2024-04-24 RX ORDER — PROCHLORPERAZINE MALEATE 10 MG
10 TABLET ORAL EVERY 6 HOURS PRN
Status: CANCELLED | OUTPATIENT
Start: 2024-09-18

## 2024-04-24 RX ORDER — DIPHENHYDRAMINE HYDROCHLORIDE 50 MG/ML
50 INJECTION INTRAMUSCULAR; INTRAVENOUS AS NEEDED
Status: CANCELLED | OUTPATIENT
Start: 2024-08-07

## 2024-04-24 RX ORDER — PROCHLORPERAZINE MALEATE 10 MG
10 TABLET ORAL EVERY 6 HOURS PRN
Status: CANCELLED | OUTPATIENT
Start: 2024-08-07

## 2024-04-24 RX ORDER — ALBUTEROL SULFATE 0.83 MG/ML
3 SOLUTION RESPIRATORY (INHALATION) AS NEEDED
Status: CANCELLED | OUTPATIENT
Start: 2024-09-18

## 2024-04-24 RX ORDER — HEPARIN 100 UNIT/ML
500 SYRINGE INTRAVENOUS AS NEEDED
Status: DISCONTINUED | OUTPATIENT
Start: 2024-04-24 | End: 2024-04-24 | Stop reason: HOSPADM

## 2024-04-24 RX ORDER — HEPARIN SODIUM,PORCINE/PF 10 UNIT/ML
50 SYRINGE (ML) INTRAVENOUS AS NEEDED
Status: DISCONTINUED | OUTPATIENT
Start: 2024-04-24 | End: 2024-04-24 | Stop reason: HOSPADM

## 2024-04-24 RX ORDER — PROCHLORPERAZINE EDISYLATE 5 MG/ML
10 INJECTION INTRAMUSCULAR; INTRAVENOUS EVERY 6 HOURS PRN
Status: DISCONTINUED | OUTPATIENT
Start: 2024-04-24 | End: 2024-04-24 | Stop reason: HOSPADM

## 2024-04-24 RX ORDER — HEPARIN 100 UNIT/ML
500 SYRINGE INTRAVENOUS AS NEEDED
Status: CANCELLED | OUTPATIENT
Start: 2024-04-24

## 2024-04-24 RX ORDER — FAMOTIDINE 10 MG/ML
20 INJECTION INTRAVENOUS ONCE AS NEEDED
Status: DISCONTINUED | OUTPATIENT
Start: 2024-04-24 | End: 2024-04-24 | Stop reason: HOSPADM

## 2024-04-24 RX ORDER — EPINEPHRINE 0.3 MG/.3ML
0.3 INJECTION SUBCUTANEOUS EVERY 5 MIN PRN
Status: CANCELLED | OUTPATIENT
Start: 2024-08-07

## 2024-04-24 RX ORDER — PROCHLORPERAZINE EDISYLATE 5 MG/ML
10 INJECTION INTRAMUSCULAR; INTRAVENOUS EVERY 6 HOURS PRN
Status: CANCELLED | OUTPATIENT
Start: 2024-07-17

## 2024-04-24 RX ORDER — FAMOTIDINE 10 MG/ML
20 INJECTION INTRAVENOUS ONCE AS NEEDED
Status: CANCELLED | OUTPATIENT
Start: 2024-08-28

## 2024-04-24 RX ORDER — EPINEPHRINE 0.3 MG/.3ML
0.3 INJECTION SUBCUTANEOUS EVERY 5 MIN PRN
Status: CANCELLED | OUTPATIENT
Start: 2024-09-18

## 2024-04-24 RX ORDER — HEPARIN SODIUM,PORCINE/PF 10 UNIT/ML
50 SYRINGE (ML) INTRAVENOUS AS NEEDED
Status: CANCELLED | OUTPATIENT
Start: 2024-04-24

## 2024-04-24 RX ORDER — ALBUTEROL SULFATE 0.83 MG/ML
3 SOLUTION RESPIRATORY (INHALATION) AS NEEDED
Status: CANCELLED | OUTPATIENT
Start: 2024-08-07

## 2024-04-24 RX ORDER — DIPHENHYDRAMINE HYDROCHLORIDE 50 MG/ML
50 INJECTION INTRAMUSCULAR; INTRAVENOUS AS NEEDED
Status: CANCELLED | OUTPATIENT
Start: 2024-07-17

## 2024-04-24 RX ORDER — EPINEPHRINE 0.3 MG/.3ML
0.3 INJECTION SUBCUTANEOUS EVERY 5 MIN PRN
Status: CANCELLED | OUTPATIENT
Start: 2024-08-28

## 2024-04-24 RX ORDER — PROCHLORPERAZINE EDISYLATE 5 MG/ML
10 INJECTION INTRAMUSCULAR; INTRAVENOUS EVERY 6 HOURS PRN
Status: CANCELLED | OUTPATIENT
Start: 2024-08-28

## 2024-04-24 RX ORDER — ALBUTEROL SULFATE 0.83 MG/ML
3 SOLUTION RESPIRATORY (INHALATION) AS NEEDED
Status: CANCELLED | OUTPATIENT
Start: 2024-07-17

## 2024-04-24 RX ORDER — FAMOTIDINE 10 MG/ML
20 INJECTION INTRAVENOUS ONCE AS NEEDED
Status: CANCELLED | OUTPATIENT
Start: 2024-07-17

## 2024-04-24 RX ORDER — PROCHLORPERAZINE EDISYLATE 5 MG/ML
10 INJECTION INTRAMUSCULAR; INTRAVENOUS EVERY 6 HOURS PRN
Status: CANCELLED | OUTPATIENT
Start: 2024-09-18

## 2024-04-24 RX ORDER — PROCHLORPERAZINE MALEATE 10 MG
10 TABLET ORAL EVERY 6 HOURS PRN
Status: DISCONTINUED | OUTPATIENT
Start: 2024-04-24 | End: 2024-04-24 | Stop reason: HOSPADM

## 2024-04-24 RX ORDER — PROCHLORPERAZINE MALEATE 10 MG
10 TABLET ORAL EVERY 6 HOURS PRN
Status: CANCELLED | OUTPATIENT
Start: 2024-08-28

## 2024-04-24 RX ORDER — EPINEPHRINE 0.3 MG/.3ML
0.3 INJECTION SUBCUTANEOUS EVERY 5 MIN PRN
Status: CANCELLED | OUTPATIENT
Start: 2024-07-17

## 2024-04-24 RX ORDER — ALBUTEROL SULFATE 0.83 MG/ML
3 SOLUTION RESPIRATORY (INHALATION) AS NEEDED
Status: DISCONTINUED | OUTPATIENT
Start: 2024-04-24 | End: 2024-04-24 | Stop reason: HOSPADM

## 2024-04-24 RX ORDER — DIPHENHYDRAMINE HYDROCHLORIDE 50 MG/ML
50 INJECTION INTRAMUSCULAR; INTRAVENOUS AS NEEDED
Status: CANCELLED | OUTPATIENT
Start: 2024-08-28

## 2024-04-24 RX ORDER — EPINEPHRINE 0.3 MG/.3ML
0.3 INJECTION SUBCUTANEOUS EVERY 5 MIN PRN
Status: DISCONTINUED | OUTPATIENT
Start: 2024-04-24 | End: 2024-04-24 | Stop reason: HOSPADM

## 2024-04-24 RX ORDER — FAMOTIDINE 10 MG/ML
20 INJECTION INTRAVENOUS ONCE AS NEEDED
Status: CANCELLED | OUTPATIENT
Start: 2024-09-18

## 2024-04-24 RX ORDER — DIPHENHYDRAMINE HYDROCHLORIDE 50 MG/ML
50 INJECTION INTRAMUSCULAR; INTRAVENOUS AS NEEDED
Status: CANCELLED | OUTPATIENT
Start: 2024-09-18

## 2024-04-24 RX ORDER — PROCHLORPERAZINE EDISYLATE 5 MG/ML
10 INJECTION INTRAMUSCULAR; INTRAVENOUS EVERY 6 HOURS PRN
Status: CANCELLED | OUTPATIENT
Start: 2024-08-07

## 2024-04-24 RX ADMIN — TRASTUZUMAB-ANNS 449.4 MG: 420 INJECTION, POWDER, LYOPHILIZED, FOR SOLUTION INTRAVENOUS at 09:19

## 2024-04-24 RX ADMIN — HEPARIN 500 UNITS: 100 SYRINGE at 09:55

## 2024-04-24 ASSESSMENT — PAIN SCALES - GENERAL
PAINLEVEL: 10-WORST PAIN EVER
PAINLEVEL: 6

## 2024-04-24 NOTE — SIGNIFICANT EVENT
04/24/24 0838   Prechemo Checklist   Has the patient been in the hospital, ED, or urgent care since last date of service Yes  (MD aware, negative for DVT)   Chemo/Immuno Consent Signed Yes   Protocol/Indications Verified Yes   Confirmed to previous date/time of medication Yes   Compared to previous dose Yes   All medications are dated accurately Yes   Pregnancy Test Negative Not applicable   Parameters Met Yes   BSA/Weight-Height Verified Yes   Dose Calculations Verified (current, total, cumulative) Yes

## 2024-04-24 NOTE — PATIENT INSTRUCTIONS
Xray right leg today  3 month follow-up with Shawna  Continue trastuzumab every 3 wks  Repeat echo in about 4 months

## 2024-04-25 ENCOUNTER — APPOINTMENT (OUTPATIENT)
Dept: PRIMARY CARE | Facility: CLINIC | Age: 64
End: 2024-04-25
Payer: COMMERCIAL

## 2024-04-26 ENCOUNTER — TELEPHONE (OUTPATIENT)
Dept: HEMATOLOGY/ONCOLOGY | Facility: CLINIC | Age: 64
End: 2024-04-26
Payer: COMMERCIAL

## 2024-04-26 NOTE — TELEPHONE ENCOUNTER
Informed patient that xray was unremarkable. Patient instructed to continue to take tylenol as needed for pain and to rest, ice, compress, and elevate knee. Patient voiced understanding.     Patient aware to reach out with any other questions or concerns.     Dr. Rosenbaum and Shawna Mccallum aware of convo.

## 2024-04-27 DIAGNOSIS — F32.A ANXIETY AND DEPRESSION: ICD-10-CM

## 2024-04-27 DIAGNOSIS — F41.9 ANXIETY AND DEPRESSION: ICD-10-CM

## 2024-04-27 ASSESSMENT — ENCOUNTER SYMPTOMS
CONFUSION: 0
FREQUENCY: 0
LEG SWELLING: 0
BACK PAIN: 0
EYE PROBLEMS: 0
DIFFICULTY URINATING: 0
WHEEZING: 0
PALPITATIONS: 0
RESPIRATORY NEGATIVE: 1
CONSTIPATION: 0
DIZZINESS: 0
DIAPHORESIS: 0
CHILLS: 0
HEADACHES: 0
MYALGIAS: 0
FEVER: 0
SLEEP DISTURBANCE: 0
APPETITE CHANGE: 0
ABDOMINAL DISTENTION: 0
BLOOD IN STOOL: 0
ARTHRALGIAS: 1
CARDIOVASCULAR NEGATIVE: 1
NAUSEA: 0
HOT FLASHES: 0
CHEST TIGHTNESS: 0
CONSTITUTIONAL NEGATIVE: 1
ABDOMINAL PAIN: 0
COUGH: 0
HEMATURIA: 0
DIARRHEA: 0
HEMOPTYSIS: 0
DEPRESSION: 0
NERVOUS/ANXIOUS: 0
SCLERAL ICTERUS: 0
FATIGUE: 0
SEIZURES: 0
DYSURIA: 0
EXTREMITY WEAKNESS: 0
EYES NEGATIVE: 1
BRUISES/BLEEDS EASILY: 0
ADENOPATHY: 0
NUMBNESS: 0
SHORTNESS OF BREATH: 0

## 2024-04-27 NOTE — PROGRESS NOTES
Breast Medical Oncology Clinic  Location: [facility]      BREAST CANCER DIAGNOSIS  Malignant neoplasm of upper-outer quadrant of right breast in female, estrogen receptor negative (Multi), Pathologic: Stage IIB (pT2, pN1mi(sn), cM0, G3, ER-, AZ-, HER2+)       ONCOLOGIC HISTORY  S/p recent lumpectomy and SLN.   12 weeks of weekly paclitaxel/trastuzumab completed on 1/31/24. Currently on maintenance q3 wk trastuzumab    CURRENT THERAPY    HISTORY OF PRESENT ILLNESS    Cassandra Lang is a 63 y.o. woman. Here for post op visit. S/p SLN which revealed 1/1 micromet. Is s/p paclitaxel/trastuzumab. Currently on q3 wk trastuzumab. No major diarrhea. Main issue today is right knee pain with extension lateral and superior. No trauma. Went to ED and they did a doppler looking for DVT nut denies any leg swelling. This was negative. Is seeing someone next wk for knee/lower thigh pain        Review of Systems   Constitutional: Negative.  Negative for appetite change, chills, diaphoresis, fatigue and fever.   HENT:   Negative for hearing loss, lump/mass and nosebleeds.    Eyes: Negative.  Negative for eye problems and icterus.   Respiratory: Negative.  Negative for chest tightness, cough, hemoptysis, shortness of breath and wheezing.    Cardiovascular: Negative.  Negative for chest pain, leg swelling and palpitations.   Gastrointestinal:  Negative for abdominal distention, abdominal pain, blood in stool, constipation, diarrhea and nausea.   Endocrine: Negative for hot flashes.   Genitourinary:  Negative for bladder incontinence, difficulty urinating, dyspareunia, dysuria, frequency and hematuria.    Musculoskeletal:  Positive for arthralgias. Negative for back pain, gait problem and myalgias.        Right knee pain   Neurological:  Negative for dizziness, extremity weakness, gait problem, headaches, numbness and seizures.   Hematological:  Negative for adenopathy. Does not bruise/bleed easily.   Psychiatric/Behavioral:  Negative for  confusion, depression and sleep disturbance. The patient is not nervous/anxious.    All other systems reviewed and are negative.        Past Medical History:  has a past medical history of Abnormal findings on diagnostic imaging of other specified body structures, Cancer (Multi), Heart disease, High blood pressure, Personal history of other medical treatment, Personal history of other medical treatment, Personal history of other medical treatment, Personal history of transient ischemic attack (TIA), and cerebral infarction without residual deficits, Stroke (Multi), and Tinea unguium (10/12/2021).  Surgical History:   has a past surgical history that includes  section, classic (2014); Appendectomy (2014); Other surgical history (2020); Other surgical history (2020); Other surgical history (2021); MR angio head wo IV contrast (2020); MR angio neck wo IV contrast (2020); Breast lumpectomy; and IR CVC port (2023).  Social History:   reports that she has never smoked. She has never been exposed to tobacco smoke. She has never used smokeless tobacco. She reports that she does not drink alcohol and does not use drugs.  Family History:    Family History   Problem Relation Name Age of Onset    Diabetes Mother      Hypertension Mother      Cancer Mother      Coronary artery disease Father      Heart attack Maternal Grandfather      Stroke Other       Family Oncology History:  Cancer-related family history includes Cancer in her mother.      OBJECTIVE    VS / Pain:  BP (!) 135/93 (BP Location: Left arm, Patient Position: Sitting, BP Cuff Size: Adult)   Pulse 82   Temp 36 °C (96.8 °F) (Temporal)   Wt 73.9 kg (163 lb 0.5 oz)   LMP  (LMP Unknown)   BMI 28.88 kg/m²   BSA: 1.81 meters squared   Pain Scale: 0    Performance Status:  The ECOG performance scale today is ECO- Restricted in physically strenuous activity.  Carries out light duty.    Physical  Exam  Constitutional:       General: She is not in acute distress.     Appearance: She is not ill-appearing, toxic-appearing or diaphoretic.   HENT:      Head: Normocephalic and atraumatic.      Nose: Nose normal. No congestion or rhinorrhea.      Mouth/Throat:      Pharynx: No posterior oropharyngeal erythema.   Eyes:      Pupils: Pupils are equal, round, and reactive to light.   Cardiovascular:      Rate and Rhythm: Normal rate and regular rhythm.      Pulses: Normal pulses.      Heart sounds: Normal heart sounds. No murmur heard.     No gallop.   Pulmonary:      Effort: No respiratory distress.      Breath sounds: Normal breath sounds.   Abdominal:      General: There is no distension.      Palpations: There is no mass.      Tenderness: There is no abdominal tenderness.   Musculoskeletal:         General: No swelling.      Cervical back: No rigidity.      Right lower leg: No edema.      Left lower leg: No edema.   Lymphadenopathy:      Cervical: No cervical adenopathy.   Skin:     General: Skin is warm.      Coloration: Skin is not cyanotic.      Findings: No bruising, ecchymosis or erythema.   Neurological:      General: No focal deficit present.      Mental Status: She is oriented to person, place, and time.      Cranial Nerves: Cranial nerves 2-12 are intact.      Motor: Motor function is intact.   Psychiatric:         Attention and Perception: Attention and perception normal.         Mood and Affect: Mood and affect normal.         Behavior: Behavior normal.         Thought Content: Thought content normal.         Judgment: Judgment normal.             Diagnostic Results   === 04/24/24 ===    XR KNEE 3 VIEWS RIGHT    - Impression -  1. Small nonspecific joint effusion. Otherwise unremarkable right  knee radiographs.    MACRO:  None.    Signed by: Francisca Isaac 4/25/2024 7:59 PM  Dictation workstation:   [id number]     Patient Name:      ASHLY KATYA HICKS     Reading Physician:    [id number] Marlon Augustine                                                                 DO  Study Date:        4/18/2024            Ordering Provider:    [id number] GLO SMITH  MRN/PID:           [id number]             Fellow:  Accession#:        [id number]         Nurse:  Date of Birth/Age: 1960 / 63 years Sonographer:          Maik Cabezas RDCS  Gender:            F                    Additional Staff:  Height:            160.00 cm            Admit Date:  Weight:            74.00 kg             Admission Status:     Outpatient  BSA / BMI:         1.77 m2 / 28.91      Encounter#:           [id number]                     kg/m2                                          Department Location:  [facility] Non                                                                Invasive  Blood Pressure: 135 /86 mmHg     Study Type:    TRANSTHORACIC ECHO (TTE) LIMITED  Diagnosis/ICD: Encounter for monitoring cardiotoxic drug therapy-Z51.81  Indication:    encounter for monitoring cardiotoxic drug therapy  CPT Code:      Echo Limited-76765     Patient History:  Pertinent History: HTN and PE. PFO.     Study Detail: The following Echo studies were performed: 2D, M-Mode, Doppler and                color flow.        PHYSICIAN INTERPRETATION:  Left Ventricle: The left ventricular systolic function is normal, with an estimated ejection fraction of 55-60%. There are no regional wall motion abnormalities. The left ventricular cavity size is normal. Spectral Doppler shows a normal pattern of left ventricular diastolic filling.  Left Atrium: The left atrium is normal in size.  Right Ventricle: The right ventricle is normal in size. There is normal right ventricular global systolic function.  Right Atrium: The right atrium is normal in size.  Aortic Valve: The aortic valve appears structurally normal. There is no evidence of aortic valve regurgitation. The peak instantaneous gradient of the aortic  valve is 6.8 mmHg.  Mitral Valve: The mitral valve is normal in structure. There is no evidence of mitral valve regurgitation.  Tricuspid Valve: The tricuspid valve is structurally normal. There is trace tricuspid regurgitation.  Pulmonic Valve: The pulmonic valve is structurally normal. There is no indication of pulmonic valve regurgitation.  Pericardium: There is no pericardial effusion noted.  Aorta: The aortic root is normal.  In comparison to the previous echocardiogram(s): There are no prior studies on this patient for comparison purposes.        CONCLUSIONS:   1. Left ventricular systolic function is normal with a 55-60% estimated ejection fraction.  LABORATORY/PATHOLOGY DATA    Hospital Outpatient Visit on 04/18/2024   Component Date Value Ref Range Status    AV pk gogo 04/18/2024 1.30  m/s Final    LV Biplane EF 04/18/2024 56  % Final    MV E/A ratio 04/18/2024 0.68   Final    Tricuspid annular plane systolic e* 04/18/2024 2.3  cm Final    LVIDd 04/18/2024 4.50  cm Final    RVSP 04/18/2024 8.6  mmHg Final    AV pk grad 04/18/2024 6.8  mmHg Final    LV A4C EF 04/18/2024 52.3   Final   Admission on 04/05/2024, Discharged on 04/05/2024   Component Date Value Ref Range Status    Ventricular Rate 04/05/2024 86  BPM Final    Atrial Rate 04/05/2024 86  BPM Final    KS Interval 04/05/2024 228  ms Final    QRS Duration 04/05/2024 94  ms Final    QT Interval 04/05/2024 380  ms Final    QTC Calculation(Bazett) 04/05/2024 455  ms Final    P Axis 04/05/2024 75  degrees Final    R Axis 04/05/2024 -29  degrees Final    T [place] 04/05/2024 3  degrees Final    QRS Count 04/05/2024 14  beats Final    Q Onset 04/05/2024 253  ms Final    T Offset 04/05/2024 443  ms Final    QTC Fredericia 04/05/2024 428  ms Final    WBC 04/05/2024 5.5  4.4 - 11.3 x10*3/uL Final    nRBC 04/05/2024 0.0  0.0 - 0.0 /100 WBCs Final    RBC 04/05/2024 4.52  4.00 - 5.20 x10*6/uL Final    Hemoglobin 04/05/2024 11.8 (L)  12.0 - 16.0 g/dL Final     Hematocrit 04/05/2024 36.9  36.0 - 46.0 % Final    MCV 04/05/2024 82  80 - 100 fL Final    MCH 04/05/2024 26.1  26.0 - 34.0 pg Final    MCHC 04/05/2024 32.0  32.0 - 36.0 g/dL Final    RDW 04/05/2024 13.3  11.5 - 14.5 % Final    Platelets 04/05/2024 272  150 - 450 x10*3/uL Final    Neutrophils % 04/05/2024 48.0  40.0 - 80.0 % Final    Immature Granulocytes %, Automated 04/05/2024 0.2  0.0 - 0.9 % Final    Lymphocytes % 04/05/2024 33.6  13.0 - 44.0 % Final    Monocytes % 04/05/2024 9.4  2.0 - 10.0 % Final    Eosinophils % 04/05/2024 7.3  0.0 - 6.0 % Final    Basophils % 04/05/2024 1.5  0.0 - 2.0 % Final    Neutrophils Absolute 04/05/2024 2.65  1.20 - 7.70 x10*3/uL Final    Immature Granulocytes Absolute, Au* 04/05/2024 0.01  0.00 - 0.70 x10*3/uL Final    Lymphocytes Absolute 04/05/2024 1.85  1.20 - 4.80 x10*3/uL Final    Monocytes Absolute 04/05/2024 0.52  0.10 - 1.00 x10*3/uL Final    Eosinophils Absolute 04/05/2024 0.40  0.00 - 0.70 x10*3/uL Final    Basophils Absolute 04/05/2024 0.08  0.00 - 0.10 x10*3/uL Final    Glucose 04/05/2024 121 (H)  74 - 99 mg/dL Final    Sodium 04/05/2024 141  136 - 145 mmol/L Final    Potassium 04/05/2024 3.8  3.5 - 5.3 mmol/L Final    Chloride 04/05/2024 105  98 - 107 mmol/L Final    Bicarbonate 04/05/2024 28  21 - 32 mmol/L Final    Anion Gap 04/05/2024 12  10 - 20 mmol/L Final    Urea Nitrogen 04/05/2024 19  6 - 23 mg/dL Final    Creatinine 04/05/2024 0.81  0.50 - 1.05 mg/dL Final    eGFR 04/05/2024 83  >60 mL/min/1.73m*2 Final    Calcium 04/05/2024 9.1  8.6 - 10.3 mg/dL Final    Albumin 04/05/2024 4.3  3.4 - 5.0 g/dL Final    Alkaline Phosphatase 04/05/2024 62  33 - 136 U/L Final    Total Protein 04/05/2024 6.7  6.4 - 8.2 g/dL Final    AST 04/05/2024 19  9 - 39 U/L Final    Bilirubin, Total 04/05/2024 0.3  0.0 - 1.2 mg/dL Final    ALT 04/05/2024 21  7 - 45 U/L Final   Infusion on 04/03/2024   Component Date Value Ref Range Status    Vitamin D, 25-Hydroxy, Total 04/03/2024 26 (L)  30  - 100 ng/mL Final          IMPRESSION/PLAN    1. Right sided [id number] HER2+/ER-/UT- breast cancer, unexpectedly found after excisional biopsy for sclerosing adenosis. I have spent 35 minutes of direct  consultation  with the patient today reviewing the cancer care plan, educating and answering questions regarding ongoing follow up, greater than 50% in counseling and coordination of care. Continue adjuvant trastuzumab q3 wks to complete 1 full year of adjuvant therapy. Recent echo (see above) shows normal LVEF. Follow-up in about 3 months with Shawna Mccallum        2. Hypercoagulable state. extensive medical history of CVA, breast cancer,  HTN, Hyperlipidemia, hypercholesterolemia. She remains on eliquis.    3. Knee pain. Xray negative for any obvious issues other than small effusion. She will follow-up with provider next wk. Prior leg doppler negative for dvt     We discussed the clinical significance of diagnosis, goals of care and treatment plan in detail.     I personally spent over half of a total 35 minutes face to face with the patient in counseling and discussion and/or coordination of care as described above.         -------------------------------------------------------------------------------------------------------------------------------  Brendon Rosenbaum MD  Director of Breast Cancer Medical Oncology Research Program   [facility]  Professor of Medicine  [facility] [address], [address]  [place] [id number]  Phone: [phone]  [email or web address]

## 2024-04-30 RX ORDER — BUPROPION HYDROCHLORIDE 75 MG/1
75 TABLET ORAL 2 TIMES DAILY
Qty: 180 TABLET | Refills: 1 | Status: SHIPPED | OUTPATIENT
Start: 2024-04-30

## 2024-04-30 NOTE — PROGRESS NOTES
SW was asked to reach out to patient as a follow up to today's clinic appointment with Dr. Rosenbaum. Patient received a large bill and she is concerned about medical debt. SW reached out to patient; able to reach patient on the phone. SW inquired about her concerns. Patient shares that she received a bill for over $[phone]. Patient is anxious and confused as she thought she had a co-pay yelena. RADHA offered to investigate and advise. RADHA reached out to the financial navigator to inquire about the co-pays. RADHA informed that [employer] rejected any co-pay assistance as the claims were approved and paid at 100% of the approved rate. RADHA requested copies of the insurance EOBs. RADHA unable to obtain these from the financial navigator. RADHA reached out to both [employer] and patient's insurance company. RADHA learned that patient's coverage is limited; pays the Medicare rate on claims. They do not have  in-network providers. Thus no contract with local health facilities. Thus the out of pocket expenses are from the amount billed and the approved amount. These excess charges are being balanced bill to the patient. It doesn't appear that the co-pay assistance would cover these excess expenses. Patient also does not qualify for free drug replacement due to the approvals from the insurance.   Patient returns to [facility] for treatment in 3 weeks. RADHA requested patient bring in copies of the bills and any tax forms from [phone]. RADHA to update med-onc team.  RADHA to follow.

## 2024-05-01 ENCOUNTER — APPOINTMENT (OUTPATIENT)
Dept: PRIMARY CARE | Facility: CLINIC | Age: 64
End: 2024-05-01
Payer: COMMERCIAL

## 2024-05-03 ENCOUNTER — APPOINTMENT (OUTPATIENT)
Dept: ORTHOPEDIC SURGERY | Facility: CLINIC | Age: 64
End: 2024-05-03
Payer: COMMERCIAL

## 2024-05-09 ENCOUNTER — OFFICE VISIT (OUTPATIENT)
Dept: PRIMARY CARE | Facility: CLINIC | Age: 64
End: 2024-05-09
Payer: COMMERCIAL

## 2024-05-09 ENCOUNTER — LAB (OUTPATIENT)
Dept: LAB | Facility: LAB | Age: 64
End: 2024-05-09
Payer: COMMERCIAL

## 2024-05-09 VITALS
SYSTOLIC BLOOD PRESSURE: 138 MMHG | WEIGHT: 162 LBS | BODY MASS INDEX: 28.7 KG/M2 | DIASTOLIC BLOOD PRESSURE: 90 MMHG | HEART RATE: 67 BPM | TEMPERATURE: 96.9 F

## 2024-05-09 DIAGNOSIS — E78.5 HYPERLIPIDEMIA, UNSPECIFIED HYPERLIPIDEMIA TYPE: ICD-10-CM

## 2024-05-09 DIAGNOSIS — M25.562 ACUTE PAIN OF BOTH KNEES: ICD-10-CM

## 2024-05-09 DIAGNOSIS — M25.562 ACUTE PAIN OF BOTH KNEES: Primary | ICD-10-CM

## 2024-05-09 DIAGNOSIS — R73.03 PRE-DIABETES: ICD-10-CM

## 2024-05-09 DIAGNOSIS — I10 PRIMARY HYPERTENSION: ICD-10-CM

## 2024-05-09 DIAGNOSIS — M25.561 ACUTE PAIN OF BOTH KNEES: Primary | ICD-10-CM

## 2024-05-09 DIAGNOSIS — M25.561 ACUTE PAIN OF BOTH KNEES: ICD-10-CM

## 2024-05-09 LAB
ALBUMIN SERPL BCP-MCNC: 4.3 G/DL (ref 3.4–5)
ALP SERPL-CCNC: 71 U/L (ref 33–136)
ALT SERPL W P-5'-P-CCNC: 20 U/L (ref 7–45)
ANION GAP SERPL CALC-SCNC: 17 MMOL/L (ref 10–20)
AST SERPL W P-5'-P-CCNC: 21 U/L (ref 9–39)
BILIRUB SERPL-MCNC: 0.4 MG/DL (ref 0–1.2)
BUN SERPL-MCNC: 15 MG/DL (ref 6–23)
CALCIUM SERPL-MCNC: 9.5 MG/DL (ref 8.6–10.6)
CHLORIDE SERPL-SCNC: 105 MMOL/L (ref 98–107)
CHOLEST SERPL-MCNC: 184 MG/DL (ref 0–199)
CHOLESTEROL/HDL RATIO: 2.5
CO2 SERPL-SCNC: 25 MMOL/L (ref 21–32)
CREAT SERPL-MCNC: 0.76 MG/DL (ref 0.5–1.05)
EGFRCR SERPLBLD CKD-EPI 2021: 89 ML/MIN/1.73M*2
ERYTHROCYTE [SEDIMENTATION RATE] IN BLOOD BY WESTERGREN METHOD: 9 MM/H (ref 0–30)
EST. AVERAGE GLUCOSE BLD GHB EST-MCNC: 131 MG/DL
GLUCOSE SERPL-MCNC: 121 MG/DL (ref 74–99)
HBA1C MFR BLD: 6.2 %
HDLC SERPL-MCNC: 74.2 MG/DL
LDLC SERPL CALC-MCNC: 93 MG/DL
NON HDL CHOLESTEROL: 110 MG/DL (ref 0–149)
POTASSIUM SERPL-SCNC: 3.9 MMOL/L (ref 3.5–5.3)
PROT SERPL-MCNC: 6.8 G/DL (ref 6.4–8.2)
RHEUMATOID FACT SER NEPH-ACNC: <10 IU/ML (ref 0–15)
SODIUM SERPL-SCNC: 143 MMOL/L (ref 136–145)
TRIGL SERPL-MCNC: 85 MG/DL (ref 0–149)
URATE SERPL-MCNC: 5.4 MG/DL (ref 2.3–6.7)
VLDL: 17 MG/DL (ref 0–40)

## 2024-05-09 PROCEDURE — 80061 LIPID PANEL: CPT

## 2024-05-09 PROCEDURE — 86038 ANTINUCLEAR ANTIBODIES: CPT

## 2024-05-09 PROCEDURE — 3080F DIAST BP >= 90 MM HG: CPT | Performed by: FAMILY MEDICINE

## 2024-05-09 PROCEDURE — 3075F SYST BP GE 130 - 139MM HG: CPT | Performed by: FAMILY MEDICINE

## 2024-05-09 PROCEDURE — 84550 ASSAY OF BLOOD/URIC ACID: CPT

## 2024-05-09 PROCEDURE — 83036 HEMOGLOBIN GLYCOSYLATED A1C: CPT

## 2024-05-09 PROCEDURE — 80053 COMPREHEN METABOLIC PANEL: CPT

## 2024-05-09 PROCEDURE — 99214 OFFICE O/P EST MOD 30 MIN: CPT | Performed by: FAMILY MEDICINE

## 2024-05-09 PROCEDURE — 85652 RBC SED RATE AUTOMATED: CPT

## 2024-05-09 PROCEDURE — 86431 RHEUMATOID FACTOR QUANT: CPT

## 2024-05-09 PROCEDURE — 1036F TOBACCO NON-USER: CPT | Performed by: FAMILY MEDICINE

## 2024-05-09 RX ORDER — METHYLPREDNISOLONE 4 MG/1
TABLET ORAL
Qty: 21 TABLET | Refills: 0 | Status: SHIPPED | OUTPATIENT
Start: 2024-05-09 | End: 2024-05-16

## 2024-05-09 ASSESSMENT — PAIN SCALES - GENERAL: PAINLEVEL: 6

## 2024-05-09 NOTE — ASSESSMENT & PLAN NOTE
-Arthritis panel.   -Medrol dosepack started today.  -Diclofenac gel BID PRN after the Medrol dosepack.

## 2024-05-09 NOTE — PROGRESS NOTES
Subjective   Patient ID: Cassandra Lang is a 63 y.o. female who presents for Follow-up (Right leg pain ).    HPI    Cassandra Lang is a 63-year-old female with past medical history of hypertension, hyperlipidemia, prediabetes, PE on Eliquis, CVA, PFO s/p repair GERD, IBS, anxiety, depression, vitamin D deficiency, right breast cancer s/p lumpectomy and SLN and current treatment weekly paclitaxel/trastuzumab.  Patient is here for:    1- the management of her right posterior calf / knee pain that started a month ago without any obvious trauma.      A review of system was completed.  All systems were reviewed and were normal, except for the ones that are listed in the HPI.    Objective   Physical Exam  Constitutional:       Appearance: Normal appearance.   HENT:      Head: Normocephalic and atraumatic.      Right Ear: Tympanic membrane, ear canal and external ear normal.      Left Ear: Tympanic membrane, ear canal and external ear normal.      Nose: Nose normal.      Mouth/Throat:      Mouth: Mucous membranes are moist.      Pharynx: Oropharynx is clear.   Eyes:      Extraocular Movements: Extraocular movements intact.      Conjunctiva/sclera: Conjunctivae normal.      Pupils: Pupils are equal, round, and reactive to light.   Cardiovascular:      Rate and Rhythm: Normal rate and regular rhythm.      Pulses: Normal pulses.   Pulmonary:      Effort: Pulmonary effort is normal.      Breath sounds: Normal breath sounds.   Abdominal:      General: Abdomen is flat. Bowel sounds are normal.      Palpations: Abdomen is soft.   Musculoskeletal:         General: Normal range of motion.      Cervical back: Normal range of motion and neck supple.      Comments: Mild right knee lateral and upper edema.    Skin:     General: Skin is warm.   Neurological:      General: No focal deficit present.      Mental Status: She is alert and oriented to person, place, and time. Mental status is at baseline.   Psychiatric:         Mood and  Affect: Mood normal.         Behavior: Behavior normal.         Thought Content: Thought content normal.         Judgment: Judgment normal.     Assessment/Plan   Problem List Items Addressed This Visit       Bilateral knee pain - Primary     -Arthritis panel.   -Medrol dosepack started today.  -Diclofenac gel BID PRN after the Medrol dosepack.         Relevant Medications    methylPREDNISolone (Medrol Dospak) 4 mg tablets    Other Relevant Orders    Arthritis Panel (CMS)      Patient to return to office in 2- 4 week.

## 2024-05-10 ENCOUNTER — APPOINTMENT (OUTPATIENT)
Dept: PRIMARY CARE | Facility: CLINIC | Age: 64
End: 2024-05-10
Payer: COMMERCIAL

## 2024-05-13 LAB — ANA SER QL HEP2 SUBST: NEGATIVE

## 2024-05-15 ENCOUNTER — INFUSION (OUTPATIENT)
Dept: HEMATOLOGY/ONCOLOGY | Facility: CLINIC | Age: 64
End: 2024-05-15
Payer: COMMERCIAL

## 2024-05-15 ENCOUNTER — SOCIAL WORK (OUTPATIENT)
Dept: CASE MANAGEMENT | Facility: HOSPITAL | Age: 64
End: 2024-05-15

## 2024-05-15 ENCOUNTER — APPOINTMENT (OUTPATIENT)
Dept: HEMATOLOGY/ONCOLOGY | Facility: CLINIC | Age: 64
End: 2024-05-15
Payer: COMMERCIAL

## 2024-05-15 VITALS
OXYGEN SATURATION: 94 % | BODY MASS INDEX: 29.19 KG/M2 | TEMPERATURE: 97.5 F | RESPIRATION RATE: 18 BRPM | HEART RATE: 72 BPM | SYSTOLIC BLOOD PRESSURE: 141 MMHG | DIASTOLIC BLOOD PRESSURE: 90 MMHG | WEIGHT: 164.8 LBS

## 2024-05-15 DIAGNOSIS — C50.411 MALIGNANT NEOPLASM OF UPPER-OUTER QUADRANT OF RIGHT BREAST IN FEMALE, ESTROGEN RECEPTOR NEGATIVE (MULTI): ICD-10-CM

## 2024-05-15 DIAGNOSIS — Z17.1 MALIGNANT NEOPLASM OF UPPER-OUTER QUADRANT OF RIGHT BREAST IN FEMALE, ESTROGEN RECEPTOR NEGATIVE (MULTI): ICD-10-CM

## 2024-05-15 PROCEDURE — 96413 CHEMO IV INFUSION 1 HR: CPT

## 2024-05-15 PROCEDURE — 2500000004 HC RX 250 GENERAL PHARMACY W/ HCPCS (ALT 636 FOR OP/ED): Performed by: INTERNAL MEDICINE

## 2024-05-15 RX ORDER — FAMOTIDINE 10 MG/ML
20 INJECTION INTRAVENOUS ONCE AS NEEDED
Status: DISCONTINUED | OUTPATIENT
Start: 2024-05-15 | End: 2024-05-15 | Stop reason: HOSPADM

## 2024-05-15 RX ORDER — ALBUTEROL SULFATE 0.83 MG/ML
3 SOLUTION RESPIRATORY (INHALATION) AS NEEDED
Status: DISCONTINUED | OUTPATIENT
Start: 2024-05-15 | End: 2024-05-15 | Stop reason: HOSPADM

## 2024-05-15 RX ORDER — HEPARIN 100 UNIT/ML
500 SYRINGE INTRAVENOUS AS NEEDED
Status: DISCONTINUED | OUTPATIENT
Start: 2024-05-15 | End: 2024-05-15 | Stop reason: HOSPADM

## 2024-05-15 RX ORDER — DIPHENHYDRAMINE HYDROCHLORIDE 50 MG/ML
50 INJECTION INTRAMUSCULAR; INTRAVENOUS AS NEEDED
Status: DISCONTINUED | OUTPATIENT
Start: 2024-05-15 | End: 2024-05-15 | Stop reason: HOSPADM

## 2024-05-15 RX ORDER — EPINEPHRINE 0.3 MG/.3ML
0.3 INJECTION SUBCUTANEOUS EVERY 5 MIN PRN
Status: DISCONTINUED | OUTPATIENT
Start: 2024-05-15 | End: 2024-05-15 | Stop reason: HOSPADM

## 2024-05-15 RX ORDER — PROCHLORPERAZINE MALEATE 10 MG
10 TABLET ORAL EVERY 6 HOURS PRN
Status: DISCONTINUED | OUTPATIENT
Start: 2024-05-15 | End: 2024-05-15 | Stop reason: HOSPADM

## 2024-05-15 RX ORDER — HEPARIN SODIUM,PORCINE/PF 10 UNIT/ML
50 SYRINGE (ML) INTRAVENOUS AS NEEDED
OUTPATIENT
Start: 2024-05-15

## 2024-05-15 RX ORDER — HEPARIN 100 UNIT/ML
500 SYRINGE INTRAVENOUS AS NEEDED
OUTPATIENT
Start: 2024-05-15

## 2024-05-15 RX ORDER — HEPARIN SODIUM,PORCINE/PF 10 UNIT/ML
50 SYRINGE (ML) INTRAVENOUS AS NEEDED
Status: DISCONTINUED | OUTPATIENT
Start: 2024-05-15 | End: 2024-05-15 | Stop reason: HOSPADM

## 2024-05-15 RX ORDER — PROCHLORPERAZINE EDISYLATE 5 MG/ML
10 INJECTION INTRAMUSCULAR; INTRAVENOUS EVERY 6 HOURS PRN
Status: DISCONTINUED | OUTPATIENT
Start: 2024-05-15 | End: 2024-05-15 | Stop reason: HOSPADM

## 2024-05-15 RX ADMIN — TRASTUZUMAB-ANNS 449.4 MG: 420 INJECTION, POWDER, LYOPHILIZED, FOR SOLUTION INTRAVENOUS at 09:12

## 2024-05-15 RX ADMIN — HEPARIN 500 UNITS: 100 SYRINGE at 09:47

## 2024-05-15 ASSESSMENT — PAIN SCALES - GENERAL: PAINLEVEL: 4

## 2024-05-15 NOTE — SIGNIFICANT EVENT

## 2024-05-16 NOTE — PROGRESS NOTES
SW met with patient in the infusion [facility]. This is a follow up discussion concerning patient's medical bills. SW explained patient's insurance coverage and the options available at this time. Patient shared she received a call from spouse's HR department concerning this matter. They are also researching options for patient and will advise at the end of the week. SW offered to reach out to this person. Patient agreeable and will provide the contact information. Patient also provided 2023 tax return. It appears that patient with qualify for HCAP/UCAP. SW to have patient sign an application for assistance. SW requested the 2022 tax return as well. Support offered. Patient tearful and very anxious throughout our discussion. SW reassured patient that her concerned have been heard and will be addressed. SW to follow closely.

## 2024-05-22 DIAGNOSIS — R73.03 PRE-DIABETES: Primary | ICD-10-CM

## 2024-05-23 ENCOUNTER — TELEPHONE (OUTPATIENT)
Dept: PRIMARY CARE | Facility: CLINIC | Age: 64
End: 2024-05-23
Payer: COMMERCIAL

## 2024-05-30 ENCOUNTER — OFFICE VISIT (OUTPATIENT)
Dept: PRIMARY CARE | Facility: CLINIC | Age: 64
End: 2024-05-30
Payer: COMMERCIAL

## 2024-05-30 VITALS
TEMPERATURE: 98.6 F | WEIGHT: 166 LBS | HEART RATE: 97 BPM | SYSTOLIC BLOOD PRESSURE: 138 MMHG | BODY MASS INDEX: 29.41 KG/M2 | DIASTOLIC BLOOD PRESSURE: 78 MMHG

## 2024-05-30 DIAGNOSIS — G89.29 CHRONIC PAIN OF RIGHT KNEE: Primary | ICD-10-CM

## 2024-05-30 DIAGNOSIS — M25.561 CHRONIC PAIN OF RIGHT KNEE: Primary | ICD-10-CM

## 2024-05-30 DIAGNOSIS — I10 UNCONTROLLED HYPERTENSION: ICD-10-CM

## 2024-05-30 PROCEDURE — 3078F DIAST BP <80 MM HG: CPT | Performed by: FAMILY MEDICINE

## 2024-05-30 PROCEDURE — 3075F SYST BP GE 130 - 139MM HG: CPT | Performed by: FAMILY MEDICINE

## 2024-05-30 PROCEDURE — 99214 OFFICE O/P EST MOD 30 MIN: CPT | Performed by: FAMILY MEDICINE

## 2024-05-30 PROCEDURE — 1036F TOBACCO NON-USER: CPT | Performed by: FAMILY MEDICINE

## 2024-05-30 RX ORDER — AMLODIPINE BESYLATE 10 MG/1
5 TABLET ORAL DAILY
Qty: 90 TABLET | Refills: 1 | Status: SHIPPED | OUTPATIENT
Start: 2024-05-30

## 2024-05-30 NOTE — PROGRESS NOTES
"Subjective   Patient ID: Cassandra Lang is a 64 y.o. female who presents for Med Refill.  Med Refill    Cassandra Lang is a 64-year-old female with past medical history of hypertension, hyperlipidemia, prediabetes, PE on Eliquis, CVA, PFO s/p repair GERD, IBS, anxiety, depression, vitamin D deficiency, right breast cancer \"24 s/p lumpectomy and SLN and current treatment weekly paclitaxel/trastuzumab.  Patient is here for:    1- right knee medial pain follow-up.  It did not respond to Medrol dose pack and is causing shooting pain with ambulation.   2- Blood glucose and blood pressure monitoring. Her BP is not controlled at home. It was 157/ 105 this morning.  She is suspecting her knee pain to be the cause.    A review of system was completed.  All systems were reviewed and were normal, except for the ones that are listed in the HPI.    Objective   Physical Exam  Constitutional:       Appearance: Normal appearance.   HENT:      Head: Normocephalic and atraumatic.      Right Ear: Tympanic membrane, ear canal and external ear normal.      Left Ear: Tympanic membrane, ear canal and external ear normal.      Nose: Nose normal.      Mouth/Throat:      Mouth: Mucous membranes are moist.      Pharynx: Oropharynx is clear.   Eyes:      Extraocular Movements: Extraocular movements intact.      Conjunctiva/sclera: Conjunctivae normal.      Pupils: Pupils are equal, round, and reactive to light.   Cardiovascular:      Rate and Rhythm: Normal rate and regular rhythm.      Pulses: Normal pulses.   Pulmonary:      Effort: Pulmonary effort is normal.      Breath sounds: Normal breath sounds.   Abdominal:      General: Abdomen is flat. Bowel sounds are normal.      Palpations: Abdomen is soft.   Musculoskeletal:         General: Normal range of motion.      Cervical back: Normal range of motion and neck supple.      Comments: -right knee medial femoral tibial joint line pain to palpation and edema.   Skin:     General: Skin is " warm.   Neurological:      General: No focal deficit present.      Mental Status: She is alert and oriented to person, place, and time. Mental status is at baseline.   Psychiatric:         Mood and Affect: Mood normal.         Behavior: Behavior normal.         Thought Content: Thought content normal.         Judgment: Judgment normal.     Assessment/Plan   Problem List Items Addressed This Visit       Uncontrolled hypertension     -Amlodipine increased to 10 mg every day today.          Relevant Medications    amLODIPine (Norvasc) 10 mg tablet    Chronic pain of right knee - Primary     -Not controlled with medrol dose pack.   -MRI right knee ordered.          Relevant Orders    MR knee right wo IV contrast    Patient to return to the office in 3- 6 weeks.

## 2024-06-03 ENCOUNTER — HOSPITAL ENCOUNTER (EMERGENCY)
Facility: HOSPITAL | Age: 64
Discharge: HOME | End: 2024-06-04
Attending: EMERGENCY MEDICINE
Payer: COMMERCIAL

## 2024-06-03 ENCOUNTER — APPOINTMENT (OUTPATIENT)
Dept: RADIOLOGY | Facility: HOSPITAL | Age: 64
End: 2024-06-03
Payer: COMMERCIAL

## 2024-06-03 VITALS
OXYGEN SATURATION: 96 % | SYSTOLIC BLOOD PRESSURE: 160 MMHG | TEMPERATURE: 99 F | HEART RATE: 90 BPM | BODY MASS INDEX: 27.66 KG/M2 | HEIGHT: 65 IN | RESPIRATION RATE: 19 BRPM | WEIGHT: 166 LBS | DIASTOLIC BLOOD PRESSURE: 95 MMHG

## 2024-06-03 DIAGNOSIS — M19.90 INFLAMMATORY ARTHRITIS: Primary | ICD-10-CM

## 2024-06-03 LAB
ALBUMIN SERPL BCP-MCNC: 4.7 G/DL (ref 3.4–5)
ALP SERPL-CCNC: 72 U/L (ref 33–136)
ALT SERPL W P-5'-P-CCNC: 20 U/L (ref 7–45)
ANION GAP SERPL CALC-SCNC: 11 MMOL/L (ref 10–20)
AST SERPL W P-5'-P-CCNC: 15 U/L (ref 9–39)
BASOPHILS # BLD AUTO: 0.07 X10*3/UL (ref 0–0.1)
BASOPHILS NFR BLD AUTO: 0.9 %
BILIRUB SERPL-MCNC: 0.3 MG/DL (ref 0–1.2)
BUN SERPL-MCNC: 16 MG/DL (ref 6–23)
CALCIUM SERPL-MCNC: 9.5 MG/DL (ref 8.6–10.3)
CHLORIDE SERPL-SCNC: 103 MMOL/L (ref 98–107)
CO2 SERPL-SCNC: 28 MMOL/L (ref 21–32)
CREAT SERPL-MCNC: 0.73 MG/DL (ref 0.5–1.05)
CRP SERPL-MCNC: 1.39 MG/DL
EGFRCR SERPLBLD CKD-EPI 2021: >90 ML/MIN/1.73M*2
EOSINOPHIL # BLD AUTO: 0.18 X10*3/UL (ref 0–0.7)
EOSINOPHIL NFR BLD AUTO: 2.4 %
ERYTHROCYTE [DISTWIDTH] IN BLOOD BY AUTOMATED COUNT: 15.5 % (ref 11.5–14.5)
ERYTHROCYTE [SEDIMENTATION RATE] IN BLOOD BY WESTERGREN METHOD: 18 MM/H (ref 0–30)
GLUCOSE SERPL-MCNC: 114 MG/DL (ref 74–99)
HCT VFR BLD AUTO: 42.7 % (ref 36–46)
HGB BLD-MCNC: 13.8 G/DL (ref 12–16)
IMM GRANULOCYTES # BLD AUTO: 0.02 X10*3/UL (ref 0–0.7)
IMM GRANULOCYTES NFR BLD AUTO: 0.3 % (ref 0–0.9)
LACTATE SERPL-SCNC: 1 MMOL/L (ref 0.4–2)
LYMPHOCYTES # BLD AUTO: 1.8 X10*3/UL (ref 1.2–4.8)
LYMPHOCYTES NFR BLD AUTO: 24 %
MCH RBC QN AUTO: 24.6 PG (ref 26–34)
MCHC RBC AUTO-ENTMCNC: 32.3 G/DL (ref 32–36)
MCV RBC AUTO: 76 FL (ref 80–100)
MONOCYTES # BLD AUTO: 0.88 X10*3/UL (ref 0.1–1)
MONOCYTES NFR BLD AUTO: 11.7 %
NEUTROPHILS # BLD AUTO: 4.56 X10*3/UL (ref 1.2–7.7)
NEUTROPHILS NFR BLD AUTO: 60.7 %
NRBC BLD-RTO: 0 /100 WBCS (ref 0–0)
PLATELET # BLD AUTO: 321 X10*3/UL (ref 150–450)
POTASSIUM SERPL-SCNC: 3.5 MMOL/L (ref 3.5–5.3)
PROT SERPL-MCNC: 7.8 G/DL (ref 6.4–8.2)
RBC # BLD AUTO: 5.62 X10*6/UL (ref 4–5.2)
SODIUM SERPL-SCNC: 138 MMOL/L (ref 136–145)
URATE SERPL-MCNC: 4.4 MG/DL (ref 2.3–6.7)
WBC # BLD AUTO: 7.5 X10*3/UL (ref 4.4–11.3)

## 2024-06-03 PROCEDURE — 73130 X-RAY EXAM OF HAND: CPT | Mod: RT

## 2024-06-03 PROCEDURE — 71046 X-RAY EXAM CHEST 2 VIEWS: CPT | Performed by: RADIOLOGY

## 2024-06-03 PROCEDURE — 87086 URINE CULTURE/COLONY COUNT: CPT | Mod: PARLAB | Performed by: EMERGENCY MEDICINE

## 2024-06-03 PROCEDURE — 99284 EMERGENCY DEPT VISIT MOD MDM: CPT

## 2024-06-03 PROCEDURE — 86140 C-REACTIVE PROTEIN: CPT | Performed by: PHYSICIAN ASSISTANT

## 2024-06-03 PROCEDURE — 73110 X-RAY EXAM OF WRIST: CPT | Mod: RT

## 2024-06-03 PROCEDURE — 84550 ASSAY OF BLOOD/URIC ACID: CPT | Performed by: PHYSICIAN ASSISTANT

## 2024-06-03 PROCEDURE — 73130 X-RAY EXAM OF HAND: CPT | Mod: RIGHT SIDE | Performed by: RADIOLOGY

## 2024-06-03 PROCEDURE — 83605 ASSAY OF LACTIC ACID: CPT | Performed by: PHYSICIAN ASSISTANT

## 2024-06-03 PROCEDURE — 81003 URINALYSIS AUTO W/O SCOPE: CPT | Performed by: EMERGENCY MEDICINE

## 2024-06-03 PROCEDURE — 85652 RBC SED RATE AUTOMATED: CPT | Performed by: PHYSICIAN ASSISTANT

## 2024-06-03 PROCEDURE — 71046 X-RAY EXAM CHEST 2 VIEWS: CPT

## 2024-06-03 PROCEDURE — 80053 COMPREHEN METABOLIC PANEL: CPT | Performed by: PHYSICIAN ASSISTANT

## 2024-06-03 PROCEDURE — 87040 BLOOD CULTURE FOR BACTERIA: CPT | Mod: PARLAB | Performed by: PHYSICIAN ASSISTANT

## 2024-06-03 PROCEDURE — 73110 X-RAY EXAM OF WRIST: CPT | Mod: RIGHT SIDE | Performed by: RADIOLOGY

## 2024-06-03 PROCEDURE — 85025 COMPLETE CBC W/AUTO DIFF WBC: CPT | Performed by: PHYSICIAN ASSISTANT

## 2024-06-03 PROCEDURE — 36415 COLL VENOUS BLD VENIPUNCTURE: CPT | Performed by: PHYSICIAN ASSISTANT

## 2024-06-03 ASSESSMENT — PAIN SCALES - GENERAL: PAINLEVEL_OUTOF10: 6

## 2024-06-03 ASSESSMENT — PAIN DESCRIPTION - PAIN TYPE: TYPE: ACUTE PAIN

## 2024-06-03 ASSESSMENT — PAIN - FUNCTIONAL ASSESSMENT: PAIN_FUNCTIONAL_ASSESSMENT: 0-10

## 2024-06-03 NOTE — ED TRIAGE NOTES
Patient comes from home for fever, joint swelling, general malaise, and not feeling well at all. Patient reports pain all the way up her right arm. Patient just finished chemo and is now doing antibodies (for right sided breast cancer)

## 2024-06-04 LAB
APPEARANCE UR: CLEAR
BILIRUB UR STRIP.AUTO-MCNC: NEGATIVE MG/DL
COLOR UR: COLORLESS
GLUCOSE UR STRIP.AUTO-MCNC: NORMAL MG/DL
HOLD SPECIMEN: NORMAL
KETONES UR STRIP.AUTO-MCNC: NEGATIVE MG/DL
LEUKOCYTE ESTERASE UR QL STRIP.AUTO: ABNORMAL
NITRITE UR QL STRIP.AUTO: NEGATIVE
PH UR STRIP.AUTO: 5.5 [PH]
PROT UR STRIP.AUTO-MCNC: NEGATIVE MG/DL
RBC # UR STRIP.AUTO: NEGATIVE /UL
RBC #/AREA URNS AUTO: NORMAL /HPF
SP GR UR STRIP.AUTO: 1.01
UROBILINOGEN UR STRIP.AUTO-MCNC: NORMAL MG/DL
WBC #/AREA URNS AUTO: NORMAL /HPF

## 2024-06-04 NOTE — ED TRIAGE NOTES
The patient was seen and examined in triage.    History of Present Illness: The patient is a 64-year-old female presents emergency department for assessment of right arm pain today.  She notes some swelling to the right wrist with limited range of motion.  She had temperature today of 100.2.  She just finished chemotherapy for right-sided breast cancer and is now on antibodies.  She reports history of DVT and PE in which she is on Eliquis and has been compliant.  She reports that she missed 1 dose last night but has been otherwise compliant.  She denies chest pains or shortness of breath.  She did use ice to the wrist which seemed to help the swelling.    Brief Physical Exam:  Exam is limited by the patient sitting in a chair in triage.   Heart: Regular rate and rhythm.   Lungs: Clear to auscultation bilaterally.   Abdomen: Soft, nondistended, normoactive bowel sounds, nontender   Musculoskeletal: Slight swelling to the right wrist without significant erythema.  Radial pulse +2/4 on the right.  Limited range of motion of the right wrist.    Plan: Appropriate labs and diagnostic imaging were ordered.      For the remainder of the patient's workup and ED course, please refer to the main ED provider note. We discussed need for diagnostic testing including laboratory studies and imaging.  We also discussed that they may be asked to wait in the waiting room while these tests are pending.  They understand that if they choose to leave without having the testing completed or resulted that we cannot rule out acute life threatening illnesses and the risks involved could lead to worsening condition, permanent disability or even death.      Disclaimer: This note was dictated by speech recognition. Minor errors in transcription may be present. Please call if questions.

## 2024-06-04 NOTE — ED PROVIDER NOTES
HPI   Chief Complaint   Patient presents with    Weakness, Gen    Joint Swelling     Patient comes from home for fever, joint swelling, general malaise, and not feeling well at all. Patient reports pain all the way up her right arm. Patient just finished chemo and is now doing antibodies (for right sided breast cancer)       Patient with past medical history of breast cancer currently receiving treatment.  She had a temperature of 100.2.  She also had some right hand pain and swelling.  This was preceded by a week ago with right knee pain and swelling.  She was placed on prednisone at that point in time.  She is not aware of any history of gout or pseudogout or inflammatory arthritis.  She denies any cough or upper respiratory symptoms.  No dysuria, urgency or frequency.  Remainder review systems negative.                          Laila Coma Scale Score: 15                     Patient History   Past Medical History:   Diagnosis Date    Abnormal findings on diagnostic imaging of other specified body structures     Abnormal CT of the chest    Cancer (Multi)     Heart disease     High blood pressure     Personal history of other medical treatment     History of echocardiogram    Personal history of other medical treatment     History of transesophageal echocardiography (MAYKEL)    Personal history of other medical treatment     History of cardiac monitoring    Personal history of transient ischemic attack (TIA), and cerebral infarction without residual deficits     History of cerebrovascular accident    Stroke (Multi)     Tinea unguium 10/12/2021    Onychomycosis of toenail     Past Surgical History:   Procedure Laterality Date    APPENDECTOMY  2014    Appendectomy    BREAST LUMPECTOMY       SECTION, CLASSIC  2014     Section    IR CVC PORT  2023    IR CVC PORT 2023 [facility]    MR HEAD ANGIO WO IV CONTRAST  2020    MR HEAD ANGIO WO IV CONTRAST 2020 [facility] CLINICAL  [facility]GAC[facility]    MR NECK ANGIO WO IV CONTRAST  05/13/2020    MR NECK ANGIO WO IV CONTRAST 5/13/2020 [facility] CLINICAL LEGACY    OTHER SURGICAL HISTORY  06/24/2020    Esophagogastroduodenoscopy    OTHER SURGICAL HISTORY  06/24/2020    Colonoscopy    OTHER SURGICAL HISTORY  03/04/2021    Patent foramen ovale repair     Family History   Problem Relation Name Age of Onset    Diabetes Mother      Hypertension Mother      Cancer Mother      Coronary artery disease Father      Heart attack Maternal Grandfather      Stroke Other       Social History     Tobacco Use    Smoking status: Never     Passive exposure: Never    Smokeless tobacco: Never   Vaping Use    Vaping status: Never Used   Substance Use Topics    Alcohol use: Never    Drug use: Never       Physical Exam   ED Triage Vitals [06/03/24 1936]   Temperature Heart Rate Respirations BP   37.2 °C (99 °F) 90 19 (!) 160/95      Pulse Ox Temp Source Heart Rate Source Patient Position   96 % Temporal Monitor Sitting      BP Location FiO2 (%)     Left arm --       Physical Exam  Vitals and nursing note reviewed.   Constitutional:       General: She is not in acute distress.     Appearance: She is well-developed.   HENT:      Head: Normocephalic and atraumatic.   Eyes:      Conjunctiva/sclera: Conjunctivae normal.   Cardiovascular:      Rate and Rhythm: Normal rate and regular rhythm.      Heart sounds: No murmur heard.  Pulmonary:      Effort: Pulmonary effort is normal. No respiratory distress.      Breath sounds: Normal breath sounds.   Abdominal:      Palpations: Abdomen is soft.      Tenderness: There is no abdominal tenderness.   Musculoskeletal:         General: Swelling present.      Cervical back: Neck supple.      Comments: Mild erythema and edema at the second metacarpal phalangeal joint and just proximal to.  No pain with passive range of motion.  He is tender to palpate.  She is neurovascularly intact.   Skin:     General: Skin is warm and dry.      Capillary Refill:  Capillary refill takes less than 2 seconds.   Neurological:      Mental Status: She is alert.   Psychiatric:         Mood and Affect: Mood normal.         ED Course & MDM   Diagnoses as of 06/04/24 0027   Inflammatory arthritis       Medical Decision Making  Differential diagnosis gout, pseudogout, cellulitis, urinary tract infection, pneumonia, etc.    Patient is negative UA.  Normal chest x-ray.  She is nontoxic-appearing.  She has no abdominal pain.  No infectious symptoms.  I suspect this is gout or pseudogout of the right hand.  She is declining any prednisone.  She cannot take any anti-inflammatories she states because of her anticoagulants.  She will take Tylenol we will mobilize the wrist with Velcro splint.  She instructed follow-up with her doctor.  I see no signs of infection at this time.  Return for any worsening symptoms.  She is afebrile here.  All questions were answered.  Prior medical records were reviewed.        Procedure  Procedures     Messi Dowell MD  06/04/24 0042

## 2024-06-05 ENCOUNTER — SOCIAL WORK (OUTPATIENT)
Dept: HEMATOLOGY/ONCOLOGY | Facility: CLINIC | Age: 64
End: 2024-06-05
Payer: COMMERCIAL

## 2024-06-05 ENCOUNTER — INFUSION (OUTPATIENT)
Dept: HEMATOLOGY/ONCOLOGY | Facility: CLINIC | Age: 64
End: 2024-06-05
Payer: COMMERCIAL

## 2024-06-05 ENCOUNTER — APPOINTMENT (OUTPATIENT)
Dept: HEMATOLOGY/ONCOLOGY | Facility: CLINIC | Age: 64
End: 2024-06-05
Payer: COMMERCIAL

## 2024-06-05 VITALS
BODY MASS INDEX: 27.07 KG/M2 | DIASTOLIC BLOOD PRESSURE: 94 MMHG | OXYGEN SATURATION: 96 % | SYSTOLIC BLOOD PRESSURE: 127 MMHG | WEIGHT: 162.7 LBS | TEMPERATURE: 97 F | RESPIRATION RATE: 18 BRPM | HEART RATE: 89 BPM

## 2024-06-05 DIAGNOSIS — C50.411 MALIGNANT NEOPLASM OF UPPER-OUTER QUADRANT OF RIGHT BREAST IN FEMALE, ESTROGEN RECEPTOR NEGATIVE (MULTI): ICD-10-CM

## 2024-06-05 DIAGNOSIS — Z17.1 MALIGNANT NEOPLASM OF UPPER-OUTER QUADRANT OF RIGHT BREAST IN FEMALE, ESTROGEN RECEPTOR NEGATIVE (MULTI): ICD-10-CM

## 2024-06-05 ASSESSMENT — PAIN SCALES - GENERAL: PAINLEVEL: 8

## 2024-06-06 LAB — BACTERIA UR CULT: NORMAL

## 2024-06-06 NOTE — PROGRESS NOTES
SW met with patient in the infusion center of [facility]: [facility]. Patient's treatment held for the day due to possible infection in her right hand. Patient provided the 2022 income tax returns. Patient will bring spouse's 2024 Social Security statement and check stubs from all of 2024. These will be needed for the 2024 HCAP applications. Support offered. Patient informed of the change in the  coverage at [facility]: [facility] due to my skilled nursing. SW reassured patient that my colleague will be brought up to date on patient's case and will follow through with support and resources. Patient appreciative.

## 2024-06-08 LAB
BACTERIA BLD CULT: NORMAL
BACTERIA BLD CULT: NORMAL

## 2024-06-10 ENCOUNTER — TELEPHONE (OUTPATIENT)
Dept: PRIMARY CARE | Facility: CLINIC | Age: 64
End: 2024-06-10

## 2024-06-10 DIAGNOSIS — M54.2 CERVICALGIA: ICD-10-CM

## 2024-06-10 DIAGNOSIS — M25.561 CHRONIC PAIN OF RIGHT KNEE: Primary | ICD-10-CM

## 2024-06-10 DIAGNOSIS — M19.072 LOCALIZED, PRIMARY OSTEOARTHRITIS OF THE ANKLE AND FOOT, LEFT: ICD-10-CM

## 2024-06-10 DIAGNOSIS — G89.29 CHRONIC PAIN OF RIGHT KNEE: Primary | ICD-10-CM

## 2024-06-13 ENCOUNTER — APPOINTMENT (OUTPATIENT)
Dept: RADIOLOGY | Facility: CLINIC | Age: 64
End: 2024-06-13
Payer: COMMERCIAL

## 2024-06-17 NOTE — TELEPHONE ENCOUNTER
Referral to rheumatology is placed in the system.  It could well be that patient's multiple joint aches are also related to her cholesterol medication.  She can stop it for 2 to 4 weeks to see if the muscle aches stop them, make a follow-up visit for reassessment.

## 2024-06-18 ENCOUNTER — APPOINTMENT (OUTPATIENT)
Dept: RADIOLOGY | Facility: CLINIC | Age: 64
End: 2024-06-18
Payer: COMMERCIAL

## 2024-06-19 ENCOUNTER — APPOINTMENT (OUTPATIENT)
Dept: PRIMARY CARE | Facility: CLINIC | Age: 64
End: 2024-06-19
Payer: COMMERCIAL

## 2024-06-21 DIAGNOSIS — C50.411 MALIGNANT NEOPLASM OF UPPER-OUTER QUADRANT OF RIGHT BREAST IN FEMALE, ESTROGEN RECEPTOR NEGATIVE (MULTI): Primary | ICD-10-CM

## 2024-06-21 DIAGNOSIS — Z17.1 MALIGNANT NEOPLASM OF UPPER-OUTER QUADRANT OF RIGHT BREAST IN FEMALE, ESTROGEN RECEPTOR NEGATIVE (MULTI): Primary | ICD-10-CM

## 2024-06-21 RX ORDER — ALBUTEROL SULFATE 0.83 MG/ML
3 SOLUTION RESPIRATORY (INHALATION) AS NEEDED
OUTPATIENT
Start: 2024-10-09

## 2024-06-21 RX ORDER — EPINEPHRINE 0.3 MG/.3ML
0.3 INJECTION SUBCUTANEOUS EVERY 5 MIN PRN
Status: CANCELLED | OUTPATIENT
Start: 2024-06-26

## 2024-06-21 RX ORDER — ALBUTEROL SULFATE 0.83 MG/ML
3 SOLUTION RESPIRATORY (INHALATION) AS NEEDED
OUTPATIENT
Start: 2024-08-28

## 2024-06-21 RX ORDER — PROCHLORPERAZINE EDISYLATE 5 MG/ML
10 INJECTION INTRAMUSCULAR; INTRAVENOUS EVERY 6 HOURS PRN
OUTPATIENT
Start: 2024-08-28

## 2024-06-21 RX ORDER — PROCHLORPERAZINE EDISYLATE 5 MG/ML
10 INJECTION INTRAMUSCULAR; INTRAVENOUS EVERY 6 HOURS PRN
OUTPATIENT
Start: 2024-10-30

## 2024-06-21 RX ORDER — FAMOTIDINE 10 MG/ML
20 INJECTION INTRAVENOUS ONCE AS NEEDED
Status: CANCELLED | OUTPATIENT
Start: 2024-06-26

## 2024-06-21 RX ORDER — EPINEPHRINE 0.3 MG/.3ML
0.3 INJECTION SUBCUTANEOUS EVERY 5 MIN PRN
OUTPATIENT
Start: 2024-11-20

## 2024-06-21 RX ORDER — PROCHLORPERAZINE EDISYLATE 5 MG/ML
10 INJECTION INTRAMUSCULAR; INTRAVENOUS EVERY 6 HOURS PRN
OUTPATIENT
Start: 2024-10-09

## 2024-06-21 RX ORDER — FAMOTIDINE 10 MG/ML
20 INJECTION INTRAVENOUS ONCE AS NEEDED
OUTPATIENT
Start: 2024-07-17

## 2024-06-21 RX ORDER — ALBUTEROL SULFATE 0.83 MG/ML
3 SOLUTION RESPIRATORY (INHALATION) AS NEEDED
OUTPATIENT
Start: 2024-08-07

## 2024-06-21 RX ORDER — DIPHENHYDRAMINE HYDROCHLORIDE 50 MG/ML
50 INJECTION INTRAMUSCULAR; INTRAVENOUS AS NEEDED
OUTPATIENT
Start: 2024-10-30

## 2024-06-21 RX ORDER — ALBUTEROL SULFATE 0.83 MG/ML
3 SOLUTION RESPIRATORY (INHALATION) AS NEEDED
OUTPATIENT
Start: 2024-11-20

## 2024-06-21 RX ORDER — DIPHENHYDRAMINE HYDROCHLORIDE 50 MG/ML
50 INJECTION INTRAMUSCULAR; INTRAVENOUS AS NEEDED
OUTPATIENT
Start: 2024-08-28

## 2024-06-21 RX ORDER — FAMOTIDINE 10 MG/ML
20 INJECTION INTRAVENOUS ONCE AS NEEDED
OUTPATIENT
Start: 2024-08-28

## 2024-06-21 RX ORDER — DIPHENHYDRAMINE HYDROCHLORIDE 50 MG/ML
50 INJECTION INTRAMUSCULAR; INTRAVENOUS AS NEEDED
Status: CANCELLED | OUTPATIENT
Start: 2024-06-26

## 2024-06-21 RX ORDER — PROCHLORPERAZINE MALEATE 10 MG
10 TABLET ORAL EVERY 6 HOURS PRN
OUTPATIENT
Start: 2024-11-20

## 2024-06-21 RX ORDER — EPINEPHRINE 0.3 MG/.3ML
0.3 INJECTION SUBCUTANEOUS EVERY 5 MIN PRN
OUTPATIENT
Start: 2024-08-07

## 2024-06-21 RX ORDER — EPINEPHRINE 0.3 MG/.3ML
0.3 INJECTION SUBCUTANEOUS EVERY 5 MIN PRN
OUTPATIENT
Start: 2024-07-17

## 2024-06-21 RX ORDER — PROCHLORPERAZINE EDISYLATE 5 MG/ML
10 INJECTION INTRAMUSCULAR; INTRAVENOUS EVERY 6 HOURS PRN
OUTPATIENT
Start: 2024-07-17

## 2024-06-21 RX ORDER — PROCHLORPERAZINE MALEATE 10 MG
10 TABLET ORAL EVERY 6 HOURS PRN
OUTPATIENT
Start: 2024-08-07

## 2024-06-21 RX ORDER — FAMOTIDINE 10 MG/ML
20 INJECTION INTRAVENOUS ONCE AS NEEDED
OUTPATIENT
Start: 2024-08-07

## 2024-06-21 RX ORDER — DIPHENHYDRAMINE HYDROCHLORIDE 50 MG/ML
50 INJECTION INTRAMUSCULAR; INTRAVENOUS AS NEEDED
OUTPATIENT
Start: 2024-10-09

## 2024-06-21 RX ORDER — DIPHENHYDRAMINE HYDROCHLORIDE 50 MG/ML
50 INJECTION INTRAMUSCULAR; INTRAVENOUS AS NEEDED
OUTPATIENT
Start: 2024-09-18

## 2024-06-21 RX ORDER — PROCHLORPERAZINE EDISYLATE 5 MG/ML
10 INJECTION INTRAMUSCULAR; INTRAVENOUS EVERY 6 HOURS PRN
OUTPATIENT
Start: 2024-11-20

## 2024-06-21 RX ORDER — FAMOTIDINE 10 MG/ML
20 INJECTION INTRAVENOUS ONCE AS NEEDED
OUTPATIENT
Start: 2024-10-30

## 2024-06-21 RX ORDER — EPINEPHRINE 0.3 MG/.3ML
0.3 INJECTION SUBCUTANEOUS EVERY 5 MIN PRN
OUTPATIENT
Start: 2024-10-30

## 2024-06-21 RX ORDER — PROCHLORPERAZINE MALEATE 10 MG
10 TABLET ORAL EVERY 6 HOURS PRN
Status: CANCELLED | OUTPATIENT
Start: 2024-06-26

## 2024-06-21 RX ORDER — PROCHLORPERAZINE MALEATE 10 MG
10 TABLET ORAL EVERY 6 HOURS PRN
OUTPATIENT
Start: 2024-10-09

## 2024-06-21 RX ORDER — DIPHENHYDRAMINE HYDROCHLORIDE 50 MG/ML
50 INJECTION INTRAMUSCULAR; INTRAVENOUS AS NEEDED
OUTPATIENT
Start: 2024-11-20

## 2024-06-21 RX ORDER — PROCHLORPERAZINE MALEATE 10 MG
10 TABLET ORAL EVERY 6 HOURS PRN
OUTPATIENT
Start: 2024-10-30

## 2024-06-21 RX ORDER — PROCHLORPERAZINE MALEATE 10 MG
10 TABLET ORAL EVERY 6 HOURS PRN
OUTPATIENT
Start: 2024-08-28

## 2024-06-21 RX ORDER — FAMOTIDINE 10 MG/ML
20 INJECTION INTRAVENOUS ONCE AS NEEDED
OUTPATIENT
Start: 2024-10-09

## 2024-06-21 RX ORDER — FAMOTIDINE 10 MG/ML
20 INJECTION INTRAVENOUS ONCE AS NEEDED
OUTPATIENT
Start: 2024-11-20

## 2024-06-21 RX ORDER — PROCHLORPERAZINE MALEATE 10 MG
10 TABLET ORAL EVERY 6 HOURS PRN
OUTPATIENT
Start: 2024-07-17

## 2024-06-21 RX ORDER — PROCHLORPERAZINE EDISYLATE 5 MG/ML
10 INJECTION INTRAMUSCULAR; INTRAVENOUS EVERY 6 HOURS PRN
OUTPATIENT
Start: 2024-09-18

## 2024-06-21 RX ORDER — EPINEPHRINE 0.3 MG/.3ML
0.3 INJECTION SUBCUTANEOUS EVERY 5 MIN PRN
OUTPATIENT
Start: 2024-09-18

## 2024-06-21 RX ORDER — ALBUTEROL SULFATE 0.83 MG/ML
3 SOLUTION RESPIRATORY (INHALATION) AS NEEDED
Status: CANCELLED | OUTPATIENT
Start: 2024-06-26

## 2024-06-21 RX ORDER — FAMOTIDINE 10 MG/ML
20 INJECTION INTRAVENOUS ONCE AS NEEDED
OUTPATIENT
Start: 2024-09-18

## 2024-06-21 RX ORDER — DIPHENHYDRAMINE HYDROCHLORIDE 50 MG/ML
50 INJECTION INTRAMUSCULAR; INTRAVENOUS AS NEEDED
OUTPATIENT
Start: 2024-07-17

## 2024-06-21 RX ORDER — PROCHLORPERAZINE EDISYLATE 5 MG/ML
10 INJECTION INTRAMUSCULAR; INTRAVENOUS EVERY 6 HOURS PRN
Status: CANCELLED | OUTPATIENT
Start: 2024-06-26

## 2024-06-21 RX ORDER — PROCHLORPERAZINE EDISYLATE 5 MG/ML
10 INJECTION INTRAMUSCULAR; INTRAVENOUS EVERY 6 HOURS PRN
OUTPATIENT
Start: 2024-08-07

## 2024-06-21 RX ORDER — EPINEPHRINE 0.3 MG/.3ML
0.3 INJECTION SUBCUTANEOUS EVERY 5 MIN PRN
OUTPATIENT
Start: 2024-10-09

## 2024-06-21 RX ORDER — DIPHENHYDRAMINE HYDROCHLORIDE 50 MG/ML
50 INJECTION INTRAMUSCULAR; INTRAVENOUS AS NEEDED
OUTPATIENT
Start: 2024-08-07

## 2024-06-21 RX ORDER — ALBUTEROL SULFATE 0.83 MG/ML
3 SOLUTION RESPIRATORY (INHALATION) AS NEEDED
OUTPATIENT
Start: 2024-10-30

## 2024-06-21 RX ORDER — ALBUTEROL SULFATE 0.83 MG/ML
3 SOLUTION RESPIRATORY (INHALATION) AS NEEDED
OUTPATIENT
Start: 2024-09-18

## 2024-06-21 RX ORDER — EPINEPHRINE 0.3 MG/.3ML
0.3 INJECTION SUBCUTANEOUS EVERY 5 MIN PRN
OUTPATIENT
Start: 2024-08-28

## 2024-06-21 RX ORDER — PROCHLORPERAZINE MALEATE 10 MG
10 TABLET ORAL EVERY 6 HOURS PRN
OUTPATIENT
Start: 2024-09-18

## 2024-06-21 RX ORDER — ALBUTEROL SULFATE 0.83 MG/ML
3 SOLUTION RESPIRATORY (INHALATION) AS NEEDED
OUTPATIENT
Start: 2024-07-17

## 2024-06-22 DIAGNOSIS — Q21.12 PFO (PATENT FORAMEN OVALE) (HHS-HCC): ICD-10-CM

## 2024-06-22 DIAGNOSIS — I63.9 CEREBRAL INFARCTION, UNSPECIFIED (MULTI): ICD-10-CM

## 2024-06-24 ENCOUNTER — APPOINTMENT (OUTPATIENT)
Dept: RADIOLOGY | Facility: CLINIC | Age: 64
End: 2024-06-24
Payer: COMMERCIAL

## 2024-06-25 RX ORDER — ASPIRIN 81 MG/1
81 TABLET ORAL DAILY
Qty: 90 TABLET | Refills: 0 | Status: SHIPPED | OUTPATIENT
Start: 2024-06-25

## 2024-06-26 ENCOUNTER — INFUSION (OUTPATIENT)
Dept: HEMATOLOGY/ONCOLOGY | Facility: CLINIC | Age: 64
End: 2024-06-26
Payer: COMMERCIAL

## 2024-06-26 ENCOUNTER — SOCIAL WORK (OUTPATIENT)
Dept: HEMATOLOGY/ONCOLOGY | Facility: CLINIC | Age: 64
End: 2024-06-26

## 2024-06-26 VITALS
SYSTOLIC BLOOD PRESSURE: 136 MMHG | BODY MASS INDEX: 26.93 KG/M2 | TEMPERATURE: 98.2 F | RESPIRATION RATE: 16 BRPM | DIASTOLIC BLOOD PRESSURE: 87 MMHG | HEART RATE: 75 BPM | WEIGHT: 161.82 LBS | OXYGEN SATURATION: 97 %

## 2024-06-26 DIAGNOSIS — Z17.1 MALIGNANT NEOPLASM OF UPPER-OUTER QUADRANT OF RIGHT BREAST IN FEMALE, ESTROGEN RECEPTOR NEGATIVE (MULTI): ICD-10-CM

## 2024-06-26 DIAGNOSIS — C50.411 MALIGNANT NEOPLASM OF UPPER-OUTER QUADRANT OF RIGHT BREAST IN FEMALE, ESTROGEN RECEPTOR NEGATIVE (MULTI): ICD-10-CM

## 2024-06-26 PROCEDURE — 96413 CHEMO IV INFUSION 1 HR: CPT

## 2024-06-26 PROCEDURE — 2500000004 HC RX 250 GENERAL PHARMACY W/ HCPCS (ALT 636 FOR OP/ED): Performed by: INTERNAL MEDICINE

## 2024-06-26 RX ORDER — HEPARIN 100 UNIT/ML
500 SYRINGE INTRAVENOUS AS NEEDED
OUTPATIENT
Start: 2024-06-26

## 2024-06-26 RX ORDER — HEPARIN SODIUM,PORCINE/PF 10 UNIT/ML
50 SYRINGE (ML) INTRAVENOUS AS NEEDED
Status: DISCONTINUED | OUTPATIENT
Start: 2024-06-26 | End: 2024-06-26 | Stop reason: HOSPADM

## 2024-06-26 RX ORDER — FAMOTIDINE 10 MG/ML
20 INJECTION INTRAVENOUS ONCE AS NEEDED
Status: DISCONTINUED | OUTPATIENT
Start: 2024-06-26 | End: 2024-06-26 | Stop reason: HOSPADM

## 2024-06-26 RX ORDER — DIPHENHYDRAMINE HYDROCHLORIDE 50 MG/ML
50 INJECTION INTRAMUSCULAR; INTRAVENOUS AS NEEDED
Status: DISCONTINUED | OUTPATIENT
Start: 2024-06-26 | End: 2024-06-26 | Stop reason: HOSPADM

## 2024-06-26 RX ORDER — HEPARIN SODIUM,PORCINE/PF 10 UNIT/ML
50 SYRINGE (ML) INTRAVENOUS AS NEEDED
OUTPATIENT
Start: 2024-06-26

## 2024-06-26 RX ORDER — EPINEPHRINE 0.3 MG/.3ML
0.3 INJECTION SUBCUTANEOUS EVERY 5 MIN PRN
Status: DISCONTINUED | OUTPATIENT
Start: 2024-06-26 | End: 2024-06-26 | Stop reason: HOSPADM

## 2024-06-26 RX ORDER — HEPARIN 100 UNIT/ML
500 SYRINGE INTRAVENOUS AS NEEDED
Status: DISCONTINUED | OUTPATIENT
Start: 2024-06-26 | End: 2024-06-26 | Stop reason: HOSPADM

## 2024-06-26 RX ORDER — PROCHLORPERAZINE MALEATE 10 MG
10 TABLET ORAL EVERY 6 HOURS PRN
Status: DISCONTINUED | OUTPATIENT
Start: 2024-06-26 | End: 2024-06-26 | Stop reason: HOSPADM

## 2024-06-26 RX ORDER — ALBUTEROL SULFATE 0.83 MG/ML
3 SOLUTION RESPIRATORY (INHALATION) AS NEEDED
Status: DISCONTINUED | OUTPATIENT
Start: 2024-06-26 | End: 2024-06-26 | Stop reason: HOSPADM

## 2024-06-26 RX ORDER — PROCHLORPERAZINE EDISYLATE 5 MG/ML
10 INJECTION INTRAMUSCULAR; INTRAVENOUS EVERY 6 HOURS PRN
Status: DISCONTINUED | OUTPATIENT
Start: 2024-06-26 | End: 2024-06-26 | Stop reason: HOSPADM

## 2024-06-26 NOTE — PROGRESS NOTES
SW met with patient in the infusion center of [facility]: [facility]. Patient continues on maintenance Keytruda. Patient able to provide spouse's pay checks for 01.01.2024-06.30.2024 from his employer. SW to submit HCAP applicaiotn for Janaury 2024 and April 2024. Patient aware of the change to the  coverage. SW to update covering  team to follow for support and resources. Patient also provided with infurantion on an  who can assist with Marketplace coverage, Medicaid and Medicare coverage for spposue when he retires. Pateint expressed understanding.

## 2024-06-26 NOTE — SIGNIFICANT EVENT
06/26/24 0915   Prechemo Checklist   Has the patient been in the hospital, ED, or urgent care since last date of service No   Chemo/Immuno Consent Completed and Signed Yes   Protocol/Indications Verified Yes   Confirmed to previous date/time of medication Yes  (last dose held for hand swelling)   Compared to previous dose Yes   All medications are dated accurately Yes   Pregnancy Test Negative Not applicable   Parameters Met Yes   BSA/Weight-Height Verified Yes   Dose Calculations Verified (current, total, cumulative) Yes

## 2024-07-03 ENCOUNTER — APPOINTMENT (OUTPATIENT)
Dept: PRIMARY CARE | Facility: CLINIC | Age: 64
End: 2024-07-03
Payer: COMMERCIAL

## 2024-07-09 ENCOUNTER — HOSPITAL ENCOUNTER (OUTPATIENT)
Dept: RADIOLOGY | Facility: CLINIC | Age: 64
Discharge: HOME | End: 2024-07-09
Payer: COMMERCIAL

## 2024-07-09 DIAGNOSIS — G89.29 CHRONIC PAIN OF RIGHT KNEE: ICD-10-CM

## 2024-07-09 DIAGNOSIS — M25.561 CHRONIC PAIN OF RIGHT KNEE: ICD-10-CM

## 2024-07-09 PROCEDURE — 73721 MRI JNT OF LWR EXTRE W/O DYE: CPT | Mod: RT

## 2024-07-09 PROCEDURE — 73721 MRI JNT OF LWR EXTRE W/O DYE: CPT | Mod: RIGHT SIDE | Performed by: STUDENT IN AN ORGANIZED HEALTH CARE EDUCATION/TRAINING PROGRAM

## 2024-07-10 ENCOUNTER — TELEPHONE (OUTPATIENT)
Dept: PRIMARY CARE | Facility: CLINIC | Age: 64
End: 2024-07-10
Payer: COMMERCIAL

## 2024-07-17 ENCOUNTER — DOCUMENTATION (OUTPATIENT)
Dept: HEMATOLOGY/ONCOLOGY | Facility: CLINIC | Age: 64
End: 2024-07-17
Payer: COMMERCIAL

## 2024-07-17 ENCOUNTER — APPOINTMENT (OUTPATIENT)
Dept: HEMATOLOGY/ONCOLOGY | Facility: CLINIC | Age: 64
End: 2024-07-17
Payer: COMMERCIAL

## 2024-07-17 ENCOUNTER — OFFICE VISIT (OUTPATIENT)
Dept: HEMATOLOGY/ONCOLOGY | Facility: CLINIC | Age: 64
End: 2024-07-17
Payer: COMMERCIAL

## 2024-07-17 ENCOUNTER — TELEPHONE (OUTPATIENT)
Dept: HEMATOLOGY/ONCOLOGY | Facility: CLINIC | Age: 64
End: 2024-07-17

## 2024-07-17 VITALS
DIASTOLIC BLOOD PRESSURE: 88 MMHG | SYSTOLIC BLOOD PRESSURE: 147 MMHG | HEART RATE: 71 BPM | BODY MASS INDEX: 26.29 KG/M2 | WEIGHT: 158 LBS

## 2024-07-17 DIAGNOSIS — C50.411 MALIGNANT NEOPLASM OF UPPER-OUTER QUADRANT OF RIGHT BREAST IN FEMALE, ESTROGEN RECEPTOR NEGATIVE (MULTI): ICD-10-CM

## 2024-07-17 DIAGNOSIS — Z17.1 MALIGNANT NEOPLASM OF UPPER-OUTER QUADRANT OF RIGHT BREAST IN FEMALE, ESTROGEN RECEPTOR NEGATIVE (MULTI): ICD-10-CM

## 2024-07-17 DIAGNOSIS — S83.241S TEAR OF MEDIAL MENISCUS OF RIGHT KNEE, CURRENT, UNSPECIFIED TEAR TYPE, SEQUELA: Primary | ICD-10-CM

## 2024-07-17 DIAGNOSIS — Z51.81 ENCOUNTER FOR MONITORING CARDIOTOXIC DRUG THERAPY: ICD-10-CM

## 2024-07-17 DIAGNOSIS — Z79.899 ENCOUNTER FOR MONITORING CARDIOTOXIC DRUG THERAPY: ICD-10-CM

## 2024-07-17 PROCEDURE — 99215 OFFICE O/P EST HI 40 MIN: CPT | Performed by: INTERNAL MEDICINE

## 2024-07-17 PROCEDURE — 3079F DIAST BP 80-89 MM HG: CPT | Performed by: INTERNAL MEDICINE

## 2024-07-17 PROCEDURE — 3077F SYST BP >= 140 MM HG: CPT | Performed by: INTERNAL MEDICINE

## 2024-07-17 ASSESSMENT — PAIN SCALES - GENERAL: PAINLEVEL: 3

## 2024-07-17 NOTE — PATIENT INSTRUCTIONS
Follow-up in 3 months with ana lilia and echocardiogram  Discontinue trastuzumab (herceptin) early  Port removal  Orthopedic surgery referral  Take allegra, claritin, or zyrtec for the itchiness

## 2024-07-17 NOTE — PROGRESS NOTES
RADHA met briefly with patient who was concerned about her outstanding balance. Pooja LANGFORD(recently retired) who was her SW submitted applications and required documents to Financial counselor in June 2024. SW will let patient know that it takes time to process documents. RADHA sent email to financial  counselor asking if anything else is needed to help patient with this.

## 2024-07-18 ENCOUNTER — OFFICE VISIT (OUTPATIENT)
Dept: ORTHOPEDIC SURGERY | Facility: CLINIC | Age: 64
End: 2024-07-18
Payer: COMMERCIAL

## 2024-07-18 DIAGNOSIS — M17.11 ARTHRITIS OF RIGHT KNEE: ICD-10-CM

## 2024-07-18 DIAGNOSIS — M25.561 CHRONIC PAIN OF RIGHT KNEE: Primary | ICD-10-CM

## 2024-07-18 DIAGNOSIS — S83.241A ACUTE MEDIAL MENISCUS TEAR OF RIGHT KNEE, INITIAL ENCOUNTER: ICD-10-CM

## 2024-07-18 DIAGNOSIS — G89.29 CHRONIC PAIN OF RIGHT KNEE: Primary | ICD-10-CM

## 2024-07-18 PROCEDURE — 1036F TOBACCO NON-USER: CPT | Performed by: ORTHOPAEDIC SURGERY

## 2024-07-18 PROCEDURE — 99203 OFFICE O/P NEW LOW 30 MIN: CPT | Performed by: ORTHOPAEDIC SURGERY

## 2024-07-18 PROCEDURE — 20610 DRAIN/INJ JOINT/BURSA W/O US: CPT | Performed by: ORTHOPAEDIC SURGERY

## 2024-07-18 RX ORDER — TRIAMCINOLONE ACETONIDE 40 MG/ML
40 INJECTION, SUSPENSION INTRA-ARTICULAR; INTRAMUSCULAR
Status: COMPLETED | OUTPATIENT
Start: 2024-07-18 | End: 2024-07-18

## 2024-07-18 RX ORDER — LIDOCAINE HYDROCHLORIDE 10 MG/ML
2 INJECTION INFILTRATION; PERINEURAL
Status: COMPLETED | OUTPATIENT
Start: 2024-07-18 | End: 2024-07-18

## 2024-07-18 NOTE — PROGRESS NOTES
Subjective    Patient ID: Cassandra Lang is a 64 y.o. female.    Chief Complaint: OTHER (RT KNEE PAIN FOR 4 MONTHS/NKI)       This is a 64-year-old female who presents for evaluation of right knee pain ongoing for the past 4 months.  Directly relates this to an injury that occurred when she was squatting down and felt a painful pop within her right knee.  Since that time she has struggled with swelling and pain which limits functions of ADL such as standing, walking and stair climbing.  She has had x-rays performed which I reviewed myself demonstrated perhaps some mild arthritis in the medial compartment of the right knee.  She has also recently completed an MRI scan.  The results of this MRI scan including the actual films were reviewed at length with the patient today.  In addition to the mild arthritic changes there is a complex tear of the posterior horn of the medial meniscus extending to the root.  This correlates well with her symptoms and mechanism of injury  Patient has tried rest and modifications of activities, ice as well as oral steroids with limited benefit.    This patient's past medical, social, and family history were reviewed as well as a review of systems including updates on the patient's information encounter sheet  Patient is actively being treated for breast cancer including undergoing chemotherapy treatments at this time    Physical Examination  Constitutional: Patient's height and weight reviewed, appears well kempt  Psychiatric: Alert and oriented ×3, appropriate mood and behavior  Pulmonary: Breathing appears nonlabored, no apparent distress  Lymphatic: No appreciable lymphadenopathy to both the upper and lower extremities  Skin: No open lesions, rashes, ulcerations  Neurologic: Gross motor and sensory exam appear intact (except for abnormalities noted in the below muscle skeletal exam)    Musculoskeletal: Satisfactory motion of the right hip without groin or thigh pain elicited.  The  right knee reveals a mild effusion without erythema or warmth to the lack of extension of a few degrees and flexion 120 degrees.  Significant localized medial joint line tenderness and increasing pain with Issa examination.  No gross ligamentous instability    Assessment: Degenerative medial meniscus tear right knee    Plan: An extended discussion ensued with the patient regarding the treatment options for their knee condition. This included both nonoperative and operative treatment options.. The patient will continue with modifications of activities of daily living as well as an exercise program. As the patient desired an intra-articular knee injection of Kenalog/lidocaine was performed today and tolerated well.. The patient will observe to see if the injection is of benefit. Follow-up on a when necessary basis.    Prior to the injection we did discuss at length with the patient the options for arthroscopy intervention.  Patient states at this time she cannot consider laparoscopic intervention due to her chemotherapy as well as cost issues with regard to her health insurance.  She would consider potentially an 2025 arthroscopic intervention    L Inj/Asp: R knee on 7/18/2024 10:47 AM  Indications: pain  Details: 22 G needle, anterolateral approach  Medications: 40 mg triamcinolone acetonide 40 mg/mL; 2 mL lidocaine 10 mg/mL (1 %)  Consent was given by the patient. Patient was prepped and draped in the usual sterile fashion.               Current Outpatient Medications:     amLODIPine (Norvasc) 10 mg tablet, Take 0.5 tablets (5 mg) by mouth once daily., Disp: 90 tablet, Rfl: 1    apixaban (Eliquis) 5 mg tablet, Take 1 tablet (5 mg) by mouth 2 times a day., Disp: 180 tablet, Rfl: 1    aspirin 81 mg EC tablet, TAKE 1 TABLET BY MOUTH ONCE DAILY., Disp: 90 tablet, Rfl: 0    buPROPion (Wellbutrin) 75 mg tablet, TAKE 1 TABLET BY MOUTH TWICE A DAY (Patient not taking: Reported on 6/26/2024), Disp: 180 tablet, Rfl: 1     cholecalciferol (Vitamin D-3) 25 MCG (1000 UT) capsule, TAKE 1 CAPSULE (25 MCG) BY MOUTH ONCE DAILY., Disp: 90 capsule, Rfl: 1    ezetimibe-simvastatin (Vytorin) 10-10 mg tablet, Take 1 tablet by mouth once daily in the evening., Disp: 90 tablet, Rfl: 1    acetaminophen (Tylenol) 325 mg tablet, Take 2 tablets (650 mg) by mouth every 4 hours if needed for mild pain (1 - 3)., Disp: 30 tablet, Rfl: 0    pantoprazole (ProtoNix) 40 mg EC tablet, Take 1 tablet (40 mg) by mouth once daily in the morning. Take before meals. Do not crush, chew, or split. Do not start before February 17, 2024. (Patient not taking: Reported on 2/21/2024), Disp: 30 tablet, Rfl: 0

## 2024-07-21 ASSESSMENT — ENCOUNTER SYMPTOMS
HEMOPTYSIS: 0
DIFFICULTY URINATING: 0
DIAPHORESIS: 0
EYES NEGATIVE: 1
CHEST TIGHTNESS: 0
BLOOD IN STOOL: 0
BRUISES/BLEEDS EASILY: 0
DIZZINESS: 0
PALPITATIONS: 0
HOT FLASHES: 0
DEPRESSION: 0
SLEEP DISTURBANCE: 0
CARDIOVASCULAR NEGATIVE: 1
HEMATURIA: 0
COUGH: 0
SHORTNESS OF BREATH: 0
ABDOMINAL PAIN: 0
SEIZURES: 0
FREQUENCY: 0
APPETITE CHANGE: 0
ARTHRALGIAS: 1
CHILLS: 0
HEADACHES: 0
FATIGUE: 1
EYE PROBLEMS: 0
ADENOPATHY: 0
NAUSEA: 0
SCLERAL ICTERUS: 0
DYSURIA: 0
ABDOMINAL DISTENTION: 0
NERVOUS/ANXIOUS: 0
MYALGIAS: 0
WHEEZING: 0
BACK PAIN: 0
EXTREMITY WEAKNESS: 0
FEVER: 0
DIARRHEA: 0
CONSTIPATION: 0
NUMBNESS: 0
LEG SWELLING: 0
CONFUSION: 0
RESPIRATORY NEGATIVE: 1

## 2024-07-21 NOTE — PROGRESS NOTES
Breast Medical Oncology [facility]  Location: [facility]      BREAST CANCER DIAGNOSIS  Malignant neoplasm of upper-outer quadrant of right breast in female, estrogen receptor negative (Multi), Pathologic: Stage IIB (pT2, pN1mi(sn), cM0, G3, ER-, [place]-, HER2+)       ONCOLOGIC HISTORY  S/p recent lumpectomy and SLN.   12 weeks of weekly paclitaxel/trastuzumab completed on 1/31/24. Currently on maintenance q3 wk trastuzumab    CURRENT THERAPY  Adjuvant trastuzumab, 1 year roberto = approx 11/2024    HISTORY OF PRESENT ILLNESS    Cassandra Lang is a 64 y.o. woman. Here for follow-up. Has been having issues with trastuzumab +itching and +myalgias. Also went to ED recently for joint pain (right knee pain) and was treated with prednisone for presumed gout?. She has many hospitals bills which are piling up and is hoping to go back to [place] to receive medical care.         Review of Systems   Constitutional:  Positive for fatigue. Negative for appetite change, chills, diaphoresis and fever.   HENT:   Negative for hearing loss, lump/mass and nosebleeds.    Eyes: Negative.  Negative for eye problems and icterus.   Respiratory: Negative.  Negative for chest tightness, cough, hemoptysis, shortness of breath and wheezing.    Cardiovascular: Negative.  Negative for chest pain, leg swelling and palpitations.   Gastrointestinal:  Negative for abdominal distention, abdominal pain, blood in stool, constipation, diarrhea and nausea.   Endocrine: Negative for hot flashes.   Genitourinary:  Negative for bladder incontinence, difficulty urinating, dyspareunia, dysuria, frequency and hematuria.    Musculoskeletal:  Positive for arthralgias. Negative for back pain, gait problem and myalgias.        Right knee pain   Neurological:  Negative for dizziness, extremity weakness, gait problem, headaches, numbness and seizures.   Hematological:  Negative for adenopathy. Does not bruise/bleed easily.   Psychiatric/Behavioral:  Negative for confusion,  depression and sleep disturbance. The patient is not nervous/anxious.    All other systems reviewed and are negative.        Past Medical History:  has a past medical history of Abnormal findings on diagnostic imaging of other specified body structures, Cancer (Multi), Heart disease, High blood pressure, Personal history of other medical treatment, Personal history of other medical treatment, Personal history of other medical treatment, Personal history of transient ischemic attack (TIA), and cerebral infarction without residual deficits, Stroke (Multi), and Tinea unguium (10/12/2021).  Surgical History:   has a past surgical history that includes  section, classic (2014); Appendectomy (2014); Other surgical history (2020); Other surgical history (2020); Other surgical history (2021); MR angio head wo IV contrast (2020); MR angio neck wo IV contrast (2020); Breast lumpectomy; and IR CVC port (2023).  Social History:   reports that she has never smoked. She has never been exposed to tobacco smoke. She has never used smokeless tobacco. She reports that she does not drink alcohol and does not use drugs.  Family History:    Family History   Problem Relation Name Age of Onset    Diabetes Mother      Hypertension Mother      Cancer Mother      Coronary artery disease Father      Heart attack Maternal Grandfather      Stroke Other       Family Oncology History:  Cancer-related family history includes Cancer in her mother.      OBJECTIVE    VS / Pain:  /88 (BP Location: Left arm, Patient Position: Sitting, BP Cuff Size: Adult)   Pulse 71   Wt 71.7 kg (158 lb)   LMP  (LMP Unknown)   BMI 26.29 kg/m²   BSA: 1.81 meters squared   Pain Scale: 0    Performance Status:  The ECOG performance scale today is ECO- Restricted in physically strenuous activity.  Carries out light duty.    Physical Exam  Constitutional:       General: She is not in acute distress.      Appearance: She is not ill-appearing, toxic-appearing or diaphoretic.   HENT:      Head: Normocephalic and atraumatic.      Nose: Nose normal. No congestion or rhinorrhea.      Mouth/Throat:      Pharynx: No posterior oropharyngeal erythema.   Eyes:      Pupils: Pupils are equal, round, and reactive to light.   Cardiovascular:      Rate and Rhythm: Normal rate and regular rhythm.      Pulses: Normal pulses.      Heart sounds: Normal heart sounds. No murmur heard.     No gallop.   Pulmonary:      Effort: No respiratory distress.      Breath sounds: Normal breath sounds.   Abdominal:      General: There is no distension.      Palpations: There is no mass.      Tenderness: There is no abdominal tenderness.   Musculoskeletal:         General: No swelling.      Cervical back: No rigidity.      Right lower leg: No edema.      Left lower leg: No edema.   Lymphadenopathy:      Cervical: No cervical adenopathy.   Skin:     General: Skin is warm.      Coloration: Skin is not cyanotic.      Findings: No bruising, ecchymosis or erythema.   Neurological:      General: No focal deficit present.      Mental Status: She is oriented to person, place, and time.      Cranial Nerves: Cranial nerves 2-12 are intact.      Motor: Motor function is intact.   Psychiatric:         Attention and Perception: Attention and perception normal.         Mood and Affect: Mood and affect normal.         Behavior: Behavior normal.         Thought Content: Thought content normal.         Judgment: Judgment normal.             Diagnostic Results   === 04/24/24 ===    XR KNEE 3 VIEWS RIGHT    - Impression -  1. Small nonspecific joint effusion. Otherwise unremarkable right  knee radiographs.    MACRO:  None.    Signed by: Francisca Isaac 4/25/2024 7:59 PM  Dictation workstation:   [id number]     Patient Name:      ASHLY Haney Physician:    [id number] Marlon Augustine DO  Study Date:         4/18/2024            Ordering Provider:    [id number] GLO SMITH  MRN/PID:           [id number]             Fellow:  Accession#:        [id number]         Nurse:  Date of Birth/Age: 1960 / 63 years Sonographer:          Maik Cabezas RDCS  Gender:            F                    Additional Staff:  Height:            160.00 cm            Admit Date:  Weight:            74.00 kg             Admission Status:     Outpatient  BSA / BMI:         1.77 m2 / 28.91      Encounter#:           [id number]                     kg/m2                                          Department Location:  [facility] Non                                                                Invasive  Blood Pressure: 135 /86 mmHg     Study Type:    TRANSTHORACIC ECHO (TTE) LIMITED  Diagnosis/ICD: Encounter for monitoring cardiotoxic drug therapy-Z51.81  Indication:    encounter for monitoring cardiotoxic drug therapy  CPT Code:      Echo Limited-19860     Patient History:  Pertinent History: HTN and PE. PFO.     Study Detail: The following Echo studies were performed: 2D, M-Mode, Doppler and                color flow.        PHYSICIAN INTERPRETATION:  Left Ventricle: The left ventricular systolic function is normal, with an estimated ejection fraction of 55-60%. There are no regional wall motion abnormalities. The left ventricular cavity size is normal. Spectral Doppler shows a normal pattern of left ventricular diastolic filling.  Left Atrium: The left atrium is normal in size.  Right Ventricle: The right ventricle is normal in size. There is normal right ventricular global systolic function.  Right Atrium: The right atrium is normal in size.  Aortic Valve: The aortic valve appears structurally normal. There is no evidence of aortic valve regurgitation. The peak instantaneous gradient of the aortic valve is 6.8 mmHg.  Mitral Valve: The mitral valve is normal in structure.  There is no evidence of mitral valve regurgitation.  Tricuspid Valve: The tricuspid valve is structurally normal. There is trace tricuspid regurgitation.  Pulmonic Valve: The pulmonic valve is structurally normal. There is no indication of pulmonic valve regurgitation.  Pericardium: There is no pericardial effusion noted.  Aorta: The aortic root is normal.  In comparison to the previous echocardiogram(s): There are no prior studies on this patient for comparison purposes.        CONCLUSIONS:   1. Left ventricular systolic function is normal with a 55-60% estimated ejection fraction.  LABORATORY/PATHOLOGY DATA    No visits with results within 6 Week(s) from this visit.   Latest known visit with results is:   Admission on 06/03/2024, Discharged on 06/04/2024   Component Date Value Ref Range Status    WBC 06/03/2024 7.5  4.4 - 11.3 x10*3/uL Final    nRBC 06/03/2024 0.0  0.0 - 0.0 /100 WBCs Final    RBC 06/03/2024 5.62 (H)  4.00 - 5.20 x10*6/uL Final    Hemoglobin 06/03/2024 13.8  12.0 - 16.0 g/dL Final    Hematocrit 06/03/2024 42.7  36.0 - 46.0 % Final    MCV 06/03/2024 76 (L)  80 - 100 fL Final    MCH 06/03/2024 24.6 (L)  26.0 - 34.0 pg Final    MCHC 06/03/2024 32.3  32.0 - 36.0 g/dL Final    RDW 06/03/2024 15.5 (H)  11.5 - 14.5 % Final    Platelets 06/03/2024 321  150 - 450 x10*3/uL Final    Neutrophils % 06/03/2024 60.7  40.0 - 80.0 % Final    Immature Granulocytes %, Automated 06/03/2024 0.3  0.0 - 0.9 % Final    Lymphocytes % 06/03/2024 24.0  13.0 - 44.0 % Final    Monocytes % 06/03/2024 11.7  2.0 - 10.0 % Final    Eosinophils % 06/03/2024 2.4  0.0 - 6.0 % Final    Basophils % 06/03/2024 0.9  0.0 - 2.0 % Final    Neutrophils Absolute 06/03/2024 4.56  1.20 - 7.70 x10*3/uL Final    Immature Granulocytes Absolute, Au* 06/03/2024 0.02  0.00 - 0.70 x10*3/uL Final    Lymphocytes Absolute 06/03/2024 1.80  1.20 - 4.80 x10*3/uL Final    Monocytes Absolute 06/03/2024 0.88  0.10 - 1.00 x10*3/uL Final    Eosinophils Absolute  06/03/2024 0.18  0.00 - 0.70 x10*3/uL Final    Basophils Absolute 06/03/2024 0.07  0.00 - 0.10 x10*3/uL Final    Glucose 06/03/2024 114 (H)  74 - 99 mg/dL Final    Sodium 06/03/2024 138  136 - 145 mmol/L Final    Potassium 06/03/2024 3.5  3.5 - 5.3 mmol/L Final    Chloride 06/03/2024 103  98 - 107 mmol/L Final    Bicarbonate 06/03/2024 28  21 - 32 mmol/L Final    Anion Gap 06/03/2024 11  10 - 20 mmol/L Final    Urea Nitrogen 06/03/2024 16  6 - 23 mg/dL Final    Creatinine 06/03/2024 0.73  0.50 - 1.05 mg/dL Final    eGFR 06/03/2024 >90  >60 mL/min/1.73m*2 Final    Calcium 06/03/2024 9.5  8.6 - 10.3 mg/dL Final    Albumin 06/03/2024 4.7  3.4 - 5.0 g/dL Final    Alkaline Phosphatase 06/03/2024 72  33 - 136 U/L Final    Total Protein 06/03/2024 7.8  6.4 - 8.2 g/dL Final    AST 06/03/2024 15  9 - 39 U/L Final    Bilirubin, Total 06/03/2024 0.3  0.0 - 1.2 mg/dL Final    ALT 06/03/2024 20  7 - 45 U/L Final    C-Reactive Protein 06/03/2024 1.39 (H)  <1.00 mg/dL Final    Sedimentation Rate 06/03/2024 18  0 - 30 mm/h Final    Uric Acid 06/03/2024 4.4  2.3 - 6.7 mg/dL Final    Lactate 06/03/2024 1.0  0.4 - 2.0 mmol/L Final    Blood Culture 06/03/2024 No growth at 4 days -  FINAL REPORT   Final    Blood Culture 06/03/2024 No growth at 4 days -  FINAL REPORT   Final    Color, Urine 06/03/2024 Colorless (N)  Light-Yellow, Yellow, Dark-Yellow Final    Appearance, Urine 06/03/2024 Clear  Clear Final    Specific Gravity, Urine 06/03/2024 1.008  1.005 - 1.035 Final    pH, Urine 06/03/2024 5.5  5.0, 5.5, 6.0, 6.5, 7.0, 7.5, 8.0 Final    Protein, Urine 06/03/2024 NEGATIVE  NEGATIVE, 10 (TRACE), 20 (TRACE) mg/dL Final    Glucose, Urine 06/03/2024 Normal  Normal mg/dL Final    Blood, Urine 06/03/2024 NEGATIVE  NEGATIVE Final    Ketones, Urine 06/03/2024 NEGATIVE  NEGATIVE mg/dL Final    Bilirubin, Urine 06/03/2024 NEGATIVE  NEGATIVE Final    Urobilinogen, Urine 06/03/2024 Normal  Normal mg/dL Final    Nitrite, Urine 06/03/2024 NEGATIVE   NEGATIVE Final    Leukocyte Esterase, Urine 06/03/2024 25 Kimani/µL (A)  NEGATIVE Final    Extra Tube 06/03/2024 Hold for add-ons.   Final    WBC, Urine 06/03/2024 1-5  1-5, NONE /HPF Final    RBC, Urine 06/03/2024 1-2  NONE, 1-2, 3-5 /HPF Final    Urine Culture 06/03/2024 Normal genitourinary lavon   Final          IMPRESSION/PLAN    1. Right sided [id number] HER2+/ER-/OK- breast cancer, unexpectedly found after excisional biopsy for sclerosing adenosis. I have spent 45 minutes of direct  consultation  with the patient today reviewing the cancer care plan, educating and answering questions regarding ongoing follow up, greater than 50% in counseling and coordination of care. Due to the fact that she's completed >6 months of trastuzumab and we have data suggesting non-inferiority of 9 wks vs. 1 yr, and many difficulties (financial and symptomatically) that she's having to discontinue adjuvant trastuzumab q3 wks now. This is her preferance. . I will request port to be removed.        2. Hypercoagulable state. extensive medical history of CVA, breast cancer,  HTN, Hyperlipidemia, hypercholesterolemia. She remains on eliquis.    3. Knee pain. Xray negative for any obvious issues other than small effusion. She will follow-up with provider next wk. Prior leg doppler negative for dvt     We discussed the clinical significance of diagnosis, goals of care and treatment plan in detail.     I personally spent over half of a total 35 minutes face to face with the patient in counseling and discussion and/or coordination of care as described above.         -------------------------------------------------------------------------------------------------------------------------------  Brendon Rosenbaum MD  Director of Breast Cancer Medical Oncology Research Program   [facility]  Professor of Medicine  [facility] [address]  [address]  [place] [address]  Phone: [phone]  [email or web address]

## 2024-07-25 DIAGNOSIS — I10 UNCONTROLLED HYPERTENSION: ICD-10-CM

## 2024-07-26 RX ORDER — AMLODIPINE BESYLATE 10 MG/1
5 TABLET ORAL DAILY
Qty: 90 TABLET | Refills: 0 | Status: SHIPPED | OUTPATIENT
Start: 2024-07-26

## 2024-08-01 ENCOUNTER — DOCUMENTATION (OUTPATIENT)
Dept: CASE MANAGEMENT | Facility: HOSPITAL | Age: 64
End: 2024-08-01

## 2024-08-01 ENCOUNTER — HOSPITAL ENCOUNTER (OUTPATIENT)
Dept: CARDIOLOGY | Facility: HOSPITAL | Age: 64
Discharge: HOME | End: 2024-08-01
Payer: COMMERCIAL

## 2024-08-01 VITALS
SYSTOLIC BLOOD PRESSURE: 128 MMHG | OXYGEN SATURATION: 94 % | DIASTOLIC BLOOD PRESSURE: 82 MMHG | RESPIRATION RATE: 15 BRPM | HEART RATE: 84 BPM | BODY MASS INDEX: 26.63 KG/M2 | TEMPERATURE: 97.9 F | WEIGHT: 160 LBS

## 2024-08-01 DIAGNOSIS — Z17.1 MALIGNANT NEOPLASM OF UPPER-OUTER QUADRANT OF RIGHT BREAST IN FEMALE, ESTROGEN RECEPTOR NEGATIVE (MULTI): ICD-10-CM

## 2024-08-01 DIAGNOSIS — S83.241S TEAR OF MEDIAL MENISCUS OF RIGHT KNEE, CURRENT, UNSPECIFIED TEAR TYPE, SEQUELA: ICD-10-CM

## 2024-08-01 DIAGNOSIS — Z79.899 ENCOUNTER FOR MONITORING CARDIOTOXIC DRUG THERAPY: ICD-10-CM

## 2024-08-01 DIAGNOSIS — Z51.81 ENCOUNTER FOR MONITORING CARDIOTOXIC DRUG THERAPY: ICD-10-CM

## 2024-08-01 DIAGNOSIS — C50.411 MALIGNANT NEOPLASM OF UPPER-OUTER QUADRANT OF RIGHT BREAST IN FEMALE, ESTROGEN RECEPTOR NEGATIVE (MULTI): ICD-10-CM

## 2024-08-01 PROCEDURE — 2500000005 HC RX 250 GENERAL PHARMACY W/O HCPCS: Performed by: STUDENT IN AN ORGANIZED HEALTH CARE EDUCATION/TRAINING PROGRAM

## 2024-08-01 PROCEDURE — 2500000004 HC RX 250 GENERAL PHARMACY W/ HCPCS (ALT 636 FOR OP/ED): Performed by: STUDENT IN AN ORGANIZED HEALTH CARE EDUCATION/TRAINING PROGRAM

## 2024-08-01 PROCEDURE — 99222 1ST HOSP IP/OBS MODERATE 55: CPT | Performed by: NURSE PRACTITIONER

## 2024-08-01 PROCEDURE — 7100000010 HC PHASE TWO TIME - EACH INCREMENTAL 1 MINUTE

## 2024-08-01 PROCEDURE — 36590 REMOVAL TUNNELED CV CATH: CPT

## 2024-08-01 PROCEDURE — 7100000009 HC PHASE TWO TIME - INITIAL BASE CHARGE

## 2024-08-01 PROCEDURE — 99152 MOD SED SAME PHYS/QHP 5/>YRS: CPT

## 2024-08-01 RX ORDER — LIDOCAINE HYDROCHLORIDE AND EPINEPHRINE 20; 10 MG/ML; UG/ML
INJECTION, SOLUTION INFILTRATION; PERINEURAL AS NEEDED
Status: DISCONTINUED | OUTPATIENT
Start: 2024-08-01 | End: 2024-08-01 | Stop reason: HOSPADM

## 2024-08-01 RX ORDER — ASPIRIN 325 MG
1000 TABLET, DELAYED RELEASE (ENTERIC COATED) ORAL DAILY
COMMUNITY

## 2024-08-01 RX ORDER — FENTANYL CITRATE 50 UG/ML
INJECTION, SOLUTION INTRAMUSCULAR; INTRAVENOUS AS NEEDED
Status: DISCONTINUED | OUTPATIENT
Start: 2024-08-01 | End: 2024-08-01 | Stop reason: HOSPADM

## 2024-08-01 RX ORDER — SODIUM CHLORIDE 9 MG/ML
100 INJECTION, SOLUTION INTRAVENOUS CONTINUOUS
Status: DISCONTINUED | OUTPATIENT
Start: 2024-08-01 | End: 2024-08-02 | Stop reason: HOSPADM

## 2024-08-01 RX ORDER — MIDAZOLAM HYDROCHLORIDE 1 MG/ML
INJECTION, SOLUTION INTRAMUSCULAR; INTRAVENOUS AS NEEDED
Status: DISCONTINUED | OUTPATIENT
Start: 2024-08-01 | End: 2024-08-01 | Stop reason: HOSPADM

## 2024-08-01 ASSESSMENT — ENCOUNTER SYMPTOMS
EYES NEGATIVE: 1
MUSCULOSKELETAL NEGATIVE: 1
LIGHT-HEADEDNESS: 1
GASTROINTESTINAL NEGATIVE: 1
ALLERGIC/IMMUNOLOGIC NEGATIVE: 1
RESPIRATORY NEGATIVE: 1
PSYCHIATRIC NEGATIVE: 1
CONSTITUTIONAL NEGATIVE: 1
ENDOCRINE NEGATIVE: 1
CARDIOVASCULAR NEGATIVE: 1
HEMATOLOGIC/LYMPHATIC NEGATIVE: 1

## 2024-08-01 ASSESSMENT — PAIN - FUNCTIONAL ASSESSMENT: PAIN_FUNCTIONAL_ASSESSMENT: 0-10

## 2024-08-01 NOTE — DISCHARGE INSTRUCTIONS
Central Venous Catheter Removal- Patient and Family Education    A trained health care provider, as ordered by your doctor has removed your Central Venous  Catheter. The following instructions will provide a guideline to decrease the risk of  complications while caring for your removal site as it heals.    Central Venous Catheter Removed: 8/1/24   __X____ Port   ______ Pheresis Catheter   ______ Dialysis Catheter   ______ Tunneled Central Catheter   ______ PICC Line    Activity:  ? No exercising, lifting heavy objects, or strenuous activities for the next seven days.    Pain Management:  ? Expect some minor discomfort at the surgical site for the next few days.  ? You may use over-the-counter medications such as Acetaminophen (Tylenol) or  Ibuprofen (Advil or Motrin) for minor discomfort, unless restricted for some other  reasons.    Care of your incision after device removal:  ? Keep dressing dry for 2 days  ? If you have sterile paper strips over the incision allow them to fall off on their own.  ? You may shower 48 hours after the dressing is removed but do not soak the incision for 2 weeks  (no swimming, bathing or hot tubs until completely healed).    Medications:  ? Resume taking all your previous medications.  ? If you are taking blood-thinning medication, please follow special instructions by your  doctor.    Call your Doctor right away, if you have any of these signs:  ? Fever higher than a temperature of 101.5°F or chills.  ? Swelling or severe pain on the side the catheter was removed.  ? Increased bleeding, redness or drainage at or around the removal site.     How to Reach your Doctor:    Call [phone] with problems or questions.     This info is a general resource. It is not meant to replace your health care provider’s advice.  Ask your doctor or health care team any questions. Always follow their instructions.

## 2024-08-01 NOTE — H&P
History Of Present Illness  Cassandra Lang is a 64 y.o. female presenting with right breast cancer; here for Mediport removal with Dr. Bishop.    Past Medical History:  Past Medical History:   Diagnosis Date    Abnormal findings on diagnostic imaging of other specified body structures     Abnormal CT of the chest    Cancer (Multi)     Heart disease     High blood pressure     Hyperlipidemia     Personal history of other medical treatment     History of echocardiogram    Personal history of other medical treatment     History of transesophageal echocardiography (MAYKEL)    Personal history of other medical treatment     History of cardiac monitoring    Personal history of transient ischemic attack (TIA), and cerebral infarction without residual deficits     History of cerebrovascular accident    Stroke (Multi)     Tinea unguium 10/12/2021    Onychomycosis of toenail        Past Surgical History:  Past Surgical History:   Procedure Laterality Date    APPENDECTOMY  2014    Appendectomy    BREAST LUMPECTOMY       SECTION, CLASSIC  2014     Section    IR CVC PORT  2023    IR CVC PORT 2023 [facility]    MR HEAD ANGIO WO IV CONTRAST  2020    MR HEAD ANGIO WO IV CONTRAST 2020 [facility]ICAL [facility]GAC[facility]    MR NECK ANGIO WO IV CONTRAST  2020    MR NECK ANGIO WO IV CONTRAST 2020 [facility] CLINICAL LEGAC[facility]    OTHER SURGICAL HISTORY  2020    Esophagogastroduodenoscopy    OTHER SURGICAL HISTORY  2020    Colonoscopy    OTHER SURGICAL HISTORY  2021    Patent foramen ovale repair          Social History:   reports that she has never smoked. She has never been exposed to tobacco smoke. She has never used smokeless tobacco. She reports that she does not drink alcohol and does not use drugs.     Family History:  Family History   Problem Relation Name Age of Onset    Diabetes Mother      Hypertension Mother      Cancer Mother      Coronary artery disease Father       Heart attack Maternal Grandfather      Stroke Other          Allergies:  Allergies   Allergen Reactions    Sulfa (Sulfonamide Antibiotics) Rash    Sulfamethoxazole-Trimethoprim Rash        Home Medications:  Current Outpatient Medications   Medication Instructions    acetaminophen (TYLENOL) 650 mg, oral, Every 4 hours PRN    amLODIPine (NORVASC) 5 mg, oral, Daily    apixaban (ELIQUIS) 5 mg, oral, 2 times daily    aspirin 81 mg, oral, Daily    buPROPion (WELLBUTRIN) 75 mg, oral, 2 times daily    cholecalciferol (VITAMIN D-3) 25 mcg, oral, Daily    ezetimibe-simvastatin (Vytorin) 10-10 mg tablet 1 tablet, oral, Every evening    omega-3 (Fish OiL) 60- mg capsule 1,000 mg, oral, Daily    pantoprazole (PROTONIX) 40 mg, oral, Daily before breakfast, Do not crush, chew, or split.       Inpatient Medications:  Scheduled medications   Medication Dose Route Frequency     PRN medications   Medication     Continuous Medications   Medication Dose Last Rate    sodium chloride 0.9%  100 mL/hr           Review of Systems   Constitutional: Negative.    HENT: Negative.     Eyes: Negative.    Respiratory: Negative.     Cardiovascular: Negative.    Gastrointestinal: Negative.    Endocrine: Negative.    Genitourinary: Negative.    Musculoskeletal: Negative.    Skin: Negative.    Allergic/Immunologic: Negative.    Neurological:  Positive for light-headedness.   Hematological: Negative.    Psychiatric/Behavioral: Negative.            Physical Exam    General:  Patient is awake, alert, and oriented.  Patient is in no acute distress.  HEENT:  Pupils equal and reactive.  Normocephalic.  Moist mucosa.    Neck:  No JVD.   Cardiovascular:  Regular rate and rhythm.  Normal S1 and S2. No murmurs/rubs/gallops. Radial pulses 2+.   Pulmonary:  Clear to auscultation bilaterally.  Abdomen:  Soft. Non-tender.   Non-distended.  Positive bowel sounds.  Lower Extremities:  Pedal pulses 2+ No LE edema.  Neurologic:  Cranial nerves II-XII grossly  intact.   No focal deficit.   Skin: Skin warm and dry, no lesions. Normal skin turgor. Righ chest Mediport.   Psychiatric: Normal affect.     Sedation Plan    ASA 3     Mallampati class: III.         Last Recorded Vitals  Blood pressure (!) 148/96, pulse 74, temperature 36.6 °C (97.9 °F), temperature source Temporal, resp. rate 12, weight 72.6 kg (160 lb), SpO2 96%.         Vitals from the Past 24 Hours  Heart Rate:  [74]   Temp:  [36.6 °C (97.9 °F)]   Resp:  [12]   BP: (148)/(96)   Weight:  [72.6 kg (160 lb)]   SpO2:  [96 %]          Relevant Results    Labs    CBC:   Recent Labs     06/03/24 1958 04/05/24 2020 02/23/24  1227 02/16/24  0655 02/15/24  1748 01/31/24  0823   WBC 7.5 5.5 5.2 5.7 10.0 4.3*   HGB 13.8 11.8* 10.3* 10.0* 11.9* 11.4*   HCT 42.7 36.9 31.7* 31.1* 36.7 34.5*    272 292 228 312 278   MCV 76* 82 87 88 87 86     BMP/CMP:   Recent Labs     06/03/24 1958 05/09/24  0855 04/05/24 2020 02/16/24  0655 02/15/24  1748 01/31/24  0901 01/24/24  0839    143 141 141 141 141 141   K 3.5 3.9 3.8 3.8 3.5 3.9 3.7    105 105 107 106 106 104   BUN 16 15 19 25* 22 11 14   CREATININE 0.73 0.76 0.81 0.71 0.66 0.68 0.65   CO2 28 25 28 27 23 27 29   CALCIUM 9.5 9.5 9.1 8.9 9.1 9.5 9.6   PROT 7.8 6.8 6.7  --  6.2* 5.9* 6.2*   BILITOT 0.3 0.4 0.3  --  0.4 0.4 0.4   ALKPHOS 72 71 62  --  57 48 50   ALT 20 20 21  --  21 24 27   AST 15 21 19  --  15 19 19   GLUCOSE 114* 121* 121* 122* 177* 106* 120*      Lipid Panel:   Recent Labs     05/09/24  0855 03/31/23  0813 02/21/22  0854 05/10/21  0832 09/11/20  0826 05/13/20  0508 05/15/19  1000 12/12/18  0954 07/31/18  0930 12/27/17  0000   CHOL 184 188 189 212* 169 294* 272* 269* 254* 288*   HDL 74.2 63.5 81.6 66.8 73.7 58.0 70.7 66.4 58.3 68.6   CHHDL 2.5 3.0 2.3 3.2 2.3 5.1* 3.8 4.1 4.4 4.2   VLDL 17 26 18 17 21  --  25 22 37 28   TRIG 85 129 89 87 103  --  126 111 186* 141   NHDL 110  --   --   --   --   --   --   --   --   --      Cardiac       No lab  "exists for component: \"CK\", \"CKMBP\"   Hemoglobin A1C:   Recent Labs     05/09/24  0855 03/31/23  0813 02/21/22  0854 05/10/21  0832 09/11/20  0826 05/13/20  0608 12/12/18  0954 07/31/18  0930 01/11/18  0928   HGBA1C 6.2* 6.1* 6.0* 5.8 5.7 6.2 5.6 5.7 5.8     TSH/ Free T4:   Recent Labs     03/31/23  0813 05/29/20  2113 05/13/20  0508 07/31/18  0930   TSH 1.41 0.95 3.02 0.84     Iron:   Recent Labs     07/12/23  0227   BNP 27     Coag:     ABO: No results found for: \"ABO\"    Past Cardiology Tests (Last 3 Years):    EKG:  Recent Labs     04/05/24  1956 02/15/24  1815 01/25/21  0748   ATRRATE 86 94 100   VENTRATE 86 94 100   PRINT 228 174 196   QRSDUR 94 94 90   QTCFRED 428 439 422   QTCCALCB 455 472 459     Encounter Date: 04/05/24   ECG 12 lead   Result Value    Ventricular Rate 86    Atrial Rate 86    TN Interval 228    QRS Duration 94    QT Interval 380    QTC Calculation(Bazett) 455    P Axis 75    R Axis -29    T Axis 3    QRS Count 14    Q Onset 253    T Offset 443    QTC Fredericia 428    Narrative    Sinus rhythm  Prolonged TN interval  Low voltage, precordial leads  Left ventricular hypertrophy  Anterior Q waves, possibly due to LVH  See ED provider note for full interpretation and clinical correlation  Confirmed by Vilma Gill ([id number]) on 4/8/2024 3:52:35 PM     Echo:  Echocardiogram:   Echocardiogram     [facility], [address] [place] [address] [id number] [phone] and  Fax [phone]    TRANSTHORACIC ECHOCARDIOGRAM REPORT      Patient Name:     ASHLY Haney Physician:  [id number] Azael HICKS MD  Study Date:       11/18/2021    Referring           AZAEL COTO  Physician:  MRN/PID:          [id number]      PCP:                Jodi Leach MD  Accession/Order#: [id number]  Department          [facility] Out[facility]  Location:  YOB: 1960     Fellow:  Gender:           F             Nurse:  Admit " "Date:       11/18/2021    Sonographer:        Christi Kelly \"RD, [facility]\"  Admission Status: Outpatient    Additional Staff:  Height:           165.10 cm     CC Report to:  Weight:           70.76 kg      Study Type:         Echocardiogram  BSA:              1.78 m2  Blood Pressure: 118 /80 mmHg    Diagnosis/ICD: R06.02-Shortness of breath  Indication:    Dyspnea on Exertion  Procedure/CPT: Echo Limited-[id number]  Study Detail: The following Echo studies were performed: 2D.      PHYSICIAN INTERPRETATION:  Left Ventricle: The left ventricular systolic function is normal, with an estimated ejection fraction of 55-60%. There are no regional wall motion abnormalities. The left ventricular cavity size is normal. Spectral Doppler shows an impaired relaxation pattern of left ventricular diastolic filling.  Left Atrium: The left atrium is normal in size.  Right Ventricle: The right ventricle is normal in size. There is normal right ventricular global systolic function.  Right Atrium: The right atrium is normal in size.  Aortic Valve: The aortic valve is trileaflet. There is no evidence of aortic valve regurgitation.  Mitral Valve: The mitral valve is normal in structure. There is trace mitral valve regurgitation.  Tricuspid Valve: The tricuspid valve is structurally normal. There is trace tricuspid regurgitation. The Doppler estimated RVSP is within normal limits at 21.4 mmHg.  Pulmonic Valve: The pulmonic valve is structurally normal. There is no indication of pulmonic valve regurgitation.  Pericardium: There is no pericardial effusion noted.  Aorta: The aortic root is normal.  Systemic Veins: The inferior vena cava appears to be of normal size. There is IVC inspiratory collapse greater than 50%.      CONCLUSIONS:  1. The left ventricular systolic function is normal with a 55-60% estimated ejection fraction.  2. Spectral Doppler shows an impaired relaxation pattern of left ventricular diastolic filling.  3. RVSP within normal " limits.    QUANTITATIVE DATA SUMMARY:  2D MEASUREMENTS:  Normal Ranges:  RVIDd:         2.86 cm   (0.9-3.6cm)  IVSd:          0.96 cm   (0.6-1.1cm)  LVPWd:         0.98 cm   (0.6-1.1cm)  LVIDd:         3.50 cm   (3.9-5.9cm)  LVIDs:         2.43 cm  LV Mass Index: 55.5 g/m2  LV % FS        30.5 %    LV SYSTOLIC FUNCTION BY 2D PLANIMETRY (MOD):  Normal Ranges:  EF-A4C View: 60.2 % (>55%)  EF-A2C View: 61.1 %  EF-Biplane:  61.8 %    TRICUSPID VALVE/RVSP:  Normal Ranges:  Peak TR Velocity: 2.15 m/s  RV Syst Pressure: 21.4 mmHg (< 30mmHg)      [id number] Landon Gregorio MD  Electronically signed on 11/18/2021 at 10:51:52 AM            Ejection Fractions:  LV EF   Date/Time Value Ref Range Status   02/01/2024 09:35 AM 52 %    11/08/2023 08:47 AM 64       Cath:  Coronary Angiography:   ADULT CATH     Narrative  Ordered by an unspecified provider.    Right Heart Cath: No results found for this or any previous visit from the past 1800 days.    Stress Test:  Nuclear:No results found for this or any previous visit from the past 1800 days.    Metabolic Stress: No results found for this or any previous visit from the past 1800 days.    Cardiac Imaging:  Cardiac Scoring: No results found for this or any previous visit from the past 1800 days.    Cardiac MRI: No results found for this or any previous visit from the past 1800 days.         Assessment/Plan  Assessment/Plan   Active Problems:  There are no active Hospital Problems.        #Right Breast Cancer  -Mediport removal with Dr. Bishop 8/1/24       NP discussed with Dr. Bishop regarding plan of care/ discharge plan      I spent 30 minutes in the professional and overall care of this patient.      Karen Nation, APRN-CNP

## 2024-08-01 NOTE — POST-PROCEDURE NOTE
Interventional Radiology Brief Postprocedure Note    Attending: Vaughn Bishop MD      Assistant: none    Diagnosis: completion of chemotherapy    Description of procedure: Mediport removal     Anesthesia:  moderate sedation    Complications: None    Estimated Blood Loss: none    Medications (Filter: Administrations occurring from 1027 to 1027 on 08/01/24) As of 08/01/24 1027      None          No specimens collected      See detailed result report with images in PACS.    The patient tolerated the procedure well without incident or complication and is in stable condition.

## 2024-08-01 NOTE — PROGRESS NOTES
SW received call from patient  form insurance company. Inquired about any updates or needed assistance on behalf of patient.  informed SW that patient no longer receiving treatment. Patient requested  to stop calling.   SW will reach out to patient. All  assistance applications have been sent via email by RADHA in June 2024.  Patient aware.

## 2024-08-07 ENCOUNTER — APPOINTMENT (OUTPATIENT)
Dept: HEMATOLOGY/ONCOLOGY | Facility: CLINIC | Age: 64
End: 2024-08-07
Payer: COMMERCIAL

## 2024-08-09 DIAGNOSIS — I10 UNCONTROLLED HYPERTENSION: ICD-10-CM

## 2024-08-11 RX ORDER — AMLODIPINE BESYLATE 10 MG/1
5 TABLET ORAL DAILY
Qty: 90 TABLET | Refills: 0 | Status: SHIPPED | OUTPATIENT
Start: 2024-08-11

## 2024-08-13 ENCOUNTER — DOCUMENTATION (OUTPATIENT)
Dept: CASE MANAGEMENT | Facility: HOSPITAL | Age: 64
End: 2024-08-13
Payer: COMMERCIAL

## 2024-08-13 NOTE — PROGRESS NOTES
SW received call from patient regarding her financial applications. She was concerned about [facility] assistance applications status. RADHA learned from financial counselor that patient was approved 10/2023 thru 12/31/23. And hadn't received applications dated 1/2024 and 4/2024. SW resubmitted applications on patient's behalf. SW contacted patient and updated her.  RADHA stressed the length of time it takes to get application processed and will update her.

## 2024-08-19 ENCOUNTER — APPOINTMENT (OUTPATIENT)
Dept: PRIMARY CARE | Facility: CLINIC | Age: 64
End: 2024-08-19
Payer: COMMERCIAL

## 2024-08-19 VITALS
BODY MASS INDEX: 26.63 KG/M2 | DIASTOLIC BLOOD PRESSURE: 88 MMHG | TEMPERATURE: 96.9 F | SYSTOLIC BLOOD PRESSURE: 136 MMHG | WEIGHT: 160 LBS | HEART RATE: 69 BPM

## 2024-08-19 DIAGNOSIS — E78.00 PURE HYPERCHOLESTEROLEMIA: ICD-10-CM

## 2024-08-19 DIAGNOSIS — Q21.12 PFO (PATENT FORAMEN OVALE) (HHS-HCC): ICD-10-CM

## 2024-08-19 DIAGNOSIS — I10 UNCONTROLLED HYPERTENSION: ICD-10-CM

## 2024-08-19 DIAGNOSIS — I63.9 CEREBRAL INFARCTION, UNSPECIFIED (MULTI): ICD-10-CM

## 2024-08-19 DIAGNOSIS — I10 PRIMARY HYPERTENSION: Primary | ICD-10-CM

## 2024-08-19 PROCEDURE — 1036F TOBACCO NON-USER: CPT | Performed by: FAMILY MEDICINE

## 2024-08-19 PROCEDURE — 99214 OFFICE O/P EST MOD 30 MIN: CPT | Performed by: FAMILY MEDICINE

## 2024-08-19 PROCEDURE — 3079F DIAST BP 80-89 MM HG: CPT | Performed by: FAMILY MEDICINE

## 2024-08-19 PROCEDURE — 3075F SYST BP GE 130 - 139MM HG: CPT | Performed by: FAMILY MEDICINE

## 2024-08-19 RX ORDER — EZETIMIBE 10 MG/1
10 TABLET ORAL DAILY
Qty: 90 TABLET | Refills: 1 | Status: SHIPPED | OUTPATIENT
Start: 2024-08-19 | End: 2025-02-15

## 2024-08-19 RX ORDER — ASPIRIN 81 MG/1
81 TABLET ORAL DAILY
Qty: 90 TABLET | Refills: 1 | Status: SHIPPED | OUTPATIENT
Start: 2024-08-19

## 2024-08-19 RX ORDER — AMLODIPINE BESYLATE 5 MG/1
5 TABLET ORAL DAILY
Qty: 90 TABLET | Refills: 1 | Status: SHIPPED | OUTPATIENT
Start: 2024-08-19 | End: 2024-08-19 | Stop reason: SDUPTHER

## 2024-08-19 RX ORDER — AMLODIPINE BESYLATE 5 MG/1
5 TABLET ORAL DAILY
Qty: 90 TABLET | Refills: 1 | Status: SHIPPED | OUTPATIENT
Start: 2024-08-19

## 2024-08-19 ASSESSMENT — PAIN SCALES - GENERAL: PAINLEVEL: 2

## 2024-08-19 NOTE — ASSESSMENT & PLAN NOTE
-controlled on 3/31/2024 when tested last.  -Statin stopped by the patient about 1 and 1/2 months ago because of myalgia side effects.  -She is refusing to take any more statin at this point.  -Repatha recommended but decline for this year because of her current lack of insurance coverage of the medicine. She is get a new insurance next year and will revisit the question at that time.   -Zetia 10 mg every day recommended for now.

## 2024-08-19 NOTE — PROGRESS NOTES
"Subjective   Patient ID: Cassandra Lang is a 64 y.o. female who presents for Follow-up (Right Leg pain) and Med Refill.  Med Refill    Cassandra Lang is a 64-year-old female with past medical history of hypertension, hyperlipidemia, prediabetes, PE on Eliquis, CVA, PFO s/p repair GERD, IBS, anxiety, depression, vitamin D deficiency, right breast cancer \"24 s/p lumpectomy and SLN and current treatment weekly paclitaxel/trastuzumab.  Patient is here for:    1- right knee medial pain follow-up.  It did not respond to Medrol dose pack and is causing shooting pain with ambulation.   2- Myalgia from statins made her stop taking hers.    3- decrease the Amlodipine dose to 5 mg every day.      A review of system was completed.  All systems were reviewed and were normal, except for the ones that are listed in the HPI.    Objective   Physical Exam  Constitutional:       Appearance: Normal appearance.   HENT:      Head: Normocephalic and atraumatic.      Right Ear: Tympanic membrane, ear canal and external ear normal.      Left Ear: Tympanic membrane, ear canal and external ear normal.      Nose: Nose normal.      Mouth/Throat:      Mouth: Mucous membranes are moist.      Pharynx: Oropharynx is clear.   Eyes:      Extraocular Movements: Extraocular movements intact.      Conjunctiva/sclera: Conjunctivae normal.      Pupils: Pupils are equal, round, and reactive to light.   Cardiovascular:      Rate and Rhythm: Normal rate and regular rhythm.      Pulses: Normal pulses.   Pulmonary:      Effort: Pulmonary effort is normal.      Breath sounds: Normal breath sounds.   Abdominal:      General: Abdomen is flat. Bowel sounds are normal.      Palpations: Abdomen is soft.   Musculoskeletal:         General: Normal range of motion.      Cervical back: Normal range of motion and neck supple.      Comments: -right knee medial femoral tibial joint line pain to palpation and edema.   Skin:     General: Skin is warm.   Neurological:     "  General: No focal deficit present.      Mental Status: She is alert and oriented to person, place, and time. Mental status is at baseline.   Psychiatric:         Mood and Affect: Mood normal.         Behavior: Behavior normal.         Thought Content: Thought content normal.         Judgment: Judgment normal.     Assessment/Plan   Problem List Items Addressed This Visit       Uncontrolled hypertension    Relevant Medications    amLODIPine (Norvasc) 5 mg tablet    PFO (patent foramen ovale) ([facility])    Relevant Medications    apixaban (Eliquis) 5 mg tablet    aspirin 81 mg EC tablet    amLODIPine (Norvasc) 5 mg tablet    Hyperlipidemia     -controlled on 3/31/2024 when tested last.  -Statin stopped by the patient about 1 and 1/2 months ago because of myalgia side effects.  -She is refusing to take any more statin at this point.  -Repatha recommended but decline for this year because of her current lack of insurance coverage of the medicine. She is get a new insurance next year and will revisit the question at that time.   -Zetia 10 mg every day recommended for now.           Relevant Medications    ezetimibe (Zetia) 10 mg tablet    Other Relevant Orders    Comprehensive Metabolic Panel    Hemoglobin A1C    Lipid Panel    TSH with reflex to Free T4 if abnormal    CBC    Primary hypertension - Primary     -controlled with Amlodipine 5 mg every day decreased by the patient.  Continued at that dose.            Relevant Medications    ezetimibe (Zetia) 10 mg tablet    Cerebral infarction, unspecified (Multi)    Relevant Medications    aspirin 81 mg EC tablet    ezetimibe (Zetia) 10 mg tablet    Other Relevant Orders    Comprehensive Metabolic Panel    Hemoglobin A1C    Lipid Panel    TSH with reflex to Free T4 if abnormal    CBC      Patient to return to the office in 12 weeks.

## 2024-08-21 ENCOUNTER — APPOINTMENT (OUTPATIENT)
Dept: RHEUMATOLOGY | Facility: CLINIC | Age: 64
End: 2024-08-21
Payer: COMMERCIAL

## 2024-08-27 ENCOUNTER — DOCUMENTATION (OUTPATIENT)
Dept: CASE MANAGEMENT | Facility: HOSPITAL | Age: 64
End: 2024-08-27
Payer: COMMERCIAL

## 2024-08-27 NOTE — PROGRESS NOTES
Patient left SW a voicemail requesting help with financial assistance application.  Forms were resent to fianacial counselor ;ast week.  SW returned call , left voicemail requesting a call back.

## 2024-08-28 ENCOUNTER — APPOINTMENT (OUTPATIENT)
Dept: HEMATOLOGY/ONCOLOGY | Facility: CLINIC | Age: 64
End: 2024-08-28
Payer: COMMERCIAL

## 2024-09-18 ENCOUNTER — APPOINTMENT (OUTPATIENT)
Dept: HEMATOLOGY/ONCOLOGY | Facility: CLINIC | Age: 64
End: 2024-09-18
Payer: COMMERCIAL

## 2024-10-08 ENCOUNTER — DOCUMENTATION (OUTPATIENT)
Dept: CASE MANAGEMENT | Facility: HOSPITAL | Age: 64
End: 2024-10-08
Payer: COMMERCIAL

## 2024-10-08 PROBLEM — Z79.899 ENCOUNTER FOR MONITORING CARDIOTOXIC DRUG THERAPY: Status: RESOLVED | Noted: 2023-11-14 | Resolved: 2024-10-08

## 2024-10-08 PROBLEM — Z92.21 STATUS POST ADMINISTRATION OF CARDIOTOXIC CHEMOTHERAPY: Status: ACTIVE | Noted: 2024-10-08

## 2024-10-08 PROBLEM — Z85.3 ENCOUNTER FOR FOLLOW-UP SURVEILLANCE OF BREAST CANCER: Status: ACTIVE | Noted: 2024-10-08

## 2024-10-08 PROBLEM — Z08 ENCOUNTER FOR FOLLOW-UP SURVEILLANCE OF BREAST CANCER: Status: ACTIVE | Noted: 2024-10-08

## 2024-10-08 PROBLEM — Z51.11 ENCOUNTER FOR ANTINEOPLASTIC CHEMOTHERAPY: Status: RESOLVED | Noted: 2023-11-14 | Resolved: 2024-10-08

## 2024-10-08 PROBLEM — Z51.81 ENCOUNTER FOR MONITORING CARDIOTOXIC DRUG THERAPY: Status: RESOLVED | Noted: 2023-11-14 | Resolved: 2024-10-08

## 2024-10-08 NOTE — PROGRESS NOTES
SW return daughter's call and spoke at length about patient's application for  assistance from 2/1/24- 5/1/24 due to having duplicates in system. SW contacted  Financial Counselor who had spoke to patient already about the letter and patient would meet her for next application. SW forwarded daughter the message. No additional concerns at this time.

## 2024-10-09 ENCOUNTER — TELEPHONE (OUTPATIENT)
Dept: HEMATOLOGY/ONCOLOGY | Facility: CLINIC | Age: 64
End: 2024-10-09

## 2024-10-14 NOTE — TELEPHONE ENCOUNTER
Mari spoke with Mrs. Lang , and  she doesn't want to reschedule at this time. Stated that she is seeing Dr. Soria next week, and wants to schedule after that visit.

## 2024-10-19 ASSESSMENT — ENCOUNTER SYMPTOMS
EYES NEGATIVE: 1
MUSCULOSKELETAL NEGATIVE: 1
CARDIOVASCULAR NEGATIVE: 1
GASTROINTESTINAL NEGATIVE: 1
NEUROLOGICAL NEGATIVE: 1
HEMATOLOGIC/LYMPHATIC NEGATIVE: 1
CONSTITUTIONAL NEGATIVE: 1
RESPIRATORY NEGATIVE: 1
ENDOCRINE NEGATIVE: 1
PSYCHIATRIC NEGATIVE: 1

## 2024-10-20 NOTE — PROGRESS NOTES
Subjective   Cassandra Lang is a 64 y.o. female who returns to the [facility] for routine follow up. She has no breast specific complaints today.    Diagnosis date: 9/6/23 right grade 3 IDC ER/DC- HER2+, pT2cN0, stage IA      Cancer Staging   Malignant neoplasm of upper-outer quadrant of right breast in female, estrogen receptor negative  Staging form: Breast, AJCC 8th Edition  - Pathologic stage from 10/17/2023: Stage IIB - Signed by Brendon Rosenbaum MD on 10/25/2023  pT2  pN1mi  cM0  Patricia grade: G3  ER Status: Negative  DC Status: Negative  HER2 Status: Positive    Cassandra Lang presented at the age of 61 with right screen detected ADH. This was recommended for excision, but she declined, and this area has remained stable on subsequent imaging studies. The area underwent ultrasound guided core biopsy revealing a diagnosis of severely atypical ductal proliferation with apocrine features. Scanning of right axilla demonstrated 2 morphologically normal lymph nodes. In the left breast at 3:00 position 3cm from nipple, a 1cm probable benign soft mass is identified and short-term 6 month sonographic follow up is recommended. She was seen in 3/2022 and the decision was made to delay surgery secondary to leaving the country for 2 months; she was prescribed anastrozole at that time, but she did not fill the prescription. She denies palpable mass, skin changes or nipple discharge. [place] has had additional imaging surveillance in July 2022 and January 2023 to follow a probably benign left breast finding and documenting stability at the right ADH site. She decided to undergo Magseed localized excisional biopsy for ADH on 9/6/2023 to avoid continued surveillance. Final pathology demontrated a 2.1cm grade 3 IDC with a positive anterior margin for DCIS. She returned to the OR on 10/2/23 for axillary staging with SLN and re-excision of the anterior margin. Final pathology from  that procedure demonstrated residual DCIS but a final negative margin and 1/ positive lymph node for micromet. After surgery she received adjuvant taxol + herceptin, with Dr. Rosenbaum, completed on 2024. She was to complete one year of herceptin, but decided to end treatment early because of financial reasons. She did not return to radiation oncology after completion of chemotherapy and is not interested in revisiting this.     BREAST IMAGIN2022 Screening mammogram demonstrated scattered fibroglandular tissue, BI-RADS Category 0, spiculated right breast mass and left breast mass. 2022 Bilateral diagnostic mammogram and targeted ultrasound, BI-RADS Category 4, Right breast at 10:00,5 cm from nipple, ill-defined mass with associated architectural distortion measuring 1.7 x 0.7 x 3.2cm warranting ultrasound guided biopsy. Scanning of right axilla demonstrates 2 morphologically normal lymph nodes. Left breast at 3:00, 3cm from nipple, probable benign soft mass measuring 1 x 1.1 x 0.5cm, recommend 6 month ultrasound follow up  2022 bilateral ultrasound and mammogram BI-RADS 3 scattered fibrglandular density, in the right breast, the biopsied architectural distortion in the superior lateral right breast at anterior to mid depth is unchanged. No suspicious masses or calcifications are identified. In the left breast there is a small oval circumscribed hypoechoic mass is again seen in the 3 o'clock position, 3 cm FN. It measures 9 mm. It is avascular and soft on elastography.  2023 bilateral mammgraom and ultrasound BI-RADS 3 there is stable architectural distortion with associated tissue marker in the anterior upper outer right breast. Sonographic scanning targeted to the 3 o'clock position left breast 3 cm from the nipple demonstrates an oval circumscribed parallel partially anechoic and partially hypoechoic mass measuring 0.8 x 0.5 x 0.6 cm, with the greatest overall radial dimension of 1.0 cm.  "This is stable compared with 2022. There is no internal Doppler blood flow and the mass remains soft on elastography.     BREAST PROCEDURE: 3/1/2022 Right breast ultrasound guided core biopsy at 10:00 5cm from nipple.  23 right magseed excisional biopsy  10/2/23 right re-excision lumpectomy, SLN     REPRODUCTIVE HEALTH: menarche at 16, postmenopausal from natural causes t age 54,  with first birth at 31,  x 1 month, denies exposure to birth control pills or post menopausal estrogen.     MEDICAL HISTORY: Hypertension, hypercholesterolemia, CVA in 2020, her daughter states she has a constant \"pounding sound\" in her head since, Pulmonary embolism 2020, patent foramen ovale s/p percutaneous closure 2021, anticoagulated on Eliquis and ASA.     FAMILY CANCER HISTORY: Mother diagnosed with kidney cancer.      MEDICAL ONCOLOGY: Dr. Rosenbaum     RADIATION ONCOLOGY: Dr. Domínguez     GENETICS: does not meet NCCN criteria       Cancer-related family history includes Cancer in her mother.    Review of Systems   Constitutional: Negative.    HENT: Negative.     Eyes: Negative.    Respiratory: Negative.     Cardiovascular: Negative.    Gastrointestinal: Negative.    Endocrine: Negative.    Genitourinary: Negative.    Musculoskeletal: Negative.    Skin: Negative.    Neurological: Negative.    Hematological: Negative.    Psychiatric/Behavioral: Negative.          Objective   Visit Vitals  /84 (BP Location: Left arm, Patient Position: Sitting, BP Cuff Size: Adult)   Pulse 88   Temp 36.7 °C (98.1 °F)   Wt 72.6 kg (160 lb)   LMP  (LMP Unknown)   BMI 26.63 kg/m²   OB Status Postmenopausal   Smoking Status Never   BSA 1.82 m²      Breast: Exam performed in sitting and supine positions.   RIGHT BREAST EXAM:  Breast: well healed radial scar with mild underlying volume deficit, good symmetry, no discrete mass  Skin Erythema: no  Peau d' orange: no  Nipple Inversion: no  Nipple Discharge: no     LEFT BREAST " EXAM:  Breast: no discrete mass  Skin Erythema: no  Peau d' orange: no  Nipple Inversion: no  Nipple Discharge: no     RIGHT PÉREZ BASIN EXAM:  Axillary: negative, well healed SLN incision  Infraclavicular: negative  Supraclavicular: negative     LEFT PÉREZ BASIN EXAM:  Axillary: negative  Infraclavicular: negative  Supraclavicular: negative        General: Otherwise healthy appearing. Patient is in no acute distress.     HEENT: Conjunctivae are well colored and sclerae nonicteric. Neck is supple, trachea midline. No lymphadenopathy or thyromegaly.     Chest: Respiratory rate and effort normal. No cough.     CV: regular rate and rhythm.     Abdomen: Abdomen is non-distended, non-tender to palpation.     Extremities: Extremities are atraumatic. There is no evidence of lymphedema. Full ROM.    Neurologic: Neurologically intact. Alert and oriented.       Assessment/Plan   Ms. Lang returns for routine surgical follow up. She underwent excisional biopsy of ADH followed by re-excision lumpectomy, SLN in September and October 2023 for IDC. She is currently doing well and without breast complaints today. She has had some recent nose bleeding on Eliquis changes to her medical history, has started no new medications, and has no new allergies.   She continues to follow with Dr. Rosenbaum in medical oncology, and is currently on surveillance, her port has recently been removed.  Unfortunately, she did not return to see radiation oncology after completion of chemotherapy. I believe she has fallen outside of the treatment window for when we would typically deliver radiation after lumpectomy. Her recurrence risk with her breast cancer subtype and node positive disease is higher without radiation. I offered for her to speak to Dr. Domínguez again about this, and she declined. She continues to be concerned the financial toxicity of her treatment. Because she did not have ideal local therapy, we also discussed mastectomy which would  be equivalent to lumpectomy + radiation. But this would also carry significant financial impact as well as a cosmetic defect. She is not interested in this option either.  She had bilateral diagnostic mammogram performed in February 2024, which was BIRADs-2 benign and the recommendation is for annual mammogram in one year. This has been ordered for her.  She will keep her scheduled follow up with other breast team members as scheduled, and will return for surgical follow up with Jessa Sun when she is due for her annual imaging. She will contact us with questions or concerns in the meantime.     Gricelda Soria MD

## 2024-10-22 ENCOUNTER — OFFICE VISIT (OUTPATIENT)
Dept: SURGICAL ONCOLOGY | Facility: CLINIC | Age: 64
End: 2024-10-22
Payer: COMMERCIAL

## 2024-10-22 VITALS
HEART RATE: 88 BPM | WEIGHT: 160 LBS | BODY MASS INDEX: 26.63 KG/M2 | SYSTOLIC BLOOD PRESSURE: 142 MMHG | DIASTOLIC BLOOD PRESSURE: 84 MMHG | TEMPERATURE: 98.1 F

## 2024-10-22 DIAGNOSIS — Z17.1 MALIGNANT NEOPLASM OF UPPER-OUTER QUADRANT OF RIGHT BREAST IN FEMALE, ESTROGEN RECEPTOR NEGATIVE: Primary | ICD-10-CM

## 2024-10-22 DIAGNOSIS — C50.411 MALIGNANT NEOPLASM OF UPPER-OUTER QUADRANT OF RIGHT BREAST IN FEMALE, ESTROGEN RECEPTOR NEGATIVE: Primary | ICD-10-CM

## 2024-10-22 PROCEDURE — 99214 OFFICE O/P EST MOD 30 MIN: CPT | Performed by: SURGERY

## 2024-10-22 PROCEDURE — 3079F DIAST BP 80-89 MM HG: CPT | Performed by: SURGERY

## 2024-10-22 PROCEDURE — 3077F SYST BP >= 140 MM HG: CPT | Performed by: SURGERY

## 2024-10-22 ASSESSMENT — PAIN SCALES - GENERAL: PAINLEVEL_OUTOF10: 0-NO PAIN

## 2024-10-30 ENCOUNTER — APPOINTMENT (OUTPATIENT)
Dept: HEMATOLOGY/ONCOLOGY | Facility: CLINIC | Age: 64
End: 2024-10-30
Payer: COMMERCIAL

## 2024-11-20 ENCOUNTER — APPOINTMENT (OUTPATIENT)
Dept: HEMATOLOGY/ONCOLOGY | Facility: CLINIC | Age: 64
End: 2024-11-20
Payer: COMMERCIAL

## 2024-11-21 ENCOUNTER — APPOINTMENT (OUTPATIENT)
Dept: ORTHOPEDIC SURGERY | Facility: CLINIC | Age: 64
End: 2024-11-21
Payer: COMMERCIAL

## 2024-11-22 ENCOUNTER — TELEPHONE (OUTPATIENT)
Dept: PRIMARY CARE | Facility: CLINIC | Age: 64
End: 2024-11-22
Payer: COMMERCIAL

## 2024-12-02 ENCOUNTER — TELEPHONE (OUTPATIENT)
Dept: HEMATOLOGY/ONCOLOGY | Facility: CLINIC | Age: 64
End: 2024-12-02
Payer: COMMERCIAL

## 2024-12-02 ENCOUNTER — DOCUMENTATION (OUTPATIENT)
Dept: CASE MANAGEMENT | Facility: HOSPITAL | Age: 64
End: 2024-12-02
Payer: COMMERCIAL

## 2024-12-02 DIAGNOSIS — C50.411 MALIGNANT NEOPLASM OF UPPER-OUTER QUADRANT OF RIGHT BREAST IN FEMALE, ESTROGEN RECEPTOR NEGATIVE: ICD-10-CM

## 2024-12-02 DIAGNOSIS — Z17.1 MALIGNANT NEOPLASM OF UPPER-OUTER QUADRANT OF RIGHT BREAST IN FEMALE, ESTROGEN RECEPTOR NEGATIVE: ICD-10-CM

## 2024-12-02 NOTE — TELEPHONE ENCOUNTER
Arabella,  The patient was last seen by Dr. Rosenbaum on 7/17/24. Are you able to fax that office note? Patient has either cancelled or no showed appointments with Shawna Mccallum, and currently no future appointments are scheduled.  ThanksPriya

## 2024-12-02 NOTE — TELEPHONE ENCOUNTER
Please call patient to schedule an appointment with Shawna Mccallum CNP. Scheduling order was placed.   Thank you.

## 2024-12-02 NOTE — PROGRESS NOTES
SW contacted by breast team regarding patient follow up and billing concerns. Financial counselor is working with patient and assisting in with billing issues. Team questioned if patient would agree to scheduling contact her. SW contacted patient and she agreed to the call. Patient inquired about  enrolling on medical insurance,  is going on to Medicaid. SW provided OSHIP number to call for assistance with insurance.

## 2024-12-03 NOTE — PROGRESS NOTES
Patient ID: Cassandra Lang is a 64 y.o. female.  BREAST CANCER DIAGNOSIS:   Malignant neoplasm of upper-outer quadrant of right breast in female, estrogen receptor negative, Pathologic: Stage IIB (pT2, pN1mi(sn), cM0, G3, ER-, [place]-, HER2+)     -Sept and 2023 Excisional biopsy of ADH followed by re-excision lumpectomy, SLN with Dr Soria     -11/15/2023 12 weeks of weekly paclitaxel/trastuzumab completed on 24.     -Decline of recommended radiation therapy due to financial burden     -24 early completion of maintenance q3 wk trastuzumab due to financial burden.       Past Medical History: Cassandra has a past medical history of Abnormal findings on diagnostic imaging of other specified body structures, Cancer (Multi), Heart disease, High blood pressure, Hyperlipidemia, Personal history of other medical treatment, Personal history of other medical treatment, Personal history of other medical treatment, Personal history of transient ischemic attack (TIA), and cerebral infarction without residual deficits, Stroke (Multi), and Tinea unguium (10/12/2021).  Surgical History:  Cassandra has a past surgical history that includes  section, classic (2014); Appendectomy (2014); Other surgical history (2020); Other surgical history (2020); Other surgical history (2021); MR angio head wo IV contrast (2020); MR angio neck wo IV contrast (2020); Breast lumpectomy; and IR CVC port (2023).  Social History:  Cassandra reports that she has never smoked. She has never been exposed to tobacco smoke. She has never used smokeless tobacco. She reports that she does not drink alcohol and does not use drugs.  Lives in a home with  Vin (Hx of a stroke), 3 adult children all live in the home - Merry is a researcher and is going to PA school, daughter Gladys works in anesthesia, son Phani is disabled from severe RA. 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 Pt and  [place] .     REPRODUCTIVE HEALTH: menarche at 16, postmenopausal from natural causes age 54,  with first birth at 31,  x 1 month, denies exposure to birth control pills or post menopausal estrogen.       Family History:    Family History   Problem Relation Name Age of Onset    Diabetes Mother      Hypertension Mother      Cancer Mother      Coronary artery disease Father      Heart attack Maternal Grandfather      Stroke Other       Family Oncology History:  Cancer-related family history includes Cancer in her mother.      CURRENT THERAPY  Observational therapy     HISTORY OF PRESENT ILLNESS     Csasandra Lang is a 64 y.o. woman who is here for follow-up breast cancer treatment follow and end of treatment visit. Today I have reviewed with the patient I will be conducting a clinical physical breast exam. Patient has declined a second medical professional today during the exam as a chapperone.       She is observational therapy and denies any concerns for return of breast cancer. She reports 2 months of itching. She denies any new lotions, soaps or detergents. She has tried zyrtec and this helped. She denies any other new medication.     She denies any chest pain or breathing issues, no sob and denies cough.     She denies any vision changes or headache issues, dizziness or loss of balance, no falls.      She denies any new or unexplained bone aches or pains with exception to arthritis in right knee and wrists and hands.      She reports the right breast is tender, but denies any skin lesions or masses, oral sores lesions or infections     She reports a normal appetite but struggles at baseline since before the cancer with reflux. She reports normal bowel movements and at baseline is gassy. She reports normal urination.      She denies any issues with sleep. She has stable fatigue    Review of Systems  ROS 14 points performed, See HPI for exceptions      OBJECTIVE:    VS / Pain:  /86 (BP  Location: Right arm, Patient Position: Sitting, BP Cuff Size: Large adult)   Pulse 99   Temp 36.8 °C (98.2 °F) (Temporal)   Wt 72.8 kg (160 lb 9.7 oz)   LMP  (LMP Unknown)   BMI 26.73 kg/m²   BSA: 1.83 meters squared   Pain Scale: 0  ECO- Fully active, able to carry on all pre-disease performance w/o restriction.      Physical Exam  Constitutional: Well developed, awake/alert/oriented x4, no distress, alert and cooperative  EYES: Sclera clear  ENMT: mucous membranes moist, no apparent injury, no lesions seen  Head/Neck: Neck supple, no apparent injury, thyroid without mass or tenderness, No JVD, trachea midline, no bruits  Respiratory / Thoracic: Patent airways, clear to all lobes, normal breath sounds with good chest expansion, thorax symmetric.  Cardiovascular: Regular, rate and rhythm, no murmurs, 2+ equal pulses of the extremities, normal auscultated S 1and S 2  GI: Nondistended, soft, non-tender, no rebound tenderness or guarding, no masses palpable, no organomegaly, +BS, no bruits  Musculoskeletal: ROM intact, no joint swelling, normal strength, no spinal tenderness  Extremities: normal extremities, no cyanosis edema, contusions or wounds, no clubbing  Neurological: alert and oriented x4, intact senses, motor, response and reflexes, normal strength  Breast: S/p right lumpectomy. No palpable masses or lesions in either breast   Lymphatic: No cervical, supraclavicular, infraclavicular or axillary lymphadenopathy  Psychological: Appropriate and talkative mood and behavior  Skin: Warm and dry, no lesions, no rashes, no jaundice           Diagnostic Results     BI mammo bilateral diagnostic tomosynthesis 2024  BI US breast limited left 2024    Narrative  Interpreted By:  Virginia Stallings,  STUDY:  BI MAMMO BILATERAL DIAGNOSTIC TOMOSYNTHESIS; BI US BREAST LIMITED  LEFT;  2024 11:01 am; 2024 11:31 am    ACCESSION NUMBER(S):  [id number]; [id number]    ORDERING CLINICIAN:  RENETTA  JAXON    INDICATION:  Patient presents for her annual bilateral mammogram and a final  follow-up ultrasound for a left breast mass. History of a right  lumpectomy with chemotherapy. MediPort in the upper right chest. Best  right MLO possible.    COMPARISON:  08/23/2023, 08/08/2023    FINDINGS:  MAMMOGRAPHY: 2D and tomosynthesis images were reviewed at 1 mm slice  thickness.    Density:  There are areas of scattered fibroglandular tissue.    In the upper-outer quadrant of the right breast at anterior depth  there are surgical clips and scarring at the lumpectomy site. The  lumpectomy site is new. There is skin and trabecular thickening of  the right breast which is most consistent with the patient's history  of radiation therapy. A surgical clip is noted in the right axilla.  There is limited evaluation of the posterior tissue on the right. No  suspicious masses or calcifications are identified.    ULTRASOUND:  Targeted left breast sonographic follow-up was  performed. In the left breast at the 3 o'clock position 3 cm from the  nipple there is an oval circumscribed hypoechoic mass. The mass  measures 0.5 x 0.4 x 0.5 cm. It is soft with elastography and there  is no internal vascularity. This mass is stable. No further follow-up  is recommended.    Impression  No mammographic evidence of malignancy in either breast. New post  treatment changes on the right.    BI-RADS CATEGORY:    BI-RADS Category:  2 Benign.  Recommendation:  Annual Screening.  Recommended Date:  1 Year.  Laterality:  Bilateral.  For any future breast imaging appointments, please call [phone]).    MACRO:  None    Signed by: Virginia Stallings 2/1/2024 1:51 PM  Dictation workstation:   [id number]     TRANSTHORACIC ECHOCARDIOGRAM REPORT        Patient Name:      ASHLY HICKS     Reading Physician:    [id number] Marlon Augustine DO  Study Date:        4/18/2024            Ordering  Provider:    [id number] GLO SMITH  MRN/PID:           [id number]             Fellow:  Accession#:        [id number]         Nurse:  Date of Birth/Age: 1960 / 63 years Sonographer:          Maik Cabezas RDCS  Gender:            F                    Additional Staff:  Height:            160.00 cm            Admit Date:  Weight:            74.00 kg             Admission Status:     Outpatient  BSA / BMI:         1.77 m2 / 28.91      Encounter#:           [id number]                     kg/m2                                          Department Location:  [facility] Non                                                                Invasive  Blood Pressure: 135 /86 mmHg     Study Type:    TRANSTHORACIC ECHO (TTE) LIMITED  Diagnosis/ICD: Encounter for monitoring cardiotoxic drug therapy-Z51.81  Indication:    encounter for monitoring cardiotoxic drug therapy  CPT Code:      Echo Limited-61241     Patient History:  Pertinent History: HTN and PE. PFO.     Study Detail: The following Echo studies were performed: 2D, M-Mode, Doppler and                color flow.        PHYSICIAN INTERPRETATION:  Left Ventricle: The left ventricular systolic function is normal, with an estimated ejection fraction of 55-60%. There are no regional wall motion abnormalities. The left ventricular cavity size is normal. Spectral Doppler shows a normal pattern of left ventricular diastolic filling.  Left Atrium: The left atrium is normal in size.  Right Ventricle: The right ventricle is normal in size. There is normal right ventricular global systolic function.  Right Atrium: The right atrium is normal in size.  Aortic Valve: The aortic valve appears structurally normal. There is no evidence of aortic valve regurgitation. The peak instantaneous gradient of the aortic valve is 6.8 mmHg.  Mitral Valve: The mitral valve is normal in structure. There is no evidence of mitral valve  regurgitation.  Tricuspid Valve: The tricuspid valve is structurally normal. There is trace tricuspid regurgitation.  Pulmonic Valve: The pulmonic valve is structurally normal. There is no indication of pulmonic valve regurgitation.  Pericardium: There is no pericardial effusion noted.  Aorta: The aortic root is normal.  In comparison to the previous echocardiogram(s): There are no prior studies on this patient for comparison purposes.        CONCLUSIONS:   1. Left ventricular systolic function is normal with a 55-60% estimated ejection fraction.     QUANTITATIVE DATA SUMMARY:  2D MEASUREMENTS:                           Normal Ranges:  IVSd:          1.13 cm   (0.6-1.1cm)  LVPWd:         0.93 cm   (0.6-1.1cm)  LVIDd:         4.50 cm   (3.9-5.9cm)  LVIDs:         2.49 cm  LV Mass Index: 89.9 g/m2  LV % FS        44.7 %     LV SYSTOLIC FUNCTION BY 2D PLANIMETRY (MOD):                      Normal Ranges:  EF-A4C View: 52.3 % (>=55%)  EF-A2C View: 60.1 %  EF-Biplane:  55.5 %     LV DIASTOLIC FUNCTION:                         Normal Ranges:  MV Peak E:    0.66 m/s (0.7-1.2 m/s)  MV Peak A:    0.98 m/s (0.42-0.7 m/s)  E/A Ratio:    0.68     (1.0-2.2)  MV lateral e' 0.11 m/s  MV medial e'  0.05 m/s     MITRAL VALVE:                       Normal Ranges:  MV Vmax:    1.01 m/s (<=1.3m/s)  MV peak P.1 mmHg (<5mmHg)  MV mean P.0 mmHg (<2mmHg)  MV DT:      254 msec (150-240msec)     AORTIC VALVE:                        Normal Ranges:  AoV Vmax:    1.30 m/s (<=1.7m/s)  AoV Peak P.8 mmHg (<20mmHg)        RIGHT VENTRICLE:  TAPSE: 22.8 mm     TRICUSPID VALVE/RVSP:                             Normal Ranges:  Peak TR Velocity: 1.18 m/s  RV Syst Pressure: 8.6 mmHg (< 30mmHg)     PULMONIC VALVE:                       Normal Ranges:  PV Max Mahesh: 1.0 m/s  (0.6-0.9m/s)  PV Max PG:  3.9 mmHg        [id number] Marlon Augustine DO  Electronically signed on 2024 at 2:30:23 PM    No visits with results within 6 Week(s) from this visit.    Latest known visit with results is:   Admission on 06/03/2024, Discharged on 06/04/2024   Component Date Value Ref Range Status    WBC 06/03/2024 7.5  4.4 - 11.3 x10*3/uL Final    nRBC 06/03/2024 0.0  0.0 - 0.0 /100 WBCs Final    RBC 06/03/2024 5.62 (H)  4.00 - 5.20 x10*6/uL Final    Hemoglobin 06/03/2024 13.8  12.0 - 16.0 g/dL Final    Hematocrit 06/03/2024 42.7  36.0 - 46.0 % Final    MCV 06/03/2024 76 (L)  80 - 100 fL Final    MCH 06/03/2024 24.6 (L)  26.0 - 34.0 pg Final    MCHC 06/03/2024 32.3  32.0 - 36.0 g/dL Final    RDW 06/03/2024 15.5 (H)  11.5 - 14.5 % Final    Platelets 06/03/2024 321  150 - 450 x10*3/uL Final    Neutrophils % 06/03/2024 60.7  40.0 - 80.0 % Final    Immature Granulocytes %, Automated 06/03/2024 0.3  0.0 - 0.9 % Final    Lymphocytes % 06/03/2024 24.0  13.0 - 44.0 % Final    Monocytes % 06/03/2024 11.7  2.0 - 10.0 % Final    Eosinophils % 06/03/2024 2.4  0.0 - 6.0 % Final    Basophils % 06/03/2024 0.9  0.0 - 2.0 % Final    Neutrophils Absolute 06/03/2024 4.56  1.20 - 7.70 x10*3/uL Final    Immature Granulocytes Absolute, Au* 06/03/2024 0.02  0.00 - 0.70 x10*3/uL Final    Lymphocytes Absolute 06/03/2024 1.80  1.20 - 4.80 x10*3/uL Final    Monocytes Absolute 06/03/2024 0.88  0.10 - 1.00 x10*3/uL Final    Eosinophils Absolute 06/03/2024 0.18  0.00 - 0.70 x10*3/uL Final    Basophils Absolute 06/03/2024 0.07  0.00 - 0.10 x10*3/uL Final    Glucose 06/03/2024 114 (H)  74 - 99 mg/dL Final    Sodium 06/03/2024 138  136 - 145 mmol/L Final    Potassium 06/03/2024 3.5  3.5 - 5.3 mmol/L Final    Chloride 06/03/2024 103  98 - 107 mmol/L Final    Bicarbonate 06/03/2024 28  21 - 32 mmol/L Final    Anion Gap 06/03/2024 11  10 - 20 mmol/L Final    Urea Nitrogen 06/03/2024 16  6 - 23 mg/dL Final    Creatinine 06/03/2024 0.73  0.50 - 1.05 mg/dL Final    eGFR 06/03/2024 >90  >60 mL/min/1.73m*2 Final    Calcium 06/03/2024 9.5  8.6 - 10.3 mg/dL Final    Albumin 06/03/2024 4.7  3.4 - 5.0 g/dL Final    Alkaline  Phosphatase 06/03/2024 72  33 - 136 U/L Final    Total Protein 06/03/2024 7.8  6.4 - 8.2 g/dL Final    AST 06/03/2024 15  9 - 39 U/L Final    Bilirubin, Total 06/03/2024 0.3  0.0 - 1.2 mg/dL Final    ALT 06/03/2024 20  7 - 45 U/L Final    C-Reactive Protein 06/03/2024 1.39 (H)  <1.00 mg/dL Final    Sedimentation Rate 06/03/2024 18  0 - 30 mm/h Final    Uric Acid 06/03/2024 4.4  2.3 - 6.7 mg/dL Final    Lactate 06/03/2024 1.0  0.4 - 2.0 mmol/L Final    Blood Culture 06/03/2024 No growth at 4 days -  FINAL REPORT   Final    Blood Culture 06/03/2024 No growth at 4 days -  FINAL REPORT   Final    Color, Urine 06/03/2024 Colorless (N)  Light-Yellow, Yellow, Dark-Yellow Final    Appearance, Urine 06/03/2024 Clear  Clear Final    Specific Gravity, Urine 06/03/2024 1.008  1.005 - 1.035 Final    pH, Urine 06/03/2024 5.5  5.0, 5.5, 6.0, 6.5, 7.0, 7.5, 8.0 Final    Protein, Urine 06/03/2024 NEGATIVE  NEGATIVE, 10 (TRACE), 20 (TRACE) mg/dL Final    Glucose, Urine 06/03/2024 Normal  Normal mg/dL Final    Blood, Urine 06/03/2024 NEGATIVE  NEGATIVE Final    Ketones, Urine 06/03/2024 NEGATIVE  NEGATIVE mg/dL Final    Bilirubin, Urine 06/03/2024 NEGATIVE  NEGATIVE Final    Urobilinogen, Urine 06/03/2024 Normal  Normal mg/dL Final    Nitrite, Urine 06/03/2024 NEGATIVE  NEGATIVE Final    Leukocyte Esterase, Urine 06/03/2024 25 Kimani/µL (A)  NEGATIVE Final    Extra Tube 06/03/2024 Hold for add-ons.   Final    WBC, Urine 06/03/2024 1-5  1-5, NONE /HPF Final    RBC, Urine 06/03/2024 1-2  NONE, 1-2, 3-5 /HPF Final    Urine Culture 06/03/2024 Normal genitourinary lavon   Final        Assessment/Plan   Malignant neoplasm of upper-outer quadrant of right breast in female, estrogen receptor negative, Pathologic: Stage IIB (pT2, pN1mi(sn), cM0, G3, ER-, [place]-, HER2+)  Cassandra was seen today for itching and follow-up.  Diagnoses and all orders for this visit:  Malignant neoplasm of upper-outer quadrant of right breast in female, estrogen receptor  negative  -     Clinic Appointment Request Follow Up; GLO SMITH  -     Comprehensive Metabolic Panel; Future  -     CBC and Auto Differential; Future  -     Cancel: Clinic Appointment Request Follow up; GLO SMITH; Future  -     Clinic Appointment Request Follow up; GLO SMITH; Future  Encounter for follow-up surveillance of breast cancer  -     Comprehensive Metabolic Panel; Future  -     CBC and Auto Differential; Future  -     Cancel: Clinic Appointment Request Follow up; GLO SMITH; Future  -     Clinic Appointment Request Follow up; GLO SMITH; Future  Status post administration of cardiotoxic chemotherapy  -     Comprehensive Metabolic Panel; Future  -     CBC and Auto Differential; Future  -     Cancel: Clinic Appointment Request Follow up; GLO SMITH; Future  -     Clinic Appointment Request Follow up; GLO SMITH; Future    Problem List Items Addressed This Visit       Malignant neoplasm of upper-outer quadrant of right breast in female, estrogen receptor negative    Overview     Diagnosis date: 9/6/23 right grade 3 IDC ER/IL- HER2+, pT2cN0, stage IA    Cassandra Lang is a pleasant 62 yo female returning to the [facility] for a post op visit after her second surgery. She has been in surveillance for a known right ADH that was diagnosed in January 2022 on screening mammogram. Even though excision was recommended at that time, she declined, and this area has remained stable on subsequent imaging studies. She had abnormal screening mammogram in January 2022 and proceeded to bilateral diagnostic mammogram and targeted ultrasound demonstrating an ill-defined mass with associated distortion in the right breast. The area underwent ultrasound guided core biopsy revealing a diagnosis of severely atypical ductal proliferation with apocrine features. Scanning of right axilla demonstrated 2 morphologically normal lymph nodes. In the left breast  at 3:00 position 3cm from nipple, a 1cm probable benign soft mass is identified and short-term 6 month sonographic follow up is recommended. She was seen in 3/2022 and the decision was made to delay surgery secondary to leaving the country for 2 months; she was prescribed anastrozole at that time, but she did not fill the prescription. She denies palpable mass, skin changes or nipple discharge. [place] she has had additional imaging surveillance in 2022 and 2023 to follow a probably benign left breast finding and documenting stability at the right ADH site. She decided to undergo Magseed localized excisional biopsy for ADH on 23 to avoid continued surveillance. Final pathology demontrated a 2.1cm grade 3 IDC with a positive anterior margin for DCIS. She returned to the OR on 10/2/23 for axillary staging with SLN and re-excision of the anterior margin. Final pathology from that procedure is still pending at the time of this visit. She is doing well since surgery and has no breast specific complaints today.    She is accompanied by her daughter, who helped to translate during this conversation, as Polish is Ms. Lang's primary language, but she was able to participate and communicate well on her own.    BREAST IMAGIN2022 Screening mammogram demonstrated scattered fibroglandular tissue, BI-RADS Category 0, spiculated right breast mass and left breast mass. 2022 Bilateral diagnostic mammogram and targeted ultrasound, BI-RADS Category 4, Right breast at 10:00,5 cm from nipple, ill-defined mass with associated architectural distortion measuring 1.7 x 0.7 x 3.2cm warranting ultrasound guided biopsy. Scanning of right axilla demonstrates 2 morphologically normal lymph nodes. Left breast at 3:00, 3cm from nipple, probable benign soft mass measuring 1 x 1.1 x 0.5cm, recommend 6 month ultrasound follow up  2022 bilateral ultrasound and mammogram BI-RADS 3 scattered  "fibrglandular density, in the right breast, the biopsied  architectural distortion in the superior lateral right breast at anterior to mid depth is unchanged. No suspicious masses or calcifications are identified. In the left breast there is a small oval circumscribed hypoechoic mass is again seen in the 3 o'clock position, 3 cm FN. It measures 9 x 5 x 9 mm. It is avascular and soft on elastography.  2023 bilateral mammgraom and ultrasound BI-RADS 3 there is stable architectural distortion with associated tissue marker in the anterior upper outer right breast. Sonographic scanning targeted to the 3 o'clock position left breast 3 cm from the nipple demonstrates an oval circumscribed parallel partially anechoic and partially hypoechoic mass measuring  0.8 x 0.5 x 0.6 cm, with the greatest overall radial dimension of 1.0 cm. This is stable compared with 2022. There is no internal Doppler blood flow and the mass remains soft on elastography.    BREAST PROCEDURE: 3/1/2022 Right breast ultrasound guided core biopsy at 10:00 5cm from nipple.  23 right magseed excisional biopsy  10/2/23 right re-excision lumpectomy, SLN    REPRODUCTIVE HEALTH: menarche at 16, postmenopausal from natural causes t age 54,  with first birth at 31,  x 1 month, denies exposure to birth control pills or post menopausal estrogen.    MEDICAL HISTORY: Hypertension, hypercholesterolemia, CVA in 2020, her daughter states she has a constant \"pounding sound\" in her head since, Pulmonary embolism 2020, patent foramen ovale s/p percutaneous closure 2021, anticoagulated on Eliquis and ASA.    FAMILY CANCER HISTORY: Mother diagnosed with kidney cancer.     MEDICAL ONCOLOGY: Dr. Rosenbaum    RADIATION ONCOLOGY: Dr. Domínguez    GENETICS: does not meet NCCN criteria         Relevant Orders    Comprehensive Metabolic Panel    CBC and Auto Differential    Clinic Appointment Request Follow up; GLO SMITH    Status post " administration of cardiotoxic chemotherapy    Relevant Orders    Comprehensive Metabolic Panel    CBC and Auto Differential    Clinic Appointment Request Follow up; DANIELA SMITHHANIE JOHNNY    Encounter for follow-up surveillance of breast cancer    Relevant Orders    Comprehensive Metabolic Panel    CBC and Auto Differential    Clinic Appointment Request Follow up; GLO SMITH   Right sided [id number] HER2+/ER-/GA- breast cancer, unexpectedly found after excisional biopsy for sclerosing adenosis.   Completion of > than 6 months of trastuzumab, however given insurance coverage issues and many medical bills and symptoms, patient driven completion of maintenance therapy. Was ok'd per Dr Brendon Rosenbaum. Port was removed, Observational therapy.      Routine labs ordered today given concerns for 2 months of itching without any rash. She understands I will call to review results      There is no evidence on clinical exam today for breast cancer recurrence. She will continue to follow in shared visit between medical oncology and surgical oncology     Hypercoagulable state. extensive medical history of CVA, breast cancer,  HTN, Hyperlipidemia, hypercholesterolemia. She remains on eliquis.        General health Maintenance  PCP Dr Hernandez - annually and as needed     At least 25 minutes of direct consultation was spent with the patient today reviewing her cancer care plan, educating and answering questions regarding ongoing follow up, greater than 50% in counseling and coordination of care      End of treatment summary completed today. Her cancer features, treatment received, side effects of treatment both current and late were reviewed with her in detail. Late side effects of treatment and future risks related to the treatment received were reviewed in detail. Symptoms of recurrence were reviewed and when she should call our office. The follow up plan alternating visits with her other providers so that she is getting a  clinical breast exam every 4 months for the first 3 years then every 6 months thereafter was discussed. She will continue to also follow with her primary care provider. Educational resources in the survivorship packet were reviewed. Healthy lifestyle advice was also discussed to optimize health and cancer outcomes. A copy of this summary was given to the patient today     Year 1-3  Every 4 Months: Physical Exam  Annually: Mammography      Year 4-5  Every 6 Months: Physical Exam  Annually: Mammography    After 5 Years- transition care back to PCP  Yearly: Physical Exam, Mammogram      Side Effects/ Health Risks/ Health Promotion  -Another cancer  -neurological changes  -organ damage  -surgical site changes  -surgical site pain  -numbness at the surgical site      High Risks  -Organ damage  -nerve damage      Patient instructed to call if:  -New Lump  -New pain that does not go away  -new rash in the surgical site area   -any questions or concerns       Patient 12 month Goals:   -Exercise More days of the week than less days of the week.  -Follow a healthy balanced diet   -Make time for myself  -Learn ways to manage my stress  -mental exercises      Has genetic Counseling been recommended?  NO        Future Plans: Are there any future treatment plans needed for your cancer history?   No     Vaccine contraindications?   You are safe to under go all vaccines. Please discuss with your PCP.  Examples include:  -Annual flu vaccines  -Shingles series   -Pneumonia series   -COVID   -TB testing       Treatment Plan:  Venous Access Orders        Thank you for the opportunity to be involved your care.   We discussed the clinical significance of diagnosis, goals of care and treatment plan in detail.   Please do not hesitate to reach out with any questions.     -------------------------------------------------------------------------------------------------------------------------------  Shawna STERN, APRN,  FNP-C  [facility]  Division of Medical Oncology- Breast   Collaborating Physician Dr. Brendon Rosenbaum   [facility] [place] [address]  Phone: [phone]  Fax: [phone]  Available via Secure Chat    Confidential Peer Review Document-  Privilege  Privileged Pursuant to [place] Revised Code Section 2305.24, .25, .251 & .252

## 2024-12-03 NOTE — TELEPHONE ENCOUNTER
Leslee from [employer] called and left voicemail, requesting call back from Priya - states it is non urgent and please call back when available.

## 2024-12-04 ENCOUNTER — LAB (OUTPATIENT)
Dept: LAB | Facility: LAB | Age: 64
End: 2024-12-04
Payer: COMMERCIAL

## 2024-12-04 ENCOUNTER — OFFICE VISIT (OUTPATIENT)
Dept: HEMATOLOGY/ONCOLOGY | Facility: CLINIC | Age: 64
End: 2024-12-04
Payer: COMMERCIAL

## 2024-12-04 ENCOUNTER — TELEPHONE (OUTPATIENT)
Dept: HEMATOLOGY/ONCOLOGY | Facility: HOSPITAL | Age: 64
End: 2024-12-04

## 2024-12-04 VITALS
WEIGHT: 160.6 LBS | DIASTOLIC BLOOD PRESSURE: 86 MMHG | TEMPERATURE: 98.2 F | SYSTOLIC BLOOD PRESSURE: 123 MMHG | HEART RATE: 99 BPM | BODY MASS INDEX: 26.73 KG/M2

## 2024-12-04 DIAGNOSIS — Z92.21 STATUS POST ADMINISTRATION OF CARDIOTOXIC CHEMOTHERAPY: ICD-10-CM

## 2024-12-04 DIAGNOSIS — Z85.3 ENCOUNTER FOR FOLLOW-UP SURVEILLANCE OF BREAST CANCER: ICD-10-CM

## 2024-12-04 DIAGNOSIS — Z08 ENCOUNTER FOR FOLLOW-UP SURVEILLANCE OF BREAST CANCER: ICD-10-CM

## 2024-12-04 DIAGNOSIS — C50.411 MALIGNANT NEOPLASM OF UPPER-OUTER QUADRANT OF RIGHT BREAST IN FEMALE, ESTROGEN RECEPTOR NEGATIVE: ICD-10-CM

## 2024-12-04 DIAGNOSIS — Z17.1 MALIGNANT NEOPLASM OF UPPER-OUTER QUADRANT OF RIGHT BREAST IN FEMALE, ESTROGEN RECEPTOR NEGATIVE: ICD-10-CM

## 2024-12-04 LAB
ALBUMIN SERPL BCP-MCNC: 4.6 G/DL (ref 3.4–5)
ALP SERPL-CCNC: 72 U/L (ref 33–136)
ALT SERPL W P-5'-P-CCNC: 24 U/L (ref 7–45)
ANION GAP SERPL CALC-SCNC: 11 MMOL/L (ref 10–20)
AST SERPL W P-5'-P-CCNC: 22 U/L (ref 9–39)
BASOPHILS # BLD AUTO: 0.1 X10*3/UL (ref 0–0.1)
BASOPHILS NFR BLD AUTO: 2.1 %
BILIRUB SERPL-MCNC: 0.7 MG/DL (ref 0–1.2)
BUN SERPL-MCNC: 12 MG/DL (ref 6–23)
CALCIUM SERPL-MCNC: 9.9 MG/DL (ref 8.6–10.3)
CHLORIDE SERPL-SCNC: 103 MMOL/L (ref 98–107)
CO2 SERPL-SCNC: 30 MMOL/L (ref 21–32)
CREAT SERPL-MCNC: 0.58 MG/DL (ref 0.5–1.05)
EGFRCR SERPLBLD CKD-EPI 2021: >90 ML/MIN/1.73M*2
EOSINOPHIL # BLD AUTO: 0.25 X10*3/UL (ref 0–0.7)
EOSINOPHIL NFR BLD AUTO: 5.1 %
ERYTHROCYTE [DISTWIDTH] IN BLOOD BY AUTOMATED COUNT: 14.6 % (ref 11.5–14.5)
GLUCOSE SERPL-MCNC: 110 MG/DL (ref 74–99)
HCT VFR BLD AUTO: 47.1 % (ref 36–46)
HGB BLD-MCNC: 15 G/DL (ref 12–16)
IMM GRANULOCYTES # BLD AUTO: 0.01 X10*3/UL (ref 0–0.7)
IMM GRANULOCYTES NFR BLD AUTO: 0.2 % (ref 0–0.9)
LYMPHOCYTES # BLD AUTO: 1.7 X10*3/UL (ref 1.2–4.8)
LYMPHOCYTES NFR BLD AUTO: 34.9 %
MCH RBC QN AUTO: 26.2 PG (ref 26–34)
MCHC RBC AUTO-ENTMCNC: 31.8 G/DL (ref 32–36)
MCV RBC AUTO: 82 FL (ref 80–100)
MONOCYTES # BLD AUTO: 0.41 X10*3/UL (ref 0.1–1)
MONOCYTES NFR BLD AUTO: 8.4 %
NEUTROPHILS # BLD AUTO: 2.4 X10*3/UL (ref 1.2–7.7)
NEUTROPHILS NFR BLD AUTO: 49.3 %
NRBC BLD-RTO: 0 /100 WBCS (ref 0–0)
PLATELET # BLD AUTO: 287 X10*3/UL (ref 150–450)
POTASSIUM SERPL-SCNC: 3.9 MMOL/L (ref 3.5–5.3)
PROT SERPL-MCNC: 6.9 G/DL (ref 6.4–8.2)
RBC # BLD AUTO: 5.72 X10*6/UL (ref 4–5.2)
SODIUM SERPL-SCNC: 140 MMOL/L (ref 136–145)
WBC # BLD AUTO: 4.9 X10*3/UL (ref 4.4–11.3)

## 2024-12-04 PROCEDURE — 3074F SYST BP LT 130 MM HG: CPT | Performed by: NURSE PRACTITIONER

## 2024-12-04 PROCEDURE — 3079F DIAST BP 80-89 MM HG: CPT | Performed by: NURSE PRACTITIONER

## 2024-12-04 PROCEDURE — 99215 OFFICE O/P EST HI 40 MIN: CPT | Performed by: NURSE PRACTITIONER

## 2024-12-04 PROCEDURE — 85025 COMPLETE CBC W/AUTO DIFF WBC: CPT

## 2024-12-04 PROCEDURE — 80053 COMPREHEN METABOLIC PANEL: CPT

## 2024-12-04 PROCEDURE — 36415 COLL VENOUS BLD VENIPUNCTURE: CPT

## 2024-12-04 PROCEDURE — 1036F TOBACCO NON-USER: CPT | Performed by: NURSE PRACTITIONER

## 2024-12-04 ASSESSMENT — PATIENT HEALTH QUESTIONNAIRE - PHQ9
1. LITTLE INTEREST OR PLEASURE IN DOING THINGS: NOT AT ALL
2. FEELING DOWN, DEPRESSED OR HOPELESS: NOT AT ALL
SUM OF ALL RESPONSES TO PHQ9 QUESTIONS 1 & 2: 0

## 2024-12-04 NOTE — TELEPHONE ENCOUNTER
Called and spoke with Leslee from [employer]. Requesting office note from surg onc in Oct and today' office note with Shawna Mccallum NP be faxed. Faxed both office notes to [phone] with successful confirmation. No further needs at this time.

## 2024-12-04 NOTE — PATIENT INSTRUCTIONS
Shawna VALDEZ, CNP follow up April 2025. Please complete labs today given 2 months of itching      Next mammogram 2/3/2025, Dr Soria will call you with results      PCP Dr Hernandez Annually and as needed.    The follow up plan alternating visits with her other providers so that she is getting a clinical breast exam every 4 months for the first 3 years then every 6 months thereafter was discussed.    Educated and instructed on signs and symptoms of recurrence to continue to watch for and call with any unusual symptoms or concerns prior to her next office visit.    Encouraged a plant based diet with higher intake of vegetables, fruit, and whole grains. Healthy choices of protein from meat, fish and plant based (beans, diogenes seeds) suggested. Encouraged adequate hydration with non-caffeinated and non-carbonated beverages. Encouraged minimal intake of processed foods and eating out regularly. She will watch her portion sizes and try not to eat late in the evening (within 4 hours of bedtime).    Instructed to obtain at least 2.5 hours of moderate exercise weekly to optimize her health and cancer outcomes.    Please call [phone] with any questions or concerns prior to your next office visit.

## 2024-12-04 NOTE — TELEPHONE ENCOUNTER
Call to patient to review labs all within normal ranges. I have reviewed with patient she may continue to use OTC medications as she has done like Zyrtec or Claritin. Reviewed next apt.

## 2024-12-05 ENCOUNTER — APPOINTMENT (OUTPATIENT)
Dept: ORTHOPEDIC SURGERY | Facility: CLINIC | Age: 64
End: 2024-12-05
Payer: COMMERCIAL

## 2024-12-24 DIAGNOSIS — E55.9 VITAMIN D DEFICIENCY: ICD-10-CM

## 2024-12-26 RX ORDER — VIT C/E/ZN/COPPR/LUTEIN/ZEAXAN 250MG-90MG
25 CAPSULE ORAL DAILY
Qty: 90 CAPSULE | Refills: 1 | Status: SHIPPED | OUTPATIENT
Start: 2024-12-26

## 2024-12-30 ENCOUNTER — APPOINTMENT (OUTPATIENT)
Dept: PRIMARY CARE | Facility: CLINIC | Age: 64
End: 2024-12-30
Payer: COMMERCIAL

## 2025-01-02 DIAGNOSIS — M25.561 CHRONIC PAIN OF RIGHT KNEE: ICD-10-CM

## 2025-01-02 DIAGNOSIS — G89.29 CHRONIC PAIN OF RIGHT KNEE: ICD-10-CM

## 2025-01-07 ENCOUNTER — OFFICE VISIT (OUTPATIENT)
Dept: ORTHOPEDIC SURGERY | Facility: CLINIC | Age: 65
End: 2025-01-07
Payer: COMMERCIAL

## 2025-01-07 ENCOUNTER — HOSPITAL ENCOUNTER (OUTPATIENT)
Dept: RADIOLOGY | Facility: CLINIC | Age: 65
Discharge: HOME | End: 2025-01-07
Payer: COMMERCIAL

## 2025-01-07 VITALS — WEIGHT: 165 LBS | HEIGHT: 63 IN | BODY MASS INDEX: 29.23 KG/M2

## 2025-01-07 DIAGNOSIS — G89.29 CHRONIC PAIN OF RIGHT KNEE: ICD-10-CM

## 2025-01-07 DIAGNOSIS — S83.231A COMPLEX TEAR OF MEDIAL MENISCUS OF RIGHT KNEE AS CURRENT INJURY, INITIAL ENCOUNTER: Primary | ICD-10-CM

## 2025-01-07 DIAGNOSIS — M25.561 CHRONIC PAIN OF RIGHT KNEE: ICD-10-CM

## 2025-01-07 PROCEDURE — 99214 OFFICE O/P EST MOD 30 MIN: CPT | Performed by: ORTHOPAEDIC SURGERY

## 2025-01-07 PROCEDURE — 3008F BODY MASS INDEX DOCD: CPT | Performed by: ORTHOPAEDIC SURGERY

## 2025-01-07 PROCEDURE — 1036F TOBACCO NON-USER: CPT | Performed by: ORTHOPAEDIC SURGERY

## 2025-01-07 PROCEDURE — 73564 X-RAY EXAM KNEE 4 OR MORE: CPT | Mod: RIGHT SIDE | Performed by: RADIOLOGY

## 2025-01-07 PROCEDURE — 73564 X-RAY EXAM KNEE 4 OR MORE: CPT | Mod: RT

## 2025-01-07 RX ORDER — CEFAZOLIN SODIUM 2 G/100ML
2 INJECTION, SOLUTION INTRAVENOUS ONCE
OUTPATIENT
Start: 2025-01-07 | End: 2025-01-07

## 2025-01-07 NOTE — PROGRESS NOTES
64-year-old is seen with right knee pain.  She has been having persistent moderate throbbing pain in the right knee for a year.  She has intermittent locking swelling and mechanical symptoms.  Right knee bothers her more than the left.  She has medial sided pain.  She had cortisone injection in July with short-term relief.  She is on Eliquis.    Pleasant and in no acute distress. Ambulates with a mildly antalgic gait.  Right knee range of motion is 3-120. There is a mild effusion and no instability. The knee is stable to varus and valgus stress Lachman and posterior drawer. There is medial joint line tenderness Issa's is positive with pain medially.  The Left knee range of motion is 0-130 without effusion or instability. There is no tenderness around the left knee. Bilateral lower extremities are well-perfused the skin is intact and muscle tone is adequate.  There is adequate range of motion of his hips without significant pain.    Multiple x-ray views of the right knee are personally reviewed and the joint spaces are maintained.  There is no acute bony abnormality.  There is a small marginal osteophyte.    MRI of the right knee is personally reviewed and there is complex tearing involving the body and posterior horn of the medial meniscus.  There are mild chondral changes in the medial and patellofemoral compartments.  There is a knee effusion.  There is a ruptured Baker's cyst.    A detailed discussion about the MRI and the meniscus tear was done.  Treatment options clued no treatment reviewed and the decision was made to proceed with right knee arthroscopy and partial medial meniscectomy and chondroplasty as needed.  The surgery and postoperative course reviewed in detail.  Risks include but not limited to infection thromboembolus neurovascular tree medical problems stiffness and limitations of arthroscopy and realistic expectations were discussed and she understands this and has elected to proceed.  She will  avoid aggravating activities in the meantime.  She will need medical clearance and will need to be able to stop Eliquis prior to surgery.

## 2025-01-07 NOTE — LETTER
January 7, 2025     Jodi Lopez MD  [address] [facility], [place], [facility] [address]  [facility] [address]    Patient: Cassandra Lang   YOB: 1960   Date of Visit: 1/7/2025       Dear Dr. Jodi Lopez MD:    Thank you for referring Cassandra Lang to me for evaluation. Below are my notes for this consultation.  If you have questions, please do not hesitate to call me. I look forward to following your patient along with you.       Sincerely,     Juvenal Gasca MD      CC: No Recipients  ______________________________________________________________________________________    64-year-old is seen with right knee pain.  She has been having persistent moderate throbbing pain in the right knee for a year.  She has intermittent locking swelling and mechanical symptoms.  Right knee bothers her more than the left.  She has medial sided pain.  She had cortisone injection in July with short-term relief.  She is on Eliquis.    Pleasant and in no acute distress. Ambulates with a mildly antalgic gait.  Right knee range of motion is 3-120. There is a mild effusion and no instability. The knee is stable to varus and valgus stress Lachman and posterior drawer. There is medial joint line tenderness Issa's is positive with pain medially.  The Left knee range of motion is 0-130 without effusion or instability. There is no tenderness around the left knee. Bilateral lower extremities are well-perfused the skin is intact and muscle tone is adequate.  There is adequate range of motion of his hips without significant pain.    Multiple x-ray views of the right knee are personally reviewed and the joint spaces are maintained.  There is no acute bony abnormality.  There is a small marginal osteophyte.    MRI of the right knee is personally reviewed and there is complex tearing involving the body and posterior horn of the medial meniscus.  There are mild chondral changes in the medial and patellofemoral compartments.   There is a knee effusion.  There is a ruptured Baker's cyst.    A detailed discussion about the MRI and the meniscus tear was done.  Treatment options clued no treatment reviewed and the decision was made to proceed with right knee arthroscopy and partial medial meniscectomy and chondroplasty as needed.  The surgery and postoperative course reviewed in detail.  Risks include but not limited to infection thromboembolus neurovascular tree medical problems stiffness and limitations of arthroscopy and realistic expectations were discussed and she understands this and has elected to proceed.  She will avoid aggravating activities in the meantime.  She will need medical clearance and will need to be able to stop Eliquis prior to surgery.

## 2025-01-20 ENCOUNTER — APPOINTMENT (OUTPATIENT)
Dept: PRIMARY CARE | Facility: CLINIC | Age: 65
End: 2025-01-20
Payer: COMMERCIAL

## 2025-01-25 ENCOUNTER — OFFICE VISIT (OUTPATIENT)
Dept: URGENT CARE | Age: 65
End: 2025-01-25
Payer: COMMERCIAL

## 2025-01-25 VITALS
SYSTOLIC BLOOD PRESSURE: 129 MMHG | WEIGHT: 152 LBS | RESPIRATION RATE: 17 BRPM | BODY MASS INDEX: 25.33 KG/M2 | TEMPERATURE: 98 F | HEART RATE: 107 BPM | DIASTOLIC BLOOD PRESSURE: 88 MMHG | OXYGEN SATURATION: 96 % | HEIGHT: 65 IN

## 2025-01-25 DIAGNOSIS — N30.00 ACUTE CYSTITIS WITHOUT HEMATURIA: Primary | ICD-10-CM

## 2025-01-25 DIAGNOSIS — R10.30 LOWER ABDOMINAL PAIN: ICD-10-CM

## 2025-01-25 LAB
POC APPEARANCE, URINE: CLEAR
POC BILIRUBIN, URINE: NEGATIVE
POC BLOOD, URINE: NEGATIVE
POC COLOR, URINE: ABNORMAL
POC GLUCOSE, URINE: NEGATIVE MG/DL
POC KETONES, URINE: NEGATIVE MG/DL
POC LEUKOCYTES, URINE: ABNORMAL
POC NITRITE,URINE: NEGATIVE
POC PH, URINE: 6 PH
POC PROTEIN, URINE: NEGATIVE MG/DL
POC SPECIFIC GRAVITY, URINE: 1.01
POC UROBILINOGEN, URINE: 0.2 EU/DL

## 2025-01-25 PROCEDURE — 87086 URINE CULTURE/COLONY COUNT: CPT

## 2025-01-25 RX ORDER — NITROFURANTOIN 25; 75 MG/1; MG/1
100 CAPSULE ORAL 2 TIMES DAILY
Qty: 14 CAPSULE | Refills: 0 | Status: SHIPPED | OUTPATIENT
Start: 2025-01-25 | End: 2025-02-01

## 2025-01-25 ASSESSMENT — ENCOUNTER SYMPTOMS
FREQUENCY: 1
CHILLS: 0
DYSURIA: 1
ABDOMINAL PAIN: 1
FATIGUE: 0
HEMATURIA: 0
FEVER: 0

## 2025-01-25 ASSESSMENT — PAIN SCALES - GENERAL: PAINLEVEL_OUTOF10: 6

## 2025-01-25 NOTE — PROGRESS NOTES
Subjective   Patient ID: Cassandra Lang is a 64 y.o. female. They present today with a chief complaint of Abdominal Pain (Pt states she has had abdominal pain for x3days ) and Other (Smell with urination possible UTI ).    History of Present Illness  Patient presents with 3-day history of bladder pressure, odor to the urine. She endorses no symptomatic management. History of UTI.      Abdominal Pain  Associated symptoms include dysuria and frequency. Pertinent negatives include no fever or hematuria.       Past Medical History  Allergies as of 2025 - Reviewed 2025   Allergen Reaction Noted    Sulfa (sulfonamide antibiotics) Rash 2023    Sulfamethoxazole-trimethoprim Rash 2023       (Not in a hospital admission)       Past Medical History:   Diagnosis Date    Abnormal findings on diagnostic imaging of other specified body structures     Abnormal CT of the chest    Cancer (Multi)     Heart disease     High blood pressure     Hyperlipidemia     Personal history of other medical treatment     History of echocardiogram    Personal history of other medical treatment     History of transesophageal echocardiography (MAYKEL)    Personal history of other medical treatment     History of cardiac monitoring    Personal history of transient ischemic attack (TIA), and cerebral infarction without residual deficits     History of cerebrovascular accident    Stroke (Multi)     Tinea unguium 10/12/2021    Onychomycosis of toenail       Past Surgical History:   Procedure Laterality Date    APPENDECTOMY  2014    Appendectomy    BREAST LUMPECTOMY       SECTION, CLASSIC  2014     Section    IR CVC PORT  2023    IR CVC PORT 2023 [facility] CVEPINV    MR HEAD ANGIO WO IV CONTRAST  2020    MR HEAD ANGIO WO IV CONTRAST 2020 [facility] CLINICAL LEGAC[facility]    MR NECK ANGIO WO IV CONTRAST  2020    MR NECK ANGIO WO IV CONTRAST 2020 [facility] CLINICAL LEGACY    OTHER SURGICAL HISTORY   "06/24/2020    Esophagogastroduodenoscopy    OTHER SURGICAL HISTORY  06/24/2020    Colonoscopy    OTHER SURGICAL HISTORY  03/04/2021    Patent foramen ovale repair        reports that she has never smoked. She has never been exposed to tobacco smoke. She has never used smokeless tobacco. She reports that she does not drink alcohol and does not use drugs.    Review of Systems  Review of Systems   Constitutional:  Negative for chills, fatigue and fever.   Gastrointestinal:  Positive for abdominal pain.   Genitourinary:  Positive for dysuria, frequency and urgency. Negative for hematuria.                                  Objective    Vitals:    01/25/25 1302   BP: 129/88   BP Location: Left arm   Patient Position: Sitting   BP Cuff Size: Adult   Pulse: 107   Resp: 17   Temp: 36.7 °C (98 °F)   TempSrc: Oral   SpO2: 96%   Weight: 68.9 kg (152 lb)   Height: 1.651 m (5' 5\")     No LMP recorded (lmp unknown). Patient is postmenopausal.    Physical Exam  Vitals reviewed.   Constitutional:       Appearance: Normal appearance.   Cardiovascular:      Rate and Rhythm: Normal rate and regular rhythm.   Pulmonary:      Effort: Pulmonary effort is normal.      Breath sounds: Normal breath sounds.   Abdominal:      General: There is no distension.      Tenderness: There is no right CVA tenderness or left CVA tenderness.   Skin:     General: Skin is warm and dry.   Neurological:      Mental Status: She is alert.         Procedures    Point of Care Test & Imaging Results from this visit  Results for orders placed or performed in visit on 01/25/25   POCT UA Automated manually resulted   Result Value Ref Range    POC Color, Urine Straw Straw, Yellow, Light-Yellow    POC Appearance, Urine Clear Clear    POC Glucose, Urine NEGATIVE NEGATIVE mg/dl    POC Bilirubin, Urine NEGATIVE NEGATIVE    POC Ketones, Urine NEGATIVE NEGATIVE mg/dl    POC Specific Gravity, Urine 1.010 1.005 - 1.035    POC Blood, Urine NEGATIVE NEGATIVE    POC PH, Urine " 6.0 No Reference Range Established PH    POC Protein, Urine NEGATIVE NEGATIVE mg/dl    POC Urobilinogen, Urine 0.2 0.2, 1.0 EU/DL    Poc Nitrite, Urine NEGATIVE NEGATIVE    POC Leukocytes, Urine SMALL (1+) (A) NEGATIVE      No results found.    Diagnostic study results (if any) were reviewed by GABRIELA Tom.    Assessment/Plan   Allergies, medications, history, and pertinent labs/EKGs/Imaging reviewed by GABRIELA Tom. Prescription given for Macrobid. Urine sent for culture. Advised patient to follow-up for any increase in symptoms, fever, chills, CVA tenderness. Patient verbalized understanding.      Medical Decision Making  At time of discharge patient was clinically well-appearing and HDS for outpatient management. The patient and/or family was educated regarding diagnosis, supportive care, OTC and Rx medications. The patient and/or family was given the opportunity to ask questions prior to discharge.  They verbalized understanding of my discussion of the plans for treatment, expected course, indications to return to  or seek further evaluation in ED, and the need for timely follow up as directed.   They were provided with a work/school excuse if requested.      Orders and Diagnoses  Diagnoses and all orders for this visit:  Acute cystitis without hematuria  -     nitrofurantoin, macrocrystal-monohydrate, (Macrobid) 100 mg capsule; Take 1 capsule (100 mg) by mouth 2 times a day for 7 days.  Lower abdominal pain  -     POCT UA Automated manually resulted      Medical Admin Record      Patient disposition: Home    Electronically signed by GABRIELA Tom  2:19 PM

## 2025-01-27 LAB — BACTERIA UR CULT: NORMAL

## 2025-01-28 ENCOUNTER — TELEPHONE (OUTPATIENT)
Dept: URGENT CARE | Age: 65
End: 2025-01-28

## 2025-01-29 ENCOUNTER — APPOINTMENT (OUTPATIENT)
Dept: PRIMARY CARE | Facility: CLINIC | Age: 65
End: 2025-01-29
Payer: COMMERCIAL

## 2025-01-29 VITALS
DIASTOLIC BLOOD PRESSURE: 86 MMHG | HEART RATE: 83 BPM | SYSTOLIC BLOOD PRESSURE: 123 MMHG | BODY MASS INDEX: 25.29 KG/M2 | WEIGHT: 152 LBS | TEMPERATURE: 98 F

## 2025-01-29 DIAGNOSIS — I10 UNCONTROLLED HYPERTENSION: ICD-10-CM

## 2025-01-29 DIAGNOSIS — M25.561 CHRONIC PAIN OF RIGHT KNEE: ICD-10-CM

## 2025-01-29 DIAGNOSIS — G89.29 CHRONIC PAIN OF RIGHT KNEE: ICD-10-CM

## 2025-01-29 DIAGNOSIS — R10.31 RLQ ABDOMINAL PAIN: Primary | ICD-10-CM

## 2025-01-29 LAB
POC APPEARANCE, URINE: CLEAR
POC BILIRUBIN, URINE: NEGATIVE
POC BLOOD, URINE: NEGATIVE
POC COLOR, URINE: YELLOW
POC GLUCOSE, URINE: NEGATIVE MG/DL
POC KETONES, URINE: NEGATIVE MG/DL
POC LEUKOCYTES, URINE: ABNORMAL
POC NITRITE,URINE: NEGATIVE
POC PH, URINE: 5.5 PH
POC PROTEIN, URINE: NEGATIVE MG/DL
POC SPECIFIC GRAVITY, URINE: <=1.005
POC UROBILINOGEN, URINE: 0.2 EU/DL

## 2025-01-29 PROCEDURE — 81003 URINALYSIS AUTO W/O SCOPE: CPT | Performed by: FAMILY MEDICINE

## 2025-01-29 PROCEDURE — 99214 OFFICE O/P EST MOD 30 MIN: CPT | Performed by: FAMILY MEDICINE

## 2025-01-29 PROCEDURE — 3074F SYST BP LT 130 MM HG: CPT | Performed by: FAMILY MEDICINE

## 2025-01-29 PROCEDURE — 1036F TOBACCO NON-USER: CPT | Performed by: FAMILY MEDICINE

## 2025-01-29 PROCEDURE — 3079F DIAST BP 80-89 MM HG: CPT | Performed by: FAMILY MEDICINE

## 2025-01-29 RX ORDER — AMLODIPINE BESYLATE 10 MG/1
10 TABLET ORAL DAILY
Qty: 90 TABLET | Refills: 1 | Status: SHIPPED | OUTPATIENT
Start: 2025-01-29

## 2025-01-29 NOTE — PROGRESS NOTES
"Subjective   Patient ID: Cassandra Lang is a 64 y.o. female who presents for Follow-up.  HPI  Cassandra Lang is a 64-year-old female with past medical history of hypertension, hyperlipidemia, prediabetes, PE on Eliquis, CVA, PFO s/p repair GERD, IBS, anxiety, depression, vitamin D deficiency, right breast cancer \"24 s/p lumpectomy and SLN and current treatment weekly paclitaxel/trastuzumab.  Patient is here for:    1- right knee medial pain follow-up.  It did not respond to Medrol dose pack and is causing shooting pain with ambulation.  She is requesting a second opinion for that knee pain.   2- decrease the Amlodipine dose to 5 mg every day.    3-Persisting RLQ abdominal pain that started a week ago and has not responded to Nitrofurantoin prescribed at the [place] urgent care on 1/25/2025. . The urine culture from 1/25/2025 was contaminated.     A review of system was completed.  All systems were reviewed and were normal, except for the ones that are listed in the HPI.    Objective   Physical Exam  Constitutional:       Appearance: Normal appearance.   HENT:      Head: Normocephalic and atraumatic.      Right Ear: Tympanic membrane, ear canal and external ear normal.      Left Ear: Tympanic membrane, ear canal and external ear normal.      Nose: Nose normal.      Mouth/Throat:      Mouth: Mucous membranes are moist.      Pharynx: Oropharynx is clear.   Eyes:      Extraocular Movements: Extraocular movements intact.      Conjunctiva/sclera: Conjunctivae normal.      Pupils: Pupils are equal, round, and reactive to light.   Cardiovascular:      Rate and Rhythm: Normal rate and regular rhythm.      Pulses: Normal pulses.   Pulmonary:      Effort: Pulmonary effort is normal.      Breath sounds: Normal breath sounds.   Abdominal:      General: Abdomen is flat. Bowel sounds are normal.      Palpations: Abdomen is soft.   Musculoskeletal:         General: Normal range of motion.      Cervical back: Normal range of " motion and neck supple.   Skin:     General: Skin is warm.   Neurological:      General: No focal deficit present.      Mental Status: She is alert and oriented to person, place, and time. Mental status is at baseline.   Psychiatric:         Mood and Affect: Mood normal.         Behavior: Behavior normal.         Thought Content: Thought content normal.         Judgment: Judgment normal.         Assessment/Plan   Problem List Items Addressed This Visit       Uncontrolled hypertension     -Amlodipine increased to 10 mg every day today.          Relevant Medications    amLODIPine (Norvasc) 10 mg tablet    Chronic pain of right knee     -Not controlled with medrol dose pack.   -ortho referral for 2nd opinion ordered.          Relevant Orders    Referral to Orthopaedic Surgery    RLQ abdominal pain - Primary     -repeat UA w/ C+S done.   -CT abdo pelvis ordered.         Relevant Orders    POCT UA Automated manually resulted    Urine Culture    CT abdomen pelvis w IV contrast    Patient to return to office in 2- 4 weeks if needed.

## 2025-01-31 LAB — BACTERIA UR CULT: NORMAL

## 2025-02-03 ENCOUNTER — HOSPITAL ENCOUNTER (OUTPATIENT)
Dept: RADIOLOGY | Facility: CLINIC | Age: 65
Discharge: HOME | End: 2025-02-03
Payer: COMMERCIAL

## 2025-02-03 DIAGNOSIS — C50.411 MALIGNANT NEOPLASM OF UPPER-OUTER QUADRANT OF RIGHT BREAST IN FEMALE, ESTROGEN RECEPTOR NEGATIVE: ICD-10-CM

## 2025-02-03 DIAGNOSIS — Z17.1 MALIGNANT NEOPLASM OF UPPER-OUTER QUADRANT OF RIGHT BREAST IN FEMALE, ESTROGEN RECEPTOR NEGATIVE: ICD-10-CM

## 2025-02-03 PROCEDURE — 77062 BREAST TOMOSYNTHESIS BI: CPT

## 2025-02-03 PROCEDURE — 77062 BREAST TOMOSYNTHESIS BI: CPT | Performed by: RADIOLOGY

## 2025-02-03 PROCEDURE — 77066 DX MAMMO INCL CAD BI: CPT | Performed by: RADIOLOGY

## 2025-02-07 ENCOUNTER — TELEPHONE (OUTPATIENT)
Dept: PRIMARY CARE | Facility: CLINIC | Age: 65
End: 2025-02-07

## 2025-02-07 ENCOUNTER — APPOINTMENT (OUTPATIENT)
Dept: ORTHOPEDIC SURGERY | Facility: CLINIC | Age: 65
End: 2025-02-07
Payer: COMMERCIAL

## 2025-02-07 DIAGNOSIS — S83.231A COMPLEX TEAR OF MEDIAL MENISCUS OF RIGHT KNEE AS CURRENT INJURY, INITIAL ENCOUNTER: ICD-10-CM

## 2025-02-07 DIAGNOSIS — E78.00 PURE HYPERCHOLESTEROLEMIA: ICD-10-CM

## 2025-02-07 DIAGNOSIS — R10.84 GENERALIZED ABDOMINAL PAIN: Primary | ICD-10-CM

## 2025-02-07 DIAGNOSIS — M25.561 CHRONIC PAIN OF RIGHT KNEE: ICD-10-CM

## 2025-02-07 DIAGNOSIS — I63.9 CEREBRAL INFARCTION, UNSPECIFIED: ICD-10-CM

## 2025-02-07 DIAGNOSIS — G89.29 CHRONIC PAIN OF RIGHT KNEE: ICD-10-CM

## 2025-02-07 DIAGNOSIS — I10 PRIMARY HYPERTENSION: ICD-10-CM

## 2025-02-07 DIAGNOSIS — M17.11 PRIMARY OSTEOARTHRITIS OF RIGHT KNEE: Primary | ICD-10-CM

## 2025-02-07 PROCEDURE — 99203 OFFICE O/P NEW LOW 30 MIN: CPT | Performed by: ORTHOPAEDIC SURGERY

## 2025-02-07 PROCEDURE — 1036F TOBACCO NON-USER: CPT | Performed by: ORTHOPAEDIC SURGERY

## 2025-02-07 NOTE — PROGRESS NOTES
Patient is a 64-year-old female who presents today for evaluation of right knee pain.  She has had x-rays and MRI.  Her x-rays demonstrate mild arthritic change.  Her MRI does demonstrate a degenerative tear of her medial meniscus with some underlying bony edema.  She did see another surgeon who recommended total knee replacement but she is not interested in that.  She is asking why she cannot have an arthroscopy.  She takes Tylenol.  She had previous injections which she says did not help.  She has not had any physical therapy.  She cannot take anti-inflammatories because of anticoagulation.    Right knee:  AAOx3, NAD, walks with a moderate antalgic gait  Varus allignment  Range of motion lacks 10 degrees of full extension and flexes to 110 degrees  Stable to varus/valgus/anterior/posterior stress through out the range of motion  Slight laxity with varus stress  Diffuse medial  joint line tenderness to palpation  Moderate effusion  SILT in a howie/saph/per/tib distribution  5/5 knee extension/df/pf/ehl  ½ dorsalis pedis and posterior tibial pulse  no popliteal lymphadenopathy  no other overlying lesions  mood: euthymic  Respirations non labored    We reviewed her plain films as well as her MRI today in clinic.  She has mild to moderate osteoarthritis of her right knee as well as a degenerative medial meniscus tear.    I discussed with her and her daughter who partially translates for her today in clinic that there are patients who are not good candidates for arthroscopy because of her underlying arthritic change and some of those patients can be worse after that operation than they were before.  At this time she is not seem interested in total knee replacement.  We did refer her to one of my partners who does arthroscopy.  I did discuss with them that she may not be a candidate for this.  All of her questions were answered.  She will follow-up with me on an as-needed basis.    This note was created using voice  recognition software and was not corrected for typographical or grammatical errors.

## 2025-02-11 ENCOUNTER — LAB (OUTPATIENT)
Dept: LAB | Facility: HOSPITAL | Age: 65
End: 2025-02-11
Payer: COMMERCIAL

## 2025-02-11 DIAGNOSIS — Z17.1 MALIGNANT NEOPLASM OF UPPER-OUTER QUADRANT OF RIGHT BREAST IN FEMALE, ESTROGEN RECEPTOR NEGATIVE: ICD-10-CM

## 2025-02-11 DIAGNOSIS — S83.241S TEAR OF MEDIAL MENISCUS OF RIGHT KNEE, CURRENT, UNSPECIFIED TEAR TYPE, SEQUELA: ICD-10-CM

## 2025-02-11 DIAGNOSIS — Z51.81 ENCOUNTER FOR MONITORING CARDIOTOXIC DRUG THERAPY: ICD-10-CM

## 2025-02-11 DIAGNOSIS — Z79.899 ENCOUNTER FOR MONITORING CARDIOTOXIC DRUG THERAPY: ICD-10-CM

## 2025-02-11 DIAGNOSIS — C50.411 MALIGNANT NEOPLASM OF UPPER-OUTER QUADRANT OF RIGHT BREAST IN FEMALE, ESTROGEN RECEPTOR NEGATIVE: ICD-10-CM

## 2025-02-11 LAB
INR PPP: 1 (ref 0.9–1.1)
PROTHROMBIN TIME: 11.4 SECONDS (ref 9.8–12.8)

## 2025-02-11 PROCEDURE — 85610 PROTHROMBIN TIME: CPT

## 2025-02-12 LAB
ALBUMIN SERPL-MCNC: 4.7 G/DL (ref 3.6–5.1)
ALP SERPL-CCNC: 72 U/L (ref 37–153)
ALT SERPL-CCNC: 16 U/L (ref 6–29)
ANION GAP SERPL CALCULATED.4IONS-SCNC: 9 MMOL/L (CALC) (ref 7–17)
AST SERPL-CCNC: 17 U/L (ref 10–35)
BILIRUB SERPL-MCNC: 0.6 MG/DL (ref 0.2–1.2)
BUN SERPL-MCNC: 14 MG/DL (ref 7–25)
CALCIUM SERPL-MCNC: 9.6 MG/DL (ref 8.6–10.4)
CHLORIDE SERPL-SCNC: 106 MMOL/L (ref 98–110)
CHOLEST SERPL-MCNC: 249 MG/DL
CHOLEST/HDLC SERPL: 3.4 (CALC)
CO2 SERPL-SCNC: 27 MMOL/L (ref 20–32)
CREAT SERPL-MCNC: 0.61 MG/DL (ref 0.5–1.05)
EGFRCR SERPLBLD CKD-EPI 2021: 101 ML/MIN/1.73M2
ERYTHROCYTE [DISTWIDTH] IN BLOOD BY AUTOMATED COUNT: 12.9 % (ref 11–15)
EST. AVERAGE GLUCOSE BLD GHB EST-MCNC: 134 MG/DL
EST. AVERAGE GLUCOSE BLD GHB EST-SCNC: 7.4 MMOL/L
GLUCOSE SERPL-MCNC: 107 MG/DL (ref 65–99)
HBA1C MFR BLD: 6.3 % OF TOTAL HGB
HCT VFR BLD AUTO: 46 % (ref 35–45)
HDLC SERPL-MCNC: 74 MG/DL
HGB BLD-MCNC: 15.1 G/DL (ref 11.7–15.5)
LDLC SERPL CALC-MCNC: 150 MG/DL (CALC)
MCH RBC QN AUTO: 27.8 PG (ref 27–33)
MCHC RBC AUTO-ENTMCNC: 32.8 G/DL (ref 32–36)
MCV RBC AUTO: 84.6 FL (ref 80–100)
NONHDLC SERPL-MCNC: 175 MG/DL (CALC)
PLATELET # BLD AUTO: 331 THOUSAND/UL (ref 140–400)
PMV BLD REES-ECKER: 9.6 FL (ref 7.5–12.5)
POTASSIUM SERPL-SCNC: 3.8 MMOL/L (ref 3.5–5.3)
PROT SERPL-MCNC: 7.1 G/DL (ref 6.1–8.1)
RBC # BLD AUTO: 5.44 MILLION/UL (ref 3.8–5.1)
SODIUM SERPL-SCNC: 142 MMOL/L (ref 135–146)
TRIGL SERPL-MCNC: 130 MG/DL
TSH SERPL-ACNC: 1.22 MIU/L (ref 0.4–4.5)
WBC # BLD AUTO: 4.7 THOUSAND/UL (ref 3.8–10.8)

## 2025-02-12 RX ORDER — EZETIMIBE 10 MG/1
10 TABLET ORAL DAILY
Qty: 90 TABLET | Refills: 1 | Status: SHIPPED | OUTPATIENT
Start: 2025-02-12

## 2025-02-16 DIAGNOSIS — E78.00 ELEVATED LDL CHOLESTEROL LEVEL: Primary | ICD-10-CM

## 2025-02-16 DIAGNOSIS — Z78.9: ICD-10-CM

## 2025-02-16 RX ORDER — ATORVASTATIN CALCIUM 40 MG/1
40 TABLET, FILM COATED ORAL DAILY
Qty: 90 TABLET | Refills: 1 | Status: SHIPPED | OUTPATIENT
Start: 2025-02-16 | End: 2025-02-16

## 2025-02-16 RX ORDER — ATORVASTATIN CALCIUM 40 MG/1
40 TABLET, FILM COATED ORAL DAILY
Qty: 90 TABLET | Refills: 1 | Status: SHIPPED | OUTPATIENT
Start: 2025-02-16 | End: 2026-03-23

## 2025-02-16 NOTE — RESULT ENCOUNTER NOTE
Patient's blood test shows that her cholesterol level is not controlled.  The blood sugar level is in the prediabetic range.  Unchanged compared to the previous 1.  We recommend to start a cholesterol medication.  A prescription of atorvastatin was sent to the pharmacy.  The patient should take it once at bedtime.

## 2025-02-18 ENCOUNTER — HOSPITAL ENCOUNTER (OUTPATIENT)
Dept: RADIOLOGY | Facility: CLINIC | Age: 65
Discharge: HOME | End: 2025-02-18
Payer: COMMERCIAL

## 2025-02-18 DIAGNOSIS — R10.31 RLQ ABDOMINAL PAIN: ICD-10-CM

## 2025-02-18 PROCEDURE — 74177 CT ABD & PELVIS W/CONTRAST: CPT

## 2025-02-18 PROCEDURE — 74177 CT ABD & PELVIS W/CONTRAST: CPT | Performed by: STUDENT IN AN ORGANIZED HEALTH CARE EDUCATION/TRAINING PROGRAM

## 2025-02-18 PROCEDURE — 2550000001 HC RX 255 CONTRASTS: Performed by: FAMILY MEDICINE

## 2025-02-18 RX ADMIN — IOHEXOL 75 ML: 350 INJECTION, SOLUTION INTRAVENOUS at 10:25

## 2025-02-21 ENCOUNTER — TELEPHONE (OUTPATIENT)
Dept: PRIMARY CARE | Facility: CLINIC | Age: 65
End: 2025-02-21
Payer: COMMERCIAL

## 2025-02-21 DIAGNOSIS — R31.0 MACROSCOPIC HEMATURIA: Primary | ICD-10-CM

## 2025-02-24 PROBLEM — R31.0 MACROSCOPIC HEMATURIA: Status: ACTIVE | Noted: 2025-02-24

## 2025-02-24 NOTE — TELEPHONE ENCOUNTER
Patient CT scan of the abdomen and pelvis showed no new abnormal findings.  We recommend urine test to be done at the lab and a referral to a urologist.  The urologist referral was placed in the system.  She can call the [facility] number in order to schedule the visit.

## 2025-02-27 ENCOUNTER — APPOINTMENT (OUTPATIENT)
Dept: OBSTETRICS AND GYNECOLOGY | Facility: CLINIC | Age: 65
End: 2025-02-27
Payer: COMMERCIAL

## 2025-02-27 VITALS
HEIGHT: 65 IN | SYSTOLIC BLOOD PRESSURE: 118 MMHG | BODY MASS INDEX: 26.19 KG/M2 | DIASTOLIC BLOOD PRESSURE: 80 MMHG | WEIGHT: 157.2 LBS

## 2025-02-27 DIAGNOSIS — Z01.419 NORMAL GYNECOLOGIC EXAMINATION: Primary | ICD-10-CM

## 2025-02-27 DIAGNOSIS — R31.0 MACROSCOPIC HEMATURIA: ICD-10-CM

## 2025-02-27 DIAGNOSIS — R30.0 DYSURIA: ICD-10-CM

## 2025-02-27 LAB
BILIRUBIN, POC: NEGATIVE
BLOOD URINE, POC: POSITIVE
CLARITY, POC: CLEAR
COLOR, POC: YELLOW
GLUCOSE URINE, POC: NEGATIVE
KETONES, POC: NEGATIVE
LEUKOCYTE EST, POC: ABNORMAL
NITRITE, POC: NEGATIVE
PH, POC: 7
SPECIFIC GRAVITY, POC: 1.01
URINE PROTEIN, POC: NEGATIVE
UROBILINOGEN, POC: 0.2

## 2025-02-27 PROCEDURE — 3074F SYST BP LT 130 MM HG: CPT | Performed by: OBSTETRICS & GYNECOLOGY

## 2025-02-27 PROCEDURE — 1036F TOBACCO NON-USER: CPT | Performed by: OBSTETRICS & GYNECOLOGY

## 2025-02-27 PROCEDURE — 3008F BODY MASS INDEX DOCD: CPT | Performed by: OBSTETRICS & GYNECOLOGY

## 2025-02-27 PROCEDURE — 81002 URINALYSIS NONAUTO W/O SCOPE: CPT | Performed by: OBSTETRICS & GYNECOLOGY

## 2025-02-27 PROCEDURE — 3079F DIAST BP 80-89 MM HG: CPT | Performed by: OBSTETRICS & GYNECOLOGY

## 2025-02-27 PROCEDURE — 99396 PREV VISIT EST AGE 40-64: CPT | Performed by: OBSTETRICS & GYNECOLOGY

## 2025-02-27 ASSESSMENT — ENCOUNTER SYMPTOMS
HEMATOLOGIC/LYMPHATIC NEGATIVE: 1
GASTROINTESTINAL NEGATIVE: 1
RESPIRATORY NEGATIVE: 1
MUSCULOSKELETAL NEGATIVE: 1
NEUROLOGICAL NEGATIVE: 1
ALLERGIC/IMMUNOLOGIC NEGATIVE: 1
ENDOCRINE NEGATIVE: 1
CONSTITUTIONAL NEGATIVE: 1
EYES NEGATIVE: 1
CARDIOVASCULAR NEGATIVE: 1
PSYCHIATRIC NEGATIVE: 1

## 2025-02-27 ASSESSMENT — PATIENT HEALTH QUESTIONNAIRE - PHQ9
1. LITTLE INTEREST OR PLEASURE IN DOING THINGS: NOT AT ALL
SUM OF ALL RESPONSES TO PHQ9 QUESTIONS 1 & 2: 0
2. FEELING DOWN, DEPRESSED OR HOPELESS: NOT AT ALL

## 2025-02-27 ASSESSMENT — PAIN SCALES - GENERAL: PAINLEVEL_OUTOF10: 0-NO PAIN

## 2025-02-27 NOTE — PROGRESS NOTES
Subjective   Patient ID: Cassandra Lang is a 64 y.o. female who presents for Annual Exam (Pt also complains of dysuria./Last pap:  5/20/2019  neg/neg./Last yamile:  2/3/2025  (bilat diag)  cat 2./Last colon screen:  3/18/2015./Declines karine Winters LPN).  HPI patient is here for annual visit she complains of recurrent hematuria has been examined by her primary care and she has a referral to urology for further evaluation CT scan of the pelvis and abdomen failed to reveal reveal any stones in the kidney or dilated ureters the bladder could not be evaluated properly because he has does not have a proper distention to evaluation of thickening of the bladder wall    Review of Systems   Constitutional: Negative.    Eyes: Negative.    Respiratory: Negative.     Cardiovascular: Negative.    Gastrointestinal: Negative.    Endocrine: Negative.    Genitourinary: Negative.    Musculoskeletal: Negative.    Skin: Negative.    Allergic/Immunologic: Negative.    Neurological: Negative.    Hematological: Negative.    Psychiatric/Behavioral: Negative.         Objective   Physical Exam  Constitutional:       Appearance: Normal appearance.   HENT:      Head: Normocephalic and atraumatic.   Cardiovascular:      Rate and Rhythm: Normal rate and regular rhythm.      Pulses: Normal pulses.      Heart sounds: Normal heart sounds.   Pulmonary:      Effort: Pulmonary effort is normal.      Breath sounds: Normal breath sounds.   Abdominal:      General: Abdomen is flat. Bowel sounds are normal.      Palpations: Abdomen is soft.      Hernia: There is no hernia in the left inguinal area or right inguinal area.   Genitourinary:     General: Normal vulva.      Exam position: Lithotomy position.      Labia:         Right: No rash, tenderness or lesion.         Left: No rash, tenderness or lesion.       Urethra: No prolapse.      Vagina: Normal.      Cervix: Normal.      Uterus: Normal.       Adnexa: Right adnexa normal and left adnexa  normal.   Musculoskeletal:      Cervical back: Normal range of motion and neck supple.   Skin:     General: Skin is warm and dry.   Neurological:      General: No focal deficit present.      Mental Status: She is alert and oriented to person, place, and time.         Assessment/Plan    hematuria  Pap test HPV typing  Urology consult         Messi Brown MD 02/27/25 11:29 AM

## 2025-03-18 ENCOUNTER — OFFICE VISIT (OUTPATIENT)
Dept: UROLOGY | Facility: HOSPITAL | Age: 65
End: 2025-03-18
Payer: COMMERCIAL

## 2025-03-18 ENCOUNTER — LAB (OUTPATIENT)
Dept: LAB | Facility: HOSPITAL | Age: 65
End: 2025-03-18
Payer: COMMERCIAL

## 2025-03-18 DIAGNOSIS — R31.0 MACROSCOPIC HEMATURIA: Primary | ICD-10-CM

## 2025-03-18 DIAGNOSIS — R31.0 GROSS HEMATURIA: Primary | ICD-10-CM

## 2025-03-18 LAB
CYTOLOGY CMNT CVX/VAG CYTO-IMP: NORMAL
HPV HR 12 DNA GENITAL QL NAA+PROBE: NEGATIVE
HPV HR GENOTYPES PNL CVX NAA+PROBE: NEGATIVE
HPV16 DNA SPEC QL NAA+PROBE: NEGATIVE
HPV18 DNA SPEC QL NAA+PROBE: NEGATIVE
LAB AP HPV GENOTYPE QUESTION: YES
LAB AP HPV HR: NORMAL
LABORATORY COMMENT REPORT: NORMAL
MENSTRUAL HX REPORTED: NORMAL
PATH REPORT.TOTAL CANCER: NORMAL
POC APPEARANCE, URINE: CLEAR
POC BILIRUBIN, URINE: NEGATIVE
POC BLOOD, URINE: ABNORMAL
POC COLOR, URINE: YELLOW
POC GLUCOSE, URINE: NEGATIVE MG/DL
POC KETONES, URINE: NEGATIVE MG/DL
POC LEUKOCYTES, URINE: NEGATIVE
POC NITRITE,URINE: NEGATIVE
POC PH, URINE: 6.5 PH
POC PROTEIN, URINE: NEGATIVE MG/DL
POC SPECIFIC GRAVITY, URINE: 1.01
POC UROBILINOGEN, URINE: 0.2 EU/DL

## 2025-03-18 PROCEDURE — 81003 URINALYSIS AUTO W/O SCOPE: CPT | Mod: QW | Performed by: NURSE PRACTITIONER

## 2025-03-18 PROCEDURE — 1036F TOBACCO NON-USER: CPT | Performed by: NURSE PRACTITIONER

## 2025-03-18 PROCEDURE — 81001 URINALYSIS AUTO W/SCOPE: CPT

## 2025-03-18 PROCEDURE — 99214 OFFICE O/P EST MOD 30 MIN: CPT | Performed by: NURSE PRACTITIONER

## 2025-03-18 PROCEDURE — G2211 COMPLEX E/M VISIT ADD ON: HCPCS | Performed by: NURSE PRACTITIONER

## 2025-03-18 NOTE — PROGRESS NOTES
[facility]  Outpatient Clinic Note    Patient Name:  Cassandra Lang  MRN:  [id number]  :  1960    Referring Provider: Jodi Lopez  Date of Service: 3/18/2025   Visit type: New patient visit     problem list/Chief complaint:  Macroscopic hematuria      HISTORY OF PRESENT ILLNESS:  Cassandra Lang is a 64 y.o. female with past medical history of HTN, HLD, prediabetes, PE on eliquis, CVA, PFO s/p repair, GERD, IBS, anxiety depression, vitamin D deficiency, right breast cancer s/p lumpectomy currently on treatment,  who presents for initial Urology visit. I performed a detailed review of the medical chart records lab testing and imaging. Patient referred to Urology for Macrohematuria.  Patient is accompanied by her daughter who is helping with translation. She reports lower back pain and bladder pain. 2 weeks ago she saw blood in her urine but not since then. She had a CT scan done and is unsure of results. She also had a pap test with her gynecologist and is still waiting for those results.     -Urinary infection? yes  -Kidney or bladder stones? no  -Period in women? no  -Kidney disease? no   -Kidney trauma? no  -Family hx or personal hx of bladder cancer, Kidney cancer or Cancer of the lining of the urinary tract? no  -Anti-swelling drugs (joint swelling and pain pills)? no   -Vigorous workout? no  -Smoker? no  -Workplace exposure to chemicals such as benzene or aromatic amines? no  -Urinary catheter? no      I personally reviewed CT AP dated 25.   IMPRESSION:  1.  No evidence of acute abnormality in the abdomen/pelvis. Appendix  is surgically absent. Bladder is not well evaluated on CT scan.  2. Chronic findings as described above.      PAST MEDICAL HISTORY:  Past Medical History:   Diagnosis Date    Abnormal findings on diagnostic imaging of other specified body structures     Abnormal CT of the chest    Breast cancer (Multi)     Cancer (Multi)     Heart disease     High  blood pressure     Hx antineoplastic chemo     right breast    Hyperlipidemia     Personal history of other medical treatment     History of echocardiogram    Personal history of other medical treatment     History of transesophageal echocardiography (MAYKEL)    Personal history of other medical treatment     History of cardiac monitoring    Personal history of transient ischemic attack (TIA), and cerebral infarction without residual deficits     History of cerebrovascular accident    Stroke (Multi)     Tinea unguium 10/12/2021    Onychomycosis of toenail       PAST SURGICAL HISTORY:  Past Surgical History:   Procedure Laterality Date    APPENDECTOMY  2014    Appendectomy    BREAST LUMPECTOMY Right 10/02/2023     SECTION, CLASSIC  2014     Section    IR CVC PORT  2023    IR CVC PORT 2023 A[facility] CVEPINV    MR HEAD ANGIO WO IV CONTRAST  2020    MR HEAD ANGIO WO IV CONTRAST 2020 [facility] CLINICAL LEGACY    MR NECK ANGIO WO IV CONTRAST  2020    MR NECK ANGIO WO IV CONTRAST 2020 [facility] CLINICAL LEGACY    OTHER SURGICAL HISTORY  2020    Esophagogastroduodenoscopy    OTHER SURGICAL HISTORY  2020    Colonoscopy    OTHER SURGICAL HISTORY  2021    Patent foramen ovale repair       ALLERGIES:  Allergies   Allergen Reactions    Sulfa (Sulfonamide Antibiotics) Rash    Sulfamethoxazole-Trimethoprim Rash       MEDICATIONS:  Current Outpatient Medications   Medication Instructions    acetaminophen (TYLENOL) 650 mg, oral, Every 4 hours PRN    amLODIPine (NORVASC) 10 mg, oral, Daily    apixaban (ELIQUIS) 5 mg, oral, 2 times daily    aspirin 81 mg, oral, Daily    atorvastatin (LIPITOR) 40 mg, oral, Daily    cholecalciferol (VITAMIN D-3) 25 mcg, oral, Daily    ezetimibe (ZETIA) 10 mg, oral, Daily    omega-3 (Fish OiL) 60- mg capsule 1,000 mg, Daily        SOCIAL HISTORY:  Social History     Tobacco Use    Smoking status: Never     Passive exposure: Never     Smokeless tobacco: Never   Vaping Use    Vaping status: Never Used   Substance Use Topics    Alcohol use: Never    Drug use: Never        FAMILY HISTORY:  Family History   Problem Relation Name Age of Onset    Diabetes Mother      Hypertension Mother      Cancer Mother      Coronary artery disease Father      Heart attack Maternal Grandfather      Stroke Other          REVIEW OF SYSTEMS:  10-pt ROS reviewed and negative except as mentioned above.    Vital signs:  not currently breastfeeding.    PHYSICAL EXAMINATION:  General: Appears comfortable and in no apparent distress.  Head: Normocephalic, atraumatic  Eyes: Non-injected conjunctiva, sclera clear, no proptosis  Lungs: Breathing is easy, non-labored. Speaking in clear and complete sentences. Normal diaphragmatic movement.  Cardiovascular: no peripheral edema, cyanosis or pallor.   Abdomen: soft, non-distended, non-tender  : Bladder: non tender, not distended  MSK: Ambulatory with steady gait, unassisted  Skin: No visible rashes or lesions  Neurologic: Alert, oriented to person, place, and time  Psychiatric: mood and affect appropriate      IMAGING DATA:   CT abdomen pelvis w IV contrast  Narrative: Interpreted By:  Chemo Naik,   STUDY:  CT ABDOMEN PELVIS W IV CONTRAST;  2/18/2025 10:25 am      INDICATION:  Signs/Symptoms:RLQ abdominal pain.      COMPARISON:  CT abdomen pelvis 11/18/2023.      ACCESSION NUMBER(S):  [id number]      ORDERING CLINICIAN:  RUBEN HARRIS      TECHNIQUE:  CT of the abdomen and pelvis was performed after administration of  intravenous contrast. Standard contiguous axial images were obtained  through the abdomen and pelvis. Coronal and sagittal reconstructions  were performed.      FINDINGS:  LOWER CHEST: No acute abnormality of the lung bases. Partially  visualized atrial septum closure device, similar to prior. BONES: No  acute osseous abnormality. Generalized osseous  demineralization/osteopenia. ABDOMINAL WALL: Within  normal limits.  LYMPH NODES: No pathologically enlarged lymph nodes in the abdomen or  pelvis.      ABDOMEN:      LIVER: Normal size and contour. No focal hepatic lesions.  BILE DUCTS: Normal caliber.  GALLBLADDER: No calcified gallstones. No wall thickening.  PANCREAS: No evidence of pancreatic duct dilatation or peripancreatic  edema. SPLEEN: Within normal limits.  ADRENALS: Within normal limits.  KIDNEYS and URETERS: No evidence of hydronephrosis or  nephrolithiasis. Normal size and enhancement pattern. 0.7 cm right  posterior midpole hypodensity too small to characterize, likely cyst.          VESSELS: No abdominal aortic aneurysm. Mild atherosclerotic  calcifications of the abdominal aorta. Mesenteric vessels appear  patent.      RETROPERITONEUM: No evidence of retroperitoneal hematoma.      PELVIS:      REPRODUCTIVE ORGANS: No pelvic masses.  BLADDER: Incompletely distended limiting evaluation for bladder wall  thickening.      BOWEL: Tiny hiatal hernia. Stomach is incompletely distended limiting  assessment for wall thickening. No bowel dilatation or obstruction.  Appendix is surgically absent. Moderate amount of formed stool  throughout the colon without wall thickening or acute inflammatory  change. PERITONEUM: No ascites or free air, no fluid collection.      Impression: 1.  No evidence of acute abnormality in the abdomen/pelvis. Appendix  is surgically absent. Bladder is not well evaluated on CT scan.  2. Chronic findings as described above.          Signed by: Chemo Naik 2/19/2025 3:40 PM  Dictation workstation:   [id number]      LABORATORY DATA:    Office Visit on 03/18/2025   Component Date Value    POC Color, Urine 03/18/2025 Yellow     POC Appearance, Urine 03/18/2025 Clear     POC Glucose, Urine 03/18/2025 NEGATIVE     POC Bilirubin, Urine 03/18/2025 NEGATIVE     POC Ketones, Urine 03/18/2025 NEGATIVE     POC Specific Gravity, Ur* 03/18/2025 1.010     POC Blood, Urine 03/18/2025 TRACE-Intact  (A)     POC PH, Urine 03/18/2025 6.5     POC Protein, Urine 03/18/2025 NEGATIVE     POC Urobilinogen, Urine 03/18/2025 0.2     Poc Nitrite, Urine 03/18/2025 NEGATIVE     POC Leukocytes, Urine 03/18/2025 NEGATIVE        ASSESSMENT:  Cassandra Lang is a 64 y.o. female with macroscopic hematuria, who presents for initial Urology visit.    1.Discussed the potential etiologies for hematuria, including physiologic, anticoagulation, NSAIDS, infection, stones, masses/cysts, inflammation, CKD, nephritis, voiding dysfunction, foreign bodies, malignancies, etc     To address the patient’s hematuria, I recommended the patient follow up with diagnostic hematuria workup which includes cystoscopy, urine culture, urine cytology, and CT Urogram. Patient is aware of the risks associated with intravenous contrast. There is no history of renal dysfunction. Risks of cystoscopy were discussed with the patient in great detail, including the risk of hematuria, UTI and discomfort. Patient understands and desires to proceed.     2.We discussed that simple renal cysts are benign findings and do not require intervention unless they are causing discomfort    PLAN:  -Reviewed CT scan results with patient and her daughter. Copy provided per their request  -UA today with trace blood. Urine microscopy and cytology ordered  -Referral for cystoscopy placed for gross hematuria  -Follow up as scheduled, or sooner if needed    All questions and concerns were addressed. Patient verbalizes understanding and has no other questions at this time.     E&M visit today is associated with current or anticipated ongoing medical care services related to a patient's single, serious condition or a complex condition.    COURTNEY Cerrato-CNP  [facility]  3/18/2025 10:08 AM

## 2025-03-19 LAB
RBC #/AREA URNS AUTO: NORMAL /HPF
WBC #/AREA URNS AUTO: NORMAL /HPF

## 2025-03-20 LAB
LABORATORY COMMENT REPORT: NORMAL
LABORATORY COMMENT REPORT: NORMAL
PATH REPORT.FINAL DX SPEC: NORMAL
PATH REPORT.GROSS SPEC: NORMAL
PATH REPORT.RELEVANT HX SPEC: NORMAL
PATH REPORT.TOTAL CANCER: NORMAL

## 2025-03-21 ENCOUNTER — TELEPHONE (OUTPATIENT)
Dept: ORTHOPEDIC SURGERY | Facility: HOSPITAL | Age: 65
End: 2025-03-21

## 2025-03-21 ENCOUNTER — OFFICE VISIT (OUTPATIENT)
Dept: ORTHOPEDIC SURGERY | Facility: HOSPITAL | Age: 65
End: 2025-03-21
Payer: COMMERCIAL

## 2025-03-21 VITALS — HEIGHT: 65 IN | BODY MASS INDEX: 26.66 KG/M2 | WEIGHT: 160 LBS

## 2025-03-21 DIAGNOSIS — M22.41 CHONDROMALACIA OF PATELLOFEMORAL JOINT, RIGHT: Primary | ICD-10-CM

## 2025-03-21 DIAGNOSIS — S83.231A COMPLEX TEAR OF MEDIAL MENISCUS OF RIGHT KNEE AS CURRENT INJURY, INITIAL ENCOUNTER: ICD-10-CM

## 2025-03-21 DIAGNOSIS — M65.961 SYNOVITIS OF RIGHT KNEE: ICD-10-CM

## 2025-03-21 RX ORDER — AMOXICILLIN 500 MG/1
TABLET, FILM COATED ORAL
COMMUNITY
Start: 2025-03-04

## 2025-03-21 ASSESSMENT — PAIN - FUNCTIONAL ASSESSMENT: PAIN_FUNCTIONAL_ASSESSMENT: 0-10

## 2025-03-21 ASSESSMENT — PAIN SCALES - GENERAL: PAINLEVEL_OUTOF10: 3

## 2025-03-21 NOTE — PROGRESS NOTES
PRIMARY CARE PHYSICIAN: Jodi Lopez MD  REFERRING PROVIDER: Hunter Paul MD  [id number]  [place] [address]     CONSULT ORTHOPAEDIC: Knee Evaluation    ASSESSMENT & PLAN    Impression: 64 y.o. female with right knee pain and instability related to a large medial meniscal root tear.      Plan:   I explained to the patient the nature of their diagnosis.  I reviewed their imaging studies with them.    Based on the history, physical exam and imaging studies above, the patient's presentation is consistent with the above diagnosis.  I had a long discussion with the patient regarding their presentation and the treatment options.  We discussed initial nonoperative versus operative management options as well as potential further diagnostic imaging.  I reviewed her MRI findings with her.  MRI confirms a medial meniscus posterior root tear.  She does have a mild amount of arthritis on her imaging but would benefit from surgery in the form of arthroscopic medial meniscal root repair for some stability to her knee given that she is not a candidate for total knee replacement.  After long discussion with the patient, she elected to proceed with surgical invention of form of a right knee arthroscopy, medial meniscus posterior root repair, intra-articular debridement synovectomy, chondroplasty.  I thoroughly explained the risks and benefits as well as the expected postoperative timeline for the proposed procedure versus nonoperative management. Risks of this procedure include but are not limited to bleeding, infection, nerve injury, DVT and failure of repair or implant.  The patient expressed understanding and wished to proceed with surgical intervention.  All questions were answered. We will begin the presurgical process and find them a surgical date in a timely fashion that works for them.    Patient was prescribed a hinged knee brace for medial meniscus root tear.The patient  is ambulatory with or without aid; but, has weakness, instability and/or deformity of their right knee which requires stabilization from this orthosis to improve their function.      Verbal and written instructions for the use, wear schedule, cleaning and application of this item were given.  Patient was instructed that should the brace result in increased pain, decreased sensation, increased swelling, or an overall worsening of their medical condition, to please contact our office immediately.     Orthotic management and training was provided for skin care, modifications due to healing tissues, edema changes, interruption in skin integrity, and safety precautions with the orthosis.    Follow-Up: At time of surgery     At the end of the visit, all questions were answered in full. The patient is in agreement with the plan and recommendations. They will call the office with any questions/concerns.    Note dictated with [employer] software. Completed without full typed error editing and sent to avoid delay.       SUBJECTIVE  CHIEF COMPLAINT:   Chief Complaint   Patient presents with    Right Knee - Pain        HPI: Cassandra Lang is a 64 y.o. female who presents today with right knee pain.  About a year ago, she was kneeling when she felt a pop in her knee and felt immediate pain and has had continued instability, catching and locking of her knee.  She underwent corticosteroid injection in July 2024 that she states may be provided 10% of pain relief for a few weeks.  She states that she has constant pain in her knee.  She said prior to this injury she had some mild pain in the knee but it is not like that she is undergoing now.    They deny any constant or progressive numbness or tingling in their legs.     REVIEW OF SYSTEMS  Constitutional: See HPI for pain assessment, No significant weight loss, recent trauma  Cardiovascular: No chest pain, shortness of breath  Respiratory: No difficulty  breathing, cough  Gastrointestinal: No nausea, vomiting, diarrhea, constipation  Musculoskeletal: Noted in HPI, positive for pain, restricted motion, stiffness  Integumentary: No rashes, easy bruising, redness   Neurological: no numbness or tingling in extremities, no gait disturbances   Psychiatric: No mood changes, memory changes, social issues  Heme/Lymph: no excessive swelling, easy bruising, excessive bleeding  ENT: No hearing changes  Eyes: No vision changes    Past Medical History:   Diagnosis Date    Abnormal findings on diagnostic imaging of other specified body structures     Abnormal CT of the chest    Breast cancer (Multi)     Cancer (Multi)     Heart disease     High blood pressure     Hx antineoplastic chemo     right breast    Hyperlipidemia     Personal history of other medical treatment     History of echocardiogram    Personal history of other medical treatment     History of transesophageal echocardiography (MAYKEL)    Personal history of other medical treatment     History of cardiac monitoring    Personal history of transient ischemic attack (TIA), and cerebral infarction without residual deficits     History of cerebrovascular accident    Stroke (Multi)     Tinea unguium 10/12/2021    Onychomycosis of toenail        Allergies   Allergen Reactions    Sulfa (Sulfonamide Antibiotics) Rash    Sulfamethoxazole-Trimethoprim Rash        Past Surgical History:   Procedure Laterality Date    APPENDECTOMY  2014    Appendectomy    BREAST LUMPECTOMY Right 10/02/2023     SECTION, CLASSIC  2014     Section    IR CVC PORT  2023    IR CVC PORT 2023 A[facility] CVEPINV    MR HEAD ANGIO WO IV CONTRAST  2020    MR HEAD ANGIO WO IV CONTRAST 2020 [facility] CLINICAL LEGACY    MR NECK ANGIO WO IV CONTRAST  2020    MR NECK ANGIO WO IV CONTRAST 2020 [facility] CLINICAL LEGACY    OTHER SURGICAL HISTORY  2020    Esophagogastroduodenoscopy    OTHER SURGICAL HISTORY   06/24/2020    Colonoscopy    OTHER SURGICAL HISTORY  03/04/2021    Patent foramen ovale repair        Family History   Problem Relation Name Age of Onset    Diabetes Mother      Hypertension Mother      Cancer Mother      Coronary artery disease Father      Heart attack Maternal Grandfather      Stroke Other          Social History     Socioeconomic History    Marital status:      Spouse name: Not on file    Number of children: Not on file    Years of education: Not on file    Highest education level: Not on file   Occupational History    Not on file   Tobacco Use    Smoking status: Never     Passive exposure: Never    Smokeless tobacco: Never   Vaping Use    Vaping status: Never Used   Substance and Sexual Activity    Alcohol use: Never    Drug use: Never    Sexual activity: Not Currently   Other Topics Concern    Not on file   Social History Narrative    Not on file     Social Drivers of Health     Financial Resource Strain: Low Risk  (2/15/2024)    Overall Financial Resource Strain (CARDIA)     Difficulty of Paying Living Expenses: Not hard at all   Food Insecurity: No Food Insecurity (3/14/2024)    Hunger Vital Sign     Worried About Running Out of Food in the Last Year: Never true     Ran Out of Food in the Last Year: Never true   Transportation Needs: No Transportation Needs (2/15/2024)    PRAPARE - Transportation     Lack of Transportation (Medical): No     Lack of Transportation (Non-Medical): No   Physical Activity: Not on file   Stress: Not on file   Social Connections: Not on file   Intimate Partner Violence: Not on file   Housing Stability: Low Risk  (2/15/2024)    Housing Stability Vital Sign     Unable to Pay for Housing in the Last Year: No     Number of Places Lived in the Last Year: 1     Unstable Housing in the Last Year: No        CURRENT MEDICATIONS:   Current Outpatient Medications   Medication Sig Dispense Refill    amoxicillin (Amoxil) 500 mg tablet take 1 tablet by mouth 3 times a day  "until gone      ezetimibe (Zetia) 10 mg tablet TAKE 1 TABLET BY MOUTH EVERY DAY 90 tablet 1    acetaminophen (Tylenol) 325 mg tablet Take 2 tablets (650 mg) by mouth every 4 hours if needed for mild pain (1 - 3). 30 tablet 0    amLODIPine (Norvasc) 10 mg tablet Take 1 tablet (10 mg) by mouth once daily. 90 tablet 1    apixaban (Eliquis) 5 mg tablet Take 1 tablet (5 mg) by mouth 2 times a day. 180 tablet 1    aspirin 81 mg EC tablet Take 1 tablet (81 mg) by mouth once daily. 90 tablet 1    atorvastatin (Lipitor) 40 mg tablet Take 1 tablet (40 mg) by mouth once daily. 90 tablet 1    cholecalciferol (Vitamin D-3) 25 MCG (1000 UT) capsule TAKE 1 CAPSULE (25 MCG) BY MOUTH ONCE DAILY. 90 capsule 1    omega-3 (Fish OiL) 60- mg capsule Take 2 capsules (1,000 mg) by mouth once daily.       No current facility-administered medications for this visit.        OBJECTIVE    PHYSICAL EXAM      10/22/2024    10:13 AM 12/4/2024     9:11 AM 1/7/2025     9:49 AM 1/25/2025     1:02 PM 1/29/2025     8:09 AM 2/27/2025     9:25 AM 3/21/2025     1:51 PM   Vitals   Systolic 142 123  129 123 118    Diastolic 84 86  88 86 80    BP Location Left arm Right arm  Left arm Right arm     Heart Rate 88 99  107 83     Temp 36.7 °C (98.1 °F) 36.8 °C (98.2 °F)  36.7 °C (98 °F) 36.7 °C (98 °F)     Resp    17      Height   1.6 m (5' 3\") 1.651 m (5' 5\")  1.651 m (5' 5\") 1.651 m (5' 5\")   Weight (lb) 160 160.61 165 152 152 157.2 160   BMI 26.63 kg/m2 26.73 kg/m2 29.23 kg/m2 25.29 kg/m2 25.29 kg/m2 26.16 kg/m2 26.63 kg/m2   BSA (m2) 1.82 m2 1.83 m2 1.82 m2 1.78 m2 1.78 m2 1.81 m2 1.82 m2   Visit Report Report Report Report Report Report Report Report      Body mass index is 26.63 kg/m².    GENERAL: A/Ox3, NAD. Appears healthy, well nourished  PSYCHIATRIC: Mood stable, appropriate memory recall  EYES: EOM intact, no scleral icterus  CARDIOVASCULAR: Palpable peripheral pulses  LUNGS: Breathing non-labored on room air  SKIN: no erythema, rashes, or " ecchymoses     MUSCULOSKELETAL:  Laterality: right Knee Exam  - Skin intact  - No erythema or warmth  - No ecchymosis or soft tissue swelling  - Alignment: varus  - Palpation: Positive tenderness medial joint line  - ROM: 0-5-130   - Effusion: None  - Strength: knee extension and flexion 5/5, EHL/PF/DF motor intact  - Stability:        Anterior drawer Negative       Posterior drawer Negative       Varus/valgus stable       negative Lachman  -Positive Issa's  - Gait: Antalgic  - Hip Exam: flexion to 100+ degrees, full extension, internal/external rotation adequate, and no pain with log roll  - Special Tests: NA    NEUROVASCULAR:  - Neurovascular Status: sensation intact to light touch distally, lower extremity motor intact  - Capillary refill brisk at extremities, Bilateral dorsalis pedis pulse 2+    Imaging: Multiple views of the affected right knee(s) demonstrate: Minimal degeneration most notably in the medial and patellofemoral compartments with early joint space narrowing, and subchondral sclerosis.  MRI of the right knee also personally reviewed and interpreted with evidence of medial meniscal root tearing.  Also mild MCL sprain.  X-rays were personally reviewed and interpreted by me.  Radiology reports were reviewed by me as well, if readily available at the time.      Claudio Rm MD  Attending Surgeon    Sports Medicine Orthopaedic Surgery  [facility] [place]  [facility] [employer]

## 2025-03-23 PROBLEM — M22.41 CHONDROMALACIA OF PATELLOFEMORAL JOINT, RIGHT: Status: ACTIVE | Noted: 2025-03-21

## 2025-03-23 PROBLEM — M65.961 SYNOVITIS OF RIGHT KNEE: Status: ACTIVE | Noted: 2025-03-21

## 2025-03-23 PROBLEM — S83.209A CURRENT TEAR OF SEMILUNAR CARTILAGE: Status: ACTIVE | Noted: 2025-03-21

## 2025-03-29 NOTE — PROGRESS NOTES
Subjective   Cassandra Lang is a 64 y.o. female presenting as a new patient today, kindly referred by GABRIELA Cerrato  due to gross hematuria. Patient presents today for cystoscopy to complete hematuria workup. CT A&P from  was negative. Urine cytology from 3/18 showed clusters of urothelial cells lacking significant cytologic atypia. Microscopic UA from 3/18 was negative.       Past Medical History:   Diagnosis Date    Abnormal findings on diagnostic imaging of other specified body structures     Abnormal CT of the chest    Breast cancer (Multi)     Cancer (Multi)     Heart disease     High blood pressure     Hx antineoplastic chemo     right breast    Hyperlipidemia     Personal history of other medical treatment     History of echocardiogram    Personal history of other medical treatment     History of transesophageal echocardiography (MAYKEL)    Personal history of other medical treatment     History of cardiac monitoring    Personal history of transient ischemic attack (TIA), and cerebral infarction without residual deficits     History of cerebrovascular accident    Stroke (Multi)     Tinea unguium 10/12/2021    Onychomycosis of toenail     Past Surgical History:   Procedure Laterality Date    APPENDECTOMY  2014    Appendectomy    BREAST LUMPECTOMY Right 10/02/2023     SECTION, CLASSIC  2014     Section    IR CVC PORT  2023    IR CVC PORT 2023 [facility]    MR HEAD ANGIO WO IV CONTRAST  2020    MR HEAD ANGIO WO IV CONTRAST 2020 [facility]    MR NECK ANGIO WO IV CONTRAST  2020    MR NECK ANGIO WO IV CONTRAST 2020 [facility] CLINICAL LEGAC[facility]    OTHER SURGICAL HISTORY  2020    Esophagogastroduodenoscopy    OTHER SURGICAL HISTORY  2020    Colonoscopy    OTHER SURGICAL HISTORY  2021    Patent foramen ovale repair     Family History   Problem Relation Name Age of Onset    Diabetes Mother      Hypertension Mother       Cancer Mother      Coronary artery disease Father      Heart attack Maternal Grandfather      Stroke Other       Current Outpatient Medications   Medication Sig Dispense Refill    ezetimibe (Zetia) 10 mg tablet TAKE 1 TABLET BY MOUTH EVERY DAY 90 tablet 1    acetaminophen (Tylenol) 325 mg tablet Take 2 tablets (650 mg) by mouth every 4 hours if needed for mild pain (1 - 3). 30 tablet 0    amLODIPine (Norvasc) 10 mg tablet Take 1 tablet (10 mg) by mouth once daily. 90 tablet 1    amoxicillin (Amoxil) 500 mg tablet take 1 tablet by mouth 3 times a day until gone      apixaban (Eliquis) 5 mg tablet Take 1 tablet (5 mg) by mouth 2 times a day. 180 tablet 1    aspirin 81 mg EC tablet Take 1 tablet (81 mg) by mouth once daily. 90 tablet 1    atorvastatin (Lipitor) 40 mg tablet Take 1 tablet (40 mg) by mouth once daily. 90 tablet 1    cholecalciferol (Vitamin D-3) 25 MCG (1000 UT) capsule TAKE 1 CAPSULE (25 MCG) BY MOUTH ONCE DAILY. 90 capsule 1    omega-3 (Fish OiL) 60- mg capsule Take 2 capsules (1,000 mg) by mouth once daily.       No current facility-administered medications for this visit.     Allergies   Allergen Reactions    Sulfa (Sulfonamide Antibiotics) Rash    Sulfamethoxazole-Trimethoprim Rash     Social History     Socioeconomic History    Marital status:      Spouse name: Not on file    Number of children: Not on file    Years of education: Not on file    Highest education level: Not on file   Occupational History    Not on file   Tobacco Use    Smoking status: Never     Passive exposure: Never    Smokeless tobacco: Never   Vaping Use    Vaping status: Never Used   Substance and Sexual Activity    Alcohol use: Never    Drug use: Never    Sexual activity: Not Currently   Other Topics Concern    Not on file   Social History Narrative    Not on file     Social Drivers of Health     Financial Resource Strain: Low Risk  (2/15/2024)    Overall Financial Resource Strain (CARDIA)     Difficulty of  Paying Living Expenses: Not hard at all   Food Insecurity: No Food Insecurity (3/14/2024)    Hunger Vital Sign     Worried About Running Out of Food in the Last Year: Never true     Ran Out of Food in the Last Year: Never true   Transportation Needs: No Transportation Needs (2/15/2024)    PRAPARE - Transportation     Lack of Transportation (Medical): No     Lack of Transportation (Non-Medical): No   Physical Activity: Not on file   Stress: Not on file   Social Connections: Not on file   Intimate Partner Violence: Not on file   Housing Stability: Low Risk  (2/15/2024)    Housing Stability Vital Sign     Unable to Pay for Housing in the Last Year: No     Number of Places Lived in the Last Year: 1     Unstable Housing in the Last Year: No       Review of Systems  Pertinent items are noted in HPI.    Objective     Cystoscopy performed:   Cassandra Lang identified using two (2) forms of identification.  Procedure: diagnostic cystourethroscopy  Indications for procedure: Gross hematuria   Risks, benefits, and alternatives were discussed in detail.   Patient appears to understand and agrees to proceed.   Patient has signed the procedure consent form.     Cystoscopy findings:  Urethra: normal course and caliber, no evidence of stricture or lesion.  Bladder: normal capacity, no trabeculations, no diverticulum, no stone, tumors or other lesions.  Post-cystoscopy: Patient tolerated procedure without complications.    Lab Review  Lab Results   Component Value Date    WBC 4.7 02/11/2025    RBC 5.44 (H) 02/11/2025    HGB 15.1 02/11/2025    HCT 46.0 (H) 02/11/2025     02/11/2025      Lab Results   Component Value Date    BUN 14 02/11/2025    CREATININE 0.61 02/11/2025     Urine analysis shows +blood       Assessment/Plan   There are no diagnoses linked to this encounter.    Gross hematuria     CT A&P from 2/18 was negative. Urine cytology from 3/18 showed clusters of urothelial cells lacking significant cytologic atypia.  Microscopic UA from 3/18 was negative.  CT was negative.    Cystoscopy today showed no abnormalities. The patient will follow-up with Shreya Carson annually for a urine analysis check.     All questions were answered to the patient's satisfaction. Patient agrees with the plan and wishes to proceed. Follow-up will be scheduled appropriately.     E&M visit today is associated with current or anticipated ongoing medical care services related to a patient's single, serious condition or a complex condition.    I spent 30 minutes of dedicated E&M time, including preparation and review of records, notes, and data, time spent with patient/family, and documentation.      Scribed for Dr. Pacheco by Karo Keating. I , Dr Pacheco, have personally reviewed and agreed with the information entered by the Virtual Scribe.

## 2025-03-31 ENCOUNTER — APPOINTMENT (OUTPATIENT)
Dept: UROLOGY | Facility: CLINIC | Age: 65
End: 2025-03-31
Payer: COMMERCIAL

## 2025-03-31 VITALS
BODY MASS INDEX: 26.82 KG/M2 | WEIGHT: 161 LBS | TEMPERATURE: 97.2 F | SYSTOLIC BLOOD PRESSURE: 133 MMHG | HEIGHT: 65 IN | DIASTOLIC BLOOD PRESSURE: 90 MMHG | HEART RATE: 96 BPM

## 2025-03-31 DIAGNOSIS — R31.0 GROSS HEMATURIA: Primary | ICD-10-CM

## 2025-03-31 DIAGNOSIS — Z79.2 NEED FOR PROPHYLACTIC ANTIBIOTIC: ICD-10-CM

## 2025-03-31 LAB
POC APPEARANCE, URINE: CLEAR
POC BILIRUBIN, URINE: NEGATIVE
POC BLOOD, URINE: ABNORMAL
POC COLOR, URINE: YELLOW
POC GLUCOSE, URINE: NEGATIVE MG/DL
POC KETONES, URINE: NEGATIVE MG/DL
POC LEUKOCYTES, URINE: ABNORMAL
POC NITRITE,URINE: NEGATIVE
POC PH, URINE: 5.5 PH
POC PROTEIN, URINE: NEGATIVE MG/DL
POC SPECIFIC GRAVITY, URINE: 1.01
POC UROBILINOGEN, URINE: 0.2 EU/DL

## 2025-03-31 PROCEDURE — 81003 URINALYSIS AUTO W/O SCOPE: CPT | Performed by: UROLOGY

## 2025-03-31 PROCEDURE — 52000 CYSTOURETHROSCOPY: CPT | Performed by: UROLOGY

## 2025-03-31 PROCEDURE — 99214 OFFICE O/P EST MOD 30 MIN: CPT | Performed by: UROLOGY

## 2025-03-31 RX ORDER — CEPHALEXIN 500 MG/1
500 CAPSULE ORAL ONCE
Qty: 1 CAPSULE | Refills: 0 | Status: SHIPPED | OUTPATIENT
Start: 2025-03-31 | End: 2025-03-31

## 2025-03-31 ASSESSMENT — PAIN SCALES - GENERAL: PAINLEVEL_OUTOF10: 0-NO PAIN

## 2025-04-01 ENCOUNTER — OFFICE VISIT (OUTPATIENT)
Dept: HEMATOLOGY/ONCOLOGY | Facility: CLINIC | Age: 65
End: 2025-04-01
Payer: COMMERCIAL

## 2025-04-01 VITALS
WEIGHT: 161.4 LBS | DIASTOLIC BLOOD PRESSURE: 82 MMHG | OXYGEN SATURATION: 98 % | TEMPERATURE: 97.9 F | HEART RATE: 76 BPM | SYSTOLIC BLOOD PRESSURE: 117 MMHG | BODY MASS INDEX: 26.86 KG/M2

## 2025-04-01 DIAGNOSIS — Z17.1 MALIGNANT NEOPLASM OF UPPER-OUTER QUADRANT OF RIGHT BREAST IN FEMALE, ESTROGEN RECEPTOR NEGATIVE: ICD-10-CM

## 2025-04-01 DIAGNOSIS — Z85.3 ENCOUNTER FOR FOLLOW-UP SURVEILLANCE OF BREAST CANCER: ICD-10-CM

## 2025-04-01 DIAGNOSIS — Z92.21 STATUS POST ADMINISTRATION OF CARDIOTOXIC CHEMOTHERAPY: ICD-10-CM

## 2025-04-01 DIAGNOSIS — C50.411 MALIGNANT NEOPLASM OF UPPER-OUTER QUADRANT OF RIGHT BREAST IN FEMALE, ESTROGEN RECEPTOR NEGATIVE: ICD-10-CM

## 2025-04-01 DIAGNOSIS — Z08 ENCOUNTER FOR FOLLOW-UP SURVEILLANCE OF BREAST CANCER: ICD-10-CM

## 2025-04-01 PROCEDURE — 3079F DIAST BP 80-89 MM HG: CPT | Performed by: NURSE PRACTITIONER

## 2025-04-01 PROCEDURE — 99214 OFFICE O/P EST MOD 30 MIN: CPT | Performed by: NURSE PRACTITIONER

## 2025-04-01 PROCEDURE — 3074F SYST BP LT 130 MM HG: CPT | Performed by: NURSE PRACTITIONER

## 2025-04-01 ASSESSMENT — PAIN SCALES - GENERAL: PAINLEVEL_OUTOF10: 2

## 2025-04-01 NOTE — PATIENT INSTRUCTIONS
Shawna VALDEZ, CNP follow up in 4 months Aug 2025,     Mammogram 2/3/2025 was normal. Planned repeat AFTER 2/3/26 and you will see Dr Soria's NP Jessa Sun     PCP Dr Hernandez Annually and as needed.    Please call [phone] with any questions or concerns prior to your next office visit.

## 2025-04-01 NOTE — PROGRESS NOTES
Patient ID: Cassandra Lang is a 64 y.o. female.  BREAST CANCER DIAGNOSIS:   Malignant neoplasm of upper-outer quadrant of right breast in female, estrogen receptor negative, Pathologic: Stage IIB (pT2, pN1mi(sn), cM0, G3, ER-, MD-, HER2+)     -Sept and 2023 Excisional biopsy of ADH followed by re-excision lumpectomy, SLN with Dr Soria     -11/15/2023 12 weeks of weekly paclitaxel/trastuzumab completed on 24.     -Decline of recommended radiation therapy due to financial burden     -24 early completion of maintenance q3 wk trastuzumab due to financial burden.       Past Medical History: Cassandra has a past medical history of Abnormal findings on diagnostic imaging of other specified body structures, Breast cancer (Multi) (), Cancer (Multi), Heart disease, High blood pressure, antineoplastic chemo (), Hyperlipidemia, Personal history of other medical treatment, Personal history of other medical treatment, Personal history of other medical treatment, Personal history of transient ischemic attack (TIA), and cerebral infarction without residual deficits, Stroke (Multi), and Tinea unguium (10/12/2021).  Surgical History:  Cassandra has a past surgical history that includes  section, classic (2014); Appendectomy (2014); Other surgical history (2020); Other surgical history (2020); Other surgical history (2021); MR angio head wo IV contrast (2020); MR angio neck wo IV contrast (2020); Breast lumpectomy (Right, 10/02/2023); and IR CVC port (2023).  Social History:  Cassandra reports that she has never smoked. She has never been exposed to tobacco smoke. She has never used smokeless tobacco. She reports that she does not drink alcohol and does not use drugs.  Lives in a home with  Vin (Hx of a stroke), 3 adult children all live in the home - Merry is a researcher and is going to PA school, daughter Gladys works in anesthesia, son Phnai is disabled  from severe RA. 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 Pt and  [place]oved [place] in .     REPRODUCTIVE HEALTH: menarche at 16, postmenopausal from natural causes age 54,  with first birth at 31,  x 1 month, denies exposure to birth control pills or post menopausal estrogen.       Family History:    Family History   Problem Relation Name Age of Onset    Diabetes Mother      Hypertension Mother      Cancer Mother      Coronary artery disease Father      Heart attack Maternal Grandfather      Stroke Other       Family Oncology History:  Cancer-related family history includes Cancer in her mother.      CURRENT THERAPY  Observational therapy     HISTORY OF PRESENT ILLNESS     Cassandra Lang is a 64 y.o. woman who is here for follow-up breast cancer follow up. Today I have reviewed with the patient I will be conducting a clinical physical breast exam. Patient has declined a second medical professional today during the exam as a chapperone.       She is observational therapy and denies any concerns for return of breast cancer. She is doing well. She reports the right breast is tender, but denies any skin lesions or masses, oral sores lesions or infections    She denies any chest pain or breathing issues, no sob and denies cough.     She denies any vision changes however is dealing with eye inflammation and is following with an eye dr.  She denies headache issues, dizziness or loss of balance, no falls.      She denies any new or unexplained bone aches or pains with exception to arthritis in right knee and wrists and hands. She is having right knee scope in May.      She reports a normal appetite but struggles at baseline since before the cancer with reflux. She reports normal bowel movements and at baseline and is taking daily probiotic. She reports pelvic pressure and blood in the urine - underwent a scope to the bladder yesterday.       She denies any issues with sleep. She has stable  fatigue    Review of Systems  ROS 14 points performed, See HPI for exceptions      OBJECTIVE:    VS / Pain:  /82 (BP Location: Right arm, Patient Position: Sitting, BP Cuff Size: Adult)   Pulse 76   Temp 36.6 °C (97.9 °F) (Temporal)   Wt 73.2 kg (161 lb 6.4 oz)   LMP  (LMP Unknown)   SpO2 98%   BMI 26.86 kg/m²   BSA: 1.83 meters squared   Pain Scale: 0  ECO- Fully active, able to carry on all pre-disease performance w/o restriction.      Physical Exam  Constitutional: Well developed, awake/alert/oriented x4, no distress, alert and cooperative  EYES: Sclera clear  ENMT: mucous membranes moist, no apparent injury, no lesions seen  Head/Neck: Neck supple, no apparent injury, thyroid without mass or tenderness, No JVD, trachea midline, no bruits  Respiratory / Thoracic: Patent airways, clear to all lobes, normal breath sounds with good chest expansion, thorax symmetric.  Cardiovascular: Regular, rate and rhythm, no murmurs, 2+ equal pulses of the extremities, normal auscultated S 1and S 2  GI: Nondistended, soft, non-tender, no rebound tenderness or guarding, no masses palpable, no organomegaly, +BS, no bruits  Musculoskeletal: ROM intact, no joint swelling, normal strength, no spinal tenderness  Extremities: normal extremities, no cyanosis edema, contusions or wounds, no clubbing  Neurological: alert and oriented x4, intact senses, motor, response and reflexes, normal strength  Breast: S/p right lumpectomy. No palpable masses or lesions in either breast   Lymphatic: No cervical, supraclavicular, infraclavicular or axillary lymphadenopathy  Psychological: Appropriate and talkative mood and behavior  Skin: Warm and dry, no lesions, no rashes, no jaundice           Diagnostic Results      Narrative & Impression   Interpreted By:  Virginia Stallings,   STUDY:  BI MAMMO BILATERAL DIAGNOSTIC TOMOSYNTHESIS;  2/3/2025 10:36 am      ACCESSION NUMBER(S):  [id number]      ORDERING CLINICIAN:  MISAEL MCGILL       INDICATION:  History of right breast lumpectomy with chemotherapy. MediPort has  been removed.      ,C50.411 Malignant neoplasm of upper-outer quadrant of right female  breast,Z17.1 Estrogen receptor negative status (ER-)      COMPARISON:  02/01/2024, 09/06/2020, 08/23/2023, 02/17/2022, 01/25/2022      FINDINGS:  MAMMOGRAPHY: 2D and tomosynthesis images were reviewed at 1 mm slice  thickness.      Density:  There are scattered areas of fibroglandular density.      In the upper-outer quadrant of the right breast at anterior depth  there are surgical clips and scarring at the lumpectomy site. The  lumpectomy site is stable. A surgical clip is noted in the right  axilla. The parenchymal pattern is stable bilaterally. No suspicious  masses or calcifications are identified.      IMPRESSION:  No mammographic evidence of malignancy. Right breast post lumpectomy  changes.      BI-RADS CATEGORY:  BI-RADS Category:  2 Benign.  Recommendation:  Annual Screening.  Recommended Date:  1 Year.  Laterality:  Bilateral.      For any future breast imaging appointments, please call [phone] ([phone]).          MACRO:  None      Signed by: Virginia Stallings 2/3/2025 11:06 AM       TRANSTHORACIC ECHOCARDIOGRAM REPORT        Patient Name:      ASHLY Haney Physician:    [id number] Marlon Augustine DO  Study Date:        4/18/2024            Ordering Provider:    [id number] GLO SMITH  MRN/PID:           [id number]             Fellow:  Accession#:        [id number]         Nurse:  Date of Birth/Age: 1960 / 63 years Sonographer:          Maik Cabezas RDCS  Gender:            F                    Additional Staff:  Height:            160.00 cm            Admit Date:  Weight:            74.00 kg             Admission Status:     Outpatient  BSA / BMI:         1.77 m2 / 28.91      Encounter#:            [id number]                     kg/m2                                          Department Location:  [facility]                                                                Invasive  Blood Pressure: 135 /86 mmHg     Study Type:    TRANSTHORACIC ECHO (TTE) LIMITED  Diagnosis/ICD: Encounter for monitoring cardiotoxic drug therapy-Z51.81  Indication:    encounter for monitoring cardiotoxic drug therapy  CPT Code:      Echo Limited-19641     Patient History:  Pertinent History: HTN and PE. PFO.     Study Detail: The following Echo studies were performed: 2D, M-Mode, Doppler and                color flow.        PHYSICIAN INTERPRETATION:  Left Ventricle: The left ventricular systolic function is normal, with an estimated ejection fraction of 55-60%. There are no regional wall motion abnormalities. The left ventricular cavity size is normal. Spectral Doppler shows a normal pattern of left ventricular diastolic filling.  Left Atrium: The left atrium is normal in size.  Right Ventricle: The right ventricle is normal in size. There is normal right ventricular global systolic function.  Right Atrium: The right atrium is normal in size.  Aortic Valve: The aortic valve appears structurally normal. There is no evidence of aortic valve regurgitation. The peak instantaneous gradient of the aortic valve is 6.8 mmHg.  Mitral Valve: The mitral valve is normal in structure. There is no evidence of mitral valve regurgitation.  Tricuspid Valve: The tricuspid valve is structurally normal. There is trace tricuspid regurgitation.  Pulmonic Valve: The pulmonic valve is structurally normal. There is no indication of pulmonic valve regurgitation.  Pericardium: There is no pericardial effusion noted.  Aorta: The aortic root is normal.  In comparison to the previous echocardiogram(s): There are no prior studies on this patient for comparison purposes.        CONCLUSIONS:   1. Left ventricular systolic function is normal with a 55-60%  estimated ejection fraction.     QUANTITATIVE DATA SUMMARY:  2D MEASUREMENTS:                           Normal Ranges:  IVSd:          1.13 cm   (0.6-1.1cm)  LVPWd:         0.93 cm   (0.6-1.1cm)  LVIDd:         4.50 cm   (3.9-5.9cm)  LVIDs:         2.49 cm  LV Mass Index: 89.9 g/m2  LV % FS        44.7 %     LV SYSTOLIC FUNCTION BY 2D PLANIMETRY (MOD):                      Normal Ranges:  EF-A4C View: 52.3 % (>=55%)  EF-A2C View: 60.1 %  EF-Biplane:  55.5 %     LV DIASTOLIC FUNCTION:                         Normal Ranges:  MV Peak E:    0.66 m/s (0.7-1.2 m/s)  MV Peak A:    0.98 m/s (0.42-0.7 m/s)  E/A Ratio:    0.68     (1.0-2.2)  MV lateral e' 0.11 m/s  MV medial e'  0.05 m/s     MITRAL VALVE:                       Normal Ranges:  MV Vmax:    1.01 m/s (<=1.3m/s)  MV peak P.1 mmHg (<5mmHg)  MV mean P.0 mmHg (<2mmHg)  MV DT:      254 msec (150-240msec)     AORTIC VALVE:                        Normal Ranges:  AoV Vmax:    1.30 m/s (<=1.7m/s)  AoV Peak P.8 mmHg (<20mmHg)        RIGHT VENTRICLE:  TAPSE: 22.8 mm     TRICUSPID VALVE/RVSP:                             Normal Ranges:  Peak TR Velocity: 1.18 m/s  RV Syst Pressure: 8.6 mmHg (< 30mmHg)     PULMONIC VALVE:                       Normal Ranges:  PV Max Mahesh: 1.0 m/s  (0.6-0.9m/s)  PV Max PG:  3.9 mmHg        [id number] Marlon Augustine DO  Electronically signed on 2024 at 2:30:23 PM     Lab Results   Component Value Date    WBC 4.7 2025    HGB 15.1 2025    HCT 46.0 (H) 2025    MCV 84.6 2025     2025       Chemistry    Lab Results   Component Value Date/Time     2025 1001    K 3.8 2025 1001     2025 1001    CO2 27 2025 1001    BUN 14 2025 1001    CREATININE 0.61 2025 1001    Lab Results   Component Value Date/Time    CALCIUM 9.6 2025 1001    ALKPHOS 72 2025 1001    AST 17 2025 1001    ALT 16 2025 1001    BILITOT 0.6 2025 1001             Assessment/Plan   Malignant neoplasm of upper-outer quadrant of right breast in female, estrogen receptor negative, Pathologic: Stage IIB (pT2, pN1mi(sn), cM0, G3, ER-, KS-, HER2+)  Cassandra was seen today for follow-up.  Diagnoses and all orders for this visit:  Status post administration of cardiotoxic chemotherapy  -     Clinic Appointment Request Follow up; GLO SMITH  -     Clinic Appointment Request Follow up; GLO SMITH; Future  Encounter for follow-up surveillance of breast cancer  -     Clinic Appointment Request Follow up; GLO SMITH  -     Clinic Appointment Request Follow up; GLO SMITH; Future  Malignant neoplasm of upper-outer quadrant of right breast in female, estrogen receptor negative  -     Clinic Appointment Request Follow Up; GLO SMITH  -     Clinic Appointment Request Follow up; GLO SMITH  -     Clinic Appointment Request Follow up; GLO SMITH; Future      Problem List Items Addressed This Visit       Malignant neoplasm of upper-outer quadrant of right breast in female, estrogen receptor negative    Overview     Diagnosis date: 9/6/23 right grade 3 IDC ER/KS- HER2+, pT2cN0, stage IA    Cassandra Lang is a pleasant 64 yo female returning to the [facility] for a post op visit after her second surgery. She has been in surveillance for a known right ADH that was diagnosed in January 2022 on screening mammogram. Even though excision was recommended at that time, she declined, and this area has remained stable on subsequent imaging studies. She had abnormal screening mammogram in January 2022 and proceeded to bilateral diagnostic mammogram and targeted ultrasound demonstrating an ill-defined mass with associated distortion in the right breast. The area underwent ultrasound guided core biopsy revealing a diagnosis of severely atypical ductal proliferation with apocrine features. Scanning of right axilla demonstrated 2  morphologically normal lymph nodes. In the left breast at 3:00 position 3cm from nipple, a 1cm probable benign soft mass is identified and short-term 6 month sonographic follow up is recommended. She was seen in 3/2022 and the decision was made to delay surgery secondary to leaving the country for 2 months; she was prescribed anastrozole at that time, but she did not fill the prescription. She denies palpable mass, skin changes or nipple discharge. [place] she has had additional imaging surveillance in 2022 and 2023 to follow a probably benign left breast finding and documenting stability at the right ADH site. She decided to undergo Magseed localized excisional biopsy for ADH on 23 to avoid continued surveillance. Final pathology demontrated a 2.1cm grade 3 IDC with a positive anterior margin for DCIS. She returned to the OR on 10/2/23 for axillary staging with SLN and re-excision of the anterior margin. Final pathology from that procedure is still pending at the time of this visit. She is doing well since surgery and has no breast specific complaints today.    She is accompanied by her daughter, who helped to translate during this conversation, as Polish is Ms. Lang's primary language, but she was able to participate and communicate well on her own.    BREAST IMAGIN2022 Screening mammogram demonstrated scattered fibroglandular tissue, BI-RADS Category 0, spiculated right breast mass and left breast mass. 2022 Bilateral diagnostic mammogram and targeted ultrasound, BI-RADS Category 4, Right breast at 10:00,5 cm from nipple, ill-defined mass with associated architectural distortion measuring 1.7 x 0.7 x 3.2cm warranting ultrasound guided biopsy. Scanning of right axilla demonstrates 2 morphologically normal lymph nodes. Left breast at 3:00, 3cm from nipple, probable benign soft mass measuring 1 x 1.1 x 0.5cm, recommend 6 month ultrasound follow up  2022  "bilateral ultrasound and mammogram BI-RADS 3 scattered fibrglandular density, in the right breast, the biopsied  architectural distortion in the superior lateral right breast at anterior to mid depth is unchanged. No suspicious masses or calcifications are identified. In the left breast there is a small oval circumscribed hypoechoic mass is again seen in the 3 o'clock position, 3 cm FN. It measures 9 x 5 x 9 mm. It is avascular and soft on elastography.  2023 bilateral mammgraom and ultrasound BI-RADS 3 there is stable architectural distortion with associated tissue marker in the anterior upper outer right breast. Sonographic scanning targeted to the 3 o'clock position left breast 3 cm from the nipple demonstrates an oval circumscribed parallel partially anechoic and partially hypoechoic mass measuring  0.8 x 0.5 x 0.6 cm, with the greatest overall radial dimension of 1.0 cm. This is stable compared with 2022. There is no internal Doppler blood flow and the mass remains soft on elastography.    BREAST PROCEDURE: 3/1/2022 Right breast ultrasound guided core biopsy at 10:00 5cm from nipple.  23 right magseed excisional biopsy  10/2/23 right re-excision lumpectomy, SLN    REPRODUCTIVE HEALTH: menarche at 16, postmenopausal from natural causes t age 54,  with first birth at 31,  x 1 month, denies exposure to birth control pills or post menopausal estrogen.    MEDICAL HISTORY: Hypertension, hypercholesterolemia, CVA in 2020, her daughter states she has a constant \"pounding sound\" in her head since, Pulmonary embolism 2020, patent foramen ovale s/p percutaneous closure 2021, anticoagulated on Eliquis and ASA.    FAMILY CANCER HISTORY: Mother diagnosed with kidney cancer.     MEDICAL ONCOLOGY: Dr. Rosenbaum    RADIATION ONCOLOGY: Dr. Domínguez    GENETICS: does not meet NCCN criteria         Relevant Orders    Clinic Appointment Request Follow up; GLO SMITH    Status post " administration of cardiotoxic chemotherapy    Relevant Orders    Clinic Appointment Request Follow up; SHAWNA MCCALLUM    Encounter for follow-up surveillance of breast cancer    Relevant Orders    Clinic Appointment Request Follow up; SHAWNA MCCALLUM     Right sided [id number] HER2+/ER-/ME- breast cancer, unexpectedly found after excisional biopsy for sclerosing adenosis.   Completion of > than 6 months of trastuzumab, however given insurance coverage issues and many medical bills and symptoms, patient driven completion of maintenance therapy. Was ok'd per Dr Brendon Rosenbaum. Port was removed, Observational therapy.          There is no evidence on clinical exam today for breast cancer recurrence. She will continue to follow in shared visit between medical oncology and surgical oncology     Hypercoagulable state. extensive medical history of CVA, breast cancer,  HTN, Hyperlipidemia, hypercholesterolemia. She remains on eliquis.        General health Maintenance  PCP Dr Hernandez - annually and as needed     At least 25 minutes of direct consultation was spent with the patient today reviewing her cancer care plan, educating and answering questions regarding ongoing follow up, greater than 50% in counseling and coordination of care          Treatment Plan:  [No matching plan found]        Thank you for the opportunity to be involved your care.   We discussed the clinical significance of diagnosis, goals of care and treatment plan in detail.   Please do not hesitate to reach out with any questions.     -------------------------------------------------------------------------------------------------------------------------------  Shawna Mccallum MSN, APRN, FNP-C  [facility]  Division of Medical Oncology- Breast   Collaborating Physician Dr. Brendon Rosenbaum   [facility] Team   [address] [address]  Phone:  [phone]  Fax: [phone]  Available via Secure Chat    Confidential Peer Review Document-  Privilege  Privileged Pursuant to [place] Revised Code Section 2305.24, .25, .251 & .252

## 2025-04-02 ENCOUNTER — TELEPHONE (OUTPATIENT)
Facility: CLINIC | Age: 65
End: 2025-04-02
Payer: COMMERCIAL

## 2025-04-11 ENCOUNTER — APPOINTMENT (OUTPATIENT)
Dept: RHEUMATOLOGY | Facility: CLINIC | Age: 65
End: 2025-04-11
Payer: COMMERCIAL

## 2025-04-11 ENCOUNTER — LAB (OUTPATIENT)
Dept: LAB | Facility: HOSPITAL | Age: 65
End: 2025-04-11
Payer: COMMERCIAL

## 2025-04-11 ENCOUNTER — PRE-ADMISSION TESTING (OUTPATIENT)
Dept: PREADMISSION TESTING | Facility: HOSPITAL | Age: 65
End: 2025-04-11
Payer: COMMERCIAL

## 2025-04-11 VITALS
HEART RATE: 92 BPM | OXYGEN SATURATION: 97 % | RESPIRATION RATE: 16 BRPM | BODY MASS INDEX: 26.81 KG/M2 | WEIGHT: 160.94 LBS | TEMPERATURE: 98.2 F | DIASTOLIC BLOOD PRESSURE: 89 MMHG | HEIGHT: 65 IN | SYSTOLIC BLOOD PRESSURE: 123 MMHG

## 2025-04-11 DIAGNOSIS — S83.231A COMPLEX TEAR OF MEDIAL MENISCUS OF RIGHT KNEE AS CURRENT INJURY, INITIAL ENCOUNTER: ICD-10-CM

## 2025-04-11 DIAGNOSIS — Z01.818 ENCOUNTER FOR OTHER PREPROCEDURAL EXAMINATION: Primary | ICD-10-CM

## 2025-04-11 DIAGNOSIS — Z01.818 PREOP TESTING: Primary | ICD-10-CM

## 2025-04-11 LAB
ALBUMIN SERPL BCP-MCNC: 4.5 G/DL (ref 3.4–5)
ALP SERPL-CCNC: 63 U/L (ref 33–136)
ALT SERPL W P-5'-P-CCNC: 17 U/L (ref 7–45)
ANION GAP SERPL CALC-SCNC: 12 MMOL/L (ref 10–20)
AST SERPL W P-5'-P-CCNC: 17 U/L (ref 9–39)
BASOPHILS # BLD AUTO: 0.07 X10*3/UL (ref 0–0.1)
BASOPHILS NFR BLD AUTO: 1.1 %
BILIRUB SERPL-MCNC: 0.4 MG/DL (ref 0–1.2)
BUN SERPL-MCNC: 22 MG/DL (ref 6–23)
CALCIUM SERPL-MCNC: 9.4 MG/DL (ref 8.6–10.3)
CHLORIDE SERPL-SCNC: 103 MMOL/L (ref 98–107)
CO2 SERPL-SCNC: 29 MMOL/L (ref 21–32)
CREAT SERPL-MCNC: 0.6 MG/DL (ref 0.5–1.05)
EGFRCR SERPLBLD CKD-EPI 2021: >90 ML/MIN/1.73M*2
EOSINOPHIL # BLD AUTO: 0.23 X10*3/UL (ref 0–0.7)
EOSINOPHIL NFR BLD AUTO: 3.6 %
ERYTHROCYTE [DISTWIDTH] IN BLOOD BY AUTOMATED COUNT: 13.4 % (ref 11.5–14.5)
GLUCOSE SERPL-MCNC: 117 MG/DL (ref 74–99)
HCT VFR BLD AUTO: 42.9 % (ref 36–46)
HGB BLD-MCNC: 13.9 G/DL (ref 12–16)
IMM GRANULOCYTES # BLD AUTO: 0.01 X10*3/UL (ref 0–0.7)
IMM GRANULOCYTES NFR BLD AUTO: 0.2 % (ref 0–0.9)
LYMPHOCYTES # BLD AUTO: 1.67 X10*3/UL (ref 1.2–4.8)
LYMPHOCYTES NFR BLD AUTO: 26.2 %
MCH RBC QN AUTO: 27.1 PG (ref 26–34)
MCHC RBC AUTO-ENTMCNC: 32.4 G/DL (ref 32–36)
MCV RBC AUTO: 84 FL (ref 80–100)
MONOCYTES # BLD AUTO: 0.53 X10*3/UL (ref 0.1–1)
MONOCYTES NFR BLD AUTO: 8.3 %
NEUTROPHILS # BLD AUTO: 3.86 X10*3/UL (ref 1.2–7.7)
NEUTROPHILS NFR BLD AUTO: 60.6 %
NRBC BLD-RTO: NORMAL /100{WBCS}
PLATELET # BLD AUTO: 278 X10*3/UL (ref 150–450)
POTASSIUM SERPL-SCNC: 3.5 MMOL/L (ref 3.5–5.3)
PROT SERPL-MCNC: 6.5 G/DL (ref 6.4–8.2)
RBC # BLD AUTO: 5.13 X10*6/UL (ref 4–5.2)
SODIUM SERPL-SCNC: 140 MMOL/L (ref 136–145)
WBC # BLD AUTO: 6.4 X10*3/UL (ref 4.4–11.3)

## 2025-04-11 PROCEDURE — 85025 COMPLETE CBC W/AUTO DIFF WBC: CPT

## 2025-04-11 PROCEDURE — 99204 OFFICE O/P NEW MOD 45 MIN: CPT

## 2025-04-11 PROCEDURE — 93010 ELECTROCARDIOGRAM REPORT: CPT | Performed by: INTERNAL MEDICINE

## 2025-04-11 PROCEDURE — 93005 ELECTROCARDIOGRAM TRACING: CPT

## 2025-04-11 PROCEDURE — 80053 COMPREHEN METABOLIC PANEL: CPT

## 2025-04-11 ASSESSMENT — DUKE ACTIVITY SCORE INDEX (DASI)
CAN YOU WALK A BLOCK OR TWO ON LEVEL GROUND: YES
DASI METS SCORE: 5.7
CAN YOU DO HEAVY WORK AROUND THE HOUSE LIKE SCRUBBING FLOORS OR LIFTING AND MOVING HEAVY FURNITURE: NO
CAN YOU DO YARD WORK LIKE RAKING LEAVES, WEEDING OR PUSHING A MOWER: NO
CAN YOU RUN A SHORT DISTANCE: NO
TOTAL_SCORE: 24.2
CAN YOU TAKE CARE OF YOURSELF (EAT, DRESS, BATHE, OR USE TOILET): YES
CAN YOU DO LIGHT WORK AROUND THE HOUSE LIKE DUSTING OR WASHING DISHES: YES
CAN YOU CLIMB A FLIGHT OF STAIRS OR WALK UP A HILL: YES
CAN YOU DO MODERATE WORK AROUND THE HOUSE LIKE VACUUMING, SWEEPING FLOORS OR CARRYING GROCERIES: YES
CAN YOU HAVE SEXUAL RELATIONS: YES
CAN YOU WALK INDOORS, SUCH AS AROUND YOUR HOUSE: YES
CAN YOU PARTICIPATE IN STRENOUS SPORTS LIKE SWIMMING, SINGLES TENNIS, FOOTBALL, BASKETBALL, OR SKIING: NO
CAN YOU PARTICIPATE IN MODERATE RECREATIONAL ACTIVITIES LIKE GOLF, BOWLING, DANCING, DOUBLES TENNIS OR THROWING A BASEBALL OR FOOTBALL: NO

## 2025-04-11 ASSESSMENT — PAIN SCALES - GENERAL: PAINLEVEL_OUTOF10: 5 - MODERATE PAIN

## 2025-04-11 ASSESSMENT — PAIN - FUNCTIONAL ASSESSMENT: PAIN_FUNCTIONAL_ASSESSMENT: 0-10

## 2025-04-11 NOTE — PREPROCEDURE INSTRUCTIONS
Medication List            Accurate as of April 11, 2025  2:13 PM. Always use your most recent med list.                acetaminophen 325 mg tablet  Commonly known as: Tylenol  Take 2 tablets (650 mg) by mouth every 4 hours if needed for mild pain (1 - 3).  Medication Adjustments for Surgery: Take/Use as prescribed     amLODIPine 10 mg tablet  Commonly known as: Norvasc  Take 1 tablet (10 mg) by mouth once daily.  Medication Adjustments for Surgery: Take/Use as prescribed     amoxicillin 500 mg tablet  Commonly known as: Amoxil  Medication Adjustments for Surgery: Take/Use as prescribed     apixaban 5 mg tablet  Commonly known as: Eliquis  Take 1 tablet (5 mg) by mouth 2 times a day.  Additional Medication Adjustments for Surgery: Other (Comment)  Notes to patient: Follow prescriber preoperative instructions     aspirin 81 mg EC tablet  Take 1 tablet (81 mg) by mouth once daily.  Medication Adjustments for Surgery: Take/Use as prescribed     atorvastatin 40 mg tablet  Commonly known as: Lipitor  Take 1 tablet (40 mg) by mouth once daily.  Medication Adjustments for Surgery: Take/Use as prescribed     cholecalciferol 25 mcg (1,000 units) capsule  Commonly known as: Vitamin D-3  TAKE 1 CAPSULE (25 MCG) BY MOUTH ONCE DAILY.  Additional Medication Adjustments for Surgery: Take last dose 7 days before surgery  Notes to patient: Last dose preoperatively 5/1/2025     ezetimibe 10 mg tablet  Commonly known as: Zetia  TAKE 1 TABLET BY MOUTH EVERY DAY  Medication Adjustments for Surgery: Do Not take on the morning of surgery  Notes to patient: Last dose preoperatively 5/8/2025     Fish OiL 60- mg capsule  Generic drug: omega-3  Notes to patient: Last dose preoperatively 5/1/2025            NPO Instructions:     Do not eat any food after midnight the night before your surgery/procedure.  You may have clear liquids until TWO hours before surgery/procedure. This includes water, black tea/coffee, (no milk or cream) apple  juice and electrolyte drinks (Gatorade).  You may chew gum up to TWO hours before your surgery/procedure.     Additional Instructions:      Seven/Six Days before Surgery:  Review your medication instructions, stop indicated medications  Five Days before Surgery:  Review your medication instructions, stop indicated medications  Three Days before Surgery:  Review your medication instructions, stop indicated medications  The Day before Surgery:  No smoking or alcohol use 24 hours before surgery  Review your medication instructions, stop indicated medications  You will be contacted regarding the time of your arrival to facility and surgery time  Do not eat any food after Midnight  Day of Surgery:  Review your medication instructions, take indicated medications  If you have diabetes, please check your fasting blood sugar upon awakening.  If fasting blood sugar is <80 mg/dl, drink 100 ml of apple juice, time limit of 2 hours before  You may have clear liquids until TWO hours before surgery/procedure.  This includes water, black tea/coffee, (no milk or cream) apple juice and electrolyte drinks (Gatorade)  You may chew gum up to TWO hours before your surgery/procedure  Wear  comfortable loose fitting clothing  Do not use moisturizers, creams, lotions or perfume  All jewelry and valuables should be left at home     CONTACT SURGEON'S OFFICE IF YOU DEVELOP:  * Fever = 100.4 F   * New respiratory symptoms (e.g. cough, shortness of breath, respiratory distress, sore throat)  * Recent loss of taste or smell  *Flu like symptoms such as headache, fatigue or gastrointestinal symptoms  * You develop any open sores, shingles, burning or painful urination   AND/OR:  * You no longer wish to have the surgery.  * Any other personal circumstances change that may lead to the need to cancel or defer this surgery.  *You were admitted to any hospital within one week of your planned procedure.     SMOKING:  *Quitting smoking can make a huge  difference to your health and recovery from surgery.    *If you need help with quitting, call 5-830-QUIT-NOW.     THE DAY BEFORE SURGERY:  *Do not eat any food after midnight the night before your surgery.   *You may have up to TEN OUNCES of clear liquids until TWO hours before your instructed ARRIVAL TIME to hospital. This includes water, black tea/coffee, (no milk or cream) apple juice, clear broth and electrolyte drinks (Gatorade). Please avoid clear liquids that are red in color.   *You may chew gum/mints up to TWO hours before your surgery/procedure.     SURGICAL TIME:  *You will be contacted between 2 p.m. and 3 p.m. the business day before your surgery with your arrival time.  *If you haven't received a call by 3pm, call [phone]  *Scheduled surgery times may change and you will be notified if this occurs-check your personal voicemail for any updates.     ON THE MORNING OF SURGERY:  *Wear comfortable, loose fitting clothing.   *Do not use moisturizers, creams, lotions or perfume.  *All jewelry and valuables should be left at home.  *Prosthetic devices such as contact lenses, hearing aids, dentures, eyelash extensions, hairpins and body piercing must be removed before surgery.     BRING WITH YOU:  *Photo ID and insurance card  *Current list of medications and allergies  *Pacemaker/Defibrillator/Heart stent cards  *CPAP machine and mask  *Slings/splints/crutches  *Copy of your complete Advanced Directive/DHPOA-if applicable  *Neurostimulator implant remote     PARKING AND ARRIVAL:  *Check in at the Main Entrance desk and let them know you are here for surgery.     IF YOU ARE HAVING OUTPATIENT/SAME DAY SURGERY:  *A responsible adult MUST accompany you at the time of discharge and stay with you for 24 hours after your surgery.  *You may NOT drive yourself home after surgery.  *You may use a taxi or ride sharing service ([employer], [employer]) to return home ONLY if you are accompanied by a friend or family  member.  *Instructions for resuming your medications will be provided by your surgeon.     Thank you for coming to Pre Admission testing.      If I have prescribed medication please don't forget to  at your pharmacy.      Any questions about today's visit call [phone] and leave a message in the general mailbox.     Patient instructed to ambulate as soon as possible postoperatively to decrease thromboembolic risk.     Gagandeep Maravilla, APRN-CNP     Thank you for visiting [facility].  If you have any changes to your health condition, please call the surgeons office to alert them and give them details of your symptoms.        Preoperative Fasting Guidelines     Why must I stop eating and drinking near surgery time?  With sedation, food or liquid in your stomach can enter your lungs causing serious complications  Increases nausea and vomiting     When do I need to stop eating and drinking before my surgery?  Do not eat any food after midnight the night before your surgery/procedure.  You may have up to TEN ounces of clear liquid until TWO hours before your instructed arrival time to the hospital.  This includes water, black tea/coffee, (no milk or cream) apple juice, and electrolyte drinks (Gatorade)  You may chew gum until TWO hours before your surgery/procedure        Additional Instructions:      The Day before Surgery:  -Review your medication instructions, stop indicated medications  -You will be contacted in the evening regarding the time of your arrival to facility and surgery time     Day of Surgery:  -Review your medication instructions, take indicated medications  -Wear comfortable loose fitting clothing  -Do not use moisturizers, creams, lotions or perfume  -All jewelry and valuables should be left at home                   Preoperative Brain Exercises     What are brain exercises?  A brain exercise is any activity that engages your thinking (cognitive) skills.     What  types of activities are considered brain exercises?  Jigsaw puzzles, crossword puzzles, word jumble, memory games, word search, and many more.  Many can be found free online or on your phone via a mobile russ.     Why should I do brain exercises before my surgery?  More recent research has shown brain exercise before surgery can lower the risk of postoperative delirium (confusion) which can be especially important for older adults.  Patients who did brain exercises for 5 to 10 minutes/day in the days before surgery, cut their risk of postoperative delirium in half up to 1 week after surgery.                         [facility]     Preoperative Deep Breathing Exercises     Why it is important to do deep breathing exercises before my surgery?  Deep breathing exercises strengthen your breathing muscles.  This helps you to recover after your surgery and decreases the chance of breathing complications.        How are the deep breathing exercises done?  Sit straight with your back supported.  Breathe in deeply and slowly through your nose. Your lower rib cage should expand and your abdomen may move forward.  Hold that breath for 3 to 5 seconds.  Breathe out through pursed lips, slowly and completely.  Rest and repeat 10 times every hour while awake.  Rest longer if you become dizzy or lightheaded.                      [facility]     Preoperative Deep Breathing Exercises     Why it is important to do deep breathing exercises before my surgery?  Deep breathing exercises strengthen your breathing muscles.  This helps you to recover after your surgery and decreases the chance of breathing complications.        How are the deep breathing exercises done?  Sit straight with your back supported.  Breathe in deeply and slowly through your nose. Your lower rib cage should expand and your abdomen may move forward.  Hold that breath for 3 to 5 seconds.  Breathe out through pursed  lips, slowly and completely.  Rest and repeat 10 times every hour while awake.  Rest longer if you become dizzy or lightheaded.        Patient Information: Incentive Spirometer  What is an incentive spirometer?  An incentive spirometer is a device used before and after surgery to “exercise” your lungs.  It helps you to take deeper breaths to expand your lungs.  Below is an example of a basic incentive spirometer.  The device you receive may differ slightly but they all function the same.    Why do I need to use an incentive spirometer?  Using your incentive spirometer prepares your lungs for surgery and helps prevent lung problems after surgery.  How do I use my incentive spirometer?  When you're using your incentive spirometer, make sure to breathe through your mouth. If you breathe through your nose, the incentive spirometer won't work properly. You can hold your nose if you have trouble.  If you feel dizzy at any time, stop and rest. Try again at a later time.  Follow the steps below:  Set up your incentive spirometer, expand the flexible tubing and connect to the outlet.  Sit upright in a chair or bed. Hold the incentive spirometer at eye level.   Put the mouthpiece in your mouth and close your lips tightly around it. Slowly breathe out (exhale) completely.  Breathe in (inhale) slowly through your mouth as deeply as you can. As you take a breath, you will see the piston rise inside the large column. While the piston rises, the indicator should move upwards. It should stay in between the 2 arrows (see Figure).  Try to get the piston as high as you can, while keeping the indicator between the arrows.   If the indicator doesn't stay between the arrows, you're breathing either too fast or too slow.  When you get it as high as you can, hold your breath for 10 seconds, or as long as possible. While you're holding your breath, the piston will slowly fall to the base of the spirometer.  Once the piston reaches the  bottom of the spirometer, breathe out slowly through your mouth. Rest for a few seconds.  Repeat 10 times. Try to get the piston to the same level with each breath.  Repeat every hour while awake  You can carefully clean the outside of the mouthpiece with an alcohol wipe or soap and water.       Patient and Family Education             Ways You Can Help Prevent Blood Clots                    This handout explains some simple things you can do to help prevent blood clots.      Blood clots are blockages that can form in the body's veins. When a blood clot forms in your deep veins, it may be called a deep vein thrombosis, or DVT for short. Blood clots can happen in any part of the body where blood flows, but they are most common in the arms and legs. If a piece of a blood clot breaks free and travels to the lungs, it is called a pulmonary embolus (PE). A PE can be a very serious problem.         Being in the hospital or having surgery can raise your chances of getting a blood clot because you may not be well enough to move around as much as you normally do.         Ways you can help prevent blood clots in the hospital           Wearing SCDs. SCDs stands for Sequential Compression Devices.   SCDs are special sleeves that wrap around your legs  They attach to a pump that fills them with air to gently squeeze your legs every few minutes.   This helps return the blood in your legs to your heart.   SCDs should only be taken off when walking or bathing.   SCDs may not be comfortable, but they can help save your life.                                            Wearing compression stockings - if your doctor orders them. These special snug fitting stockings gently squeeze your legs to help blood flow.       Walking. Walking helps move the blood in your legs.   If your doctor says it is ok, try walking the halls at least   5 times a day. Ask us to help you get up, so you don't fall.      Taking any blood thinning medicines your  doctor orders.        Page 1 of 2            [facility]; 3/23   Ways you can help prevent blood clots at home         Wearing compression stockings - if your doctor orders them. ? Walking - to help move the blood in your legs.       Taking any blood thinning medicines your doctor orders.      Signs of a blood clot or PE        Tell your doctor or nurse know right away if you have of the problems listed below.    If you are at home, seek medical care right away. Call 911 for chest pain or problems breathing.                Signs of a blood clot (DVT) - such as pain,  swelling, redness or warmth in your arm or leg      Signs of a pulmonary embolism (PE) - such as chest pain or feeling short of breath

## 2025-04-11 NOTE — CPM/PAT H&P
CPM/PAT Evaluation       Name: Cassandra ALDANA Rickey (Cassandra KATYA Lang)  /Age: 1960/64 y.o.     In-Person       Chief Complaint: Complex tear of medial meniscus of right knee as current injury, initial encounter, Chondromalacia of patellofemoral joint, right, and Synovitis of right knee     HPI  Patient is an alert and oriented 64 year old female scheduled for a right knee arthroscopy, medial meniscus posterior root repair, intra-articular debridement synovectomy, chondroplasty on 2025 with Dr. Rm for complex tear of medial meniscus of right knee as current injury, initial encounter, chondromalacia of patellofemoral joint, right, and synovitis of right knee. PMHX includes OA, CVA, HTN, HLD, GERD, PE, depression, and anxiety. Presents to [facility] today for perioperative risk stratification and optimization.     Past Medical History:   Diagnosis Date    Abnormal findings on diagnostic imaging of other specified body structures     Abnormal CT of the chest    Breast cancer (Multi)     Cancer (Multi)     Heart disease     High blood pressure     Hx antineoplastic chemo     right breast    Hyperlipidemia     Personal history of other medical treatment     History of echocardiogram    Personal history of other medical treatment     History of transesophageal echocardiography (MAYKEL)    Personal history of other medical treatment     History of cardiac monitoring    Personal history of transient ischemic attack (TIA), and cerebral infarction without residual deficits     History of cerebrovascular accident    Stroke (Multi)     Tinea unguium 10/12/2021    Onychomycosis of toenail     Past Surgical History:   Procedure Laterality Date    APPENDECTOMY  2014    Appendectomy    BREAST LUMPECTOMY Right 10/02/2023     SECTION, CLASSIC  2014     Section    IR CVC PORT  2023    IR CVC PORT 2023 [facility]    MR HEAD ANGIO WO IV CONTRAST  2020    MR HEAD ANGIO WO IV  CONTRAST 5/13/2020 [facility] CLINICAL LEGACY    MR NECK ANGIO WO IV CONTRAST  05/13/2020    MR NECK ANGIO WO IV CONTRAST 5/13/2020 [facility] CLINICAL LEGACY    OTHER SURGICAL HISTORY  06/24/2020    Esophagogastroduodenoscopy    OTHER SURGICAL HISTORY  06/24/2020    Colonoscopy    OTHER SURGICAL HISTORY  03/04/2021    Patent foramen ovale repair     Patient  reports that she is not currently sexually active.    Family History   Problem Relation Name Age of Onset    Diabetes Mother      Hypertension Mother      Cancer Mother      Coronary artery disease Father      Heart attack Maternal Grandfather      Stroke Other       Allergies   Allergen Reactions    Sulfa (Sulfonamide Antibiotics) Rash    Sulfamethoxazole-Trimethoprim Rash     Medication Documentation Review Audit       Reviewed by Gina Alfredo RN (Registered Nurse) on 04/11/25 at 1356      Medication Order Taking? Sig Documenting Provider Last Dose Status   acetaminophen (Tylenol) 325 mg tablet [id number] Yes Take 2 tablets (650 mg) by mouth every 4 hours if needed for mild pain (1 - 3). Messi Workman MD Past Month Active   amLODIPine (Norvasc) 10 mg tablet [id number] Yes Take 1 tablet (10 mg) by mouth once daily. Jodi Lopez MD 4/11/2025 Active   amoxicillin (Amoxil) 500 mg tablet [id number]  take 1 tablet by mouth 3 times a day until gone Historical Provider, MD  Active   apixaban (Eliquis) 5 mg tablet [id number] Yes Take 1 tablet (5 mg) by mouth 2 times a day. Jodi Lopez MD 4/11/2025 Active   aspirin 81 mg EC tablet [id number] Yes Take 1 tablet (81 mg) by mouth once daily. Jodi Lopez MD 4/11/2025 Active   atorvastatin (Lipitor) 40 mg tablet [id number] No Take 1 tablet (40 mg) by mouth once daily.   Patient not taking: Reported on 4/11/2025    Jodi Lopez MD Not Taking Active   cholecalciferol (Vitamin D-3) 25 MCG (1000 UT) capsule [id number] Yes TAKE 1 CAPSULE (25 MCG) BY MOUTH ONCE DAILY. Jodi Lopez MD  4/11/2025 Active   ezetimibe (Zetia) 10 mg tablet [id number] Yes TAKE 1 TABLET BY MOUTH EVERY DAY Jodi Lopez MD 4/10/2025 Active   omega-3 (Fish OiL) 60- mg capsule [id number] Yes Take 2 capsules (1,000 mg) by mouth once daily. Historical Provider, MD Past Month Active                     Review of Systems   Constitutional: Negative for chills, decreased appetite, diaphoresis, fever and malaise/fatigue.   Eyes:  Negative for blurred vision and double vision.   Cardiovascular:  Negative for chest pain, claudication, cyanosis, dyspnea on exertion, irregular heartbeat, leg swelling, near-syncope and palpitations.   Respiratory:  Negative for cough, hemoptysis, shortness of breath and wheezing.    Endocrine: Negative for cold intolerance, heat intolerance, polydipsia, polyphagia and polyuria.   Gastrointestinal:  Negative for abdominal pain, constipation, diarrhea, dysphagia, nausea and vomiting.   Genitourinary:  Negative for bladder incontinence, dysuria, hematuria, incomplete emptying, nocturia, frequency, pelvic pain and urgency.   Neurological:  Negative for headaches, light-headedness, paresthesias, sensory change and weakness.   Psychiatric/Behavioral:  Negative for altered mental status.    Musculoskeletal: Negative for myalgias. Positive for arthralgias     Vitals and nursing note reviewed.     Physical exam  Constitutional:       Appearance: Normal appearance. She is Overweight.   HENT:      Head: Normocephalic and atraumatic.      Mouth/Throat:      Mouth: Mucous membranes are moist.      Pharynx: Oropharynx is clear.   Eyes:      Extraocular Movements: Extraocular movements intact.      Conjunctiva/sclera: Conjunctivae normal.      Pupils: Pupils are equal, round, and reactive to light.   Cardiovascular:      PMI at left midclavicular line. Normal rate. Regular rhythm. Normal S1. Normal S2.       Murmurs: There is no murmur.      No gallop.  No click. No rub.       No audible carotid bruit    "  No lower extremity edema on exam  Pulmonary:      Effort: Pulmonary effort is normal.      Breath sounds: Normal breath sounds.   Abdominal:      General: Abdomen is flat. Bowel sounds are normal.      Palpations: Abdomen is soft and non-tender  Musculoskeletal:      Cervical back: Normal range of motion and neck supple.      Knee, right: Limited ROM  Skin:     General: Skin is warm and dry.      Capillary Refill: Capillary refill takes less than 2 seconds.   Neurological:      General: No focal deficit present.      Mental Status: She is alert and oriented to person, place, and time. Mental status is at baseline.   Psychiatric:         Mood and Affect: Mood normal.         Behavior: Behavior normal.         Thought Content: Thought content normal.         Judgment: Judgment normal.     Vitals and nursing note reviewed. Physical exam within normal limits.     Visit Vitals  /89   Pulse 92   Temp 36.8 °C (98.2 °F)   Resp 16   Ht 1.651 m (5' 5\")   Wt 73 kg (160 lb 15 oz)   LMP  (LMP Unknown)   SpO2 97%   BMI 26.78 kg/m²   OB Status Postmenopausal   Smoking Status Never   BSA 1.83 m²       DASI Risk Score      Flowsheet Row Pre-Admission Testing from 4/11/2025 in [facility]   Can you take care of yourself (eat, dress, bathe, or use toilet)?  2.75 filed at 04/11/2025 1434   Can you walk indoors, such as around your house? 1.75 filed at 04/11/2025 1434   Can you walk a block or two on level ground?  2.75 filed at 04/11/2025 1434   Can you climb a flight of stairs or walk up a hill? 5.5 filed at 04/11/2025 1434   Can you run a short distance? 0 filed at 04/11/2025 1434   Can you do light work around the house like dusting or washing dishes? 2.7 filed at 04/11/2025 1434   Can you do moderate work around the house like vacuuming, sweeping floors or carrying groceries? 3.5 filed at 04/11/2025 1434   Can you do heavy work around the house like scrubbing floors or lifting and moving heavy furniture?  0 " filed at 04/11/2025 1434   Can you do yard work like raking leaves, weeding or pushing a mower? 0 filed at 04/11/2025 1434   Can you have sexual relations? 5.25 filed at 04/11/2025 1434   Can you participate in moderate recreational activities like golf, bowling, dancing, doubles tennis or throwing a baseball or football? 0 filed at 04/11/2025 1434   Can you participate in strenous sports like swimming, singles tennis, football, basketball, or skiing? 0 filed at 04/11/2025 1434   DASI SCORE 24.2 filed at 04/11/2025 1434   METS Score (Will be calculated only when all the questions are answered) 5.7 filed at 04/11/2025 1434          Caprini DVT Assessment      Flowsheet Row Pre-Admission Testing from 4/11/2025 in [facility] ED to Hosp-Admission (Discharged) from 2/15/2024 in [facility] 3 with Messi Workman MD and Everett Meyer MD   DVT Score (IF A SCORE IS NOT CALCULATING, MUST SELECT A BMI TO COMPLETE) 11 filed at 04/11/2025 1434 8 filed at 02/15/2024 2126   Medical Factors Present cancer, chemotherapy, or previous malignancy, History of DVT/PE filed at 04/11/2025 1434 --   Surgical Factors Major surgery planned, lasting 2-3 hours filed at 04/11/2025 1434 --   BMI (BMI MUST BE CHOSEN) 30 or less filed at 04/11/2025 1434 30 or less filed at 02/15/2024 2126   RETIRED: Current Status -- Present cancer or chemotherapy filed at 02/15/2024 2126   RETIRED: History -- SVT, DVT/PE filed at 02/15/2024 2126   RETIRED: Age -- 60-75 years filed at 02/15/2024 2126          Modified Frailty Index      Flowsheet Row Pre-Admission Testing from 4/11/2025 in [facility]   Non-independent functional status (problems with dressing, bathing, personal grooming, or cooking) 0 filed at 04/11/2025 1434   History of diabetes mellitus  0 filed at 04/11/2025 1434   History of COPD 0 filed at 04/11/2025 1434   History of CHF No filed at 04/11/2025 1434   History of MI 0 filed at 04/11/2025 1434    History of Percutaneous Coronary Intervention, Cardiac Surgery, or Angina No filed at 04/11/2025 1434   Hypertension requiring the use of medication  0.0909 filed at 04/11/2025 1434   Peripheral vascular disease 0 filed at 04/11/2025 1434   Impaired sensorium (cognitive impairement or loss, clouding, or delirium) 0 filed at 04/11/2025 1434   TIA or CVA withouy residual deficit 0.0909 filed at 04/11/2025 1434   Cerebrovascular accident with deficit 0 filed at 04/11/2025 1434   Modified Frailty Index Calculator .1818 filed at 04/11/2025 1434          [id number] Stroke Risk Points  Current as of just now        N/A 0 to 9 Points:      Last Change: N/A          The [id number] risk score (Angeline BENNETT, et al. 2009. © 2010 [employer]) quantifies the risk of stroke for a patient with atrial fibrillation. For patients without atrial fibrillation or under the age of 18 this score appears as N/A. Higher score values generally indicate higher risk of stroke.        This score is not applicable to this patient. Components are not calculated.          Revised Cardiac Risk Index      Flowsheet Row Pre-Admission Testing from 4/11/2025 in [facility]   High-Risk Surgery (Intraperitoneal, Intrathoracic,Suprainguinal vascular) 0 filed at 04/11/2025 1434   History of ischemic heart disease (History of MI, History of positive exercuse test, Current chest paint considered due to myocardial ischemia, Use of nitrate therapy, ECG with pathological Q Waves) 0 filed at 04/11/2025 1434   History of congestive heart failure (pulmonary edemia, bilateral rales or S3 gallop, Paroxysmal nocturnal dyspnea, CXR showing pulmonary vascular redistribution) 0 filed at 04/11/2025 1434   History of cerebrovascular disease (Prior TIA or stroke) 1 filed at 04/11/2025 1434   Pre-operative insulin treatment 0 filed at 04/11/2025 1434   Pre-operative creatinine>2 mg/dl 0 filed at 04/11/2025 1434   Revised Cardiac Risk  Calculator 1 filed at 04/11/2025 1434          Apfel Simplified Score    No data to display       Risk Analysis Index Results This Encounter    No data found in the last 10 encounters.       Stop Bang Score      Flowsheet Row Pre-Admission Testing from 4/11/2025 in [facility]   Do you snore loudly? 1 filed at 04/11/2025 1358   Do you often feel tired or fatigued after your sleep? 1 filed at 04/11/2025 1358   Has anyone ever observed you stop breathing in your sleep? 0 filed at 04/11/2025 1358   Do you have or are you being treated for high blood pressure? 1 filed at 04/11/2025 1358   Recent BMI (Calculated) 26.8 filed at 04/11/2025 1358   Is BMI greater than 35 kg/m2? 0=No filed at 04/11/2025 1358   Age older than 50 years old? 1=Yes filed at 04/11/2025 1358   Is your neck circumference greater than 17 inches (Male) or 16 inches (Female)? 0 filed at 04/11/2025 1358   Gender - Male 0=No filed at 04/11/2025 1358   STOP-BANG Total Score 4 filed at 04/11/2025 1358          Prodigy: High Risk  Total Score: 8              Prodigy Age Score           ARISCAT Score for Postoperative Pulmonary Complications      Flowsheet Row Pre-Admission Testing from 4/11/2025 in [facility]   Age Calculated Score 3 filed at 04/11/2025 1434   Preoperative SpO2 0 filed at 04/11/2025 1434   Respiratory infection in the last month Either upper or lower (i.e., URI, bronchitis, pneumonia), with fever and antibiotic treatment 0 filed at 04/11/2025 1434   Preoperative anemia (Hgb less than 10 g/dl) 0 filed at 04/11/2025 1434   Surgical incision  0 filed at 04/11/2025 1434   Duration of surgery  16 filed at 04/11/2025 1434   Emergency Procedure  0 filed at 04/11/2025 1434   ARISCAT Total Score  19 filed at 04/11/2025 1434          Mai Perioperative Risk for Myocardial Infarction or Cardiac Arrest (NICHOLAS)      Flowsheet Row Pre-Admission Testing from 4/11/2025 in [facility]   Calculated Age Score  "1.24 filed at 04/11/2025 1435   Functional Status  0 filed at 04/11/2025 1435   ASA Class  -1.92 filed at 04/11/2025 1435   Creatinine 0 filed at 04/11/2025 1435   Type of Procedure  0.80 filed at 04/11/2025 1435   NICHOLAS Total Score  -5.13 filed at 04/11/2025 1435   NICHOLAS % 0.59 filed at 04/11/2025 1435          Assessment & Plan:    Neuro:  No significant findings on chart review or clinical presentation and evaluation.   History of Cerebrovascular accident-Managed with Aspirin. Event occurred in 2021. States she presented with \"clicking/pressure in head.\" No residual deficits. Normal neuro exam in PAT. MRI findings in 2021 resulted with acute to subacute infarct involving the left corona radiata and anterior basal ganglia region. No reoccurrence.     HEENT/Airway:  No diagnosis or significant findings on chart review or clinical presentation and evaluation.   STOP-BANG Score-4 points moderate risk for ZAKIYA    Mallampati::  II    TM distance::  >3 FB    Neck ROM::  Full  Dentures-denies  Crowns-denies  Implants-denies  Multiple teeth missing on exam    Cardiovascular:  No significant findings on chart review or clinical presentation and evaluation.   History of Hypertension-Managed with Amlodipine. BP in PAT was 123/89  History of Hyperlipidemia-Managed with Ezetimibe and Atorvastatin  History of Pulmonary embolism-Managed with Aspirin/Eliquis. Pulmonary embolism diagnosed in 2021 with no reoccurrence.   METS: 5.7  RCRI: 1 point, 6.0% risk for postoperative MACE   NICHOLAS: 0.59% risk for postoperative MACE  EKG -normal sinus rhythm with LAFB, LVH, and possible septal/lateral infarcts Rate-90 No acute changes.   Transthoracic echocardiogram-Completed 4/18/2024  1. Left ventricular systolic function is normal with a 55-60% estimated ejection fraction.     Pulmonary:  No diagnosis or significant findings on chart review or clinical presentation and evaluation.   ARISCAT: <26 points, 1.6% risk of in-hospital postoperative " pulmonary complication  PRODIGY: Moderate risk for opioid induced respiratory depression  Smoking History-She has never smoked.  Discussed smoking cessation and deep breathing handout given    Renal/Urinary:  No diagnosis or significant findings on chart review or clinical presentation and evaluation, however, the patient is at increased risk of perioperative renal complications secondary to age>/= 56 and HTN. Preventative measures include BP monitoring, medication compliance, and hydration management.   CMP-Pending  Creatinine-  GFR-    Endocrine:  No diagnosis or significant findings on chart review or clinical presentation and evaluation.   [id number]    Hematologic/Immunology:  No significant findings on chart review or clinical presentation and evaluation.  History of Breast cancer-Diagnosed in 2024. Treated with right breast lumpectomy and chemotherapy. No reoccurrence.   The patient is not a Jehovah’s witness and will accept blood and blood products if medically indicated.   History of previous blood transfusions No  CBC-Pending  HGB-Pending  Caprini Score 11, patient at High for postoperative DVT. Pt supplied education/VTE handout  Anticoagulation use: Yes   Aspirin-Continue in the preoperative period  Eliquis-pending    Gastrointestinal:   No significant findings on chart review or clinical presentation and evaluation.   History of Gastroesophageal reflux disease-Managed with OTC medications and diet.     Recreational drug use: none  Alcohol use none    Infectious disease:   No diagnosis or significant findings on chart review or clinical presentation and evaluation.     Musculoskeletal:   No diagnosis or significant findings on chart review or clinical presentation and evaluation.   JHFRAT score-8 points. moderate risk for falls    Anesthesia:  ASA 3 - Patient with moderate systemic disease with functional limitations  Anticipated anesthesia-regional  History of General anesthesia- yes  Complications-  No anesthesia complications  No family history of anesthesia complications    Labs & Imaging ordered:  CBC, CMP, EKG  Nickel/metal allergy-negative  Shellfish allergy-negative    Overall, patient Moderate Risk for the scheduled Moderate Risk surgery. Discussed with patient medication instructions, NPO guidelines, and any questions or concerns.     Face to face time spent 45 minutes

## 2025-04-13 LAB
ATRIAL RATE: 90 BPM
P AXIS: 55 DEGREES
P OFFSET: 168 MS
P ONSET: 117 MS
PR INTERVAL: 194 MS
Q ONSET: 214 MS
QRS COUNT: 15 BEATS
QRS DURATION: 96 MS
QT INTERVAL: 376 MS
QTC CALCULATION(BAZETT): 459 MS
QTC FREDERICIA: 430 MS
R AXIS: -48 DEGREES
T AXIS: 41 DEGREES
T OFFSET: 402 MS
VENTRICULAR RATE: 90 BPM

## 2025-04-14 ENCOUNTER — APPOINTMENT (OUTPATIENT)
Dept: PREADMISSION TESTING | Facility: HOSPITAL | Age: 65
End: 2025-04-14
Payer: COMMERCIAL

## 2025-05-01 ENCOUNTER — OFFICE VISIT (OUTPATIENT)
Dept: CARDIOLOGY | Facility: CLINIC | Age: 65
End: 2025-05-01
Payer: COMMERCIAL

## 2025-05-01 VITALS
WEIGHT: 163.4 LBS | BODY MASS INDEX: 27.22 KG/M2 | HEART RATE: 92 BPM | SYSTOLIC BLOOD PRESSURE: 120 MMHG | HEIGHT: 65 IN | OXYGEN SATURATION: 94 % | DIASTOLIC BLOOD PRESSURE: 80 MMHG

## 2025-05-01 DIAGNOSIS — Z01.818 PRE-OPERATIVE CLEARANCE: ICD-10-CM

## 2025-05-01 DIAGNOSIS — I10 PRIMARY HYPERTENSION: Primary | ICD-10-CM

## 2025-05-01 DIAGNOSIS — I63.9 CEREBRAL INFARCTION, UNSPECIFIED MECHANISM (MULTI): ICD-10-CM

## 2025-05-01 DIAGNOSIS — Z01.818 PREOP TESTING: ICD-10-CM

## 2025-05-01 DIAGNOSIS — F41.9 ANXIETY: ICD-10-CM

## 2025-05-01 DIAGNOSIS — Q21.12 PFO (PATENT FORAMEN OVALE) (HHS-HCC): ICD-10-CM

## 2025-05-01 DIAGNOSIS — E78.00 PURE HYPERCHOLESTEROLEMIA: ICD-10-CM

## 2025-05-01 DIAGNOSIS — Z86.73 HISTORY OF CEREBROVASCULAR ACCIDENT: ICD-10-CM

## 2025-05-01 PROBLEM — R39.15 URINARY URGENCY: Status: RESOLVED | Noted: 2024-01-17 | Resolved: 2025-05-01

## 2025-05-01 PROBLEM — L23.9 ALLERGIC DERMATITIS: Status: RESOLVED | Noted: 2023-06-28 | Resolved: 2025-05-01

## 2025-05-01 PROCEDURE — 99212 OFFICE O/P EST SF 10 MIN: CPT

## 2025-05-01 PROCEDURE — 1036F TOBACCO NON-USER: CPT | Performed by: INTERNAL MEDICINE

## 2025-05-01 PROCEDURE — 99204 OFFICE O/P NEW MOD 45 MIN: CPT | Performed by: INTERNAL MEDICINE

## 2025-05-01 PROCEDURE — 3008F BODY MASS INDEX DOCD: CPT | Performed by: INTERNAL MEDICINE

## 2025-05-01 PROCEDURE — 3074F SYST BP LT 130 MM HG: CPT | Performed by: INTERNAL MEDICINE

## 2025-05-01 PROCEDURE — 3078F DIAST BP <80 MM HG: CPT | Performed by: INTERNAL MEDICINE

## 2025-05-01 NOTE — PROGRESS NOTES
"  Subjective  Cassandra Lang  is a 64 y.o. year old female who presents for preoperative evaluation for right TKR.  She has diffuse arthritis pain. No chest pain, no dyspnea, no palpaitions, no edema.    Blood pressure 120/80, pulse 92, height 1.651 m (5' 5\"), weight 74.1 kg (163 lb 6.4 oz), SpO2 94%, not currently breastfeeding.   Sulfa (sulfonamide antibiotics) and Sulfamethoxazole-trimethoprim  Medical History[1]  Surgical History[2]  Family History[3]  @SOC    Current Medications[4]     ROS  Review of Systems   All other systems reviewed and are negative.      Physical Exam  Physical Exam  Constitutional:       Appearance: Normal appearance.   HENT:      Head: Normocephalic and atraumatic.   Cardiovascular:      Rate and Rhythm: Normal rate and regular rhythm.   Pulmonary:      Effort: Pulmonary effort is normal.      Breath sounds: Normal breath sounds.   Abdominal:      General: Abdomen is flat.   Musculoskeletal:      Right lower leg: No edema.      Left lower leg: No edema.   Skin:     General: Skin is warm and dry.   Neurological:      General: No focal deficit present.      Mental Status: She is alert and oriented to person, place, and time.   Psychiatric:         Mood and Affect: Mood normal.         Behavior: Behavior normal.          EKG  Encounter Date: 04/11/25   ECG 12 Lead   Result Value    Ventricular Rate 90    Atrial Rate 90    PA Interval 194    QRS Duration 96    QT Interval 376    QTC Calculation(Bazett) 459    P Axis 55    R Axis -48    T Axis 41    QRS Count 15    Q Onset 214    P Onset 117    P Offset 168    T Offset 402    QTC Fredericia 430    Narrative    Normal sinus rhythm  Possible Left atrial enlargement  Left anterior fascicular block  Left ventricular hypertrophy  Cannot rule out Septal infarct , age undetermined  Possible Lateral infarct , age undetermined  No previous ECGs available  Confirmed by Marcel Thomas ([id number]) on 4/13/2025 6:36:34 PM       Problem List Items Addressed " This Visit       PFO (patent foramen ovale) ([facility])    S/P closure 21 by Dr. Benavides after CVA         Hyperlipidemia    Primary hypertension - Primary    Cerebral infarction, unspecified    Anxiety    History of cerebrovascular accident    Pre-operative clearance    For TKR at moderate risk a=for moderate risk proedure medically optimized          Other Visit Diagnoses         Preop testing        Relevant Orders    Follow Up In Cardiology              Proceed with surgery as planne  Return 6 months with EKG      Lawrence Davalos MD        [1]   Past Medical History:  Diagnosis Date    Abnormal findings on diagnostic imaging of other specified body structures     Abnormal CT of the chest    Breast cancer     Cancer (Multi)     Heart disease     High blood pressure     Hx antineoplastic chemo     right breast    Hyperlipidemia     Personal history of other medical treatment     History of echocardiogram    Personal history of other medical treatment     History of transesophageal echocardiography (MAYKEL)    Personal history of other medical treatment     History of cardiac monitoring    Personal history of transient ischemic attack (TIA), and cerebral infarction without residual deficits     History of cerebrovascular accident    Stroke (Multi)     Tinea unguium 10/12/2021    Onychomycosis of toenail   [2]   Past Surgical History:  Procedure Laterality Date    APPENDECTOMY  2014    Appendectomy    BREAST LUMPECTOMY Right 10/02/2023     SECTION, CLASSIC  2014     Section    IR CVC PORT  2023    IR CVC PORT 2023 [facility] CVEPINV    MR HEAD ANGIO WO IV CONTRAST  2020    MR HEAD ANGIO WO IV CONTRAST 2020 [facility] CLINICAL LEGACY    MR NECK ANGIO WO IV CONTRAST  2020    MR NECK ANGIO WO IV CONTRAST 2020 [facility] CLINICAL LEGACY    OTHER SURGICAL HISTORY  2020    Esophagogastroduodenoscopy    OTHER SURGICAL HISTORY  2020    Colonoscopy    OTHER  SURGICAL HISTORY  03/04/2021    Patent foramen ovale repair   [3]   Family History  Problem Relation Name Age of Onset    Diabetes Mother      Hypertension Mother      Cancer Mother      Coronary artery disease Father      Heart attack Maternal Grandfather      Stroke Other     [4]   Current Outpatient Medications   Medication Sig Dispense Refill    acetaminophen (Tylenol) 325 mg tablet Take 2 tablets (650 mg) by mouth every 4 hours if needed for mild pain (1 - 3). 30 tablet 0    amLODIPine (Norvasc) 10 mg tablet Take 1 tablet (10 mg) by mouth once daily. 90 tablet 1    amoxicillin (Amoxil) 500 mg tablet take 1 tablet by mouth 3 times a day until gone      apixaban (Eliquis) 5 mg tablet Take 1 tablet (5 mg) by mouth 2 times a day. 180 tablet 1    aspirin 81 mg EC tablet Take 1 tablet (81 mg) by mouth once daily. 90 tablet 1    atorvastatin (Lipitor) 40 mg tablet Take 1 tablet (40 mg) by mouth once daily. (Patient not taking: Reported on 4/11/2025) 90 tablet 1    cholecalciferol (Vitamin D-3) 25 MCG (1000 UT) capsule TAKE 1 CAPSULE (25 MCG) BY MOUTH ONCE DAILY. 90 capsule 1    ezetimibe (Zetia) 10 mg tablet TAKE 1 TABLET BY MOUTH EVERY DAY 90 tablet 1    omega-3 (Fish OiL) 60- mg capsule Take 2 capsules (1,000 mg) by mouth once daily.       No current facility-administered medications for this visit.

## 2025-05-07 ENCOUNTER — TELEPHONE (OUTPATIENT)
Dept: ORTHOPEDIC SURGERY | Age: 65
End: 2025-05-07
Payer: COMMERCIAL

## 2025-05-07 NOTE — TELEPHONE ENCOUNTER
Called and left Cassandra Lang a voicemail advising her to stop taking Eliquis 48hr prior to surgery.

## 2025-05-08 ENCOUNTER — APPOINTMENT (OUTPATIENT)
Dept: PRIMARY CARE | Facility: CLINIC | Age: 65
End: 2025-05-08
Payer: COMMERCIAL

## 2025-05-08 VITALS
TEMPERATURE: 98 F | HEART RATE: 74 BPM | BODY MASS INDEX: 26.96 KG/M2 | WEIGHT: 162 LBS | SYSTOLIC BLOOD PRESSURE: 123 MMHG | DIASTOLIC BLOOD PRESSURE: 84 MMHG

## 2025-05-08 DIAGNOSIS — Z01.818 PREOPERATIVE CLEARANCE: Primary | ICD-10-CM

## 2025-05-08 DIAGNOSIS — M25.50 ARTHRALGIA, UNSPECIFIED JOINT: ICD-10-CM

## 2025-05-08 DIAGNOSIS — M25.50 ARTHRALGIA OF MULTIPLE SITES, BILATERAL: ICD-10-CM

## 2025-05-08 PROCEDURE — 99214 OFFICE O/P EST MOD 30 MIN: CPT | Performed by: FAMILY MEDICINE

## 2025-05-08 PROCEDURE — 3074F SYST BP LT 130 MM HG: CPT | Performed by: FAMILY MEDICINE

## 2025-05-08 PROCEDURE — 1036F TOBACCO NON-USER: CPT | Performed by: FAMILY MEDICINE

## 2025-05-08 PROCEDURE — 3079F DIAST BP 80-89 MM HG: CPT | Performed by: FAMILY MEDICINE

## 2025-05-08 NOTE — ASSESSMENT & PLAN NOTE
-Preoperative clearance from the cardiologist completed on 5/01/2025. For TKR a, she was at moderate risk risk proedure and medically optimized.  -BP is well controlled.  -No recent URI or UTI.

## 2025-05-08 NOTE — H&P (VIEW-ONLY)
"Subjective   Patient ID: Cassandra Lang is a 64 y.o. female who presents for Pre-op Clearance.  HPI  Cassandra Lang is a 64-year-old female with past medical history of hypertension, hyperlipidemia, prediabetes, PE on Eliquis, CVA, PFO s/p repair GERD, IBS, anxiety, depression, vitamin D deficiency, right breast cancer \"24 s/p lumpectomy and SLN and current treatment weekly paclitaxel/trastuzumab.  Patient is here for:    1-Pre operative clearance for a RTK Arthroscopy.  -Preoperative clearance from the cardiologist completed on 5/01/2025. For TKR a, she was at moderate risk risk proedure and medically optimized.  -BP is well controlled.  -No recent URI or UTI.     2-Arthralgia of multiple joints: bilateral knees, bilateral hips, left shoulder and low back pain.  It started a month ago and is getting worse.     A review of system was completed.  All systems were reviewed and were normal, except for the ones that are listed in the HPI.    Objective   Physical Exam  Constitutional:       Appearance: Normal appearance.   HENT:      Head: Normocephalic and atraumatic.      Right Ear: Tympanic membrane, ear canal and external ear normal.      Left Ear: Tympanic membrane, ear canal and external ear normal.      Nose: Nose normal.      Mouth/Throat:      Mouth: Mucous membranes are moist.      Pharynx: Oropharynx is clear.   Eyes:      Extraocular Movements: Extraocular movements intact.      Conjunctiva/sclera: Conjunctivae normal.      Pupils: Pupils are equal, round, and reactive to light.   Cardiovascular:      Rate and Rhythm: Normal rate and regular rhythm.      Pulses: Normal pulses.   Pulmonary:      Effort: Pulmonary effort is normal.      Breath sounds: Normal breath sounds.   Abdominal:      General: Abdomen is flat. Bowel sounds are normal.      Palpations: Abdomen is soft.   Musculoskeletal:         General: Normal range of motion.      Cervical back: Normal range of motion and neck supple.   Skin:     " General: Skin is warm.   Neurological:      General: No focal deficit present.      Mental Status: She is alert and oriented to person, place, and time. Mental status is at baseline.   Psychiatric:         Mood and Affect: Mood normal.         Behavior: Behavior normal.         Thought Content: Thought content normal.         Judgment: Judgment normal.     Assessment/Plan   Problem List Items Addressed This Visit       Arthralgia of multiple sites, bilateral    -Etiology unclear.  -Arthritis panel ordered today.          Relevant Orders    CBC    POCT UA Automated manually resulted    Urine Culture    Protime-INR    Comprehensive Metabolic Panel    Preoperative clearance - Primary    -Preoperative clearance from the cardiologist completed on 5/01/2025. For TKR a, she was at moderate risk risk proedure and medically optimized.  -BP is well controlled.  -No recent URI or UTI.          Relevant Orders    CBC    POCT UA Automated manually resulted    Urine Culture    Protime-INR    Comprehensive Metabolic Panel     Other Visit Diagnoses         Arthralgia, unspecified joint        Relevant Orders    Uric Acid    C-Reactive Protein    Sedimentation Rate    TAMIKA with Reflex to TERRIE    Rheumatoid Factor         Patient to return to office after her surgery.

## 2025-05-10 LAB
ALBUMIN SERPL-MCNC: 4.9 G/DL (ref 3.6–5.1)
ALP SERPL-CCNC: 77 U/L (ref 37–153)
ALT SERPL-CCNC: 18 U/L (ref 6–29)
ANA PAT SER IF-IMP: ABNORMAL
ANA SER QL IF: POSITIVE
ANA TITR SER IF: ABNORMAL TITER
ANION GAP SERPL CALCULATED.4IONS-SCNC: 10 MMOL/L (CALC) (ref 7–17)
APPEARANCE UR: CLEAR
AST SERPL-CCNC: 20 U/L (ref 10–35)
BACTERIA #/AREA URNS HPF: ABNORMAL /HPF
BACTERIA UR CULT: NORMAL
BILIRUB SERPL-MCNC: 0.6 MG/DL (ref 0.2–1.2)
BILIRUB UR QL STRIP: NEGATIVE
BUN SERPL-MCNC: 13 MG/DL (ref 7–25)
CALCIUM SERPL-MCNC: 10 MG/DL (ref 8.6–10.4)
CHLORIDE SERPL-SCNC: 103 MMOL/L (ref 98–110)
CO2 SERPL-SCNC: 27 MMOL/L (ref 20–32)
COLOR UR: YELLOW
CREAT SERPL-MCNC: 0.61 MG/DL (ref 0.5–1.05)
CRP SERPL-MCNC: <3 MG/L
DSDNA AB SER-ACNC: ABNORMAL [IU]/ML
EGFRCR SERPLBLD CKD-EPI 2021: 101 ML/MIN/1.73M2
ENA RNP AB SER-ACNC: ABNORMAL
ENA SM AB SER IA-ACNC: ABNORMAL
ENA SM+RNP AB SER IA-ACNC: ABNORMAL
ERYTHROCYTE [DISTWIDTH] IN BLOOD BY AUTOMATED COUNT: 13.6 % (ref 11–15)
ERYTHROCYTE [SEDIMENTATION RATE] IN BLOOD BY WESTERGREN METHOD: 6 MM/H
GLUCOSE SERPL-MCNC: 103 MG/DL (ref 65–139)
GLUCOSE UR QL STRIP: NEGATIVE
HCT VFR BLD AUTO: 47.5 % (ref 35–45)
HGB BLD-MCNC: 15.6 G/DL (ref 11.7–15.5)
HGB UR QL STRIP: NEGATIVE
HYALINE CASTS #/AREA URNS LPF: ABNORMAL /LPF
INR PPP: 1
KETONES UR QL STRIP: NEGATIVE
LEUKOCYTE ESTERASE UR QL STRIP: ABNORMAL
MCH RBC QN AUTO: 27.8 PG (ref 27–33)
MCHC RBC AUTO-ENTMCNC: 32.8 G/DL (ref 32–36)
MCV RBC AUTO: 84.7 FL (ref 80–100)
NITRITE UR QL STRIP: NEGATIVE
NUCLEOSOME AB SER IA-ACNC: ABNORMAL
PH UR STRIP: 6.5 [PH] (ref 5–8)
PLATELET # BLD AUTO: 289 THOUSAND/UL (ref 140–400)
PMV BLD REES-ECKER: 9.6 FL (ref 7.5–12.5)
POTASSIUM SERPL-SCNC: 4 MMOL/L (ref 3.5–5.3)
PROT SERPL-MCNC: 7.2 G/DL (ref 6.1–8.1)
PROT UR QL STRIP: NEGATIVE
PROTHROMBIN TIME: 10.5 SEC (ref 9–11.5)
RBC # BLD AUTO: 5.61 MILLION/UL (ref 3.8–5.1)
RBC #/AREA URNS HPF: ABNORMAL /HPF
RHEUMATOID FACT SERPL-ACNC: 11 IU/ML
SERVICE CMNT-IMP: ABNORMAL
SODIUM SERPL-SCNC: 140 MMOL/L (ref 135–146)
SP GR UR STRIP: 1.01 (ref 1–1.03)
SQUAMOUS #/AREA URNS HPF: ABNORMAL /HPF
URATE SERPL-MCNC: 4.5 MG/DL (ref 2.5–7)
WBC # BLD AUTO: 4.8 THOUSAND/UL (ref 3.8–10.8)
WBC #/AREA URNS HPF: ABNORMAL /HPF

## 2025-05-12 LAB
ALBUMIN SERPL-MCNC: 4.9 G/DL (ref 3.6–5.1)
ALP SERPL-CCNC: 77 U/L (ref 37–153)
ALT SERPL-CCNC: 18 U/L (ref 6–29)
ANA PAT SER IF-IMP: ABNORMAL
ANA SER QL IF: POSITIVE
ANA TITR SER IF: ABNORMAL TITER
ANION GAP SERPL CALCULATED.4IONS-SCNC: 10 MMOL/L (CALC) (ref 7–17)
APPEARANCE UR: CLEAR
AST SERPL-CCNC: 20 U/L (ref 10–35)
BACTERIA #/AREA URNS HPF: ABNORMAL /HPF
BACTERIA UR CULT: NORMAL
BILIRUB SERPL-MCNC: 0.6 MG/DL (ref 0.2–1.2)
BILIRUB UR QL STRIP: NEGATIVE
BUN SERPL-MCNC: 13 MG/DL (ref 7–25)
CALCIUM SERPL-MCNC: 10 MG/DL (ref 8.6–10.4)
CHLORIDE SERPL-SCNC: 103 MMOL/L (ref 98–110)
CO2 SERPL-SCNC: 27 MMOL/L (ref 20–32)
COLOR UR: YELLOW
CREAT SERPL-MCNC: 0.61 MG/DL (ref 0.5–1.05)
CRP SERPL-MCNC: <3 MG/L
DSDNA AB SER-ACNC: 2 IU/ML
EGFRCR SERPLBLD CKD-EPI 2021: 101 ML/MIN/1.73M2
ENA RNP AB SER-ACNC: ABNORMAL AI
ENA SM AB SER IA-ACNC: ABNORMAL AI
ENA SM+RNP AB SER IA-ACNC: ABNORMAL AI
ERYTHROCYTE [DISTWIDTH] IN BLOOD BY AUTOMATED COUNT: 13.6 % (ref 11–15)
ERYTHROCYTE [SEDIMENTATION RATE] IN BLOOD BY WESTERGREN METHOD: 6 MM/H
GLUCOSE SERPL-MCNC: 103 MG/DL (ref 65–139)
GLUCOSE UR QL STRIP: NEGATIVE
HCT VFR BLD AUTO: 47.5 % (ref 35–45)
HGB BLD-MCNC: 15.6 G/DL (ref 11.7–15.5)
HGB UR QL STRIP: NEGATIVE
HYALINE CASTS #/AREA URNS LPF: ABNORMAL /LPF
INR PPP: 1
KETONES UR QL STRIP: NEGATIVE
LABORATORY COMMENT REPORT: ABNORMAL
LEUKOCYTE ESTERASE UR QL STRIP: ABNORMAL
MCH RBC QN AUTO: 27.8 PG (ref 27–33)
MCHC RBC AUTO-ENTMCNC: 32.8 G/DL (ref 32–36)
MCV RBC AUTO: 84.7 FL (ref 80–100)
NITRITE UR QL STRIP: NEGATIVE
NUCLEOSOME AB SER IA-ACNC: ABNORMAL AI
PH UR STRIP: 6.5 [PH] (ref 5–8)
PLATELET # BLD AUTO: 289 THOUSAND/UL (ref 140–400)
PMV BLD REES-ECKER: 9.6 FL (ref 7.5–12.5)
POTASSIUM SERPL-SCNC: 4 MMOL/L (ref 3.5–5.3)
PROT SERPL-MCNC: 7.2 G/DL (ref 6.1–8.1)
PROT UR QL STRIP: NEGATIVE
PROTHROMBIN TIME: 10.5 SEC (ref 9–11.5)
RBC # BLD AUTO: 5.61 MILLION/UL (ref 3.8–5.1)
RBC #/AREA URNS HPF: ABNORMAL /HPF
RHEUMATOID FACT SERPL-ACNC: 11 IU/ML
SERVICE CMNT-IMP: ABNORMAL
SODIUM SERPL-SCNC: 140 MMOL/L (ref 135–146)
SP GR UR STRIP: 1.01 (ref 1–1.03)
SQUAMOUS #/AREA URNS HPF: ABNORMAL /HPF
URATE SERPL-MCNC: 4.5 MG/DL (ref 2.5–7)
WBC # BLD AUTO: 4.8 THOUSAND/UL (ref 3.8–10.8)
WBC #/AREA URNS HPF: ABNORMAL /HPF

## 2025-05-12 NOTE — DISCHARGE INSTRUCTIONS
Claudio Rm M.D.   Sports Medicine Orthopaedic Surgery    [facility]  [address] [facility]                  [address] [facility] [place]       [address]  [facility], [address]   Phone: [phone]         Phone: [phone]       Phone: [phone]   Fax: [phone]                         Fax: [phone]       Fax: [phone]     After hours phone number: [phone]    AFTER SURGERY     Anesthesia  If you received a nerve block during surgery, you may have numbness or inability to move the limb. Do not be alarmed as this may last 8-36 hours depending upon the amount and type of medication used by the anesthesiologist. Make sure If you are experiencing numbness after 36 hours, please call the office. When the nerve block begins to wear off, you will feel a tingling sensation, like pins and needles. It is important that you start taking the pain medication at that time to ensure that you “stay ahead of the pain.” It is important to take the pain medicine when the pain level is a 4 or 5/10, before it gets too high.    Do not operate heavy machinery, make major decisions, drink alcohol or smoke for 24 hours. Do not drink alcohol while taking pain medications.    Prescribed Medications   Narcotic pain medicine (Oxycodone): The goal of post-operative pain management is pain control, NOT pain elimination. You should expect some pain after surgery - this pain helps you protect itself while it is healing. Constipation, nausea, itching, and drowsiness are side effects of this type of medication. You should take an over-the-counter stool softener (Colace and/or Senna) while taking narcotics to prevent constipation. If you experience itching, over the counter Benadryl may be helpful. Narcotic pain medications often produce drowsiness and it is against the law to operate a vehicle  while taking these medications. If you are taking oxycodone, you should take acetaminophen (Tylenol) around the clock to decrease baseline pain. Do not take Tylenol-containing products while on Percocet or Norman.   Refill Policy: For concerns over your safety due to the rising opioid addiction epidemic in the [place], refills of your narcotic pain medications will only be provided on a case by case basis. Please use these medications judiciously.    Anti-inflammatory (NSAID) medicine (Naproxen or Mobic): These are both anti-inflammatory and pain relief. Do NOT take this medication if you have had an ulcer in the past unless you have cleared this with your primary care doctor. You should take NSAIDs with food or antacid to reduce the chance of upset stomach. Depending on your surgery, Dr. Rm may instruct you to avoid these medications.    Anti-nausea medicine (ondansetron/Zofran): sometimes patients experience nausea related to either anesthesia or the narcotic pain medication. If this is the case you will find this medication helpful.    DVT prophylaxis (Aspirin or Eliquis): For most patients, activity alone is sufficient to prevent dangerous blood clots, but in some cases your personal risk profile and/or the type of surgery you have undergone makes it necessary that you take medication to help prevent blood clots. Dr. Rm will inform you if you are to start one of these medications postoperatively.    Stool softener (Colace and/or Senna): are available over the counter at your local pharmacy and should be taken while you are taking narcotic pain medication to avoid constipation. You should stop taking these medications if you develop diarrhea. Over the counter laxatives may be used if you develop painful constipation.     Diet   Start with clear liquids (water, juice, Gatorade) and light foods (jello, soup, crackers). Progress to normal diet as tolerated if you are not nauseated. Avoid greasy or spicy  foods for the first 24hrs to avoid GI upset. Increase fluid intake to help prevent constipation.    Dressings / Wound Care  You may remove the outer dressing after 3 days and then can shower. (If you have a splint, please leave the splint in place until follow-up.) Do not remove Steri-strips (white stickers) if present over your incisions. Steri-strips may fall off on their own, which is normal. After the bandage has been removed, you may leave the incisions open to air. Alternatively, if you prefer to keep them covered, you may do so with Band-Aids, a light gauze dressing, or a clean ACE wrap. You may shower after the bandage has been removed (3 days), but it is very important that you pat the wounds dry after the shower. It is OK to allow soap and water to run over the wound - DO NOT soak or scrub the wound. Outside of the shower, keep your incision clean and dry until your first postoperative visit, approximately 10-14 days after surgery. You may remove your sling or brace to shower, unless otherwise instructed. As your balance may be affected by recent surgery, we recommend placing a plastic chair in the shower to help prevent falls. Do NOT take baths, go into a pool, or soak the operative site until approved by Dr. Rm.    Bracing / Physical Therapy   If you were given one, make sure you wear sling or brace at all times until your follow-up with Dr. Rm. Only remove your sling or brace for physical therapy, home exercises, and hygiene. These are typically used for 4-6 weeks after surgery in order to protect the healing of the tissue.     Physical therapy is just as important to your recovery as the actual operation! If you were given a prescription for physical therapy, make sure you go to your appointments and do your exercises daily at home (especially motion exercises).     Ice is a very important part of your recovery. It helps reduce inflammation and improves pain control. You should ice a few times  each day for 20-30 minutes at a time. Please make sure there is something under the ice (clean towel, cloth, T-shirt) so that the ice doesn't directly contact your skin. If you ordered a commercially available ice machine (optional) and a compression setting is available, you should use LOW or no compression during the first 5 days. After that, you may increase compression setting as tolerated. If the compression is bothering you then do not use compression.    Driving / Travel  Ultimately, it is your judgment to decide when you are safe to drive, but if you are at all unsure, do not risk your life or someone else's. As a general guideline, you will not be able to drive for 4-6 weeks after surgery. You should certainly not drive while on narcotics pain medication or while in a brace or sling.     Avoid flights and long distance traveling for 6 weeks after surgery. It is important to discuss your travel plans with Dr. Rm, as additional medications may need to be prescribed to help prevent blood clots if certain travel is unavoidable.     Return to Work or School   Your return to work will depend on what surgery was done and what type of work you do. Please note that these are general guidelines, and there may be modifications based on your unique situation. Typically, you may return to sedentary work or school 3-7 days after surgery if pain is tolerable and you are no longer requiring narcotic pain medication. In conjunction with your input, Dr. Rm will determine when you may return to more physically rigorous demands.     If you had Knee or Hip Surgery   If your surgery involves a ligament reconstruction or tendon repair, you will typically be prescribed crutches for at least the first few days until pain and the postoperative protocol allows you to fully bear weight and also wear a brace for 4-6 weeks. If cartilage surgery or meniscus repair is performed, you may be on crutches for 6 weeks. Individual  rehabilitation guidelines will vary based upon the unique situation and surgery of every patient, but take these general guidelines into account when planning return to work.     If you had Shoulder Surgery   If your surgery involves a repair (rotator cuff repair, labral repair), you will have a sling on for six weeks after surgery. If you have a sling, you will need to wear it all day. Please wear the sling at all times except for bathing for the first 2 weeks minimum. As long as you can abide by the restrictions, you can return to work when you feel like you can do so safely. However, you will need to take into consideration driving and activities related to your job. You may be able to safely loosen it if you are able to keep your arm supported. Please understand that you will NOT be able to work with your arm away from your body, above shoulder level, or use your arm against gravity for approximately 8 weeks. For jobs that require physical labor, you may require four months or more to return to work. If your surgery does NOT involve a repair (subacromial decompression, distal clavicle excision, capsular release), then you will be in a sling for only a few days after surgery. When comfortable, you may return to work when ready to conduct normal activities of your job. Remember that you may be on narcotic pain medications and these should be discontinued prior to your return to work. For jobs that require physical labor, you may require 6 weeks or more to return to work.     If you had Elbow or Wrist Surgery   If your surgery involves a repair or reconstruction, you will have a splint and sling on followed by a brace for four to six weeks after surgery. As long as you can abide by the restrictions, you can return to work when you feel like you can do so safely. However, you will need to take into consideration driving and activities related to your job. If you have a sling, you will need to wear it all day unless  otherwise instructed. You may be able to safely loosen it if you are able to keep your arm supported. For jobs that require physical labor, you may require four months or more to return to work.    If you had Ankle Surgery   If your surgery involves a repair or reconstruction, you will have a splint followed by a walking boot for four to six weeks after surgery. As long as you can abide by the restrictions, you can return to work when you feel like you can do so safely. However, you will need to take into consideration driving and activities related to your job. For jobs that require physical labor, you may require four months or more to return to work.    Normal Sensations and Findings after Surgery:   PAIN: We do everything possible to make your pain/discomfort level tolerable, but some amount of pain is to be expected.   WARMTH: Mild warmth around the operative site is normal for up to 3 weeks.   REDNESS: Small amount of redness where the sutures enter the skin is normal. If redness worsens or spreads it is important that you contact the office.   DRAINAGE: A small amount is normal for the first 48-72 hours. If wounds continue to drain after this time (requiring multiple gauze changes per day), please contact the office.   NUMBNESS: Around the incision is common.   BRUISING: Is common and often tracks down the arm or leg due to gravity and results in an alarming appearance, but is common and will resolve with time.   FEVER: Low-grade fevers (less than 101.5°F) are common during the first week after surgery. You should drink plenty of fluids and breathe deeply.   CONSTIPATION: Post-operative pain medications as well as immobility can lead to constipation. Please consider taking an over the counter stool softener such as Colace and/or Senna if you experience constipation or if you have a history of constipation.    Follow-Up   A Follow-up appointment should be arranged for 10-14 days after surgery. If one has not  been provided, please call the office to schedule.       NOTIFY US IMMEDIATELY FOR ANY OF THE FOLLOWING:   Most orthopedic surgical procedures are uneventful. However, complications can occur. The following are things to be aware of in the immediate postoperative period.   FEVER - Temperature rises above 101.5°F or associated chills/sweats   WOUND - If you notice drainage more than 4 days after surgery, if the drainage turns yellows and foul smelling, if you need to change gauze multiple times per day, or if sutures become loose.   CARDIOVASCULAR - Chest pain, shortness of breath, palpitations, or fainting spells must be taken seriously. Go to the emergency room (or call 911) immediately for evaluation.   BLOOD CLOTS - Orthopaedic surgery patients are at risk for blood clots. While the risk is higher for lower extremity surgery, even those who have undergone upper extremity surgery are at an increased risk. Please notify Dr. Rm if you or someone in your family has had blood clots or any type of known clotting disorder. Signs of blood clots may include calf pain or cramping, diffuse swelling in the leg and foot, or chest pain and shortness of breath. Please call the office or go to the hospital if you recognize any of these symptoms.   NAUSEA - If you have severe vomiting, diarrhea, or constipation, or cannot keep any liquid down   URINARY RETENTION - If you cannot urinate the night after surgery, please go to the Emergency Room.

## 2025-05-13 ENCOUNTER — HOSPITAL ENCOUNTER (OUTPATIENT)
Facility: HOSPITAL | Age: 65
Setting detail: OUTPATIENT SURGERY
Discharge: HOME | End: 2025-05-13
Attending: STUDENT IN AN ORGANIZED HEALTH CARE EDUCATION/TRAINING PROGRAM | Admitting: STUDENT IN AN ORGANIZED HEALTH CARE EDUCATION/TRAINING PROGRAM
Payer: COMMERCIAL

## 2025-05-13 ENCOUNTER — ANESTHESIA (OUTPATIENT)
Dept: OPERATING ROOM | Facility: HOSPITAL | Age: 65
End: 2025-05-13
Payer: COMMERCIAL

## 2025-05-13 ENCOUNTER — ANESTHESIA EVENT (OUTPATIENT)
Dept: OPERATING ROOM | Facility: HOSPITAL | Age: 65
End: 2025-05-13
Payer: COMMERCIAL

## 2025-05-13 VITALS
TEMPERATURE: 97.3 F | OXYGEN SATURATION: 94 % | RESPIRATION RATE: 14 BRPM | BODY MASS INDEX: 26.56 KG/M2 | HEIGHT: 65 IN | DIASTOLIC BLOOD PRESSURE: 93 MMHG | HEART RATE: 74 BPM | WEIGHT: 159.4 LBS | SYSTOLIC BLOOD PRESSURE: 132 MMHG

## 2025-05-13 DIAGNOSIS — M65.961 SYNOVITIS OF RIGHT KNEE: ICD-10-CM

## 2025-05-13 DIAGNOSIS — M22.41 CHONDROMALACIA OF PATELLOFEMORAL JOINT, RIGHT: Primary | ICD-10-CM

## 2025-05-13 PROCEDURE — 29881 ARTHRS KNE SRG MNISECTMY M/L: CPT

## 2025-05-13 PROCEDURE — A4550 SURGICAL TRAYS: HCPCS | Performed by: STUDENT IN AN ORGANIZED HEALTH CARE EDUCATION/TRAINING PROGRAM

## 2025-05-13 PROCEDURE — 2500000004 HC RX 250 GENERAL PHARMACY W/ HCPCS (ALT 636 FOR OP/ED): Mod: JZ

## 2025-05-13 PROCEDURE — 29882 ARTHRS KNE SRG MNISC RPR M/L: CPT | Performed by: STUDENT IN AN ORGANIZED HEALTH CARE EDUCATION/TRAINING PROGRAM

## 2025-05-13 PROCEDURE — 3700000002 HC GENERAL ANESTHESIA TIME - EACH INCREMENTAL 1 MINUTE: Performed by: STUDENT IN AN ORGANIZED HEALTH CARE EDUCATION/TRAINING PROGRAM

## 2025-05-13 PROCEDURE — 29881 ARTHRS KNE SRG MNISECTMY M/L: CPT | Performed by: STUDENT IN AN ORGANIZED HEALTH CARE EDUCATION/TRAINING PROGRAM

## 2025-05-13 PROCEDURE — 7100000001 HC RECOVERY ROOM TIME - INITIAL BASE CHARGE: Performed by: STUDENT IN AN ORGANIZED HEALTH CARE EDUCATION/TRAINING PROGRAM

## 2025-05-13 PROCEDURE — 29876 ARTHRS KNEE SURG SYNVCT MAJ: CPT | Performed by: STUDENT IN AN ORGANIZED HEALTH CARE EDUCATION/TRAINING PROGRAM

## 2025-05-13 PROCEDURE — 7100000009 HC PHASE TWO TIME - INITIAL BASE CHARGE: Performed by: STUDENT IN AN ORGANIZED HEALTH CARE EDUCATION/TRAINING PROGRAM

## 2025-05-13 PROCEDURE — 2720000007 HC OR 272 NO HCPCS: Performed by: STUDENT IN AN ORGANIZED HEALTH CARE EDUCATION/TRAINING PROGRAM

## 2025-05-13 PROCEDURE — 3600000009 HC OR TIME - EACH INCREMENTAL 1 MINUTE - PROCEDURE LEVEL FOUR: Performed by: STUDENT IN AN ORGANIZED HEALTH CARE EDUCATION/TRAINING PROGRAM

## 2025-05-13 PROCEDURE — 7100000002 HC RECOVERY ROOM TIME - EACH INCREMENTAL 1 MINUTE: Performed by: STUDENT IN AN ORGANIZED HEALTH CARE EDUCATION/TRAINING PROGRAM

## 2025-05-13 PROCEDURE — 2500000004 HC RX 250 GENERAL PHARMACY W/ HCPCS (ALT 636 FOR OP/ED): Mod: JZ | Performed by: NURSE ANESTHETIST, CERTIFIED REGISTERED

## 2025-05-13 PROCEDURE — 2500000004 HC RX 250 GENERAL PHARMACY W/ HCPCS (ALT 636 FOR OP/ED): Performed by: ANESTHESIOLOGY

## 2025-05-13 PROCEDURE — 2500000004 HC RX 250 GENERAL PHARMACY W/ HCPCS (ALT 636 FOR OP/ED): Mod: JZ | Performed by: STUDENT IN AN ORGANIZED HEALTH CARE EDUCATION/TRAINING PROGRAM

## 2025-05-13 PROCEDURE — 3600000004 HC OR TIME - INITIAL BASE CHARGE - PROCEDURE LEVEL FOUR: Performed by: STUDENT IN AN ORGANIZED HEALTH CARE EDUCATION/TRAINING PROGRAM

## 2025-05-13 PROCEDURE — 2500000004 HC RX 250 GENERAL PHARMACY W/ HCPCS (ALT 636 FOR OP/ED)

## 2025-05-13 PROCEDURE — 3700000001 HC GENERAL ANESTHESIA TIME - INITIAL BASE CHARGE: Performed by: STUDENT IN AN ORGANIZED HEALTH CARE EDUCATION/TRAINING PROGRAM

## 2025-05-13 PROCEDURE — 2500000004 HC RX 250 GENERAL PHARMACY W/ HCPCS (ALT 636 FOR OP/ED): Mod: JW | Performed by: ANESTHESIOLOGY

## 2025-05-13 PROCEDURE — 2500000005 HC RX 250 GENERAL PHARMACY W/O HCPCS

## 2025-05-13 PROCEDURE — 2780000003 HC OR 278 NO HCPCS: Performed by: STUDENT IN AN ORGANIZED HEALTH CARE EDUCATION/TRAINING PROGRAM

## 2025-05-13 PROCEDURE — 2500000001 HC RX 250 WO HCPCS SELF ADMINISTERED DRUGS (ALT 637 FOR MEDICARE OP)

## 2025-05-13 PROCEDURE — 7100000010 HC PHASE TWO TIME - EACH INCREMENTAL 1 MINUTE: Performed by: STUDENT IN AN ORGANIZED HEALTH CARE EDUCATION/TRAINING PROGRAM

## 2025-05-13 DEVICE — IMPLANT SYSTEM, SUTURELOC
Type: IMPLANTABLE DEVICE | Site: KNEE | Status: FUNCTIONAL
Brand: ARTHREX®

## 2025-05-13 RX ORDER — SODIUM CHLORIDE, SODIUM LACTATE, POTASSIUM CHLORIDE, CALCIUM CHLORIDE 600; 310; 30; 20 MG/100ML; MG/100ML; MG/100ML; MG/100ML
40 INJECTION, SOLUTION INTRAVENOUS CONTINUOUS
Status: DISCONTINUED | OUTPATIENT
Start: 2025-05-13 | End: 2025-05-13 | Stop reason: HOSPADM

## 2025-05-13 RX ORDER — ALBUTEROL SULFATE 0.83 MG/ML
2.5 SOLUTION RESPIRATORY (INHALATION) EVERY 30 MIN PRN
Status: DISCONTINUED | OUTPATIENT
Start: 2025-05-13 | End: 2025-05-13 | Stop reason: HOSPADM

## 2025-05-13 RX ORDER — IPRATROPIUM BROMIDE 0.5 MG/2.5ML
500 SOLUTION RESPIRATORY (INHALATION) EVERY 30 MIN PRN
Status: DISCONTINUED | OUTPATIENT
Start: 2025-05-13 | End: 2025-05-13 | Stop reason: HOSPADM

## 2025-05-13 RX ORDER — HYDROMORPHONE HYDROCHLORIDE 0.2 MG/ML
0.2 INJECTION INTRAMUSCULAR; INTRAVENOUS; SUBCUTANEOUS EVERY 5 MIN PRN
Status: DISCONTINUED | OUTPATIENT
Start: 2025-05-13 | End: 2025-05-13 | Stop reason: HOSPADM

## 2025-05-13 RX ORDER — ONDANSETRON HYDROCHLORIDE 2 MG/ML
4 INJECTION, SOLUTION INTRAVENOUS ONCE AS NEEDED
Status: DISCONTINUED | OUTPATIENT
Start: 2025-05-13 | End: 2025-05-13 | Stop reason: HOSPADM

## 2025-05-13 RX ORDER — ROPIVACAINE HYDROCHLORIDE 5 MG/ML
INJECTION, SOLUTION EPIDURAL; INFILTRATION; PERINEURAL
Status: DISCONTINUED | OUTPATIENT
Start: 2025-05-13 | End: 2025-05-13

## 2025-05-13 RX ORDER — FENTANYL CITRATE 50 UG/ML
INJECTION, SOLUTION INTRAMUSCULAR; INTRAVENOUS AS NEEDED
Status: DISCONTINUED | OUTPATIENT
Start: 2025-05-13 | End: 2025-05-13

## 2025-05-13 RX ORDER — ACETAMINOPHEN 325 MG/1
975 TABLET ORAL ONCE
Status: COMPLETED | OUTPATIENT
Start: 2025-05-13 | End: 2025-05-13

## 2025-05-13 RX ORDER — DEXMEDETOMIDINE IN 0.9 % NACL 20 MCG/5ML
SYRINGE (ML) INTRAVENOUS
Status: DISCONTINUED | OUTPATIENT
Start: 2025-05-13 | End: 2025-05-13

## 2025-05-13 RX ORDER — FENTANYL CITRATE 50 UG/ML
50 INJECTION, SOLUTION INTRAMUSCULAR; INTRAVENOUS EVERY 5 MIN PRN
Status: DISCONTINUED | OUTPATIENT
Start: 2025-05-13 | End: 2025-05-13 | Stop reason: HOSPADM

## 2025-05-13 RX ORDER — CELECOXIB 200 MG/1
200 CAPSULE ORAL ONCE
Status: COMPLETED | OUTPATIENT
Start: 2025-05-13 | End: 2025-05-13

## 2025-05-13 RX ORDER — ONDANSETRON 4 MG/1
4 TABLET, FILM COATED ORAL EVERY 8 HOURS PRN
Qty: 20 TABLET | Refills: 0 | Status: SHIPPED | OUTPATIENT
Start: 2025-05-13 | End: 2025-05-20

## 2025-05-13 RX ORDER — PROPOFOL 10 MG/ML
INJECTION, EMULSION INTRAVENOUS AS NEEDED
Status: DISCONTINUED | OUTPATIENT
Start: 2025-05-13 | End: 2025-05-13

## 2025-05-13 RX ORDER — OXYCODONE HYDROCHLORIDE 5 MG/1
5 TABLET ORAL EVERY 4 HOURS PRN
Qty: 15 TABLET | Refills: 0 | Status: SHIPPED | OUTPATIENT
Start: 2025-05-13 | End: 2025-05-16

## 2025-05-13 RX ORDER — LIDOCAINE HYDROCHLORIDE 10 MG/ML
INJECTION, SOLUTION EPIDURAL; INFILTRATION; INTRACAUDAL; PERINEURAL AS NEEDED
Status: DISCONTINUED | OUTPATIENT
Start: 2025-05-13 | End: 2025-05-13

## 2025-05-13 RX ORDER — LABETALOL HYDROCHLORIDE 5 MG/ML
5 INJECTION, SOLUTION INTRAVENOUS EVERY 5 MIN PRN
Status: DISCONTINUED | OUTPATIENT
Start: 2025-05-13 | End: 2025-05-13 | Stop reason: HOSPADM

## 2025-05-13 RX ORDER — CEFAZOLIN SODIUM 2 G/100ML
2 INJECTION, SOLUTION INTRAVENOUS ONCE
Status: COMPLETED | OUTPATIENT
Start: 2025-05-13 | End: 2025-05-13

## 2025-05-13 RX ORDER — MEPERIDINE HYDROCHLORIDE 25 MG/ML
12.5 INJECTION INTRAMUSCULAR; INTRAVENOUS; SUBCUTANEOUS EVERY 10 MIN PRN
Status: DISCONTINUED | OUTPATIENT
Start: 2025-05-13 | End: 2025-05-13 | Stop reason: HOSPADM

## 2025-05-13 RX ORDER — ACETAMINOPHEN 500 MG
1000 TABLET ORAL EVERY 8 HOURS PRN
Qty: 60 TABLET | Refills: 1 | Status: SHIPPED | OUTPATIENT
Start: 2025-05-13 | End: 2025-06-02

## 2025-05-13 RX ORDER — FAMOTIDINE 10 MG/ML
20 INJECTION, SOLUTION INTRAVENOUS ONCE
Status: COMPLETED | OUTPATIENT
Start: 2025-05-13 | End: 2025-05-13

## 2025-05-13 RX ORDER — GABAPENTIN 300 MG/1
300 CAPSULE ORAL ONCE
Status: COMPLETED | OUTPATIENT
Start: 2025-05-13 | End: 2025-05-13

## 2025-05-13 RX ORDER — MIDAZOLAM HYDROCHLORIDE 1 MG/ML
2 INJECTION, SOLUTION INTRAMUSCULAR; INTRAVENOUS ONCE
Status: COMPLETED | OUTPATIENT
Start: 2025-05-13 | End: 2025-05-13

## 2025-05-13 RX ORDER — FENTANYL CITRATE 50 UG/ML
50 INJECTION, SOLUTION INTRAMUSCULAR; INTRAVENOUS ONCE
Status: COMPLETED | OUTPATIENT
Start: 2025-05-13 | End: 2025-05-13

## 2025-05-13 RX ADMIN — DEXAMETHASONE SODIUM PHOSPHATE 4 MG: 4 INJECTION INTRA-ARTICULAR; INTRALESIONAL; INTRAMUSCULAR; INTRAVENOUS; SOFT TISSUE at 11:56

## 2025-05-13 RX ADMIN — CEFAZOLIN SODIUM 2 G: 2 INJECTION, SOLUTION INTRAVENOUS at 11:56

## 2025-05-13 RX ADMIN — LIDOCAINE HYDROCHLORIDE 5 ML: 10 INJECTION, SOLUTION EPIDURAL; INFILTRATION; INTRACAUDAL; PERINEURAL at 11:53

## 2025-05-13 RX ADMIN — POVIDONE-IODINE 1 APPLICATION: 5 SOLUTION TOPICAL at 10:43

## 2025-05-13 RX ADMIN — FENTANYL CITRATE 50 MCG: 50 INJECTION, SOLUTION INTRAMUSCULAR; INTRAVENOUS at 11:53

## 2025-05-13 RX ADMIN — GABAPENTIN 300 MG: 300 CAPSULE ORAL at 10:43

## 2025-05-13 RX ADMIN — MIDAZOLAM 2 MG: 1 INJECTION INTRAMUSCULAR; INTRAVENOUS at 11:07

## 2025-05-13 RX ADMIN — FENTANYL CITRATE 25 MCG: 50 INJECTION, SOLUTION INTRAMUSCULAR; INTRAVENOUS at 12:41

## 2025-05-13 RX ADMIN — SODIUM CHLORIDE, POTASSIUM CHLORIDE, SODIUM LACTATE AND CALCIUM CHLORIDE: 600; 310; 30; 20 INJECTION, SOLUTION INTRAVENOUS at 11:48

## 2025-05-13 RX ADMIN — CELECOXIB 200 MG: 200 CAPSULE ORAL at 10:41

## 2025-05-13 RX ADMIN — FAMOTIDINE 20 MG: 10 INJECTION, SOLUTION INTRAVENOUS at 10:43

## 2025-05-13 RX ADMIN — FENTANYL CITRATE 50 MCG: 50 INJECTION INTRAMUSCULAR; INTRAVENOUS at 11:07

## 2025-05-13 RX ADMIN — PROPOFOL 200 MG: 10 INJECTION, EMULSION INTRAVENOUS at 11:54

## 2025-05-13 RX ADMIN — ACETAMINOPHEN 975 MG: 325 TABLET ORAL at 10:41

## 2025-05-13 RX ADMIN — ROPIVACAINE HYDROCHLORIDE 20 ML: 5 INJECTION, SOLUTION EPIDURAL; INFILTRATION; PERINEURAL at 11:09

## 2025-05-13 RX ADMIN — Medication 8 MCG: at 11:09

## 2025-05-13 SDOH — HEALTH STABILITY: MENTAL HEALTH: CURRENT SMOKER: 0

## 2025-05-13 ASSESSMENT — PAIN - FUNCTIONAL ASSESSMENT
PAIN_FUNCTIONAL_ASSESSMENT: 0-10

## 2025-05-13 ASSESSMENT — PAIN SCALES - GENERAL
PAIN_LEVEL: 0
PAINLEVEL_OUTOF10: 0 - NO PAIN
PAINLEVEL_OUTOF10: 0 - NO PAIN
PAINLEVEL_OUTOF10: 4
PAINLEVEL_OUTOF10: 0 - NO PAIN

## 2025-05-13 ASSESSMENT — PAIN DESCRIPTION - DESCRIPTORS: DESCRIPTORS: ACHING

## 2025-05-13 NOTE — ANESTHESIA PREPROCEDURE EVALUATION
Patient: Cassandra Lang    Procedure Information       Date/Time: 05/13/25 1105    Procedure: Right knee arthroscopy, medial meniscus posterior root repair, intra-articular debridement synovectomy, chondroplasty (Arthrex root repair, lateral knee post) (Right: Knee) - 1hr    Location: [facility] 07 / Virtual [facility]    Surgeons: Claudio Rm MD          Medical History[1]  Surgical History[2]    Relevant Problems   Anesthesia (within normal limits)      Cardiac  PFO   (+) Flow murmur   (+) Hyperlipidemia   (+) PFO (patent foramen ovale) (HHS-HCC)   (+) Primary hypertension      Pulmonary   (+) PE (pulmonary thromboembolism) (Multi)   (+) Pulmonary embolism      Neuro   (+) Anxiety   (+) Anxiety and depression   (+) Stroke (Multi)      GI   (+) Chronic GERD   (+) Irritable bowel syndrome      /Renal   (+) Urinary tract infection      Hematology   (+) PE (pulmonary thromboembolism) (Multi)      Musculoskeletal   (+) Primary osteoarthritis of right knee   (+) Scoliosis      HEENT   (+) Chronic sinusitis      ID   (+) Onychomycosis of toenail   (+) Shingles   (+) Tinea pedis of both feet   (+) Urinary tract infection      GYN   (+) Malignant neoplasm of upper-outer quadrant of right breast in female, estrogen receptor negative       Clinical information reviewed:                   NPO Detail:  No data recorded     Physical Exam    Airway  Mallampati: II  TM distance: >3 FB  Neck ROM: full     Cardiovascular Comments: deferred   Dental   Comments: No loose teeth     Pulmonary Comments: deferred   Abdominal   Comments: deferred           Anesthesia Plan    History of general anesthesia?: yes  History of complications of general anesthesia?: no    ASA 3     general and regional     The patient is not a current smoker.  Patient was not previously instructed to abstain from smoking on day of procedure.  Patient did not smoke on day of procedure.  Education provided regarding risk of obstructive sleep apnea.  intravenous  induction   Postoperative pain plan includes opioids.  Anesthetic plan and risks discussed with patient.  Use of blood products discussed with patient who consented to blood products.    Plan discussed with CRNA and CAA.           [1]   Past Medical History:  Diagnosis Date    Abnormal findings on diagnostic imaging of other specified body structures     Abnormal CT of the chest    Breast cancer     Cancer (Multi)     Heart disease     High blood pressure     Hx antineoplastic chemo     right breast    Hyperlipidemia     Personal history of other medical treatment     History of echocardiogram    Personal history of other medical treatment     History of transesophageal echocardiography (MAYKEL)    Personal history of other medical treatment     History of cardiac monitoring    Personal history of transient ischemic attack (TIA), and cerebral infarction without residual deficits     History of cerebrovascular accident    Stroke (Multi)     Tinea unguium 10/12/2021    Onychomycosis of toenail   [2]   Past Surgical History:  Procedure Laterality Date    APPENDECTOMY  2014    Appendectomy    BREAST LUMPECTOMY Right 10/02/2023     SECTION, CLASSIC  2014     Section    IR CVC PORT  2023    IR CVC PORT 2023 [facility]    MR HEAD ANGIO WO IV CONTRAST  2020    MR HEAD ANGIO WO IV CONTRAST 2020 [facility] CLINICAL LEGACY    MR NECK ANGIO WO IV CONTRAST  2020    MR NECK ANGIO WO IV CONTRAST 2020 [facility] CLINICAL LEGACY    OTHER SURGICAL HISTORY  2020    Esophagogastroduodenoscopy    OTHER SURGICAL HISTORY  2020    Colonoscopy    OTHER SURGICAL HISTORY  2021    Patent foramen ovale repair

## 2025-05-13 NOTE — ANESTHESIA PROCEDURE NOTES
Peripheral Block    Patient location during procedure: pre-op  Medication administered at: 5/13/2025 11:09 AM  End time: 5/13/2025 11:12 AM  Reason for block: at surgeon's request and post-op pain management  Staffing  Performed: SRNA and attending   Authorized by: Lawrence Andrade MD    Performed by: Lawrence Andrade MD  Preanesthetic Checklist  Completed: patient identified, IV checked, site marked, risks and benefits discussed, surgical consent, monitors and equipment checked, pre-op evaluation and timeout performed   Timeout performed at: 5/13/2025 11:06 AM  Peripheral Block  Patient position: laying flat  Prep: ChloraPrep  Patient monitoring: heart rate, cardiac monitor and continuous pulse ox  Block type: adductor canal  Laterality: right  Injection technique: single-shot  Guidance: ultrasound guided  Needle  Needle gauge: 21 G  Needle length: 10 cm  Needle localization: ultrasound guidance  Test dose: negative  Assessment  Injection assessment: negative aspiration for heme, no paresthesia on injection, incremental injection and local visualized surrounding nerve on ultrasound  Paresthesia pain: none  Heart rate change: no  Slow fractionated injection: yes  Medications Administered  ropivacaine (NAROPIN) 5 MG/ML Perineural - perineural injection   20 mL - 5/13/2025 11:09:00 AM  dexAMETHasone (Decadron) injection - perineural injection   5 mg - 5/13/2025 11:09:00 AM  dexmedeTOMIDine in 0.9 % NaCL - perineural injection   8 mcg - 5/13/2025 11:09:00 AM

## 2025-05-13 NOTE — ANESTHESIA POSTPROCEDURE EVALUATION
Patient: Cassandra Lang    Procedure Summary       Date: 05/13/25 Room / Location: [facility] / [facility]    Anesthesia Start: 1148 Anesthesia Stop: 1258    Procedure: Right knee arthroscopy, medial meniscus posterior root repair, partial lateral meniscectomy, intra-articular debridement synovectomy, chondroplasty (Right: Knee) Diagnosis:       Complex tear of medial meniscus of right knee as current injury, initial encounter      Chondromalacia of patellofemoral joint, right      Synovitis of right knee      Complex tear of lateral meniscus of right knee, subsequent encounter      (Complex tear of medial meniscus of right knee as current injury, initial encounter [S83.231A])      (Chondromalacia of patellofemoral joint, right [M22.41])      (Synovitis of right knee [M65.961])    Surgeons: Claudio Rm MD Responsible Provider: Lawrence Andrade MD    Anesthesia Type: general, regional ASA Status: 3            Anesthesia Type: general, regional    Vitals Value Taken Time   /95 05/13/25 13:15   Temp 36.2 °C (97.2 °F) 05/13/25 12:52   Pulse 82 05/13/25 13:15   Resp 17 05/13/25 13:15   SpO2 97 % 05/13/25 13:15       Anesthesia Post Evaluation    Patient location during evaluation: PACU  Patient participation: complete - patient participated  Level of consciousness: awake  Pain score: 0  Pain management: adequate  Multimodal analgesia pain management approach  Airway patency: patent  Two or more strategies used to mitigate risk of obstructive sleep apnea  Cardiovascular status: acceptable  Respiratory status: acceptable  Hydration status: acceptable  Postoperative Nausea and Vomiting: none        There were no known notable events for this encounter.

## 2025-05-13 NOTE — NURSING NOTE
Patient in Phase 2; dressed and up to chair with RN assist. Tolerating po fluids, no complaint of pain and no complaint of nausea.     Daughter at bedside; discussed discharge instructions with patient and daughter. All questions at this time answered.     Discharge instructions provided using teachback method.  Patient's health-related risk factors discussed with patient.  Patient educated to look for worsening signs and symptoms and educated to seek medical attention if experiencing medical emergency.  Patient aware of needs to follow up with outpatient clinics as scheduled. Home going meds reviewed with patient.  Patient verbalized understanding of disposition and discharge instructions.  All questions answered to patient's satisfaction and within nursing scope of practice.    Patient clinically appropriate for discharge. Vitals stable/baseline. IV removed and patient transported to discharge area via wheelchair. Patient did not seem confident with crutches when transferring into the car. Patient provided a walker without wheels to use at home if needed. Patient and daughter provided instructions on how to use the walker with toe-touch weight bearing status. Patient and daughter stated understanding of my instructions.

## 2025-05-13 NOTE — BRIEF OP NOTE
Date: 2025  OR Location: [facility]    Name: Cassandra Lang, : 1960, Age: 65 y.o., MRN: [id number], Sex: female    Diagnosis  Pre-op Diagnosis      * Complex tear of medial meniscus of right knee as current injury, initial encounter [S83.231A]     * Chondromalacia of patellofemoral joint, right [M22.41]     * Synovitis of right knee [M65.961] Post-op Diagnosis     * Complex tear of medial meniscus of right knee as current injury, initial encounter [S83.231A]     * Chondromalacia of patellofemoral joint, right [M22.41]     * Synovitis of right knee [M65.961]     * Complex tear of lateral meniscus of right knee, subsequent encounter [S83.271D]     Procedures  1.  Right knee arthroscopy, medial meniscus posterior root repair  2.  Right knee arthroscopic partial lateral meniscectomy  3.  Right knee arthroscopic extensive intra-articular debridement and synovectomy  4.  Right knee arthroscopic chondroplasty patellofemoral joint    Surgeons      * Claudio Rm - Primary    Resident/Fellow/Other Assistant:  Surgeons and Role:     * Gabrielle Lopresti, PA-C - [facility] First Assist    Staff:   Joeulator: Mary Rigginsub Person: Alysha Zayas Person: Reyna    Anesthesia Staff: Anesthesiologist: Issa Dunham DO; Lawrence Andrade MD  CRNA: COURTNEY Montelongo-CRNA    Procedure Summary  Anesthesia: Regional, General  ASA: III  Estimated Blood Loss: 5mL  Intra-op Medications:   Administrations occurring from 1105 to 1320 on 25:   Medication Name Total Dose   dexAMETHasone (Decadron) 4 mg/mL IV Syringe 2 mL 4 mg   fentaNYL (Sublimaze) injection 50 mcg/mL 75 mcg   LR bolus Cannot be calculated   lidocaine PF (Xylocaine-MPF) local injection 1 % 5 mL   propofol (Diprivan) injection 10 mg/mL 200 mg   ceFAZolin (Ancef) 2 g in dextrose (iso)  mL 2 g   fentaNYL PF (Sublimaze) injection 50 mcg 50 mcg   midazolam (Versed) injection 2 mg 2 mg          Anesthesia Record               Intraprocedure I/O Totals           Intake    LR bolus 800.00 mL    ceFAZolin (Ancef) 2 g in dextrose (iso)  mL 100.00 mL    Total Intake 900 mL          Specimen: No specimens collected     Findings: Medial meniscus posterior tear, complex central tearing lateral meniscus, tricompartmental synovitis, chondromalacia patellofemoral joint    Complications:  None; patient tolerated the procedure well.     Disposition: PACU - hemodynamically stable.  Condition: stable  Specimens Collected: No specimens collected  Attending Attestation: I performed the procedure.    Claudio Rm  Phone Number: [phone]

## 2025-05-13 NOTE — OP NOTE
Right knee arthroscopy, medial meniscus posterior root repair, partial lateral meniscectomy, intra-articular debridement synovectomy, chondroplasty (R) Operative Note     Date: 2025  OR Location: [facility]    Name: Cassandra Lang, : 1960, Age: 65 y.o., MRN: [id number], Sex: female    Diagnosis  Pre-op Diagnosis      * Complex tear of medial meniscus of right knee as current injury, initial encounter [S83.231A]     * Chondromalacia of patellofemoral joint, right [M22.41]     * Synovitis of right knee [M65.961] Post-op Diagnosis     * Complex tear of medial meniscus of right knee as current injury, initial encounter [S83.231A]     * Chondromalacia of patellofemoral joint, right [M22.41]     * Synovitis of right knee [M65.961]     * Complex tear of lateral meniscus of right knee, subsequent encounter [S83.271D]     Procedures  1.  Right knee arthroscopy, medial meniscus posterior root repair  2.  Right knee arthroscopic partial lateral meniscectomy  3.  Right knee arthroscopic extensive intra-articular debridement and synovectomy  4.  Right knee arthroscopic chondroplasty patellofemoral joint    Surgeons      * Claudio Rm - Primary    Resident/Fellow/Other Assistant:  Surgeons and Role:     * Gabrielle Lopresti, PA-C - [facility] First Assist    Staff:   Joeulator: Mary  Scrub Person: Alysha Rigginsub Person: Reyna    Anesthesia Staff: Anesthesiologist: Issa Dunham DO; Lawrence Andrade MD  CRNA: COURTNEY Montelongo-CRNA    Procedure Summary  Anesthesia: Regional, General  ASA: III  Estimated Blood Loss: 5mL  Intra-op Medications:   Administrations occurring from 1105 to 1320 on 25:   Medication Name Total Dose   dexAMETHasone (Decadron) 4 mg/mL IV Syringe 2 mL 4 mg   fentaNYL (Sublimaze) injection 50 mcg/mL 75 mcg   LR bolus Cannot be calculated   lidocaine PF (Xylocaine-MPF) local injection 1 % 5 mL   propofol (Diprivan) injection 10 mg/mL 200 mg   ceFAZolin (Ancef) 2 g in dextrose (iso) IV  100 mL 2 g   fentaNYL PF (Sublimaze) injection 50 mcg 50 mcg   midazolam (Versed) injection 2 mg 2 mg          Anesthesia Record               Intraprocedure I/O Totals          Intake    LR bolus 800.00 mL    ceFAZolin (Ancef) 2 g in dextrose (iso)  mL 100.00 mL    Total Intake 900 mL          Specimen: No specimens collected      Drains and/or Catheters: * None in log *    Tourniquet Times: 25min    Implants:  Implants       Type Name Action Serial No.       SUTURELOC IMPLANT SYSTEM, MENISCAL ROOT REPAIR Implanted               Findings: Medial meniscus posterior root tear, complex central tearing lateral meniscus, tricompartmental synovitis, chondromalacia patellofemoral joint     Indications: Cassandra Lang is an 65 y.o. female who is having surgery for right knee medial meniscus posterior root tear, chondromalacia and tricompartmental synovitis.  MRI confirmed the above.  She failed nonoperative management.  After long discussion with the patient, she elected to proceed with surgical intervention form of a right knee arthroscopy, medial meniscus root repair, intra-articular debridement and synovectomy, chondroplasty.  I thoroughly explained the risks and benefits as well as the expected postoperative timeline for the proposed procedure versus nonoperative management. Risks of this procedure include but are not limited to bleeding, infection, nerve injury, DVT/PE and failure of repair or implant.  The patient expressed understanding and wished to proceed with surgical intervention.  All questions were answered. They were consented to the above procedure at bedside.    The patient was seen in the preoperative area. The risks, benefits, complications, treatment options, non-operative alternatives, expected recovery and outcomes were discussed with the patient. The possibilities of reaction to medication, pulmonary aspiration, injury to surrounding structures, bleeding, recurrent infection, the need for  additional procedures, failure to diagnose a condition, and creating a complication requiring transfusion or operation were discussed with the patient. The patient concurred with the proposed plan, giving informed consent.  The site of surgery was properly noted/marked if necessary per policy. The patient has been actively warmed in preoperative area. Preoperative antibiotics have been ordered and given within 1 hours of incision. Venous thrombosis prophylaxis have been ordered including unilateral sequential compression device    Procedure Details:   EXAMINATION UNDER ANESTHESIA: Range of motion 0-140; Stable to varus/valgus/anterior/posterior; Negative Lachman; Negative Anterior and Posterior Drawer    ARTHROSCOPIC FINDINGS:   The patellofemoral joint demonstrated chondromalacia of the central patella and trochlea which were addressed with a gentle chondroplasty with an arthroscopic shaver. The gutters were clear. The medial compartment demonstrated grade 2 changes with some early chondral wear but no evidence of full-thickness chondral loss and the medial meniscus was torn at the posterior root but otherwise intact.  The posterior root was unstable to probing with some medial meniscus extrusion.  This was repaired as described below. The notch showed an intact PCL and ACL. The lateral compartment demonstrated intact chondral surfaces and the lateral meniscus demonstrated tearing centrally in the body with loose meniscal fragments which were addressed with a partial lateral meniscectomy with an arthroscopic shaver back to a stable rim of healthy appearing lateral meniscus tissue. There was an extensive amount of scar tissue in the infrapatellar fat pad that was thoroughly debrided. An extensive intra-articular debridement and synovectomy was performed in the suprapatellar pouch, infrapatellar fat pad, medial and lateral gutters, femoral notch, patellofemoral compartment and medial and lateral compartments with a  combination of arthroscopic shaver and ablator.    PROCEDURE IN DETAIL:   The patient was identified in the preoperative area. The right knee was marked as the visible operative field. The patient received a single-shot adductor canal regional anesthetic. They were then brought to the operating room and placed supine on the operating room table. SCDs were placed for DVT prophylaxis and antibiotics were given. After smooth induction of general anesthesia, the contralateral lower extremity was placed in a well-padded lithotomy beltran and the operative lower extremity was placed with tourniquet and a leg beltran. The right lower extremity was prepped and draped in usual sterile fashion. A timeout was taken to ensure the correct patient, correct site, correct procedure, as well as that preoperative antibiotics had been given and DVT prophylaxis was in place.      We began by making a standard inferolateral portal. Diagnostic arthroscopy of the patellofemoral joint and gutters was performed as described above.  A gentle chondroplasty was performed on the patella.  The knee was brought into flexion. The rest of the diagnostic arthroscopy was completed as described above.    The patellofemoral joint demonstrated chondromalacia of the central patella and trochlea which were addressed with a gentle chondroplasty with an arthroscopic shaver. The gutters were clear. The medial compartment demonstrated grade 2 changes with some early chondral wear but no evidence of full-thickness chondral loss and the medial meniscus was torn at the posterior root but otherwise intact.  The posterior root was unstable to probing with some medial meniscus extrusion.  This was repaired as described below. The notch showed an intact PCL and ACL. The lateral compartment demonstrated intact chondral surfaces and the lateral meniscus demonstrated tearing centrally in the body with loose meniscal fragments which were addressed with a partial lateral  meniscectomy with an arthroscopic shaver back to a stable rim of healthy appearing lateral meniscus tissue. There was an extensive amount of scar tissue in the infrapatellar fat pad that was thoroughly debrided. An extensive intra-articular debridement and synovectomy was performed in the suprapatellar pouch, infrapatellar fat pad, medial and lateral gutters, femoral notch, patellofemoral compartment and medial and lateral compartments with a combination of arthroscopic shaver and ablator.    We then proceeded with the medial meniscus root repair portion of the case.  A passport cannula was placed in the medial portal.  The root attachment site was prepared in standard fashion with a gentle decortication and debridement with endoscopic shaver and curette back to healthy bleeding bone.  The meniscus root repair guide was then positioned appropriately and the cannulated guidewire was passed up the tibial metaphysis.  The shuttling stitch was then passed through the tibia and the suture loc anchor was delivered into the joint and secured in to the subchondral bone in the area of the posterior root attachment.  The anchor was then deployed.  A meniscus scorpion was then used to pass the repair sutures x 2 through the medial meniscus posterior horn tissue.  These repair sutures were then shuttled through the anchor using the preloaded fiber links and the repair sutures were tensioned appropriately restoring the medial meniscus posterior horn/root down to its attachment on the posterior medial tibial plateau which was now stable to probing with correction of the medial meniscus extrusion.    Arthroscopic fluid was drained from the knee as the arthroscope was removed.    The wounds were irrigated. The portals were closed with buried interrupted 3-0 monocryl sutures. Steri-strips, a sterile dressing, and Ace wrap were placed.  The knee was placed in a hinged knee brace locked in extension.  The needle, sponge, and  instrument counts were correct at the end of the case. The patient was awoken, taken to PACU in stable condition.     POSTOPERATIVE PLAN: The patient will begin the medial meniscus posterior root repair protocol. The patient will start by progressively increasing weightbearing as the postoperative protocol dictates with crutches.  The patient will follow up with me at their routine 2-week postoperative visit. They will begin PT in the next 1-2 weeks. The patient will be started on aspirin 81 mg twice daily for 2 weeks for DVT prophylaxis.     Gabrielle LoPresti, PA-C was present for the entire case.  Her assistance was necessary and critical to the successful completion of the procedure.  She provided skilled assistance with arthroscope manipulation, knee positioning, wound closure.  A qualified assistant was not available to perform her portion of the case.    Evidence of Infection: No   Complications:  None; patient tolerated the procedure well.    Disposition: PACU - hemodynamically stable.  Condition: stable     Attending Attestation: I performed the procedure.    Claudio Rm  Phone Number: [phone]

## 2025-05-13 NOTE — ANESTHESIA PROCEDURE NOTES
Airway  Date/Time: 5/13/2025 11:55 AM  Reason: elective    Airway not difficult    Staffing  Performed: CRNA   Authorized by: Issa Dunham DO    Performed by: SANGITA Montelongo  Patient location during procedure: OR    Patient Condition  Indications for airway management: anesthesia  Patient position: sniffing  MILS maintained throughout  No planned trial extubation  Sedation level: deep     Final Airway Details   Preoxygenated: yes  Final airway type: supraglottic airway  Successful airway: classic  Size: 4   Ventilation between attempts: BVM  Number of attempts at approach: 2  Number of other approaches attempted: 1    Other AttemptsUnsuccessful airways attempted: Jaw clamped shut unable to scissor.

## 2025-05-16 ENCOUNTER — APPOINTMENT (OUTPATIENT)
Dept: PHYSICAL THERAPY | Facility: CLINIC | Age: 65
End: 2025-05-16
Payer: COMMERCIAL

## 2025-05-19 NOTE — PROGRESS NOTES
PHYSICAL THERAPY EVALUATION AND TREATMENT    Patient Name: Cassandra Lang  MRN: [id number]  Today's Date: 5/22/2025  Time Calculation  Start Time: 0845  Stop Time: 0932  Time Calculation (min): 47 min    PT Evaluation Time Entry  PT Evaluation (Low) Time Entry: 20  PT Therapeutic Procedures Time Entry  Therapeutic Activity Time Entry: 27                   Insurance:  Visit number: 1 (updated 05/22/25)  Insurance Type: Payor: COMM[employer]CIAL [employer] / Plan: [employer] / Product Type: *No Product type* / EVAL $329.28 // 20 PT/OT V PCY 0 USED NO AUTH NEEDED PAYS % AFTER DED 4000.00 3342.00 APPLIED   Surgery: Right knee arthroscopy, medial meniscus posterior root repair, partial lateral meniscectomy, intra-articular debridement synovectomy, chondroplasty - Right, 5/13/2025.  Days since surgery: 9   Referred by: Claudio Rm MD     Assessment:  PT Assessment  PT Assessment Results: Decreased strength, Decreased range of motion, Decreased endurance, Impaired balance, Impaired sensation, Orthopedic restrictions, Pain  Rehab Prognosis: Good  Evaluation/Treatment Tolerance: Patient tolerated treatment well  Barriers to Participation: Ability to acquire knowledge, Housing layout, Insurance     Cassandra Lang  is a 65 y.o. old patient who participated in a physical therapy evaluation today due to undergoing surgical repair of the R medial meniscus on 5/13/25. Juan Josés impairments include: balance deficits, gait deficits, pain, decreased strength, decreased range of motion, soft tissue dysfunction, edema, decreased sensation, and decreased functional mobility.   Due to these impairments, she has the following functional limitations and participation restrictions:  increased fall risk, difficulty with ambulation, difficulty with stair negotiation, difficulty performing transfers, difficulty performing household activities, difficulty performing recreational activities, difficulty with  completing/performing some ADLs/IADLs, and increased time to complete ADLs/IADLs. Cassandra's clinical presentation is Stable and/or uncomplicated characteristics with the level of complexity being low. Cassandra's potential for rehab is good.  Skilled physical therapy services are appropriate and beneficial in order to achieve measurable and meaningful change in the objective tests and measures. Utilization of skilled physical therapy services will aid in advancing her functional status and attaining her therapy-related goals. Cassandra verbalized understanding and is in agreement with all goals and plan of care.  Cassandra left session with all questions answered and no increase in symptoms.      Plan:  OP PT Plan  Treatment/Interventions: Biofeedback, Cryotherapy, Dry needling, Education/ Instruction, Electrical stimulation, Gait training, Hot pack, Manual therapy, Neuromuscular re-education, Self care/ home management, Therapeutic activities, Therapeutic exercises  PT Plan: Skilled PT  PT Frequency: 2 times per week  Duration: 6 weeks  Rehab Potential: Good  Plan of Care Agreement: Patient    NV: Progress R knee ROM, flexibility and strength according to [facility] Meniscus Root Repair protocol. Gait training with walker.    Therapy Diagnosis:   Problem List Items Addressed This Visit           ICD-10-CM    Current tear of semilunar cartilage S83.209A    Relevant Orders    Follow Up In Physical Therapy    Complex tear of medial meniscus of right knee, subsequent encounter - Primary S83.231D    Relevant Orders    Follow Up In Physical Therapy       Subjective    Cassandra Lang  is a 65 y.o. female  presenting to the clinic with chief complaint of R knee pain and numbness following surgical repair of her medial meniscus. Presents in R knee brace locked in extension. According to the [facility] protocol, pt is to be TDWB for the 0-4 weeks and maintain the knee in extension with ambulation and while sleeping. Given crutches from the hospital  "but has not attempted ambulating. \"Scoots\" in a chair at home and performs stand-pivot transfers on her LLE to other surfaces. Owns a walker without wheels. Daughter present for session to assist with communicating with pt due to pt being a native Polish speaker.    Onset: 5/13/25 surgery date    JIGNA: tore her meniscus when squatting to put something away    Pain location:  R knee, ankle and foot  Pain description:  sharp, shooting   5/10 at worst; 0/10 at best    Worse with:  moving the R knee, putting weight through RLE  Better with:  not moving the R knee    Confirms tingling in R toes and ankle  Confirms radiating pain into R foot    Prior treatments/interventions:  ice, pain medication    Home: Lives with  and daughters in 2 story home with 1st floor set-up. 5 IVETTE with no HR but has temporary ramp.  PLOF: IND  CLOF: Requires some assist from family with iADLs  Functional limitations: walking, navigating steps, performing iADLs IND  Pt's goal: \" to be able to walk\"     Work: retired  Exercise: n/a  Hobbies: gardening, shopping    Diagnostic images: n/a    Medical History Form: Reviewed (scanned into chart)    Precautions:  Fall Risk: Moderate    + PMH of breast CA, medically treated; +pre-diabetic; +HTN, medically managed; +blood thinner medication  Denies: CA, pacemaker, blood thinner medication use, latex allergy, epilepsy/seizures, or other known cardio/neuro/pulmonary problems   Denies unexpected weight loss/gain, night sweats, or night pain.    Previous surgeries include:  heart surgery    Objective   Outcome Measures:    LEFS: 74/80    Posture: normal  Transfers: with SUP/CGA with walker  Bed Mobility: IND  Gait: Pt ambulates with SUP/CGA with walker. Instructed to use TDWB with good follow through.    Dermatomes/Sensation BLE:  Mid-Anterior Thigh (L2) L: Intact, R: Intact  Medial Knee (L3) L: Intact, R: Impaired  Medial Malleolus (L4) L: Intact, R: Intact  Dorsal Second Toe Web Space (L5) L: " Intact, R: Intact  Lateral Malleolus (S1) L: Intact, R: Intact  Popliteal Fossa (S2) L: Intact, R: Intact     Myotomes/Strength (MMT in sitting):  Hip Flex (L2) L: 4- , R: 3-  Hip Add L: 4 , R: 3-  Hip Abd L: 4+ , R: 3-  Knee Ext (L3) L: 4+ , R: 2  Knee Flex L: 4+ , R: 2  Great Toe Extension (L5) L: 4 , R: 4  Ankle DF L: 4+ , R: 3+  Ankle PF (S1) L: 4 , R: 4    Range of Motion: measured in supine using goniometer in degrees  Knee Flex R/L: 35 / 135  Knee Ext R/L: 5 / 0    Special Tests: NT    Observation: ACE wrap donned on RLE during session. Unable to unwrap to observe incision and edema due to time.    Treatments:    Assigned HEP- [employer] [id number]    Therapeutic Activity: 27 minutes  Instructed pt and daughter on using walker instead of crutches due to the increased stability this device provides. Instructed on how to properly measure the walker they have at home to the proper height.  Instructed pt on how to ambulate with a walker using TDWB  Ambulatory transfer from  to mat table with walker and CGA  Instructed pt and daughter in the following HEP:  Supine Quad Set  - 1 x daily - 7 x weekly - 3 sets - 10 reps - 5 sec hold  Seated Knee Flexion AAROM  - 1 x daily - 7 x weekly - 3 sets - 10 reps  Seated Hamstring Stretch with Chair  - 1 x daily - 7 x weekly - 3 sets - 30 sec to 1 min hold  Educated pt and daughter in proper donning and doffing and wear schedule of brace    EDUCATION: (see above)  Outpatient Education  Individual(s) Educated: Patient, Child  Education Provided: Anatomy, Fall Risk, Home Exercise Program, Home Safety, Physiology, POC, Post-Op Precautions  Risk and Benefits Discussed with Patient/Caregiver/Other: yes  Patient/Caregiver Demonstrated Understanding: yes  Plan of Care Discussed and Agreed Upon: yes  Patient Response to Education: Patient/Caregiver Verbalized Understanding of Information, Patient/Caregiver Performed Return Demonstration of Exercises/Activities, Patient/Caregiver Asked  Appropriate Questions    -Educated Cassandra  on the role of PT and PT POC  -Educated Cassandra regarding benefit and purpose of skilled PT services along with results of examination findings and how this correlates to their chief complaint.   -Answered Cassandra's questions in full.  -Educated Cassandra on relevant anatomy and the rationale for exercises  -Cassandra Lang advised to hold any ex if it increases pain, Cassandra Lang verbalized understanding    Goals:        STG's (within 2 weeks)  Cassandra Lang will decrease pain by >/= 2/10 points from baseline to improve ability to perform household/recreational activities.    Cassandra ALDANA Celiarnulfo will demonstrate independence,  excellent understanding of and report compliance with at least 3 exercises in her HEP to facilitate the learning process to maximize PT benefits and to facilitate independent rehab program upon discharge.    LTG's (by discharge)    Patient will improve R knee strength to >/=4+/5 for improved knee stability.    Patient will improve R knee AROM to >/=0-120 deg for improved knee mobility.    Patient will demonstrate reciprocal gait pattern without assistive device to return to PLOF and improve independence     Patient will demonstrate reciprocal gait pattern with stair negotiation.    Patient will improve LEFS score to by 9 points (MDC) to demonstrate significant improvement in the ability to complete household and community functional tasks with the lower extremity independently. Baseline: 74/80    Patient will report 50% improvement in sensation to indicate healing and to improve ADLs, functional mobility, and other household and occupational activities.    Cassandra Lang will decrease pain to 0-2/10 to improve ability to perform household/recreational activities.    Cassandra Lang will demonstrate independence,  excellent understanding of and report compliance with HEP to facilitate the learning process to maximize PT benefits and to facilitate  independent rehab program upon discharge.

## 2025-05-22 ENCOUNTER — EVALUATION (OUTPATIENT)
Dept: PHYSICAL THERAPY | Facility: CLINIC | Age: 65
End: 2025-05-22
Payer: COMMERCIAL

## 2025-05-22 ENCOUNTER — OFFICE VISIT (OUTPATIENT)
Dept: ORTHOPEDIC SURGERY | Facility: HOSPITAL | Age: 65
End: 2025-05-22
Payer: COMMERCIAL

## 2025-05-22 VITALS — BODY MASS INDEX: 26.63 KG/M2 | WEIGHT: 160 LBS

## 2025-05-22 DIAGNOSIS — M22.41 CHONDROMALACIA OF PATELLOFEMORAL JOINT, RIGHT: Primary | ICD-10-CM

## 2025-05-22 DIAGNOSIS — S83.231A COMPLEX TEAR OF MEDIAL MENISCUS OF RIGHT KNEE AS CURRENT INJURY, INITIAL ENCOUNTER: ICD-10-CM

## 2025-05-22 DIAGNOSIS — S83.231D COMPLEX TEAR OF MEDIAL MENISCUS OF RIGHT KNEE, SUBSEQUENT ENCOUNTER: Primary | ICD-10-CM

## 2025-05-22 PROCEDURE — 99211 OFF/OP EST MAY X REQ PHY/QHP: CPT | Performed by: STUDENT IN AN ORGANIZED HEALTH CARE EDUCATION/TRAINING PROGRAM

## 2025-05-22 PROCEDURE — 97530 THERAPEUTIC ACTIVITIES: CPT | Mod: GP

## 2025-05-22 PROCEDURE — 1036F TOBACCO NON-USER: CPT | Performed by: STUDENT IN AN ORGANIZED HEALTH CARE EDUCATION/TRAINING PROGRAM

## 2025-05-22 PROCEDURE — 97161 PT EVAL LOW COMPLEX 20 MIN: CPT | Mod: GP

## 2025-05-22 ASSESSMENT — PAIN - FUNCTIONAL ASSESSMENT: PAIN_FUNCTIONAL_ASSESSMENT: 0-10

## 2025-05-22 ASSESSMENT — ENCOUNTER SYMPTOMS
LOSS OF SENSATION IN FEET: 1
DEPRESSION: 0
OCCASIONAL FEELINGS OF UNSTEADINESS: 1

## 2025-05-22 ASSESSMENT — PAIN SCALES - GENERAL: PAINLEVEL_OUTOF10: 6

## 2025-05-22 NOTE — PROGRESS NOTES
PRIMARY CARE PHYSICIAN: Jodi Lopez MD    ORTHOPAEDIC POSTOPERATIVE VISIT    ASSESSMENT & PLAN    Impression: 65 y.o. female 9 days postop s/p Right knee arthroscopy, medial meniscus posterior root repair, partial lateral meniscectomy, intra-articular debridement synovectomy, chondroplasty     Plan:   Overall she is doing fairly well.  She will continue with her brace and crutches for now.  She understands her postoperative precautions.  She will begin working with physical therapy through the postoperative protocol for the above.  She will ice and elevate.    I reviewed the intraoperative findings with the patient and answered all their questions. I reviewed their postoperative timeline and plan with them. They understand the postoperative precautions and the treatment plan going forward.     Follow-Up: Patient will follow-up 4 weeks, no x-rays    At the end of the visit, all questions were answered in full. The patient is in agreement with the plan and recommendations. They will call the office with any questions/concerns.    Note dictated with [employer] software. Completed without full typed error editing and sent to avoid delay.    SUBJECTIVE  CHIEF COMPLAINT:   Chief Complaint   Patient presents with    Right Knee - Post-op        HPI: Cassandra Lang is a 65 y.o. female who is now 9 days postop status post Right knee arthroscopy, medial meniscus posterior root repair, partial lateral meniscectomy, intra-articular debridement synovectomy, chondroplasty, done 5/13/25. Cassandra is doing well. She started physical therapy today. Cassandra has not removed ace wrap, compression stocking, or dressing since surgery. Removed today. She reports an intermittent burning sensation in the right knee and stiffness and pain in the right ankle. Ankle pain and stiffness likely due to ace wrap and compression sleeve. She denies any new injury to the knee since surgery.    REVIEW OF SYSTEMS  Constitutional:  See HPI for pain assessment, No significant weight loss, recent trauma  Cardiovascular: No chest pain, shortness of breath  Respiratory: No difficulty breathing, cough  Gastrointestinal: No nausea, vomiting, diarrhea, constipation  Musculoskeletal: Noted in HPI, positive for pain, restricted motion, stiffness  Integumentary: No rashes, easy bruising, redness   Neurological: no numbness or tingling in extremities, no gait disturbances   Psychiatric: No mood changes, memory changes, social issues  Heme/Lymph: no excessive swelling, easy bruising, excessive bleeding  ENT: No hearing changes  Eyes: No vision changes    CURRENT MEDICATIONS:   Current Medications[1]     OBJECTIVE    PHYSICAL EXAM      5/13/2025    11:15 AM 5/13/2025    11:20 AM 5/13/2025    12:52 PM 5/13/2025     1:00 PM 5/13/2025     1:15 PM 5/13/2025     1:25 PM 5/13/2025     1:42 PM   Vitals   Systolic 129 119 134 128 115 135 132   Diastolic 92 91 92 92 95 98 93   Heart Rate 97 96 82 80 82 79 74   Temp   36.2 °C (97.2 °F)   36.3 °C (97.3 °F) 36.3 °C (97.3 °F)   Resp 17 21 14 15 17 14 14      There is no height or weight on file to calculate BMI.    General: Well-appearing, no acute distress    Skin intact bilateral upper and lower extremities  No erythema  No warmth    Detailed examination of right knee demonstrates:  Surgical incisions healing well, Steri-Strips in place  No erythema or warmth  No drainage  No ecchymosis  Medium effusion  Resolving swelling, minimal  Knee range of motion: 0 - 0 - 30  Mild to moderate early quadriceps inhibition and trace atrophy  Patella mobility 1+ medial and lateral  Lower extremity motor grossly intact  L4-S1 sensation intact bilaterally  2+ DP/PT pulses bilaterally  Warm and well-perfused, brisk capillary refill    IMAGING:   No new imaging      Claudio Rm MD  Attending Surgeon    Sports Medicine Orthopaedic Surgery  [facility]  [facility] [employer]          [1]   Current Outpatient Medications   Medication Sig Dispense Refill    acetaminophen (Tylenol) 325 mg tablet Take 2 tablets (650 mg) by mouth every 4 hours if needed for mild pain (1 - 3). 30 tablet 0    acetaminophen (Tylenol) 500 mg tablet Take 2 tablets (1,000 mg) by mouth every 8 hours if needed for mild pain (1 - 3) for up to 20 days. 60 tablet 1    amLODIPine (Norvasc) 10 mg tablet Take 1 tablet (10 mg) by mouth once daily. 90 tablet 1    amoxicillin (Amoxil) 500 mg tablet take 1 tablet by mouth 3 times a day until gone (Patient not taking: Reported on 5/13/2025)      apixaban (Eliquis) 5 mg tablet Take 1 tablet (5 mg) by mouth 2 times a day. 180 tablet 1    aspirin 81 mg EC tablet Take 1 tablet (81 mg) by mouth once daily. 90 tablet 1    atorvastatin (Lipitor) 40 mg tablet Take 1 tablet (40 mg) by mouth once daily. 90 tablet 1    cholecalciferol (Vitamin D-3) 25 MCG (1000 UT) capsule TAKE 1 CAPSULE (25 MCG) BY MOUTH ONCE DAILY. (Patient not taking: Reported on 5/13/2025) 90 capsule 1    ezetimibe (Zetia) 10 mg tablet TAKE 1 TABLET BY MOUTH EVERY DAY 90 tablet 1    omega-3 (Fish OiL) 60- mg capsule Take 2 capsules (1,000 mg) by mouth once daily. (Patient not taking: Reported on 5/13/2025)       No current facility-administered medications for this visit.

## 2025-05-28 ENCOUNTER — TELEPHONE (OUTPATIENT)
Dept: ORTHOPEDIC SURGERY | Facility: HOSPITAL | Age: 65
End: 2025-05-28
Payer: COMMERCIAL

## 2025-05-28 NOTE — TELEPHONE ENCOUNTER
Patient is 2 weeks S/P Right knee arthroscopy, medial meniscus posterior root repair, partial lateral meniscectomy, intra-articular debridement synovectomy, chondroplasty, done 5/13/25.     Patient's daughter called stating that her mother has been running a fever (100+) since late last night. Patient has taken Tylenol 2x today (7 AM, 3 PM). Patient is reporting intermittent, sharp right knee pain that she rates 10/10. Daughter is concerned that there is some type of underlying infection S/P surgery. She denies any signs of infection surrounding the incision site; no redness, warmth, discharge. Patient/daughter would like to know how to proceed.

## 2025-05-29 ENCOUNTER — OFFICE VISIT (OUTPATIENT)
Dept: ORTHOPEDIC SURGERY | Facility: HOSPITAL | Age: 65
End: 2025-05-29
Payer: COMMERCIAL

## 2025-05-29 DIAGNOSIS — I63.9 CEREBRAL INFARCTION, UNSPECIFIED: ICD-10-CM

## 2025-05-29 DIAGNOSIS — S83.241D ACUTE MEDIAL MENISCUS TEAR OF RIGHT KNEE, SUBSEQUENT ENCOUNTER: Primary | ICD-10-CM

## 2025-05-29 DIAGNOSIS — Q21.12 PFO (PATENT FORAMEN OVALE) (HHS-HCC): ICD-10-CM

## 2025-05-29 PROCEDURE — 99211 OFF/OP EST MAY X REQ PHY/QHP: CPT | Performed by: STUDENT IN AN ORGANIZED HEALTH CARE EDUCATION/TRAINING PROGRAM

## 2025-05-29 PROCEDURE — 1036F TOBACCO NON-USER: CPT | Performed by: STUDENT IN AN ORGANIZED HEALTH CARE EDUCATION/TRAINING PROGRAM

## 2025-05-29 ASSESSMENT — PAIN - FUNCTIONAL ASSESSMENT: PAIN_FUNCTIONAL_ASSESSMENT: 0-10

## 2025-05-29 ASSESSMENT — PAIN SCALES - GENERAL: PAINLEVEL_OUTOF10: 5 - MODERATE PAIN

## 2025-05-29 NOTE — PROGRESS NOTES
PRIMARY CARE PHYSICIAN: Jodi Lopez MD    ORTHOPAEDIC POSTOPERATIVE VISIT    ASSESSMENT & PLAN    Impression: 65 y.o. female 2+ weeks postop s/p Right knee arthroscopy, medial meniscus posterior root repair, partial lateral meniscectomy, intra-articular debridement synovectomy, chondroplasty     Plan:   Overall she is doing well.  There is no concern for infection at this time.  Her swelling is normal for her postoperative course.  She will continue to ice and elevate and wear a compressive knee sleeve under her brace.  She will continue working with physical therapy through the postoperative protocol for the above.    I reviewed their postoperative timeline and plan with them. They understand the postoperative precautions and the treatment plan going forward.     Follow-Up: Patient will follow-up 4 weeks, no x-rays    At the end of the visit, all questions were answered in full. The patient is in agreement with the plan and recommendations. They will call the office with any questions/concerns.    Note dictated with [employer] software. Completed without full typed error editing and sent to avoid delay.    SUBJECTIVE  CHIEF COMPLAINT:   Chief Complaint   Patient presents with    Right Knee - Post-op        HPI: Cassandra Lang is a 65 y.o. female who is now 2 weeks postop status post Right knee arthroscopy, medial meniscus posterior root repair, partial lateral meniscectomy, intra-articular debridement synovectomy, chondroplasty, done 5/13/25. Cassandra's daughter called in yesterday stating that Cassandra had a fever that began on Tuesday night, which was reported to have been 100.6 at its highest. She complains of intense, intermittent right knee pain as well. Fever has since resolved. Cassandra has not been walking on her right knee at all since surgery. Cassandra denies any new injury to the knee since her last POV.     5/22/25  Cassandra Lang is a 65 y.o. female who is now 9 days postop status  post Right knee arthroscopy, medial meniscus posterior root repair, partial lateral meniscectomy, intra-articular debridement synovectomy, chondroplasty, done 5/13/25. Cassandra is doing well. She started physical therapy today. Cassandra has not removed ace wrap, compression stocking, or dressing since surgery. Removed today. She reports an intermittent burning sensation in the right knee and stiffness and pain in the right ankle. Ankle pain and stiffness likely due to ace wrap and compression sleeve. She denies any new injury to the knee since surgery.    REVIEW OF SYSTEMS  Constitutional: See HPI for pain assessment, No significant weight loss, recent trauma  Cardiovascular: No chest pain, shortness of breath  Respiratory: No difficulty breathing, cough  Gastrointestinal: No nausea, vomiting, diarrhea, constipation  Musculoskeletal: Noted in HPI, positive for pain, restricted motion, stiffness  Integumentary: No rashes, easy bruising, redness   Neurological: no numbness or tingling in extremities, no gait disturbances   Psychiatric: No mood changes, memory changes, social issues  Heme/Lymph: no excessive swelling, easy bruising, excessive bleeding  ENT: No hearing changes  Eyes: No vision changes    CURRENT MEDICATIONS:   Current Medications[1]     OBJECTIVE    PHYSICAL EXAM      5/13/2025    11:20 AM 5/13/2025    12:52 PM 5/13/2025     1:00 PM 5/13/2025     1:15 PM 5/13/2025     1:25 PM 5/13/2025     1:42 PM 5/22/2025     3:19 PM   Vitals   Systolic 119 134 128 115 135 132    Diastolic 91 92 92 95 98 93    Heart Rate 96 82 80 82 79 74    Temp  36.2 °C (97.2 °F)   36.3 °C (97.3 °F) 36.3 °C (97.3 °F)    Resp 21 14 15 17 14 14    Weight (lb)       160   BMI       26.63 kg/m2   BSA (m2)       1.82 m2   Visit Report       Report      There is no height or weight on file to calculate BMI.    General: Well-appearing, no acute distress    Skin intact bilateral upper and lower extremities  No erythema  No warmth    Detailed  examination of right knee demonstrates:  Surgical incisions healing well, Steri-Strips in place  No erythema or warmth  No drainage  No ecchymosis  Medium effusion  Resolving swelling, minimal  Knee range of motion: 0-0-60  Mild to moderate early quadriceps inhibition and trace atrophy  Patella mobility 1+ medial and lateral  Lower extremity motor grossly intact  L4-S1 sensation intact bilaterally  2+ DP/PT pulses bilaterally  Warm and well-perfused, brisk capillary refill    IMAGING:   No new imaging      Claudio Rm MD  Attending Surgeon    Sports Medicine Orthopaedic Surgery  [facility] [place]  [facility] [employer] [facility]          [1]   Current Outpatient Medications   Medication Sig Dispense Refill    acetaminophen (Tylenol) 325 mg tablet Take 2 tablets (650 mg) by mouth every 4 hours if needed for mild pain (1 - 3). 30 tablet 0    acetaminophen (Tylenol) 500 mg tablet Take 2 tablets (1,000 mg) by mouth every 8 hours if needed for mild pain (1 - 3) for up to 20 days. 60 tablet 1    amLODIPine (Norvasc) 10 mg tablet Take 1 tablet (10 mg) by mouth once daily. 90 tablet 1    amoxicillin (Amoxil) 500 mg tablet take 1 tablet by mouth 3 times a day until gone (Patient not taking: Reported on 5/13/2025)      apixaban (Eliquis) 5 mg tablet Take 1 tablet (5 mg) by mouth 2 times a day. 180 tablet 1    aspirin 81 mg EC tablet Take 1 tablet (81 mg) by mouth once daily. 90 tablet 1    atorvastatin (Lipitor) 40 mg tablet Take 1 tablet (40 mg) by mouth once daily. 90 tablet 1    cholecalciferol (Vitamin D-3) 25 MCG (1000 UT) capsule TAKE 1 CAPSULE (25 MCG) BY MOUTH ONCE DAILY. (Patient not taking: Reported on 5/13/2025) 90 capsule 1    ezetimibe (Zetia) 10 mg tablet TAKE 1 TABLET BY MOUTH EVERY DAY 90 tablet 1    omega-3 (Fish OiL) 60- mg capsule Take 2 capsules (1,000 mg) by mouth once daily. (Patient not taking: Reported on 5/13/2025)        No current facility-administered medications for this visit.

## 2025-05-31 RX ORDER — ASPIRIN 81 MG/1
81 TABLET ORAL DAILY
Qty: 90 TABLET | Refills: 1 | Status: SHIPPED | OUTPATIENT
Start: 2025-05-31

## 2025-06-01 DIAGNOSIS — R76.8 POSITIVE ANA (ANTINUCLEAR ANTIBODY): Primary | ICD-10-CM

## 2025-06-03 ENCOUNTER — TREATMENT (OUTPATIENT)
Dept: PHYSICAL THERAPY | Facility: CLINIC | Age: 65
End: 2025-06-03
Payer: COMMERCIAL

## 2025-06-03 DIAGNOSIS — S83.231D COMPLEX TEAR OF MEDIAL MENISCUS OF RIGHT KNEE, SUBSEQUENT ENCOUNTER: Primary | ICD-10-CM

## 2025-06-03 DIAGNOSIS — S83.231A COMPLEX TEAR OF MEDIAL MENISCUS OF RIGHT KNEE AS CURRENT INJURY, INITIAL ENCOUNTER: ICD-10-CM

## 2025-06-03 PROCEDURE — 97140 MANUAL THERAPY 1/> REGIONS: CPT | Mod: GP

## 2025-06-03 PROCEDURE — 97110 THERAPEUTIC EXERCISES: CPT | Mod: GP

## 2025-06-03 NOTE — PROGRESS NOTES
Physical Therapy    Physical Therapy Treatment    Patient Name: Cassandra Lang  MRN: [id number]  Today's Date: 6/3/2025  Time Calculation  Start Time: 1446  Stop Time: 1524  Time Calculation (min): 38 min    Current Problem  Problem List Items Addressed This Visit           ICD-10-CM    Current tear of semilunar cartilage S83.209A    Complex tear of medial meniscus of right knee, subsequent encounter - Primary S83.231D       Insurance - reviewed:  Visit number: 2 (updated 06/03/25)  Insurance Type: Payor: COMM[employer]CIAL [employer] / Plan: [employer]CIAL [employer] / Product Type: *No Product type* / 20 PT/OT V PCY 0 USED NO AUTH NEEDED PAYS % AFTER DED 4000.00 3342.00 APPLIED   Surgery: Right knee arthroscopy, medial meniscus posterior root repair, partial lateral meniscectomy, intra-articular debridement synovectomy, chondroplasty - Right, 5/13/2025.  Days since surgery: 21   Referred by: Claudio Rm MD     Precautions: Fall Risk: Moderate    + PMH of breast CA, medically treated; +pre-diabetic; +HTN, medically managed; +blood thinner medication  Denies: CA, pacemaker, blood thinner medication use, latex allergy, epilepsy/seizures, or other known cardio/neuro/pulmonary problems   Denies unexpected weight loss/gain, night sweats, or night pain.     Previous surgeries include:  heart surgery     General  Subjective      Cassandra Lang denies any falls or complications since last session. Cassandra Lang reports tried walking with the FWW with moderate success. Still experiences pain in R knee and into R medial/anterior calf. Wants to know how to navigate steps into her home without the ramp. Reports having some low back pain following completion of supine quad set.    Cassandra Lang reports fair compliance with HEP. # of reps limited by pain.    Pain:  unrated  Pain location: R anterior knee joint and calf      Objective     Treatments:  Abbreviation Key  NP = not performed this visit   NV = next  visit  // = parallel    Assigned HEP- [facility] [id number]    Therapeutic Exercise:  30 minutes    Educated pt and daughter on how to navigate steps safely at home via BUE support on 1 HR and hopping method but pt declined to practice it, reporting she will still use the ramp until she is weight-bearing  1x20 R knee flexion/extension PROM by this PT  2x10 RLE supine SAQs (added to HEP)  2x10 RLE heel slides with strap; measured R knee flexion AAROM: 31 degrees  1x10 RLE supine hip abduction  Attempted supine RLE SLR but unable to complete, transitioned to completed 1x10 seated RLE SLR (added to HEP)    Manual Therapy:  8 minutes   STM/DTM to anterior and posterior/lateral R knee joint in supine      Outpatient Education: Reviewed home exercise program. Home exercise program instructed and issued. Answered questions about home exercise program. Provided manual cues for correct performance of exercises. Provided verbal feedback to improve exercise technique. Educated pt on different LLE position and/or transitioning to completing the quad set in sitting to limit back pain.  Cassandra Lang verbalizes understanding of all education provided: Yes    Assessment:  Cassandra Lang completed treatment today which focused upon improving RLE strength and ROM in order to address deficits following R medial meniscus repair.  Cassandra presents with slightly decreased R knee flexion this session and hypersensitivity to touch on R anterior knee joint/calf.  Cassandra requires cueing to limit resisting this PT when completing knee flexion/extension PROM with moderate follow through.  Cassandra was challenged with supine SLRs due to R quad weakness but had greater success when completing in sitting.  Cassandra's  response to tx was: Patient tolerated therapeutic interventions well and without any adverse events. Cassandra's Progress towards goals: Patient reports there has not been a significant change in functional abilities. Cassandra Lang continues to  have gait deficits, balance deficits, decreased strength, decreased ROM, and pain limiting participation in household chores, recreational activities, exercise, attending medical appointments, and shopping within the community Further skilled therapy services are warranted to address the remaining impairments in order to progress towards functional goals. Cassandra left session with all questions answered and no increase in symptoms.      Plan: Progress R knee ROM, flexibility and strength according to [facility] Meniscus Root Repair protocol. Standing exercises.      Time Calculation  Start Time: 1446  Stop Time: 1524  Time Calculation (min): 38 min     PT Therapeutic Procedures Time Entry  Manual Therapy Time Entry: 8  Therapeutic Exercise Time Entry: 30

## 2025-06-05 ENCOUNTER — TELEPHONE (OUTPATIENT)
Dept: PRIMARY CARE | Facility: CLINIC | Age: 65
End: 2025-06-05
Payer: COMMERCIAL

## 2025-06-06 ENCOUNTER — APPOINTMENT (OUTPATIENT)
Dept: RHEUMATOLOGY | Facility: CLINIC | Age: 65
End: 2025-06-06
Payer: COMMERCIAL

## 2025-06-06 ENCOUNTER — TREATMENT (OUTPATIENT)
Dept: PHYSICAL THERAPY | Facility: CLINIC | Age: 65
End: 2025-06-06
Payer: COMMERCIAL

## 2025-06-06 DIAGNOSIS — S83.231D COMPLEX TEAR OF MEDIAL MENISCUS OF RIGHT KNEE, SUBSEQUENT ENCOUNTER: Primary | ICD-10-CM

## 2025-06-06 DIAGNOSIS — S83.231A COMPLEX TEAR OF MEDIAL MENISCUS OF RIGHT KNEE AS CURRENT INJURY, INITIAL ENCOUNTER: ICD-10-CM

## 2025-06-06 PROCEDURE — 97530 THERAPEUTIC ACTIVITIES: CPT | Mod: GP

## 2025-06-06 PROCEDURE — 97110 THERAPEUTIC EXERCISES: CPT | Mod: GP

## 2025-06-06 NOTE — PROGRESS NOTES
Physical Therapy    Physical Therapy Treatment    Patient Name: Cassandra Lang  MRN: [id number]  Today's Date: 6/6/2025  Time Calculation  Start Time: 1137  Stop Time: 1217  Time Calculation (min): 40 min    Current Problem  Problem List Items Addressed This Visit           ICD-10-CM       Musculoskeletal and Injuries    Complex tear of medial meniscus of right knee, subsequent encounter - Primary S83.231D    Current tear of semilunar cartilage S83.209A       Insurance - reviewed:  Visit number: 3 (updated 06/06/25)  Insurance Type: Payor: [employer] / Plan: COMM[employer]CIAL [employer] / Product Type: *No Product type* / 20 PT/OT V PCY 0 USED NO AUTH NEEDED PAYS % AFTER DED 4000.00 3342.00 APPLIED   Surgery: Right knee arthroscopy, medial meniscus posterior root repair, partial lateral meniscectomy, intra-articular debridement synovectomy, chondroplasty - Right, 5/13/2025.  Days since surgery: 24   Referred by: Claudio Rm MD     Precautions: Fall Risk: Moderate    + PMH of breast CA, medically treated; +pre-diabetic; +HTN, medically managed; +blood thinner medication  Denies: CA, pacemaker, blood thinner medication use, latex allergy, epilepsy/seizures, or other known cardio/neuro/pulmonary problems   Denies unexpected weight loss/gain, night sweats, or night pain.     Previous surgeries include:  heart surgery     General  Subjective      Cassandra Lang confirms any falls or complications since last session - fell twice out of her chair/slid out of chair. They changed the chair she was sitting in and this is better. Cassandra Lang reports her future son in law is helping with doing her exercises. She was having INC R sided LBP though but this has been better this week.   Pt's daughter Merry present for session and assists with translation as needed     Cassandra Lang reports fair compliance with HEP. # of reps limited by pain.    Pain:  4/10 7-8/10 R knee pain with movement   Pain  location: R ankle pain   No LBP today      Objective     Treatments:  Abbreviation Key  NP = not performed this visit   NV = next visit  // = parallel    Assigned HEP- [facility] [email or web address]    Therapeutic Exercise:    Brace on for the following exercises, unlocked only for ROM ex:   Seated quad sets x 15 reps - HEP  Seated heel slides AROM and AAROM x 10 reps total - HEP option  Seated SLR x 10 reps - HEP   Standing R LE only Hip Abduction with Counter Support x 12 reps - HEP   Standing R LE only Hip flexion with Counter Support x 12 reps - HEP   Standing R LE only Hip extension with Counter Support x 12 reps - HEP   Seated ankle pumps x 12 reps - HEP   Semi reclined glute sets x 15 reps - HEP       Therapeutic Activity  Educated pt and her daughter regarding the following:   - how to lock and unlock brace properly  - how to ensure proper fit of brace  - safe transfer technique/placement of wheelchair  - importance of good HEP compliance in order to achieve optimal rehab results        The following interventions were not performed this visit:   Manual Therapy:    minutes   STM/DTM to anterior and posterior/lateral R knee joint in supine      Outpatient Education: Reviewed home exercise program. Home exercise program instructed and issued. Answered questions about home exercise program. Provided manual cues for correct performance of exercises. Provided verbal feedback to improve exercise technique.   Cassandra Lang verbalizes understanding of all education provided: Yes    Assessment:  Cassandra Lang completed treatment today which focused upon improving RLE strength and ROM along with education in order to address deficits following R medial meniscus repair.  Cassandra presents with fear behaviors regarding moving R LE along with reports of suboptimal HEP compliance - strong education offered regarding this.  Cassandra requires cueing for proper muscle activation and performance of ex today. She demonstrated R quad  activation with seated quad sets the best today.  Cassandra was challenged with all strengthening and ROM interventions today.  Cassandra's  response to tx was: Patient tolerated therapeutic interventions well and without any adverse events. Reports mild INC in pain to 3/10 post tx. Cassandra's Progress towards goals: Patient reports there has not been a significant change in functional abilities. Cassandra Lang continues to have gait deficits, balance deficits, decreased strength, decreased ROM, and pain limiting participation in household chores, recreational activities, exercise, attending medical appointments, and shopping within the community Further skilled therapy services are warranted to address the remaining impairments in order to progress towards functional goals. Cassandra left session with all questions answered and no increase in symptoms.      Plan: Progress R knee ROM, flexibility and strength according to [facility] Meniscus Root Repair protocol.   Assess response to HEP updates and compliance       Time Calculation  Start Time: 1137  Stop Time: 1217  Time Calculation (min): 40 min     PT Therapeutic Procedures Time Entry  Therapeutic Exercise Time Entry: 28  Therapeutic Activity Time Entry: 12

## 2025-06-10 NOTE — PROGRESS NOTES
Physical Therapy    Physical Therapy Treatment    Patient Name: Cassandra Lang  MRN: [id number]  Today's Date: 6/11/2025  Time Calculation  Start Time: 1634  Stop Time: 1713  Time Calculation (min): 39 min    Current Problem  Problem List Items Addressed This Visit           ICD-10-CM    Current tear of semilunar cartilage S83.209A    Complex tear of medial meniscus of right knee, subsequent encounter - Primary S83.231D       Insurance - reviewed:  Visit number: 4 (updated 06/11/25)  Insurance Type: Payor: COMM[employer]CIAL [employer] / Plan: COMM[employer]CIAL [employer] / Product Type: *No Product type* / 20 PT/OT V PCY 0 USED NO AUTH NEEDED PAYS % AFTER DED 4000.00 3342.00 APPLIED   Surgery: Right knee arthroscopy, medial meniscus posterior root repair, partial lateral meniscectomy, intra-articular debridement synovectomy, chondroplasty - Right, 5/13/2025.  Days since surgery: 29   Referred by: Claudio Rm MD     Precautions: Fall Risk: Moderate    + PMH of breast CA, medically treated; +pre-diabetic; +HTN, medically managed; +blood thinner medication  Denies: CA, pacemaker, blood thinner medication use, latex allergy, epilepsy/seizures, or other known cardio/neuro/pulmonary problems   Denies unexpected weight loss/gain, night sweats, or night pain.     Previous surgeries include:  heart surgery     General  Subjective      Cassandra Lang denies any falls or complications since last session. Cassandra Lang reports having gout in the R ankle, that has resulted in increased swelling. Also has observed increased swelling in medial aspect of R knee.    Cassandra Lang reports good compliance with HEP.    Pain:  5/10  Pain location: R ankle      Objective     Treatments:  Abbreviation Key  NP = not performed this visit   NV = next visit  // = parallel    Assigned HEP- [facility] [id number]    Therapeutic Exercise:  27 minutes    The following were completed with the brace doffed  1x30 RLE seated heel slides  3x10  RLE seated marches; added 3# ankle weight on thigh during 2nd and 3rd trials  3x10 RLE seated hip abduction; added 3# ankle weight on thigh during 2nd and 3rd trials  2x15 RLE seated SLR with 3# ankle weight on thigh  Measured R knee flexion in sittin degrees  1x1 min seated RLE hamstring stretch with LE on chair; measured R knee extension following stretch: 4 degrees  1x30 sec RLE gastroc stretch with strap and RLE on chair    Gait Trainin minutes   Amb 45'x2 with walker and CGA via PWB 20# on RLE according to protocol; pt given visual demo prior to attempting  Amb 66' with walker and SUP via PWB 20# on RLE      Outpatient Education: Reviewed home exercise program. Answered questions about home exercise program. Provided manual cues for correct performance of exercises. Provided verbal feedback to improve exercise technique.   Cassandra Gilbertdeepakarnulfo verbalizes understanding of all education provided: Yes    Assessment:  Cassandra Lang completed treatment today which focused upon gait training and improving RLE strength and flexibility in order to address deficits following R meniscal repair on 25.  Cassandra presents with improved R knee AROM: 4-53 degrees.  Cassandra requires a visual demonstration of how to ambulate with 20# weight-bearing through RLE with the walker with good carry over.  Cassandra was challenged with addition of ankle weight to seated RLE exercises, reporting increased fatigue and demonstrating some trunk compensation during completion.  Cassandra's  response to tx was: Patient tolerated therapeutic interventions well and without any adverse events. Improved independence with home exercises. Improved tolerance to strengthening progressions. Cassandra's Progress towards goals: Improved gait quality. Improved ability to complete household activities. Reduced frequency of pain. Cassandra Lang continues to have gait deficits, balance deficits, decreased strength, decreased ROM, and pain limiting participation  in household chores, recreational activities, exercise, attending medical appointments, and shopping within the community Further skilled therapy services are warranted to address the remaining impairments in order to progress towards functional goals. Cassandra left session with all questions answered and no increase in symptoms.      Plan: Progress R knee ROM, flexibility and strength according to [facility] Meniscus Root Repair protocol.   Assess response to HEP updates and compliance       Time Calculation  Start Time: 1634  Stop Time: 1713  Time Calculation (min): 39 min     PT Therapeutic Procedures Time Entry  Therapeutic Exercise Time Entry: 27  Gait Training Time Entry: 12

## 2025-06-11 ENCOUNTER — TREATMENT (OUTPATIENT)
Dept: PHYSICAL THERAPY | Facility: CLINIC | Age: 65
End: 2025-06-11
Payer: COMMERCIAL

## 2025-06-11 DIAGNOSIS — S83.231D COMPLEX TEAR OF MEDIAL MENISCUS OF RIGHT KNEE, SUBSEQUENT ENCOUNTER: Primary | ICD-10-CM

## 2025-06-11 DIAGNOSIS — S83.231A COMPLEX TEAR OF MEDIAL MENISCUS OF RIGHT KNEE AS CURRENT INJURY, INITIAL ENCOUNTER: ICD-10-CM

## 2025-06-11 PROCEDURE — 97110 THERAPEUTIC EXERCISES: CPT | Mod: GP

## 2025-06-11 PROCEDURE — 97116 GAIT TRAINING THERAPY: CPT | Mod: GP

## 2025-06-17 NOTE — PROGRESS NOTES
Physical Therapy    Physical Therapy Treatment    Patient Name: Cassandra Lang  MRN: [id number]  Today's Date: 6/18/2025  Time Calculation  Start Time: 1648  Stop Time: 1726  Time Calculation (min): 38 min    Current Problem  Problem List Items Addressed This Visit           ICD-10-CM    Current tear of semilunar cartilage S83.209A    Complex tear of medial meniscus of right knee, subsequent encounter - Primary S83.231D       Insurance - reviewed:  Visit number: 5 (updated 06/18/25)  Insurance Type: Payor: COMM[employer]CIAL [employer] / Plan: COMM[employer]CIAL [employer] / Product Type: *No Product type* / 20 PT/OT V PCY 0 USED NO AUTH NEEDED PAYS % AFTER DED 4000.00 3342.00 APPLIED   Surgery: Right knee arthroscopy, medial meniscus posterior root repair, partial lateral meniscectomy, intra-articular debridement synovectomy, chondroplasty - Right, 5/13/2025.  Days since surgery: 36   Referred by: Claudio Rm MD     Precautions: Fall Risk: Moderate    + PMH of breast CA, medically treated; +pre-diabetic; +HTN, medically managed; +blood thinner medication  Denies: CA, pacemaker, blood thinner medication use, latex allergy, epilepsy/seizures, or other known cardio/neuro/pulmonary problems   Denies unexpected weight loss/gain, night sweats, or night pain.     Previous surgeries include:  heart surgery     General  Subjective      Csasandra Lang denies any falls or complications since last session. Cassandra Lang reports continued burning pain in R knee. Has trialed walking in the house with the walker but reports there is not much room to walk with the walker in her house. Daughter present for session.    Cassandra Lang reports good compliance with HEP.    Pain:  4/10  Pain location: R knee      Objective   R knee AROM:  6/18/25: 0-70 degrees  6/11/25: 4-53 degrees      Treatments:  Abbreviation Key  NP = not performed this visit   NV = next visit  // = parallel    Assigned HEP- [facility]  [email or web address]    Therapeutic Exercise:  30 minutes    The following were completed with the brace doffed  1x20 RLE supine SLR  2x10 RLE supine SAQs; attempted to add 1# ankle weight during second set unable to tolerate due to experiencing sharp pain in the knee (added to HEP)  3x10 RLE hip abduction in L s/l  3x10 RLE clamshell in L s/l  1x10 RLE seated R knee flexion AAROM; measured R knee flexion AROM on last rep: 70 degrees  1x30 sec RLE gastroc stretch with strap in long sitting (added to HEP)  Measured R knee extension in lyin degrees      Manual Therapy:  8 minutes   Pt in supine:  Medial/lateral and superior/inferior R patellar glides; instructed pt on how to complete herself at home  Cross-friction massage surrounding pt's incisions      Outpatient Education: Reviewed home exercise program. Home exercise program instructed and issued. Answered questions about home exercise program. Provided manual cues for correct performance of exercises. Provided verbal feedback to improve exercise technique. Instructed pt to focus on improving quad strength in order to progress off the brace when she sees the surgeon next. Educated daughter that we will trial navigating steps when pt is full weight-bearing in answer to daughter's question.  Cassandra KATYA Lang verbalizes understanding of all education provided: Yes    Assessment:  Cassandra Lang completed treatment today which focused upon RLE strengthening, manual therapy in order to address deficits following R medicus repair.  Cassandra presents with improving R knee AROM: 0-70 degrees, as well as tightness in the tissue surrounding her incisions and decreased patellar movement superiorly/inferiorly vs medially/laterally.  Cassandra requires a removal of weight when attempting SAQs due to reporting increased pain with the addition of weight.  Cassandra was challenged with maintaining 0 degrees knee extension during SLRs, demonstrating an extensor lag on all repetitions.  Cassandra's   response to tx was: Patient tolerated therapeutic interventions well and without any adverse events. Improved joint mobility.  Improved independence with home exercises. Cassandra's Progress towards goals: Improved gait quality. Reduced intensity of pain. Cassandra Lang continues to have gait deficits, balance deficits, decreased strength, decreased ROM, and pain limiting participation in household chores, recreational activities, exercise, attending medical appointments, and shopping within the community Further skilled therapy services are warranted to address the remaining impairments in order to progress towards functional goals. Cassandra left session with all questions answered and no increase in symptoms.      Plan: Progress R knee ROM, flexibility and strength according to [facility] Meniscus Root Repair protocol. Assess response to HEP updates and compliance. Standing hip hikes. Navigating ramp with walker. Emphasis quad strength and patellar mobility.        Time Calculation  Start Time: 1648  Stop Time: 1726  Time Calculation (min): 38 min     PT Therapeutic Procedures Time Entry  Therapeutic Exercise Time Entry: 30  Manual Therapy Time Entry: 8

## 2025-06-18 ENCOUNTER — TREATMENT (OUTPATIENT)
Dept: PHYSICAL THERAPY | Facility: CLINIC | Age: 65
End: 2025-06-18
Payer: COMMERCIAL

## 2025-06-18 DIAGNOSIS — S83.231A COMPLEX TEAR OF MEDIAL MENISCUS OF RIGHT KNEE AS CURRENT INJURY, INITIAL ENCOUNTER: ICD-10-CM

## 2025-06-18 DIAGNOSIS — S83.231D COMPLEX TEAR OF MEDIAL MENISCUS OF RIGHT KNEE, SUBSEQUENT ENCOUNTER: Primary | ICD-10-CM

## 2025-06-18 PROCEDURE — 97110 THERAPEUTIC EXERCISES: CPT | Mod: GP

## 2025-06-18 PROCEDURE — 97140 MANUAL THERAPY 1/> REGIONS: CPT | Mod: GP

## 2025-06-19 ENCOUNTER — APPOINTMENT (OUTPATIENT)
Dept: ORTHOPEDIC SURGERY | Facility: HOSPITAL | Age: 65
End: 2025-06-19
Payer: COMMERCIAL

## 2025-06-19 ENCOUNTER — TELEPHONE (OUTPATIENT)
Dept: PRIMARY CARE | Facility: CLINIC | Age: 65
End: 2025-06-19
Payer: COMMERCIAL

## 2025-06-19 DIAGNOSIS — E78.00 PURE HYPERCHOLESTEROLEMIA: ICD-10-CM

## 2025-06-19 DIAGNOSIS — E78.00 ELEVATED LDL CHOLESTEROL LEVEL: ICD-10-CM

## 2025-06-19 DIAGNOSIS — I10 PRIMARY HYPERTENSION: ICD-10-CM

## 2025-06-19 DIAGNOSIS — I63.9 CEREBRAL INFARCTION, UNSPECIFIED: ICD-10-CM

## 2025-06-20 ENCOUNTER — OFFICE VISIT (OUTPATIENT)
Dept: ORTHOPEDIC SURGERY | Facility: HOSPITAL | Age: 65
End: 2025-06-20
Payer: COMMERCIAL

## 2025-06-20 DIAGNOSIS — S83.241D ACUTE MEDIAL MENISCUS TEAR OF RIGHT KNEE, SUBSEQUENT ENCOUNTER: Primary | ICD-10-CM

## 2025-06-20 PROCEDURE — 1036F TOBACCO NON-USER: CPT

## 2025-06-20 PROCEDURE — 99024 POSTOP FOLLOW-UP VISIT: CPT

## 2025-06-20 PROCEDURE — 99212 OFFICE O/P EST SF 10 MIN: CPT

## 2025-06-20 ASSESSMENT — PAIN - FUNCTIONAL ASSESSMENT: PAIN_FUNCTIONAL_ASSESSMENT: 0-10

## 2025-06-20 ASSESSMENT — PAIN SCALES - GENERAL: PAINLEVEL_OUTOF10: 5 - MODERATE PAIN

## 2025-06-20 NOTE — PROGRESS NOTES
PRIMARY CARE PHYSICIAN: Jodi Lopez MD    ORTHOPAEDIC POSTOPERATIVE VISIT    ASSESSMENT & PLAN    Impression: 65 y.o. female  5+ weeks postop s/p Right knee arthroscopy, medial meniscus posterior root repair, partial lateral meniscectomy, intra-articular debridement synovectomy, chondroplasty     Plan:   Overall Cassandra is improving.  She will will work with her physical therapist on progressing her weightbearing and unlocking her brace per the postoperative protocol for the above.  She may discontinue with her brace when she has obtained adequate quadricep control.  She will continue working with physical therapy through the postoperative protocol, focusing on her knee range of motion and quadricep activation.  We discussed her postop precautions and she expressed understanding.  Patient will ice, elevate and rest the knee as she needs.  She will follow-up with us in 6 weeks.    I reviewed their postoperative timeline and plan with them. They understand the postoperative precautions and the treatment plan going forward.     Follow-Up: Patient will follow-up 6 weeks, no x-rays    At the end of the visit, all questions were answered in full. The patient is in agreement with the plan and recommendations. They will call the office with any questions/concerns.    Note dictated with [employer] software. Completed without full typed error editing and sent to avoid delay.    SUBJECTIVE  CHIEF COMPLAINT:   Chief Complaint   Patient presents with    Right Knee - Post-op        HPI: Cassandra Lang is a 65 y.o. female who is now 5+ weeks S/P right knee arthroscopy, medial meniscus posterior root repair, partial lateral meniscectomy, intra-articular debridement synovectomy, chondroplasty, done 5/13/25. Cassandra is doing well. She continues to wear knee brace and is ambulating with a walker. She has been attending physical therapy regularly. Cassandra reports intermittent pain in the right knee, that will  radiate into the right ankle. Denies any new injury to the knee since last visit.     REVIEW OF SYSTEMS  Constitutional: See HPI for pain assessment, No significant weight loss, recent trauma  Cardiovascular: No chest pain, shortness of breath  Respiratory: No difficulty breathing, cough  Gastrointestinal: No nausea, vomiting, diarrhea, constipation  Musculoskeletal: Noted in HPI, positive for pain, restricted motion, stiffness  Integumentary: No rashes, easy bruising, redness   Neurological: no numbness or tingling in extremities, no gait disturbances   Psychiatric: No mood changes, memory changes, social issues  Heme/Lymph: no excessive swelling, easy bruising, excessive bleeding  ENT: No hearing changes  Eyes: No vision changes    CURRENT MEDICATIONS:   Current Medications[1]     OBJECTIVE    PHYSICAL EXAM      5/13/2025    11:20 AM 5/13/2025    12:52 PM 5/13/2025     1:00 PM 5/13/2025     1:15 PM 5/13/2025     1:25 PM 5/13/2025     1:42 PM 5/22/2025     3:19 PM   Vitals   Systolic 119 134 128 115 135 132    Diastolic 91 92 92 95 98 93    Heart Rate 96 82 80 82 79 74    Temp  36.2 °C (97.2 °F)   36.3 °C (97.3 °F) 36.3 °C (97.3 °F)    Resp 21 14 15 17 14 14    Weight (lb)       160   BMI       26.63 kg/m2   BSA (m2)       1.82 m2   Visit Report       Report      There is no height or weight on file to calculate BMI.    General: Well-appearing, no acute distress    Skin intact bilateral upper and lower extremities  No erythema  No warmth    Detailed examination of right knee demonstrates:  Surgical incisions well-healed  No erythema or warmth  No ecchymosis  Medium effusion  Resolving swelling, minimal  Knee range of motion: 0-0-80  Mild to moderate early quadriceps inhibition and trace atrophy  Patella mobility 1+ medial and lateral  Lower extremity motor grossly intact  L4-S1 sensation intact bilaterally  2+ DP/PT pulses bilaterally  Warm and well-perfused, brisk capillary refill    IMAGING:   No new imaging          [1]   Current Outpatient Medications   Medication Sig Dispense Refill    acetaminophen (Tylenol) 325 mg tablet Take 2 tablets (650 mg) by mouth every 4 hours if needed for mild pain (1 - 3). 30 tablet 0    amLODIPine (Norvasc) 10 mg tablet Take 1 tablet (10 mg) by mouth once daily. 90 tablet 1    amoxicillin (Amoxil) 500 mg tablet take 1 tablet by mouth 3 times a day until gone (Patient not taking: Reported on 5/13/2025)      apixaban (Eliquis) 5 mg tablet Take 1 tablet (5 mg) by mouth 2 times a day. 180 tablet 1    aspirin 81 mg EC tablet TAKE 1 TABLET BY MOUTH ONCE DAILY. 90 tablet 1    atorvastatin (Lipitor) 40 mg tablet Take 1 tablet (40 mg) by mouth once daily. 90 tablet 1    cholecalciferol (Vitamin D-3) 25 MCG (1000 UT) capsule TAKE 1 CAPSULE (25 MCG) BY MOUTH ONCE DAILY. (Patient not taking: Reported on 5/13/2025) 90 capsule 1    ezetimibe (Zetia) 10 mg tablet TAKE 1 TABLET BY MOUTH EVERY DAY 90 tablet 1    omega-3 (Fish OiL) 60- mg capsule Take 2 capsules (1,000 mg) by mouth once daily. (Patient not taking: Reported on 5/13/2025)       No current facility-administered medications for this visit.

## 2025-06-22 RX ORDER — EZETIMIBE 10 MG/1
10 TABLET ORAL DAILY
Qty: 90 TABLET | Refills: 1 | Status: SHIPPED | OUTPATIENT
Start: 2025-06-22

## 2025-06-22 RX ORDER — ATORVASTATIN CALCIUM 40 MG/1
40 TABLET, FILM COATED ORAL DAILY
Qty: 90 TABLET | Refills: 1 | Status: SHIPPED | OUTPATIENT
Start: 2025-06-22 | End: 2026-07-27

## 2025-06-23 NOTE — PROGRESS NOTES
Physical Therapy    Physical Therapy Treatment    Patient Name: Cassandra Lang  MRN: [id number]  Today's Date: 6/25/2025  Time Calculation  Start Time: 1007  Stop Time: 1052  Time Calculation (min): 45 min    Current Problem  Problem List Items Addressed This Visit           ICD-10-CM    Current tear of semilunar cartilage S83.209A    Complex tear of medial meniscus of right knee, subsequent encounter - Primary S83.231D       Insurance - reviewed:  Visit number: 6 (updated 06/25/25)  Insurance Type: Payor: COMMERCIAL [employer] NETWORK / Plan: COMMERCIAL [employer] NETWORK / Product Type: *No Product type* / 20 PT/OT V PCY 0 USED NO AUTH NEEDED PAYS % AFTER DED 4000.00 3342.00 APPLIED   Surgery: Right knee arthroscopy, medial meniscus posterior root repair, partial lateral meniscectomy, intra-articular debridement synovectomy, chondroplasty - Right, 5/13/2025.  Days since surgery: 43   Referred by: Claudio Rm MD     Precautions: Fall Risk: Moderate    + PMH of breast CA, medically treated; +pre-diabetic; +HTN, medically managed; +blood thinner medication  Denies: CA, pacemaker, blood thinner medication use, latex allergy, epilepsy/seizures, or other known cardio/neuro/pulmonary problems   Denies unexpected weight loss/gain, night sweats, or night pain.     Previous surgeries include:  heart surgery    As of 6/25:  phase 2 rehab: progress ROM as able, WBAT, keep brace 0-90 w/ ambulation till    Good quad control     General  Subjective      Cassandra Lang denies any falls or complications since last session. Cassandra Lang reports 3/10 anterior knee pain w/ ex and gait. Pt enters clinic w/ walker and brace locked into knee extension. Pt reporting LBP and B glut pain worse than knee pain.  Daughter present for session.    Cassandra Lang reports good compliance with HEP: pt reports she is performing 3x10, 3x day of each exercise    Pain:  3/10  Pain location: R knee, anterior      Objective   R knee  AROM:  6/25: 0-75 degrees  6/18/25: 0-70 degrees  6/11/25: 4-53 degrees      Treatments:  Abbreviation Key  NP = not performed this visit   NV = next visit  // = parallel    Assigned HEP- [employer] [id number]    Therapeutic Exercise:  45 minutes    Nu step : L1 x 7 min (seat 8) : brace unlocked 0-90, nv: can perform w/o brace    The following were completed with the brace doffed:  1x20 RLE supine SLR  2x10 RLE supine SAQs; NC  3x10 RLE hip abduction in L s/l  3x10 RLE clamshell in L s/l  1x10 RLE seated R knee flexion AAROM; also instructed pt in heel slides w/ strap  1x30 sec RLE gastroc stretch with strap in long sitting (added to HEP)    Brace unlocked to 0-90 then 0-60 degrees (pt felt more stable at 0-60):   Gait in clinic w/ Walker   In // bars: reciprocal walk over row of canes   Stand ham curls for gait 10x  Nv: brace on: mini squats and/or leg press, mini step up?, multi directional walking, HR/TR, gait w/ WW indoors, outdoors, ramp navigation    Manual Therapy:    minutes NC  Pt in supine:  Medial/lateral and superior/inferior R patellar glides; instructed pt on how to complete herself at home  Cross-friction massage surrounding pt's incisions    Outpatient Education: Reviewed home exercise program. Home exercise program instructed and issued. Provided verbal feedback to improve exercise technique. Discussed significance of applying ice to reduce pain. Educated regarding elevating the limb to reduce swelling. Gait instruction.Issued tubigrip size E : reviewed wear schedule and wash care. Instructed pt to focus on improving quad strength in order to progress off the brace . How in phase 2 she can progress WB and knee flexion in NWB as able.  Do not exceed 90 degrees w/ ambulation, can unlock brace to 0-90 when pt feeling more stable w/ ambulation at 0-60.    Cassandra Lang verbalizes understanding of all education provided: Yes    Assessment:  Cassandra Lang completed treatment today which focused upon   there ex and gait training in order to address deficits following R medicus repair.  Pt is now 6 wks post op. Cassandra presents with decreased R knee ROM, decreased R quad strength, and gait deficits. Able to advance to phase 2 protocol (wk 6-12) today w/o incident.    -improved R knee flexion noted on nu step  + quad lag still present w/ SLR, however, pt able to ambulate in clinic today WBAT w/ brace unlocked 0-90  w/o buckling.  Pt felt more stable w/ brace unlocked 0-60.  Cues required for knee flexion in toe off phase of gait.  Issued tubigrip to address mild to mod jt effusion remaining w/ comfort voiced.         Cassandra's  response to tx was: Improved gait,  Patient tolerated therapeutic interventions well and without any adverse events. Improved joint mobility.  Improved independence with home exercises. Cassandra's Progress towards goals: Improved gait quality. Reduced intensity of pain. Cassandra Lang continues to have gait deficits, balance deficits, decreased strength, decreased ROM, and pain limiting participation in household chores, recreational activities, exercise, attending medical appointments, and shopping within the community Further skilled therapy services are warranted to address the remaining impairments in order to progress towards functional goals. Cassandra left session with all questions answered and no increase in symptoms.      Plan: Progress phase 2 [place] Meniscus Root Repair protocol. Assess response to HEP updates and progression of WB    Nv: brace on: mini squats and/or leg press, mini step up?, multi directional walking, HR/TR, gait w/ WW indoors, outdoors, ramp navigation      Time Calculation  Start Time: 1007  Stop Time: 1052  Time Calculation (min): 45 min     PT Therapeutic Procedures Time Entry  Therapeutic Exercise Time Entry: 45

## 2025-06-24 ENCOUNTER — APPOINTMENT (OUTPATIENT)
Dept: PHYSICAL THERAPY | Facility: CLINIC | Age: 65
End: 2025-06-24
Payer: COMMERCIAL

## 2025-06-24 DIAGNOSIS — S83.231D COMPLEX TEAR OF MEDIAL MENISCUS OF RIGHT KNEE, SUBSEQUENT ENCOUNTER: Primary | ICD-10-CM

## 2025-06-24 DIAGNOSIS — Q21.12 PFO (PATENT FORAMEN OVALE) (HHS-HCC): ICD-10-CM

## 2025-06-25 ENCOUNTER — APPOINTMENT (OUTPATIENT)
Dept: PHYSICAL THERAPY | Facility: CLINIC | Age: 65
End: 2025-06-25
Payer: COMMERCIAL

## 2025-06-25 DIAGNOSIS — S83.231D COMPLEX TEAR OF MEDIAL MENISCUS OF RIGHT KNEE, SUBSEQUENT ENCOUNTER: Primary | ICD-10-CM

## 2025-06-25 DIAGNOSIS — S83.231A COMPLEX TEAR OF MEDIAL MENISCUS OF RIGHT KNEE AS CURRENT INJURY, INITIAL ENCOUNTER: ICD-10-CM

## 2025-06-25 PROCEDURE — 97110 THERAPEUTIC EXERCISES: CPT | Mod: GP,CQ

## 2025-06-28 RX ORDER — APIXABAN 5 MG/1
5 TABLET, FILM COATED ORAL 2 TIMES DAILY
Qty: 180 TABLET | Refills: 1 | Status: SHIPPED | OUTPATIENT
Start: 2025-06-28

## 2025-06-30 NOTE — PROGRESS NOTES
"Physical Therapy    Physical Therapy Treatment    Patient Name: Cassandra Lang  MRN: [id number]  Today's Date: 7/1/2025  Time Calculation  Start Time: 1730  Stop Time: 1818  Time Calculation (min): 48 min    Current Problem  Problem List Items Addressed This Visit           ICD-10-CM    Current tear of semilunar cartilage S83.209A    Complex tear of medial meniscus of right knee, subsequent encounter - Primary S83.231D       Insurance - reviewed:  Visit number: 7 (updated 07/01/25)  Insurance Type: Payor: COMM[employer]CIAL [employer] NET[employer] / Plan: COMMERCIAL [employer] NETWORK / Product Type: *No Product type* / 20 PT/OT V PCY 0 USED NO AUTH NEEDED PAYS % AFTER DED 4000.00 3342.00 APPLIED   Surgery: Right knee arthroscopy, medial meniscus posterior root repair, partial lateral meniscectomy, intra-articular debridement synovectomy, chondroplasty - Right, 5/13/2025.  Days since surgery: 49   Referred by: Claudio Rm MD     Precautions: Fall Risk: Moderate    + PMH of breast CA, medically treated; +pre-diabetic; +HTN, medically managed; +blood thinner medication  Denies: CA, pacemaker, blood thinner medication use, latex allergy, epilepsy/seizures, or other known cardio/neuro/pulmonary problems   Denies unexpected weight loss/gain, night sweats, or night pain.     Previous surgeries include:  heart surgery    As of 6/25:  phase 2 rehab: progress ROM as able, WBAT, keep brace 0-90 w/ ambulation till    Good quad control     General  Subjective      Cassandra ALDANA Vladimirchristopher denies any falls or complications since last session. Cassandra ALDANA Vladimirchristopher reports 3/10 anterior knee pain w/ ex and gait. Pt enters clinic w/ walker and brace  unlocked 0-60. Improved mark w/ less UE reliance on device evident.  Pt denies R LE buckling.  Stood/walked x 2 hrs in yard earlier today to check her garden: increased knee pain as a result.   Daughter present for session.  Pt feels \"a block\" in the lateral hamstring w/ attempt to flex her knee    Cassandra ALDANA " "Rickey reports good compliance with HEP: pt reports she is performing HEP 2xday    Pain:  3/10  Pain location: R knee, anterior or infrapatellar regions      Objective   R knee AROM:  7/1: 0-78 degrees and 0-80 degrees w/ encouragement  6/25: 0-75 degrees  6/18/25: 0-70 degrees  6/11/25: 4-53 degrees      Treatments:  Abbreviation Key  NP = not performed this visit   NV = next visit  // = parallel    Assigned HEP- [employer] [id number]    Therapeutic Exercise:  40 minutes    Nu step : L1 x 7 min (seat 8) : brace unlocked 0-110: PT DOES NOT SEEM TO OBTAIN END RANGE KNEE FLEXION.. NV: HOLD? (BEST KNEE FLEXION NOTED IN SIT)    The following were completed with the brace doffed:  1x10 RLE seated R knee flexion AAROM; also instructed pt in heel slides w/ strap  1x20 RLE supine SLR: MILD QUAD LAG W/ 2ND SET OF 10  Nv: try TB resisted hamstring curl in sit    NC/HEP\":  2x10 RLE supine SAQs;   3x10 RLE hip abduction in L s/l  3x10 RLE clamshell in L s/l  1x30 sec RLE gastroc stretch with strap in long sitting (added to HEP)    Brace unlocked to 0-90 ):   -stand B HR/TR 10xea  -attempted mini squat or sit to stand from elevated chair: poor posterior chain m. Activation and form, sit to stand limited by lack of knee flexion  Gait in clinic w/ Walker Then SPC inside then outside of // bars, SBA, cues for knee flexion and sequencing  In // bars: reciprocal walk over row of canes   Stand ham curls for gait 10x  Nv: brace on: mini squats and/or leg press or fitter leg press, mini step up?, multi directional walking, , gait w/ WW indoors, outdoors, ramp navigation    Manual Therapy:  8 minutes   Pt in supine:  Brief STM and cupping R lateral distal hamstring: improved knee flexion post technique    Outpatient Education: Reviewed home exercise program. Home exercise program instructed and issued. Provided verbal feedback to improve exercise technique. Discussed significance of applying ice to reduce pain. Educated regarding " elevating the limb to reduce swelling. Gait instruction using SPC: pt can trial in home, but use WW outdoors for safety.  Unlocked brace from 0-60 to 0-90 degrees as protocol indicated,  w/o incident  Cassandra Lang verbalizes understanding of all education provided: Yes    Assessment:  Cassandra Lang completed treatment today which focused upon  there ex and gait training in order to address deficits following R medicus repair.  Pt is now 7 wks post op. Cassandra presents with decreased R knee ROM, decreased R quad strength, and gait deficits.   -improved R knee flexion to 80 degrees w/ less pain/tightness following brief manual therapy to distal lateral hamstring  + quad lag still present w/ 2nd set of 10 with SLR, however, improved endurance evident. pt able to ambulate in clinic today w/ SPC and brace unlocked 0-90  w/o buckling.  Unlocked brace from 0-60 to 0-90 degrees as protocol indicated,  w/o incident  Cues initially required for knee flexion, step thru phase of gait, and sequencing.  Pt to continue to use WW outdoors for safety, till strength improves.   Lack of R knee flexion, mild pt fear/hesitation, and posterior chain m. Weakness limited pt ability to perform mini squat or sit to stand from elevated surface correctly.        Cassandra's  response to tx was: Improved gait,  Patient tolerated therapeutic interventions well and without any adverse events. Improved joint mobility.  Improved independence with home exercises. Cassandra's Progress towards goals: Improved gait quality. Improved walking distance. Improved ability to complete household activities. Reduced intensity of pain. Cassandra Lang continues to have gait deficits, balance deficits, decreased strength, decreased ROM, and pain limiting participation in household chores, recreational activities, exercise, attending medical appointments, and shopping within the community Further skilled therapy services are warranted to address the remaining  impairments in order to progress towards functional goals. Cassandra left session with all questions answered and no increase in symptoms.      Plan: Progress phase 2 [place] Meniscus Root Repair protocol. Assess response to HEP updates and progression of WB  Pt seems to tolerate standing, functional exercise better than isolation ex  Nv: brace on: mini squats and/or leg press, mini step up?, multi directional walking,   , gait w/ WW indoors, outdoors, ramp navigation      Time Calculation  Start Time: 1730  Stop Time: 1818  Time Calculation (min): 48 min     PT Therapeutic Procedures Time Entry  Therapeutic Exercise Time Entry: 40  Manual Therapy Time Entry: 8

## 2025-07-01 ENCOUNTER — TREATMENT (OUTPATIENT)
Dept: PHYSICAL THERAPY | Facility: CLINIC | Age: 65
End: 2025-07-01
Payer: COMMERCIAL

## 2025-07-01 DIAGNOSIS — S83.231A COMPLEX TEAR OF MEDIAL MENISCUS OF RIGHT KNEE AS CURRENT INJURY, INITIAL ENCOUNTER: ICD-10-CM

## 2025-07-01 DIAGNOSIS — S83.231D COMPLEX TEAR OF MEDIAL MENISCUS OF RIGHT KNEE, SUBSEQUENT ENCOUNTER: Primary | ICD-10-CM

## 2025-07-01 PROCEDURE — 97110 THERAPEUTIC EXERCISES: CPT | Mod: GP,CQ

## 2025-07-02 NOTE — PROGRESS NOTES
Physical Therapy    Physical Therapy Treatment    Patient Name: Cassandra Lang  MRN: [id number]  Today's Date: 7/7/2025  Time Calculation  Start Time: 1738  Stop Time: 1820  Time Calculation (min): 42 min    Current Problem  Problem List Items Addressed This Visit           ICD-10-CM    Current tear of semilunar cartilage S83.209A    Complex tear of medial meniscus of right knee, subsequent encounter - Primary S83.231D       Insurance - reviewed:  Visit number: 8 (updated 07/07/25)  Insurance Type: Payor: COMMERCIAL [employer] NETWORK / Plan: COMMERCIAL [employer] NETWORK / Product Type: *No Product type* / 20 PT/OT V PCY 0 USED NO AUTH NEEDED PAYS % AFTER DED 4000.00 3342.00 APPLIED   Surgery: Right knee arthroscopy, medial meniscus posterior root repair, partial lateral meniscectomy, intra-articular debridement synovectomy, chondroplasty - Right, 5/13/2025.  Days since surgery: 55   Referred by: Claudio Rm MD     Precautions: Fall Risk: Moderate    + PMH of breast CA, medically treated; +pre-diabetic; +HTN, medically managed; +blood thinner medication  Denies: CA, pacemaker, blood thinner medication use, latex allergy, epilepsy/seizures, or other known cardio/neuro/pulmonary problems   Denies unexpected weight loss/gain, night sweats, or night pain.     Previous surgeries include:  heart surgery    As of 6/25:  phase 2 rehab: progress ROM as able, WBAT, keep brace 0-90 w/ ambulation until demonstrating good quad control       General  Subjective      Cassandra Lang denies any falls or complications since last session. Cassandra Lang reports continued R foot swelling and burning in her R knee and foot. Presents ambulating with SPC on the R side.    Cassandra Lang reports fair compliance with HEP. Mainly has been walking.    Pain:  unrated  Pain location: R knee      Objective   R knee AROM:  7/7: 0-80 degrees on step  7/1: 0-78 degrees and 0-80 degrees w/ encouragement  6/25: 0-75 degrees  6/18/25: 0-70  "degrees  6/11/25: 4-53 degrees      Treatments:  Abbreviation Key  NP = not performed this visit   NV = next visit  // = parallel    Assigned HEP- [employer] [id number]    Therapeutic Exercise:  42 minutes     All exercises performed with brace donned and locked from 0-90 degrees  Probike II, rocking back and forth with BLEs, seat 5, level 4, for 5 min warm-up and subjective  3x10 BLE knee extension on leg press, seat 4, 40#  2x10 RLE forward and lateral step ups to 4\" step with BUE support  1x5 RLE knee flexion stretch in standing on step (added to HEP)  Measured R knee flexion on step: 80 degrees    Outpatient Education: Reviewed home exercise program. Home exercise program instructed and issued. Answered questions about home exercise program. Provided manual cues for correct performance of exercises. Provided verbal feedback to improve exercise technique. Educated pt to prioritize SLR at home to allow for improving quad control and eventual discontinued use of the brace. Educated pt that her physician will be the one to clear her to drive.  Cassandra Lang verbalizes understanding of all education provided: Yes    Assessment:  Cassandra Lang completed treatment today which focused upon improving R knee ROM, strength in order to address deficits following R medial meniscus repair on 5/13/25.  Cassandra presented ambulating with SPC on the R side, educated pt to use on L side and adjusted height of the SPC to best suit the pt.  Cassandra requires cueing for proper muscle activation and exercise technique with moderate follow through as well as education to push herself at home when completing exercises to allow for a full recovery.  Cassandra was challenged achieving full ROM on the Probike, only able to rock BLEs back and forth vs achieve a full revolution.  Cassandra's  response to tx was: Patient tolerated therapeutic interventions well and without any adverse events. Improved independence with home exercises. Improved tolerance to " strengthening progressions. Cassandra's Progress towards goals: Improved gait quality. Improved ability to negotiate stairs. Improved ability to complete household activities. Improved ability to complete activities in the community. Reduced frequency of pain. Reduced intensity of pain. Reduced pain level. Cassandra Lang continues to have gait deficits, balance deficits, decreased strength, decreased ROM, and pain limiting participation in household chores, recreational activities, exercise, sleep, attending medical appointments, and shopping within the community Further skilled therapy services are warranted to address the remaining impairments in order to progress towards functional goals. Cassandra left session with all questions answered and no increase in symptoms.      Plan: Progress phase 2 [place] Meniscus Root Repair protocol. Assess response to HEP updates and progression of WB. Mini-squats, ambulating over different surfaces, mult-directional walking, deadlift, balance.        Time Calculation  Start Time: 1738  Stop Time: 1820  Time Calculation (min): 42 min     PT Therapeutic Procedures Time Entry  Therapeutic Exercise Time Entry: 42

## 2025-07-07 ENCOUNTER — TREATMENT (OUTPATIENT)
Dept: PHYSICAL THERAPY | Facility: CLINIC | Age: 65
End: 2025-07-07
Payer: COMMERCIAL

## 2025-07-07 DIAGNOSIS — S83.231D COMPLEX TEAR OF MEDIAL MENISCUS OF RIGHT KNEE, SUBSEQUENT ENCOUNTER: Primary | ICD-10-CM

## 2025-07-07 DIAGNOSIS — S83.231A COMPLEX TEAR OF MEDIAL MENISCUS OF RIGHT KNEE AS CURRENT INJURY, INITIAL ENCOUNTER: ICD-10-CM

## 2025-07-07 PROCEDURE — 97110 THERAPEUTIC EXERCISES: CPT | Mod: GP

## 2025-07-14 ENCOUNTER — APPOINTMENT (OUTPATIENT)
Dept: PHYSICAL THERAPY | Facility: CLINIC | Age: 65
End: 2025-07-14
Payer: COMMERCIAL

## 2025-07-14 ENCOUNTER — DOCUMENTATION (OUTPATIENT)
Dept: PHYSICAL THERAPY | Facility: CLINIC | Age: 65
End: 2025-07-14
Payer: COMMERCIAL

## 2025-07-14 DIAGNOSIS — S83.231D COMPLEX TEAR OF MEDIAL MENISCUS OF RIGHT KNEE, SUBSEQUENT ENCOUNTER: Primary | ICD-10-CM

## 2025-07-14 NOTE — PROGRESS NOTES
Physical Therapy                      Therapy Communication Note    Patient Name: Cassandra Lang  MRN: [id number]  Today's Date: 7/14/2025     Discipline: Physical Therapy    Missed Visit Reason:  No transportation.    Missed Time: Cancel

## 2025-07-14 NOTE — PROGRESS NOTES
Physical Therapy    Physical Therapy Treatment    Patient Name: Cassandra Lang  MRN: [id number]  Today's Date: 7/15/2025  Time Calculation  Start Time: 0923  Stop Time: 1000  Time Calculation (min): 37 min    Current Problem  Problem List Items Addressed This Visit           ICD-10-CM    Current tear of semilunar cartilage S83.209A    Complex tear of medial meniscus of right knee, subsequent encounter - Primary S83.231D       Insurance - reviewed:  Visit number: 9 (updated 07/15/25)  Insurance Type: Payor: [employer] / Plan: [employer] / Product Type: *No Product type* / 20 PT/OT V PCY 0 USED NO AUTH NEEDED PAYS % AFTER DED 4000.00 3342.00 APPLIED   Surgery: Right knee arthroscopy, medial meniscus posterior root repair, partial lateral meniscectomy, intra-articular debridement synovectomy, chondroplasty - Right, 5/13/2025.  Days since surgery: 63   Referred by: Claudio Rm MD     Precautions: Fall Risk: Moderate    + PMH of breast CA, medically treated; +pre-diabetic; +HTN, medically managed; +blood thinner medication  Denies: CA, pacemaker, blood thinner medication use, latex allergy, epilepsy/seizures, or other known cardio/neuro/pulmonary problems   Denies unexpected weight loss/gain, night sweats, or night pain.     Previous surgeries include:  heart surgery    As of 6/25:  phase 2 rehab: progress ROM as able, WBAT, keep brace 0-90 w/ ambulation until demonstrating good quad control       General  Subjective  **Pt arrived 8 min late to session. Daughter present for session to assist with translating.**    Cassandra Lang denies any falls or complications since last session. Cassandra Lang reports increased pain in the knee yesterday because she walked in her garden and yard yesterday. Still has swelling in her R ankle. Presents ambulating with SPC on     Cassandra Lang reports good compliance with HEP.    Pain:  3/10  Pain location: R knee      Objective   R knee  "AROM:  7/15: 0-80 degrees initially but this PT pushed pt to closer to 90 degrees but pt unable to tolerate holding this position due to increased R knee pain  7/7: 0-80 degrees on step  7/1: 0-78 degrees and 0-80 degrees w/ encouragement  6/25: 0-75 degrees  6/18/25: 0-70 degrees  6/11/25: 4-53 degrees      Treatments:  Abbreviation Key  NP = not performed this visit   NV = next visit  // = parallel    Assigned HEP- [employer] [id number]    Therapeutic Exercise:  22 minutes    Probike II, rocking back and forth with BLEs, seat 5, level 4, for 5 min warm-up and subjective  1x10 LLE heel taps off 2\" step standing on RLE  3x10 mini-squats; added chair + pillows as tactile target behind pt to facilitate greater hip hinge (added to HEP)  1x20 RLE SLRs in supine with brace doffed  Measured R knee AROM in supine (see above)    Neuromuscular Re-education: 15 minutes   3x10 LLE tapping 1 cone in 1st set, 2 cones in second set, and 3 cones in 3rd set with no UE support CGA  2x10 LLE tapping 4 cones arranged in a square around pt with no UE support and CGA    Outpatient Education: Reviewed home exercise program. Home exercise program instructed and issued. Answered questions about home exercise program. Provided manual cues for correct performance of exercises. Provided verbal feedback to improve exercise technique.   Cassandra Lang verbalizes understanding of all education provided: Yes    Assessment:  Cassandra Lang completed treatment today which focused upon RLE strengthening and dynamic stability in order to address deficits following R meniscus repair.  Cassandra presents with improving R knee flexion when pushed but pt had a difficult time tolerating this position due to increased R knee pain.  Cassandra requires addition of a chair when completing mini squats to facilitate proper hip hinge/form with good follow through.  Cassandra was challenged with completing heel taps with proper form, often shifting weight into LLE and " compensating with L hip. Form improved slightly with this PT holding pt in R weight shift when tapping.  Cassandra's  response to tx was: Patient tolerated therapeutic interventions well and without any adverse events. Improved joint mobility.  Patient was appropriately fatigued with no complaints. Cassandra's Progress towards goals: Improved gait quality. Improved ability to negotiate stairs. Improved ability to complete household activities. Improved ability to complete activities in the community. Cassandra Lang continues to have gait deficits, balance deficits, decreased strength, decreased ROM, and pain limiting participation in household chores, recreational activities, exercise, attending medical appointments, and shopping within the community Further skilled therapy services are warranted to address the remaining impairments in order to progress towards functional goals. Cassandra left session with all questions answered and no increase in symptoms.      Plan: Recheck next session.           Time Calculation  Start Time: 0923  Stop Time: 1000  Time Calculation (min): 37 min     PT Therapeutic Procedures Time Entry  Therapeutic Exercise Time Entry: 22  Neuromuscular Re-Education Time Entry: 15

## 2025-07-15 ENCOUNTER — TREATMENT (OUTPATIENT)
Dept: PHYSICAL THERAPY | Facility: CLINIC | Age: 65
End: 2025-07-15
Payer: COMMERCIAL

## 2025-07-15 DIAGNOSIS — S83.231D COMPLEX TEAR OF MEDIAL MENISCUS OF RIGHT KNEE, SUBSEQUENT ENCOUNTER: Primary | ICD-10-CM

## 2025-07-15 DIAGNOSIS — S83.231A COMPLEX TEAR OF MEDIAL MENISCUS OF RIGHT KNEE AS CURRENT INJURY, INITIAL ENCOUNTER: ICD-10-CM

## 2025-07-15 PROCEDURE — 97112 NEUROMUSCULAR REEDUCATION: CPT | Mod: GP

## 2025-07-15 PROCEDURE — 97110 THERAPEUTIC EXERCISES: CPT | Mod: GP

## 2025-07-21 ENCOUNTER — APPOINTMENT (OUTPATIENT)
Dept: PHYSICAL THERAPY | Facility: CLINIC | Age: 65
End: 2025-07-21
Payer: COMMERCIAL

## 2025-07-21 DIAGNOSIS — S83.231D COMPLEX TEAR OF MEDIAL MENISCUS OF RIGHT KNEE, SUBSEQUENT ENCOUNTER: Primary | ICD-10-CM

## 2025-07-23 NOTE — PROGRESS NOTES
"Physical Therapy    Physical Therapy Treatment    Patient Name: Cassandra Lang  MRN: [id number]  Today's Date: 8/13/2025       Current Problem  Problem List Items Addressed This Visit           ICD-10-CM    Complex tear of medial meniscus of right knee, subsequent encounter - Primary S83.231D       Insurance - reviewed:  Visit number: 10 (updated 07/23/25)  Insurance Type: Payor: [employer] / Plan: [employer] / Product Type: *No Product type* / 20 PT/OT V PCY 0 USED NO AUTH NEEDED PAYS % AFTER DED 4000.00 3342.00 APPLIED   Surgery: Right knee arthroscopy, medial meniscus posterior root repair, partial lateral meniscectomy, intra-articular debridement synovectomy, chondroplasty - Right, 5/13/2025.  Days since surgery: 92   Referred by: Claudio Rm MD     Precautions: Fall Risk: Moderate    + PMH of breast CA, medically treated; +pre-diabetic; +HTN, medically managed; +blood thinner medication  Denies: CA, pacemaker, blood thinner medication use, latex allergy, epilepsy/seizures, or other known cardio/neuro/pulmonary problems   Denies unexpected weight loss/gain, night sweats, or night pain.     Previous surgeries include:  heart surgery    As of 6/25:  phase 2 rehab: progress ROM as able, WBAT, keep brace 0-90 w/ ambulation until demonstrating good quad control       General  Subjective      Cassandra Lang {taldenies:64430::\"denies\"} any falls or complications since last session. Cassandra Lang reports ***    Cassandra Lang reports {talgfp:22537::\"good\"} compliance with HEP.    Pain:  ***/10  Pain location: ***      Objective   R knee AROM:  7/15: 0-80 degrees initially but this PT pushed pt to closer to 90 degrees but pt unable to tolerate holding this position due to increased R knee pain  7/7: 0-80 degrees on step  7/1: 0-78 degrees and 0-80 degrees w/ encouragement  6/25: 0-75 degrees  6/18/25: 0-70 degrees  6/11/25: 4-53 degrees    Treatments:  Abbreviation Key  NP = " "not performed this visit   NV = next visit  // = parallel    Assigned HEP- [employer] [id number]    Therapeutic Exercise:    minutes    Probike II, rocking back and forth with BLEs, seat 5, level 4, for 5 min warm-up and subjective  1x10 LLE heel taps off 2\" step standing on RLE  3x10 mini-squats; added chair + pillows as tactile target behind pt to facilitate greater hip hinge (added to HEP)  1x20 RLE SLRs in supine with brace doffed  Measured R knee AROM in supine (see above)    Neuromuscular Re-education:   minutes   3x10 LLE tapping 1 cone in 1st set, 2 cones in second set, and 3 cones in 3rd set with no UE support CGA  2x10 LLE tapping 4 cones arranged in a square around pt with no UE support and CGA    {talbillingoptions:14632}      Outpatient Education: {Education:89793}   Cassandra ALDANA Vladimirchristopher verbalizes understanding of all education provided: {yes,no:71043::\"Yes\"}    Reassessment:   Cassandra Lang has completed 10 physical therapy sessions from 5/22/2025 to 7/23/25 to address deficits following R meniscal surgical repair.  Treatment has included Therapeutic exercise, Manual therapy, Gait training, Home program instruction and progression, Neuromuscular re-education, Therapeutic activities, Self care and home management, and Instruction in activity modification.  she reports {HEP compliant on reassess:00532::\"patient reports compliance with the instructed home exercises.\"}.  Cassandra {HAS HAS NOT:47476} made significant progress towards the established therapy goals.  At this time I recommend {PT DC options:95179}    Goals:   STG's (within 2 weeks)  Cassandra Lang will decrease pain by >/= 2/10 points from baseline to improve ability to perform household/recreational activities.  7/25/25 GOAL     Cassandra Lang will demonstrate independence,  excellent understanding of and report compliance with at least 3 exercises in her HEP to facilitate the learning process to maximize PT benefits and to facilitate " independent rehab program upon discharge.  7/25/25 GOAL     LTG's (by discharge)     Patient will improve R knee strength to >/=4+/5 for improved knee stability.  7/25/25 GOAL     Patient will improve R knee AROM to >/=0-120 deg for improved knee mobility.  7/25/25 GOAL     Patient will demonstrate reciprocal gait pattern without assistive device to return to PLOF and improve independence.  7/25/25 GOAL     Patient will demonstrate reciprocal gait pattern with stair negotiation.  7/25/25 GOAL     Patient will improve LEFS score to by 9 points (MDC) to demonstrate significant improvement in the ability to complete household and community functional tasks with the lower extremity independently. Baseline: 74/80  7/25/25 GOAL     Patient will report 50% improvement in sensation to indicate healing and to improve ADLs, functional mobility, and other household and occupational activities.  7/25/25 GOAL     Cassandra Lang will decrease pain to 0-2/10 to improve ability to perform household/recreational activities.  7/25/25 GOAL     Cassandra aLng will demonstrate independence,  excellent understanding of and report compliance with HEP to facilitate the learning process to maximize PT benefits and to facilitate independent rehab program upon discharge.  7/25/25 GOAL    Plan: ***                                    activities, Self care and home management, Instruction in activity modification, and Cryotherapy.    Goals:   STG's (within 2 weeks)  Cassandra Lang will decrease pain by >/= 2/10 points from baseline to improve ability to perform household/recreational activities.  8/13/25 GOAL MET - pain reaches a 3/10 at max     Cassandra Lang will demonstrate independence,  excellent understanding of and report compliance with at least 3 exercises in her HEP to facilitate the learning process to maximize PT benefits and to facilitate independent rehab program upon discharge.  8/13/25 GOAL MET     LTG's (by discharge)     Patient will improve R knee strength to >/=4+/5 for improved knee stability.  8/13/25 GOAL NOT MET - extension 3+/5, flexion 4/5     Patient will improve R knee AROM to >/=0-120 deg for improved knee mobility.  8/13/25 GOAL NOT MET - 0-96 degrees     Patient will demonstrate reciprocal gait pattern without assistive device to return to PLOF and improve independence.  8/13/25 GOAL MET     Patient will demonstrate reciprocal gait pattern with stair negotiation.  8/13/25 GOAL NOT MET - navigates steps non-reciprocally     Patient will improve LEFS score to by 9 points (MDC) to demonstrate significant improvement in the ability to complete household and community functional tasks with the lower extremity independently. Baseline: 74/80  8/13/25 GOAL NOT MET - 41/80     Patient will report 50% improvement in sensation to indicate healing and to improve ADLs, functional mobility, and other household and occupational activities.  8/13/25 GOAL MET - still experiences some numbness in near the R tibia but otherwise has no numbness     Cassandra Lang will decrease pain to 0-2/10 to improve ability to perform household/recreational activities.  8/13/25 GOAL NOT MET - pain reaches a 3/10 at max     Cassandra Lang will demonstrate independence,  excellent understanding of and report compliance with HEP to facilitate  the learning process to maximize PT benefits and to facilitate independent rehab program upon discharge.  8/13/25 GOAL NOT MET - upon HEP review, pt only completing ~half of the prescribed exercises    Plan: Continue PT for 1x/week for 6 weeks. Convert notes to new system when able. Ask pt about follow up visit with surgeon and if brace is DC. Continue with phase II of the [facility] Arthroscopic Meniscal Repair Protocol until pt passes the criteria to progress to phase III.          Time Calculation  Start Time: 0946  Stop Time: 1025  Time Calculation (min): 39 min     PT Therapeutic Procedures Time Entry  Therapeutic Exercise Time Entry: 23  Therapeutic Activity Time Entry: 16

## 2025-07-23 NOTE — PROGRESS NOTES
"Physical Therapy    Physical Therapy Treatment    Patient Name: Cassandra Lang  MRN: [id number]  Today's Date: 7/25/2025       Current Problem  Problem List Items Addressed This Visit           ICD-10-CM    Complex tear of medial meniscus of right knee, subsequent encounter - Primary S83.231D       Insurance - reviewed:  Visit number: 10 (updated 07/23/25)  Insurance Type: Payor: [employer] / Plan: COMM[employer]CIAL [employer] / Product Type: *No Product type* / 20 PT/OT V PCY 0 USED NO AUTH NEEDED PAYS % AFTER DED 4000.00 3342.00 APPLIED   Surgery: Right knee arthroscopy, medial meniscus posterior root repair, partial lateral meniscectomy, intra-articular debridement synovectomy, chondroplasty - Right, 5/13/2025.  Days since surgery: 73   Referred by: Claudio Rm MD     Precautions: Fall Risk: Moderate    + PMH of breast CA, medically treated; +pre-diabetic; +HTN, medically managed; +blood thinner medication  Denies: CA, pacemaker, blood thinner medication use, latex allergy, epilepsy/seizures, or other known cardio/neuro/pulmonary problems   Denies unexpected weight loss/gain, night sweats, or night pain.     Previous surgeries include:  heart surgery    As of 6/25:  phase 2 rehab: progress ROM as able, WBAT, keep brace 0-90 w/ ambulation until demonstrating good quad control       General  Subjective      Cassandra Lang {taldenies:14568::\"denies\"} any falls or complications since last session. Cassandra Lang reports ***    Cassandra Lang reports {talgfp:81573::\"good\"} compliance with HEP.    Pain:  ***/10  Pain location: ***      Objective   R knee AROM:  7/15: 0-80 degrees initially but this PT pushed pt to closer to 90 degrees but pt unable to tolerate holding this position due to increased R knee pain  7/7: 0-80 degrees on step  7/1: 0-78 degrees and 0-80 degrees w/ encouragement  6/25: 0-75 degrees  6/18/25: 0-70 degrees  6/11/25: 4-53 degrees    Treatments:  Abbreviation Key  NP = " "not performed this visit   NV = next visit  // = parallel    Assigned HEP- [employer] [id number]    Therapeutic Exercise:    minutes    Probike II, rocking back and forth with BLEs, seat 5, level 4, for 5 min warm-up and subjective  1x10 LLE heel taps off 2\" step standing on RLE  3x10 mini-squats; added chair + pillows as tactile target behind pt to facilitate greater hip hinge (added to HEP)  1x20 RLE SLRs in supine with brace doffed  Measured R knee AROM in supine (see above)    Neuromuscular Re-education:   minutes   3x10 LLE tapping 1 cone in 1st set, 2 cones in second set, and 3 cones in 3rd set with no UE support CGA  2x10 LLE tapping 4 cones arranged in a square around pt with no UE support and CGA    {talbillingoptions:43566}      Outpatient Education: {Education:90325}   Cassandra ALDANA Vladimirchristopher verbalizes understanding of all education provided: {yes,no:30306::\"Yes\"}    Reassessment:   Cassandra Lang has completed 10 physical therapy sessions from 5/22/2025 to 7/23/25 to address deficits following R meniscal surgical repair.  Treatment has included Therapeutic exercise, Manual therapy, Gait training, Home program instruction and progression, Neuromuscular re-education, Therapeutic activities, Self care and home management, and Instruction in activity modification.  she reports {HEP compliant on reassess:51333::\"patient reports compliance with the instructed home exercises.\"}.  Cassandra {HAS HAS NOT:53595} made significant progress towards the established therapy goals.  At this time I recommend {PT DC options:35525}    Goals:   STG's (within 2 weeks)  Cassandra Lang will decrease pain by >/= 2/10 points from baseline to improve ability to perform household/recreational activities.  7/25/25 GOAL     Cassandra Lang will demonstrate independence,  excellent understanding of and report compliance with at least 3 exercises in her HEP to facilitate the learning process to maximize PT benefits and to facilitate " independent rehab program upon discharge.  7/25/25 GOAL     LTG's (by discharge)     Patient will improve R knee strength to >/=4+/5 for improved knee stability.  7/25/25 GOAL     Patient will improve R knee AROM to >/=0-120 deg for improved knee mobility.  7/25/25 GOAL     Patient will demonstrate reciprocal gait pattern without assistive device to return to PLOF and improve independence.  7/25/25 GOAL     Patient will demonstrate reciprocal gait pattern with stair negotiation.  7/25/25 GOAL     Patient will improve LEFS score to by 9 points (MDC) to demonstrate significant improvement in the ability to complete household and community functional tasks with the lower extremity independently. Baseline: 74/80  7/25/25 GOAL     Patient will report 50% improvement in sensation to indicate healing and to improve ADLs, functional mobility, and other household and occupational activities.  7/25/25 GOAL     Cassandra Lang will decrease pain to 0-2/10 to improve ability to perform household/recreational activities.  7/25/25 GOAL     Cassandra Lang will demonstrate independence,  excellent understanding of and report compliance with HEP to facilitate the learning process to maximize PT benefits and to facilitate independent rehab program upon discharge.  7/25/25 GOAL    Plan: ***

## 2025-07-24 ENCOUNTER — APPOINTMENT (OUTPATIENT)
Dept: ORTHOPEDIC SURGERY | Facility: HOSPITAL | Age: 65
End: 2025-07-24
Payer: COMMERCIAL

## 2025-07-25 ENCOUNTER — APPOINTMENT (OUTPATIENT)
Dept: PHYSICAL THERAPY | Facility: CLINIC | Age: 65
End: 2025-07-25
Payer: COMMERCIAL

## 2025-07-25 DIAGNOSIS — S83.231D COMPLEX TEAR OF MEDIAL MENISCUS OF RIGHT KNEE, SUBSEQUENT ENCOUNTER: Primary | ICD-10-CM

## 2025-08-06 ENCOUNTER — APPOINTMENT (OUTPATIENT)
Dept: HEMATOLOGY/ONCOLOGY | Facility: CLINIC | Age: 65
End: 2025-08-06
Payer: COMMERCIAL

## 2025-08-13 ENCOUNTER — APPOINTMENT (OUTPATIENT)
Dept: PHYSICAL THERAPY | Facility: CLINIC | Age: 65
End: 2025-08-13
Payer: COMMERCIAL

## 2025-08-13 DIAGNOSIS — S83.231D COMPLEX TEAR OF MEDIAL MENISCUS OF RIGHT KNEE, SUBSEQUENT ENCOUNTER: Primary | ICD-10-CM

## 2025-08-13 DIAGNOSIS — S83.231A COMPLEX TEAR OF MEDIAL MENISCUS OF RIGHT KNEE AS CURRENT INJURY, INITIAL ENCOUNTER: ICD-10-CM

## 2025-08-13 PROCEDURE — 97530 THERAPEUTIC ACTIVITIES: CPT | Mod: GP

## 2025-08-13 PROCEDURE — 97110 THERAPEUTIC EXERCISES: CPT | Mod: GP

## 2025-08-14 ENCOUNTER — OFFICE VISIT (OUTPATIENT)
Dept: ORTHOPEDIC SURGERY | Facility: HOSPITAL | Age: 65
End: 2025-08-14
Payer: COMMERCIAL

## 2025-08-14 DIAGNOSIS — S83.231D COMPLEX TEAR OF MEDIAL MENISCUS OF RIGHT KNEE AS CURRENT INJURY, SUBSEQUENT ENCOUNTER: Primary | ICD-10-CM

## 2025-08-14 PROCEDURE — 99213 OFFICE O/P EST LOW 20 MIN: CPT | Performed by: STUDENT IN AN ORGANIZED HEALTH CARE EDUCATION/TRAINING PROGRAM

## 2025-08-14 PROCEDURE — 1036F TOBACCO NON-USER: CPT | Performed by: STUDENT IN AN ORGANIZED HEALTH CARE EDUCATION/TRAINING PROGRAM

## 2025-08-14 PROCEDURE — 99212 OFFICE O/P EST SF 10 MIN: CPT

## 2025-08-14 ASSESSMENT — PAIN - FUNCTIONAL ASSESSMENT: PAIN_FUNCTIONAL_ASSESSMENT: NO/DENIES PAIN

## 2025-08-22 ENCOUNTER — APPOINTMENT (OUTPATIENT)
Dept: RHEUMATOLOGY | Facility: CLINIC | Age: 65
End: 2025-08-22
Payer: COMMERCIAL

## 2025-08-22 VITALS
BODY MASS INDEX: 27.82 KG/M2 | HEIGHT: 63 IN | DIASTOLIC BLOOD PRESSURE: 87 MMHG | HEART RATE: 90 BPM | OXYGEN SATURATION: 96 % | WEIGHT: 157 LBS | SYSTOLIC BLOOD PRESSURE: 122 MMHG

## 2025-08-22 DIAGNOSIS — R76.8 ANTI-RNP ANTIBODIES PRESENT: ICD-10-CM

## 2025-08-22 DIAGNOSIS — M17.11 PRIMARY OSTEOARTHRITIS OF RIGHT KNEE: ICD-10-CM

## 2025-08-22 DIAGNOSIS — R76.8 POSITIVE ANA (ANTINUCLEAR ANTIBODY): ICD-10-CM

## 2025-08-22 DIAGNOSIS — S83.231D COMPLEX TEAR OF MEDIAL MENISCUS OF RIGHT KNEE, SUBSEQUENT ENCOUNTER: Primary | ICD-10-CM

## 2025-08-22 DIAGNOSIS — M15.0 PRIMARY OSTEOARTHRITIS INVOLVING MULTIPLE JOINTS: ICD-10-CM

## 2025-08-22 PROCEDURE — 3008F BODY MASS INDEX DOCD: CPT | Performed by: INTERNAL MEDICINE

## 2025-08-22 PROCEDURE — 3074F SYST BP LT 130 MM HG: CPT | Performed by: INTERNAL MEDICINE

## 2025-08-22 PROCEDURE — 99204 OFFICE O/P NEW MOD 45 MIN: CPT | Performed by: INTERNAL MEDICINE

## 2025-08-22 PROCEDURE — 3079F DIAST BP 80-89 MM HG: CPT | Performed by: INTERNAL MEDICINE

## 2025-08-22 ASSESSMENT — PAIN SCALES - GENERAL: PAINLEVEL_OUTOF10: 5

## 2025-08-24 DIAGNOSIS — I10 UNCONTROLLED HYPERTENSION: ICD-10-CM

## 2025-08-26 ENCOUNTER — TREATMENT (OUTPATIENT)
Dept: PHYSICAL THERAPY | Facility: CLINIC | Age: 65
End: 2025-08-26
Payer: COMMERCIAL

## 2025-08-26 DIAGNOSIS — S83.231A COMPLEX TEAR OF MEDIAL MENISCUS OF RIGHT KNEE AS CURRENT INJURY, INITIAL ENCOUNTER: ICD-10-CM

## 2025-08-26 DIAGNOSIS — S83.231D COMPLEX TEAR OF MEDIAL MENISCUS OF RIGHT KNEE, SUBSEQUENT ENCOUNTER: Primary | ICD-10-CM

## 2025-08-26 PROCEDURE — 97110 THERAPEUTIC EXERCISES: CPT | Mod: GP

## 2025-08-27 RX ORDER — AMLODIPINE BESYLATE 10 MG/1
10 TABLET ORAL DAILY
Qty: 90 TABLET | Refills: 1 | Status: SHIPPED | OUTPATIENT
Start: 2025-08-27

## 2025-09-03 ENCOUNTER — TREATMENT (OUTPATIENT)
Dept: PHYSICAL THERAPY | Facility: CLINIC | Age: 65
End: 2025-09-03
Payer: COMMERCIAL

## 2025-09-03 DIAGNOSIS — S83.231A COMPLEX TEAR OF MEDIAL MENISCUS OF RIGHT KNEE AS CURRENT INJURY, INITIAL ENCOUNTER: ICD-10-CM

## 2025-09-03 DIAGNOSIS — S83.231D COMPLEX TEAR OF MEDIAL MENISCUS OF RIGHT KNEE, SUBSEQUENT ENCOUNTER: Primary | ICD-10-CM

## 2025-09-03 PROCEDURE — 97110 THERAPEUTIC EXERCISES: CPT | Mod: GP

## (undated) DEVICE — GLOVE, SURGICAL, PROTEXIS PI BLUE W/NEUTHERA, 8.0, PF, LF

## (undated) DEVICE — GLOVE, SURGICAL, PROTEXIS PI , 7.5, PF, LF

## (undated) DEVICE — NEEDLE, SPINAL, S/SU, 18GA 3IN, QUINCKE, STERILE

## (undated) DEVICE — NEEDLE, HYPODERMIC, NEEDLE PRO, 25G X 1.5, ORANGE

## (undated) DEVICE — ELECTRODE, ELECTROSURGICAL, BLADE, INSULATED, ENT/IMA, STERILE

## (undated) DEVICE — SHAVER, BONE CUTTER, 4.0MM

## (undated) DEVICE — GLOVE, SURGICAL, PROTEXIS PI BLUE W/NEUTHERA, 6.5, PF, LF

## (undated) DEVICE — SUTURE, MONOCRYL, 3-0, 27 IN, SH, UNDYED

## (undated) DEVICE — SYRINGE, 20 CC, LUER LOCK

## (undated) DEVICE — Device

## (undated) DEVICE — CUFF, TOURNIQUET, 30 X 4, DUAL PORT/SNGL BLADDER, DISP, LF

## (undated) DEVICE — DRESSING, GAUZE, PETROLATUM, STRIP, XEROFORM, 1 X 8 IN, STERILE

## (undated) DEVICE — DRESSING, ABD PAD, TENDERSORB, 7.5 X 8 IN, NS

## (undated) DEVICE — SUTURE, SILK, 2-0, 30 IN, SH, BLACK

## (undated) DEVICE — DRAPE, INCISE, ANTIMICROBIAL, IOBAN 2, 13 X 13 IN, DISPOSABLE, STERILE

## (undated) DEVICE — SUTURE, MONOCRYL, 3-0, 27 IN, PS-2, UNDYED

## (undated) DEVICE — BANDAGE, ESMARK, 6 IN X 12 FT

## (undated) DEVICE — ELECTRODE, ELECTROSURGICAL, BLADE EXT 4 INCH, INSULATED

## (undated) DEVICE — PADDING, UNDERCAST, WEBRIL, 6 IN X 4 YD, REG, NS

## (undated) DEVICE — WAND, ASPIRATING ABLATOR, 50 DEG, APOLLORF H50

## (undated) DEVICE — DRESSING, GAUZE, SPONGE, KERLIX, SUPER, 6 X 6.75 IN, STERILE 10PK

## (undated) DEVICE — CLIP, LIGATING, HORIZON, MEDIUM, TITANIUM

## (undated) DEVICE — PROBE, APOLLO RF, 50 DEG, MULTI PORT

## (undated) DEVICE — STRIP, SKIN CLOSURE, STERI STRIP, REINFORCED, 0.5 X 4 IN

## (undated) DEVICE — NEEDLE, HYPODERMIC, REGULAR WALL, SHORT BEVEL, 22 G X 1.5 IN

## (undated) DEVICE — DRAPE, SHEET, THREE QUARTER, FAN FOLD, 57 X 77 IN

## (undated) DEVICE — SUTURE, CTD, VICRYL, 2-0, UND, BR, CT-2

## (undated) DEVICE — SUTURE, VICRYL, 3-0, 27 IN, SH, VIOLET

## (undated) DEVICE — CLIP, LIGATING, W/ADHESIVE, WIDE SLOT, SMALL, TITANIUM

## (undated) DEVICE — SUTURE, MONOCRYL, 4-0, 27 IN, PS-2, UNDYED

## (undated) DEVICE — GLOVE, PROTEXIS PI CLASSIC, SZ-6.5, PF, LF